# Patient Record
Sex: MALE | Race: WHITE | Employment: OTHER | URBAN - METROPOLITAN AREA
[De-identification: names, ages, dates, MRNs, and addresses within clinical notes are randomized per-mention and may not be internally consistent; named-entity substitution may affect disease eponyms.]

---

## 2017-03-03 ENCOUNTER — OFFICE VISIT (OUTPATIENT)
Dept: FAMILY MEDICINE CLINIC | Age: 52
End: 2017-03-03

## 2017-03-03 VITALS
OXYGEN SATURATION: 98 % | HEART RATE: 95 BPM | DIASTOLIC BLOOD PRESSURE: 99 MMHG | WEIGHT: 242 LBS | BODY MASS INDEX: 29.85 KG/M2 | SYSTOLIC BLOOD PRESSURE: 153 MMHG

## 2017-03-03 DIAGNOSIS — G89.29 CHRONIC BILATERAL LOW BACK PAIN WITHOUT SCIATICA: Primary | ICD-10-CM

## 2017-03-03 DIAGNOSIS — M54.50 CHRONIC BILATERAL LOW BACK PAIN WITHOUT SCIATICA: Primary | ICD-10-CM

## 2017-03-03 PROCEDURE — 99213 OFFICE O/P EST LOW 20 MIN: CPT | Performed by: FAMILY MEDICINE

## 2017-03-03 RX ORDER — BACLOFEN 10 MG/1
10 TABLET ORAL 3 TIMES DAILY
Qty: 90 TABLET | Refills: 0 | Status: SHIPPED | OUTPATIENT
Start: 2017-03-03 | End: 2017-03-28 | Stop reason: SDUPTHER

## 2017-03-06 RX ORDER — LISINOPRIL 40 MG/1
TABLET ORAL
Qty: 30 TABLET | Refills: 5 | Status: SHIPPED | OUTPATIENT
Start: 2017-03-06 | End: 2017-09-01 | Stop reason: SDUPTHER

## 2017-03-15 ENCOUNTER — TELEPHONE (OUTPATIENT)
Dept: FAMILY MEDICINE CLINIC | Age: 52
End: 2017-03-15

## 2017-03-28 DIAGNOSIS — G89.29 CHRONIC BILATERAL LOW BACK PAIN WITHOUT SCIATICA: ICD-10-CM

## 2017-03-28 DIAGNOSIS — M54.50 CHRONIC BILATERAL LOW BACK PAIN WITHOUT SCIATICA: ICD-10-CM

## 2017-03-28 RX ORDER — BACLOFEN 10 MG/1
TABLET ORAL
Qty: 90 TABLET | Refills: 3 | Status: SHIPPED | OUTPATIENT
Start: 2017-03-28 | End: 2017-05-05 | Stop reason: CLARIF

## 2017-04-04 RX ORDER — DULOXETIN HYDROCHLORIDE 30 MG/1
CAPSULE, DELAYED RELEASE ORAL
Qty: 90 CAPSULE | Refills: 1 | Status: SHIPPED | OUTPATIENT
Start: 2017-04-04 | End: 2017-10-01 | Stop reason: SDUPTHER

## 2017-04-24 ENCOUNTER — HOSPITAL ENCOUNTER (OUTPATIENT)
Dept: OTHER | Age: 52
Discharge: HOME | End: 2017-04-24
Attending: NEUROLOGICAL SURGERY | Admitting: NEUROLOGICAL SURGERY

## 2017-04-24 LAB
ALBUMIN SERPL-MCNC: 4.4 G/DL (ref 3.4–5)
ANION GAP SERPL CALCULATED.3IONS-SCNC: 14 MMOL/L (ref 3–16)
BUN BLDV-MCNC: 14 MG/DL (ref 7–20)
CALCIUM SERPL-MCNC: 9.7 MG/DL (ref 8.3–10.6)
CHLORIDE BLD-SCNC: 101 MMOL/L (ref 99–110)
CO2: 26 MMOL/L (ref 21–32)
CREAT SERPL-MCNC: 0.7 MG/DL (ref 0.9–1.3)
GFR AFRICAN AMERICAN: >60
GFR NON-AFRICAN AMERICAN: >60
GLUCOSE BLD-MCNC: 103 MG/DL (ref 70–99)
HCT VFR BLD CALC: 41.2 % (ref 40.5–52.5)
HEMOGLOBIN: 14 G/DL (ref 13.5–17.5)
MCH RBC QN AUTO: 29.5 PG (ref 26–34)
MCHC RBC AUTO-ENTMCNC: 33.9 G/DL (ref 31–36)
MCV RBC AUTO: 86.8 FL (ref 80–100)
PDW BLD-RTO: 14.1 % (ref 12.4–15.4)
PHOSPHORUS: 4.7 MG/DL (ref 2.5–4.9)
PLATELET # BLD: 229 K/UL (ref 135–450)
PMV BLD AUTO: 7.8 FL (ref 5–10.5)
POTASSIUM SERPL-SCNC: 4.3 MMOL/L (ref 3.5–5.1)
RBC # BLD: 4.75 M/UL (ref 4.2–5.9)
SODIUM BLD-SCNC: 141 MMOL/L (ref 136–145)
WBC # BLD: 9.6 K/UL (ref 4–11)

## 2017-04-24 RX ORDER — TIZANIDINE 4 MG/1
TABLET ORAL
Qty: 90 TABLET | Refills: 3 | Status: SHIPPED | OUTPATIENT
Start: 2017-04-24 | End: 2017-05-17 | Stop reason: SDUPTHER

## 2017-05-05 ENCOUNTER — OFFICE VISIT (OUTPATIENT)
Dept: FAMILY MEDICINE CLINIC | Age: 52
End: 2017-05-05

## 2017-05-05 VITALS
HEIGHT: 75 IN | TEMPERATURE: 98.5 F | HEART RATE: 85 BPM | DIASTOLIC BLOOD PRESSURE: 72 MMHG | SYSTOLIC BLOOD PRESSURE: 110 MMHG | BODY MASS INDEX: 28.91 KG/M2 | WEIGHT: 232.5 LBS | OXYGEN SATURATION: 97 %

## 2017-05-05 DIAGNOSIS — G89.29 CHRONIC BILATERAL LOW BACK PAIN WITHOUT SCIATICA: ICD-10-CM

## 2017-05-05 DIAGNOSIS — M54.50 CHRONIC BILATERAL LOW BACK PAIN WITHOUT SCIATICA: ICD-10-CM

## 2017-05-05 DIAGNOSIS — J45.20 MILD INTERMITTENT ASTHMA WITHOUT COMPLICATION: ICD-10-CM

## 2017-05-05 DIAGNOSIS — Z01.818 PREOP EXAMINATION: Primary | ICD-10-CM

## 2017-05-05 DIAGNOSIS — I10 ESSENTIAL HYPERTENSION: ICD-10-CM

## 2017-05-05 PROCEDURE — 99243 OFF/OP CNSLTJ NEW/EST LOW 30: CPT | Performed by: FAMILY MEDICINE

## 2017-05-05 ASSESSMENT — ENCOUNTER SYMPTOMS
BLOOD IN STOOL: 0
SHORTNESS OF BREATH: 0
ABDOMINAL PAIN: 0
EYE PAIN: 0
WHEEZING: 0

## 2017-05-17 ENCOUNTER — OFFICE VISIT (OUTPATIENT)
Dept: FAMILY MEDICINE CLINIC | Age: 52
End: 2017-05-17

## 2017-05-17 VITALS
HEIGHT: 76 IN | HEART RATE: 76 BPM | DIASTOLIC BLOOD PRESSURE: 74 MMHG | WEIGHT: 228.13 LBS | SYSTOLIC BLOOD PRESSURE: 137 MMHG | OXYGEN SATURATION: 98 % | BODY MASS INDEX: 27.78 KG/M2

## 2017-05-17 DIAGNOSIS — E78.2 MIXED HYPERLIPIDEMIA: ICD-10-CM

## 2017-05-17 DIAGNOSIS — Z12.5 SCREENING PSA (PROSTATE SPECIFIC ANTIGEN): ICD-10-CM

## 2017-05-17 DIAGNOSIS — M54.50 CHRONIC BILATERAL LOW BACK PAIN WITHOUT SCIATICA: ICD-10-CM

## 2017-05-17 DIAGNOSIS — G89.29 CHRONIC BILATERAL LOW BACK PAIN WITHOUT SCIATICA: ICD-10-CM

## 2017-05-17 DIAGNOSIS — I10 ESSENTIAL HYPERTENSION: Primary | ICD-10-CM

## 2017-05-17 DIAGNOSIS — Z12.11 COLON CANCER SCREENING: ICD-10-CM

## 2017-05-17 DIAGNOSIS — Z23 NEED FOR TDAP VACCINATION: ICD-10-CM

## 2017-05-17 LAB
CHOLESTEROL, TOTAL: 205 MG/DL (ref 0–199)
HDLC SERPL-MCNC: 26 MG/DL (ref 40–60)
LDL CHOLESTEROL CALCULATED: 130 MG/DL
PROSTATE SPECIFIC ANTIGEN: 0.75 NG/ML (ref 0–4)
TRIGL SERPL-MCNC: 243 MG/DL (ref 0–150)
VLDLC SERPL CALC-MCNC: 49 MG/DL

## 2017-05-17 PROCEDURE — 90471 IMMUNIZATION ADMIN: CPT | Performed by: FAMILY MEDICINE

## 2017-05-17 PROCEDURE — 90715 TDAP VACCINE 7 YRS/> IM: CPT | Performed by: FAMILY MEDICINE

## 2017-05-17 PROCEDURE — 99214 OFFICE O/P EST MOD 30 MIN: CPT | Performed by: FAMILY MEDICINE

## 2017-05-17 PROCEDURE — 36415 COLL VENOUS BLD VENIPUNCTURE: CPT | Performed by: FAMILY MEDICINE

## 2017-05-17 RX ORDER — OXYCODONE HYDROCHLORIDE 10 MG/1
TABLET ORAL
Refills: 0 | COMMUNITY
Start: 2017-05-06

## 2017-05-17 RX ORDER — TIZANIDINE 4 MG/1
4 TABLET ORAL EVERY 6 HOURS PRN
Qty: 120 TABLET | Refills: 3 | Status: SHIPPED | OUTPATIENT
Start: 2017-05-17 | End: 2017-09-05 | Stop reason: SDUPTHER

## 2017-05-17 ASSESSMENT — PATIENT HEALTH QUESTIONNAIRE - PHQ9
SUM OF ALL RESPONSES TO PHQ QUESTIONS 1-9: 16
5. POOR APPETITE OR OVEREATING: 2
3. TROUBLE FALLING OR STAYING ASLEEP: 2
9. THOUGHTS THAT YOU WOULD BE BETTER OFF DEAD, OR OF HURTING YOURSELF: 0
10. IF YOU CHECKED OFF ANY PROBLEMS, HOW DIFFICULT HAVE THESE PROBLEMS MADE IT FOR YOU TO DO YOUR WORK, TAKE CARE OF THINGS AT HOME, OR GET ALONG WITH OTHER PEOPLE: 2
2. FEELING DOWN, DEPRESSED OR HOPELESS: 2
7. TROUBLE CONCENTRATING ON THINGS, SUCH AS READING THE NEWSPAPER OR WATCHING TELEVISION: 2
6. FEELING BAD ABOUT YOURSELF - OR THAT YOU ARE A FAILURE OR HAVE LET YOURSELF OR YOUR FAMILY DOWN: 1
4. FEELING TIRED OR HAVING LITTLE ENERGY: 3
8. MOVING OR SPEAKING SO SLOWLY THAT OTHER PEOPLE COULD HAVE NOTICED. OR THE OPPOSITE, BEING SO FIGETY OR RESTLESS THAT YOU HAVE BEEN MOVING AROUND A LOT MORE THAN USUAL: 2
1. LITTLE INTEREST OR PLEASURE IN DOING THINGS: 2
SUM OF ALL RESPONSES TO PHQ9 QUESTIONS 1 & 2: 4

## 2017-06-12 RX ORDER — DULOXETIN HYDROCHLORIDE 60 MG/1
CAPSULE, DELAYED RELEASE ORAL
Qty: 90 CAPSULE | Refills: 1 | Status: SHIPPED | OUTPATIENT
Start: 2017-06-12 | End: 2017-11-25 | Stop reason: SDUPTHER

## 2017-06-28 ENCOUNTER — TELEPHONE (OUTPATIENT)
Dept: FAMILY MEDICINE CLINIC | Age: 52
End: 2017-06-28

## 2017-06-28 RX ORDER — LUBIPROSTONE 24 UG/1
24 CAPSULE, GELATIN COATED ORAL 2 TIMES DAILY WITH MEALS
Qty: 60 CAPSULE | Refills: 3 | Status: SHIPPED | OUTPATIENT
Start: 2017-06-28 | End: 2018-03-16 | Stop reason: ALTCHOICE

## 2017-07-07 ENCOUNTER — OFFICE VISIT (OUTPATIENT)
Dept: FAMILY MEDICINE CLINIC | Age: 52
End: 2017-07-07

## 2017-07-07 VITALS
TEMPERATURE: 98.3 F | WEIGHT: 228 LBS | OXYGEN SATURATION: 97 % | BODY MASS INDEX: 28.12 KG/M2 | DIASTOLIC BLOOD PRESSURE: 72 MMHG | HEART RATE: 87 BPM | SYSTOLIC BLOOD PRESSURE: 118 MMHG

## 2017-07-07 DIAGNOSIS — T40.2X5A CONSTIPATION DUE TO OPIOID THERAPY: Primary | ICD-10-CM

## 2017-07-07 DIAGNOSIS — K59.03 CONSTIPATION DUE TO OPIOID THERAPY: Primary | ICD-10-CM

## 2017-07-07 PROCEDURE — 99213 OFFICE O/P EST LOW 20 MIN: CPT | Performed by: FAMILY MEDICINE

## 2017-07-07 RX ORDER — LACTULOSE 10 G/15ML
SOLUTION ORAL
Qty: 3 BOTTLE | Refills: 5 | Status: SHIPPED | OUTPATIENT
Start: 2017-07-07 | End: 2019-01-17

## 2017-07-10 ENCOUNTER — TELEPHONE (OUTPATIENT)
Dept: FAMILY MEDICINE CLINIC | Age: 52
End: 2017-07-10

## 2017-07-10 ENCOUNTER — HOSPITAL ENCOUNTER (OUTPATIENT)
Dept: OTHER | Age: 52
Discharge: HOME | End: 2017-07-10
Attending: FAMILY MEDICINE | Admitting: FAMILY MEDICINE

## 2017-07-10 DIAGNOSIS — K59.03 CONSTIPATION DUE TO OPIOID THERAPY: ICD-10-CM

## 2017-07-10 DIAGNOSIS — K59.03 CONSTIPATION DUE TO OPIOID THERAPY: Primary | ICD-10-CM

## 2017-07-10 DIAGNOSIS — T40.2X5A CONSTIPATION DUE TO OPIOID THERAPY: Primary | ICD-10-CM

## 2017-07-10 DIAGNOSIS — T40.2X5A CONSTIPATION DUE TO OPIOID THERAPY: ICD-10-CM

## 2017-08-18 ENCOUNTER — TELEPHONE (OUTPATIENT)
Dept: FAMILY MEDICINE CLINIC | Age: 52
End: 2017-08-18

## 2017-08-22 ENCOUNTER — TELEPHONE (OUTPATIENT)
Dept: FAMILY MEDICINE CLINIC | Age: 52
End: 2017-08-22

## 2017-09-01 RX ORDER — LISINOPRIL 40 MG/1
TABLET ORAL
Qty: 30 TABLET | Refills: 5 | Status: SHIPPED | OUTPATIENT
Start: 2017-09-01 | End: 2018-02-27 | Stop reason: SDUPTHER

## 2017-09-05 DIAGNOSIS — M54.50 CHRONIC BILATERAL LOW BACK PAIN WITHOUT SCIATICA: ICD-10-CM

## 2017-09-05 DIAGNOSIS — G89.29 CHRONIC BILATERAL LOW BACK PAIN WITHOUT SCIATICA: ICD-10-CM

## 2017-09-05 RX ORDER — TIZANIDINE 4 MG/1
TABLET ORAL
Qty: 120 TABLET | Refills: 3 | Status: SHIPPED | OUTPATIENT
Start: 2017-09-05 | End: 2017-12-11 | Stop reason: SDUPTHER

## 2017-10-02 RX ORDER — DULOXETIN HYDROCHLORIDE 30 MG/1
CAPSULE, DELAYED RELEASE ORAL
Qty: 90 CAPSULE | Refills: 1 | Status: SHIPPED | OUTPATIENT
Start: 2017-10-02 | End: 2017-11-27 | Stop reason: SDUPTHER

## 2017-10-05 ENCOUNTER — TELEPHONE (OUTPATIENT)
Dept: FAMILY MEDICINE CLINIC | Age: 52
End: 2017-10-05

## 2017-10-11 ENCOUNTER — OFFICE VISIT (OUTPATIENT)
Dept: FAMILY MEDICINE CLINIC | Age: 52
End: 2017-10-11

## 2017-10-11 VITALS
BODY MASS INDEX: 28.31 KG/M2 | WEIGHT: 229.5 LBS | SYSTOLIC BLOOD PRESSURE: 145 MMHG | DIASTOLIC BLOOD PRESSURE: 85 MMHG | OXYGEN SATURATION: 95 % | HEART RATE: 79 BPM

## 2017-10-11 DIAGNOSIS — R79.89 LOW TESTOSTERONE: Primary | ICD-10-CM

## 2017-10-11 PROCEDURE — 99213 OFFICE O/P EST LOW 20 MIN: CPT | Performed by: FAMILY MEDICINE

## 2017-10-11 NOTE — PATIENT INSTRUCTIONS
Please read the healthy family handout that you were given and share it with your family. Please compare this printed medication list with your medications at home to be sure they are the same. If you have any medications that are different please contact us immediately at [phone]. Also review your allergies that we have listed, these may also include medications that you have not been able to tolerate, make sure everything listed is correct. If you have any allergies that are different please contact us immediately at [phone].

## 2017-10-11 NOTE — PROGRESS NOTES
Subjective:   He presents for follow-up of his low testosterone. He was diagnosed with low testosterone back in 2011 and he took Androderm for short period of time but stopped it for unclear reasons in 2014 his testosterone was low normal range. He has had low motivation ED and fatigue and his chronic pain doctor checked his blood work and he had significantly lower her total testosterone at 40 now. His PSA was normal at 0.7. He has been feeling depressed also which may be related to low testosterone as well        He is allergic to vioxx and phenergan [promethazine hcl]. Objective:   BP (!) 145/85 (Site: Right Arm, Position: Sitting, Cuff Size: Large Adult)   Pulse 79   Wt 229 lb 8 oz (104.1 kg)   SpO2 95%   BMI 28.31 kg/m²   No results found for this visit on 10/11/17. Exam:  Constitutional:  Well developed, well nourished, no acute distress, non-toxic appearance    HENT:  Atraumatic,oropharynx moist,  Neck-  no tenderness, supple   Respiratory:  No respiratory distress, normal breath sounds, no rales, no wheezing   Cardiovascular:  Normal rate, normal rhythm, no murmurs, no gallops, no rubs   GI:  Soft, nondistended, nontender, no organomegaly, no mass, no rebound, no guarding   Musculoskeletal:  No edema  Integument:  Well hydrated, no rash   Neurologic:  Alert & oriented x 3, no focal deficits noted   Psychiatric:  Speech and behavior appropriate, mildly depressed affect and mood   Assessment and Plan:  1. Low testosterone  Testosterone 4 MG/24HR PT24     Start on testosterone replacement therapy. We talked about testosterone pellets placed under the skin. Recheck in 1 month if still depressed will adjust depression pills. Explained to patient risk of taking testosterone replacement therapy. Recheck levels and monitor PSAs. Call or return to office prn if these symptoms worsen or fail to improve as anticipated. Avoid tobacco products exposure.  The Healthy Family Handout was given to the patient today.  Saravanan Keyes M.D.

## 2017-10-16 DIAGNOSIS — R79.89 LOW TESTOSTERONE: Primary | ICD-10-CM

## 2017-10-16 NOTE — TELEPHONE ENCOUNTER
Patient scheduled for Thursday and Friday morning - please place future orders and he will be in to have them drawn.

## 2017-10-17 ENCOUNTER — NURSE ONLY (OUTPATIENT)
Dept: FAMILY MEDICINE CLINIC | Age: 52
End: 2017-10-17

## 2017-10-17 DIAGNOSIS — R79.89 LOW TESTOSTERONE: ICD-10-CM

## 2017-10-17 PROCEDURE — 36415 COLL VENOUS BLD VENIPUNCTURE: CPT | Performed by: FAMILY MEDICINE

## 2017-10-17 NOTE — PROGRESS NOTES
Blood drawn per physician order. 21 gauge needle used. Location rt ac space w/o incident. Pressure applied until bleeding stopped. Bandaid applied. Patient instructed to call or return if problems with bleeding. 1 tubes drawn.

## 2017-10-18 ENCOUNTER — NURSE ONLY (OUTPATIENT)
Dept: FAMILY MEDICINE CLINIC | Age: 52
End: 2017-10-18

## 2017-10-18 DIAGNOSIS — R79.89 LOW TESTOSTERONE: Primary | ICD-10-CM

## 2017-10-18 PROCEDURE — 36415 COLL VENOUS BLD VENIPUNCTURE: CPT | Performed by: FAMILY MEDICINE

## 2017-10-19 LAB
SEX HORMONE BINDING GLOBULIN: 28 NMOL/L (ref 11–80)
TESTOSTERONE FREE-NONMALE: 3.9 PG/ML (ref 47–244)
TESTOSTERONE TOTAL: 20 NG/DL (ref 220–1000)

## 2017-10-20 LAB
SEX HORMONE BINDING GLOBULIN: 27 NMOL/L (ref 11–80)
TESTOSTERONE FREE-NONMALE: 4.4 PG/ML (ref 47–244)
TESTOSTERONE TOTAL: 22 NG/DL (ref 220–1000)

## 2017-10-26 ENCOUNTER — TELEPHONE (OUTPATIENT)
Dept: FAMILY MEDICINE CLINIC | Age: 52
End: 2017-10-26

## 2017-10-26 NOTE — TELEPHONE ENCOUNTER
I let the patient know that an appeal was started for the Androderm Patch and it will take up to 67 hourse for a decision. They do not contact us, they will send a letter to the patient.      I will follow up on this by Monday afternoon    [phone]  Ref# [id number]

## 2017-11-17 ENCOUNTER — OFFICE VISIT (OUTPATIENT)
Dept: FAMILY MEDICINE CLINIC | Age: 52
End: 2017-11-17

## 2017-11-17 VITALS
HEIGHT: 76 IN | DIASTOLIC BLOOD PRESSURE: 73 MMHG | HEART RATE: 79 BPM | SYSTOLIC BLOOD PRESSURE: 124 MMHG | TEMPERATURE: 99.6 F | BODY MASS INDEX: 28.18 KG/M2 | WEIGHT: 231.38 LBS

## 2017-11-17 DIAGNOSIS — Z23 NEED FOR INFLUENZA VACCINATION: ICD-10-CM

## 2017-11-17 DIAGNOSIS — Z23 NEED FOR PNEUMOCOCCAL VACCINE: ICD-10-CM

## 2017-11-17 DIAGNOSIS — E78.2 MIXED HYPERLIPIDEMIA: ICD-10-CM

## 2017-11-17 DIAGNOSIS — I10 ESSENTIAL HYPERTENSION: Primary | ICD-10-CM

## 2017-11-17 DIAGNOSIS — R79.89 LOW TESTOSTERONE: ICD-10-CM

## 2017-11-17 PROCEDURE — 90471 IMMUNIZATION ADMIN: CPT | Performed by: FAMILY MEDICINE

## 2017-11-17 PROCEDURE — 90670 PCV13 VACCINE IM: CPT | Performed by: FAMILY MEDICINE

## 2017-11-17 PROCEDURE — 90688 IIV4 VACCINE SPLT 0.5 ML IM: CPT | Performed by: FAMILY MEDICINE

## 2017-11-17 PROCEDURE — 90472 IMMUNIZATION ADMIN EACH ADD: CPT | Performed by: FAMILY MEDICINE

## 2017-11-17 PROCEDURE — 99214 OFFICE O/P EST MOD 30 MIN: CPT | Performed by: FAMILY MEDICINE

## 2017-11-17 NOTE — PROGRESS NOTES
Subjective:   He presents for follow up of his high blood pressure low testosterone hyperlipidemia and he wants to get a flu shot and recommended that he get a pneumonia shot. He finally did get testosterone started 10 days ago he really hasn't started feeling better just yet. His blood pressures have been running good we checked his annual cholesterol back in the spring which I reviewed today. He has a hard time walking and asked that we give him disability placard because of his back issues        He is allergic to vioxx and phenergan [promethazine hcl]. He   reports that he has been smoking Cigars. He has a 5.00 pack-year smoking history. He has never used smokeless tobacco. Review of systems negative for chest pain swelling shortness breath wheezing abdominal pain rectal bleeding  Objective:   /73 (Site: Left Arm, Position: Sitting, Cuff Size: Large Adult)   Pulse 79   Temp 99.6 °F (37.6 °C) (Oral)   Ht 6' 3.5\" (1.918 m)   Wt 231 lb 6 oz (105 kg)   BMI 28.54 kg/m²   BP Readings from Last 3 Encounters:   11/17/17 124/73   10/11/17 (!) 145/85   07/07/17 118/72     Physical Exam   Constitutional: He is oriented to person, place, and time. He appears well-developed and well-nourished. Non-toxic appearance. HENT:   Head: Normocephalic. Eyes: Conjunctivae are normal. Right eye exhibits no discharge. Left eye exhibits no discharge. No scleral icterus. Neck: Neck supple. Carotid bruit is not present. No thyroid mass and no thyromegaly present. Cardiovascular: Normal rate, regular rhythm and normal heart sounds. No murmur heard. Pulmonary/Chest: Breath sounds normal. No respiratory distress. He has no wheezes. He has no rales. Abdominal: Soft. He exhibits no distension and no mass. There is no hepatosplenomegaly. There is no tenderness. There is no rebound and no guarding. Musculoskeletal: He exhibits no edema. Lymphadenopathy:     He has no cervical adenopathy.         Right: No

## 2017-11-28 RX ORDER — DULOXETIN HYDROCHLORIDE 30 MG/1
CAPSULE, DELAYED RELEASE ORAL
Qty: 90 CAPSULE | Refills: 1 | Status: SHIPPED | OUTPATIENT
Start: 2017-11-28 | End: 2018-05-30 | Stop reason: SDUPTHER

## 2017-11-28 RX ORDER — DULOXETIN HYDROCHLORIDE 60 MG/1
CAPSULE, DELAYED RELEASE ORAL
Qty: 90 CAPSULE | Refills: 0 | Status: SHIPPED | OUTPATIENT
Start: 2017-11-28 | End: 2018-04-04 | Stop reason: SDUPTHER

## 2017-12-11 DIAGNOSIS — M54.50 CHRONIC BILATERAL LOW BACK PAIN WITHOUT SCIATICA: ICD-10-CM

## 2017-12-11 DIAGNOSIS — G89.29 CHRONIC BILATERAL LOW BACK PAIN WITHOUT SCIATICA: ICD-10-CM

## 2017-12-12 RX ORDER — TIZANIDINE 4 MG/1
TABLET ORAL
Qty: 120 TABLET | Refills: 3 | Status: SHIPPED | OUTPATIENT
Start: 2017-12-12 | End: 2018-03-26 | Stop reason: SDUPTHER

## 2018-02-27 RX ORDER — LISINOPRIL 40 MG/1
TABLET ORAL
Qty: 30 TABLET | Refills: 5 | Status: SHIPPED | OUTPATIENT
Start: 2018-02-27 | End: 2018-08-14 | Stop reason: SDUPTHER

## 2018-03-16 ENCOUNTER — OFFICE VISIT (OUTPATIENT)
Dept: FAMILY MEDICINE CLINIC | Age: 53
End: 2018-03-16

## 2018-03-16 VITALS
WEIGHT: 232.25 LBS | SYSTOLIC BLOOD PRESSURE: 136 MMHG | DIASTOLIC BLOOD PRESSURE: 76 MMHG | BODY MASS INDEX: 28.65 KG/M2 | HEART RATE: 83 BPM | TEMPERATURE: 99.1 F | OXYGEN SATURATION: 99 %

## 2018-03-16 DIAGNOSIS — K59.03 DRUG-INDUCED CONSTIPATION: ICD-10-CM

## 2018-03-16 DIAGNOSIS — N32.0 BLADDER OUTLET OBSTRUCTION: ICD-10-CM

## 2018-03-16 DIAGNOSIS — R79.89 LOW TESTOSTERONE: ICD-10-CM

## 2018-03-16 DIAGNOSIS — N52.9 ERECTILE DYSFUNCTION, UNSPECIFIED ERECTILE DYSFUNCTION TYPE: ICD-10-CM

## 2018-03-16 DIAGNOSIS — Z12.5 SCREENING PSA (PROSTATE SPECIFIC ANTIGEN): ICD-10-CM

## 2018-03-16 DIAGNOSIS — G47.01 INSOMNIA DUE TO MEDICAL CONDITION: Primary | ICD-10-CM

## 2018-03-16 PROCEDURE — 99214 OFFICE O/P EST MOD 30 MIN: CPT | Performed by: FAMILY MEDICINE

## 2018-03-16 RX ORDER — TRAZODONE HYDROCHLORIDE 50 MG/1
50 TABLET ORAL NIGHTLY
Qty: 30 TABLET | Refills: 3 | Status: SHIPPED | OUTPATIENT
Start: 2018-03-16 | End: 2018-07-06 | Stop reason: SDUPTHER

## 2018-03-26 ENCOUNTER — NURSE ONLY (OUTPATIENT)
Dept: FAMILY MEDICINE CLINIC | Age: 53
End: 2018-03-26

## 2018-03-26 DIAGNOSIS — M54.50 CHRONIC BILATERAL LOW BACK PAIN WITHOUT SCIATICA: ICD-10-CM

## 2018-03-26 DIAGNOSIS — G89.29 CHRONIC BILATERAL LOW BACK PAIN WITHOUT SCIATICA: ICD-10-CM

## 2018-03-26 DIAGNOSIS — R79.89 LOW TESTOSTERONE: ICD-10-CM

## 2018-03-26 LAB — PROSTATE SPECIFIC ANTIGEN: 1.12 NG/ML (ref 0–4)

## 2018-03-26 PROCEDURE — 36415 COLL VENOUS BLD VENIPUNCTURE: CPT | Performed by: FAMILY MEDICINE

## 2018-03-27 RX ORDER — TIZANIDINE 4 MG/1
TABLET ORAL
Qty: 120 TABLET | Refills: 3 | Status: SHIPPED | OUTPATIENT
Start: 2018-03-27 | End: 2018-07-06 | Stop reason: SDUPTHER

## 2018-03-28 ENCOUNTER — HOSPITAL ENCOUNTER (OUTPATIENT)
Dept: OTHER | Age: 53
Discharge: HOME | End: 2018-03-28
Attending: FAMILY MEDICINE | Admitting: FAMILY MEDICINE

## 2018-03-28 LAB — PROSTATE SPECIFIC ANTIGEN: 1.17 NG/ML (ref 0–4)

## 2018-03-29 LAB
SEX HORMONE BINDING GLOBULIN: 33 NMOL/L (ref 11–80)
TESTOSTERONE FREE-NONMALE: 3.4 PG/ML (ref 47–244)
TESTOSTERONE TOTAL: 19 NG/DL (ref 220–1000)

## 2018-04-02 DIAGNOSIS — R79.89 LOW TESTOSTERONE: ICD-10-CM

## 2018-04-03 ENCOUNTER — TELEPHONE (OUTPATIENT)
Dept: FAMILY MEDICINE CLINIC | Age: 53
End: 2018-04-03

## 2018-04-04 LAB
SEX HORMONE BINDING GLOBULIN: 29 NMOL/L (ref 11–80)
TESTOSTERONE FREE-NONMALE: 1.5 PG/ML (ref 47–244)
TESTOSTERONE TOTAL: 8 NG/DL (ref 220–1000)

## 2018-04-05 RX ORDER — DULOXETIN HYDROCHLORIDE 60 MG/1
CAPSULE, DELAYED RELEASE ORAL
Qty: 90 CAPSULE | Refills: 1 | Status: SHIPPED | OUTPATIENT
Start: 2018-04-05 | End: 2018-07-26 | Stop reason: ALTCHOICE

## 2018-05-19 ENCOUNTER — OFFICE VISIT (OUTPATIENT)
Dept: FAMILY MEDICINE CLINIC | Age: 53
End: 2018-05-19

## 2018-05-19 VITALS
HEART RATE: 68 BPM | SYSTOLIC BLOOD PRESSURE: 107 MMHG | WEIGHT: 224.5 LBS | OXYGEN SATURATION: 98 % | BODY MASS INDEX: 27.69 KG/M2 | TEMPERATURE: 98.8 F | DIASTOLIC BLOOD PRESSURE: 68 MMHG

## 2018-05-19 DIAGNOSIS — F32.2 SEVERE SINGLE CURRENT EPISODE OF MAJOR DEPRESSIVE DISORDER, WITHOUT PSYCHOTIC FEATURES (HCC): ICD-10-CM

## 2018-05-19 DIAGNOSIS — R79.89 LOW TESTOSTERONE: Primary | ICD-10-CM

## 2018-05-19 PROCEDURE — 99213 OFFICE O/P EST LOW 20 MIN: CPT | Performed by: FAMILY MEDICINE

## 2018-05-19 RX ORDER — TESTOSTERONE CYPIONATE 200 MG/ML
200 INJECTION INTRAMUSCULAR
Qty: 2 ML | Refills: 2 | Status: SHIPPED | OUTPATIENT
Start: 2018-05-19 | End: 2018-08-16 | Stop reason: SDUPTHER

## 2018-05-22 ENCOUNTER — TELEPHONE (OUTPATIENT)
Dept: ORTHOPEDIC SURGERY | Age: 53
End: 2018-05-22

## 2018-05-22 ENCOUNTER — NURSE ONLY (OUTPATIENT)
Dept: FAMILY MEDICINE CLINIC | Age: 53
End: 2018-05-22

## 2018-05-22 DIAGNOSIS — Z79.899 MEDICATION MANAGEMENT: Primary | ICD-10-CM

## 2018-05-22 PROCEDURE — 99999 PR OFFICE/OUTPT VISIT,PROCEDURE ONLY: CPT | Performed by: FAMILY MEDICINE

## 2018-05-30 RX ORDER — DULOXETIN HYDROCHLORIDE 30 MG/1
CAPSULE, DELAYED RELEASE ORAL
Qty: 90 CAPSULE | Refills: 1 | Status: SHIPPED | OUTPATIENT
Start: 2018-05-30 | End: 2018-07-26 | Stop reason: ALTCHOICE

## 2018-05-31 DIAGNOSIS — Z79.899 MEDICATION MANAGEMENT: Primary | ICD-10-CM

## 2018-06-04 ENCOUNTER — TELEPHONE (OUTPATIENT)
Dept: FAMILY MEDICINE CLINIC | Age: 53
End: 2018-06-04

## 2018-06-04 RX ORDER — SERTRALINE HYDROCHLORIDE 100 MG/1
100 TABLET, FILM COATED ORAL DAILY
Qty: 30 TABLET | Refills: 3 | Status: SHIPPED | OUTPATIENT
Start: 2018-06-04 | End: 2018-08-16 | Stop reason: ALTCHOICE

## 2018-06-14 ENCOUNTER — OFFICE VISIT (OUTPATIENT)
Dept: FAMILY MEDICINE CLINIC | Age: 53
End: 2018-06-14

## 2018-06-14 VITALS
SYSTOLIC BLOOD PRESSURE: 108 MMHG | DIASTOLIC BLOOD PRESSURE: 68 MMHG | OXYGEN SATURATION: 98 % | TEMPERATURE: 98.6 F | BODY MASS INDEX: 28.28 KG/M2 | HEART RATE: 86 BPM | WEIGHT: 229.25 LBS

## 2018-06-14 DIAGNOSIS — K08.89 PAIN, DENTAL: ICD-10-CM

## 2018-06-14 DIAGNOSIS — R59.0 ANTERIOR CERVICAL LYMPHADENOPATHY: Primary | ICD-10-CM

## 2018-06-14 PROCEDURE — 99213 OFFICE O/P EST LOW 20 MIN: CPT | Performed by: NURSE PRACTITIONER

## 2018-06-14 RX ORDER — AMOXICILLIN AND CLAVULANATE POTASSIUM 875; 125 MG/1; MG/1
1 TABLET, FILM COATED ORAL 2 TIMES DAILY
Qty: 20 TABLET | Refills: 0 | Status: SHIPPED | OUTPATIENT
Start: 2018-06-14 | End: 2018-06-24

## 2018-06-14 ASSESSMENT — ENCOUNTER SYMPTOMS
SORE THROAT: 0
NAUSEA: 0
TROUBLE SWALLOWING: 0
RHINORRHEA: 0
VOICE CHANGE: 0
FACIAL SWELLING: 0
COUGH: 0
SHORTNESS OF BREATH: 0
WHEEZING: 0

## 2018-06-27 ENCOUNTER — TELEPHONE (OUTPATIENT)
Dept: FAMILY MEDICINE CLINIC | Age: 53
End: 2018-06-27

## 2018-07-03 ENCOUNTER — HOSPITAL ENCOUNTER (OUTPATIENT)
Dept: OTHER | Age: 53
Discharge: HOME | End: 2018-07-03
Attending: ANESTHESIOLOGY | Admitting: ANESTHESIOLOGY

## 2018-07-03 DIAGNOSIS — M51.24 THORACIC DISC HERNIATION: ICD-10-CM

## 2018-07-03 DIAGNOSIS — G89.29 CHRONIC LOW BACK PAIN: ICD-10-CM

## 2018-07-03 DIAGNOSIS — M54.50 CHRONIC LOW BACK PAIN: ICD-10-CM

## 2018-07-06 DIAGNOSIS — G89.29 CHRONIC BILATERAL LOW BACK PAIN WITHOUT SCIATICA: ICD-10-CM

## 2018-07-06 DIAGNOSIS — M54.50 CHRONIC BILATERAL LOW BACK PAIN WITHOUT SCIATICA: ICD-10-CM

## 2018-07-06 DIAGNOSIS — G47.01 INSOMNIA DUE TO MEDICAL CONDITION: ICD-10-CM

## 2018-07-06 RX ORDER — TIZANIDINE 4 MG/1
TABLET ORAL
Qty: 120 TABLET | Refills: 1 | Status: SHIPPED | OUTPATIENT
Start: 2018-07-06 | End: 2018-08-16 | Stop reason: ALTCHOICE

## 2018-07-06 RX ORDER — TRAZODONE HYDROCHLORIDE 50 MG/1
50 TABLET ORAL NIGHTLY
Qty: 30 TABLET | Refills: 1 | Status: SHIPPED | OUTPATIENT
Start: 2018-07-06 | End: 2018-07-26 | Stop reason: ALTCHOICE

## 2018-07-26 ENCOUNTER — OFFICE VISIT (OUTPATIENT)
Dept: FAMILY MEDICINE CLINIC | Age: 53
End: 2018-07-26

## 2018-07-26 VITALS
WEIGHT: 229 LBS | DIASTOLIC BLOOD PRESSURE: 70 MMHG | HEART RATE: 79 BPM | BODY MASS INDEX: 28.25 KG/M2 | OXYGEN SATURATION: 98 % | SYSTOLIC BLOOD PRESSURE: 124 MMHG

## 2018-07-26 DIAGNOSIS — G89.29 CHRONIC BILATERAL LOW BACK PAIN WITHOUT SCIATICA: Primary | ICD-10-CM

## 2018-07-26 DIAGNOSIS — F32.2 SEVERE SINGLE CURRENT EPISODE OF MAJOR DEPRESSIVE DISORDER, WITHOUT PSYCHOTIC FEATURES (HCC): ICD-10-CM

## 2018-07-26 DIAGNOSIS — F41.9 ANXIETY: ICD-10-CM

## 2018-07-26 DIAGNOSIS — M54.50 CHRONIC BILATERAL LOW BACK PAIN WITHOUT SCIATICA: Primary | ICD-10-CM

## 2018-07-26 PROCEDURE — 99213 OFFICE O/P EST LOW 20 MIN: CPT | Performed by: NURSE PRACTITIONER

## 2018-07-26 RX ORDER — ALPRAZOLAM 0.5 MG/1
TABLET ORAL
Qty: 4 TABLET | Refills: 0 | Status: SHIPPED | OUTPATIENT
Start: 2018-07-26 | End: 2018-09-05 | Stop reason: SDUPTHER

## 2018-07-26 RX ORDER — CITALOPRAM 10 MG/1
10 TABLET ORAL DAILY
Qty: 30 TABLET | Refills: 3 | Status: SHIPPED | OUTPATIENT
Start: 2018-07-26 | End: 2018-08-16 | Stop reason: SDUPTHER

## 2018-07-26 ASSESSMENT — ENCOUNTER SYMPTOMS
ALLERGIC/IMMUNOLOGIC NEGATIVE: 1
EYES NEGATIVE: 1
RESPIRATORY NEGATIVE: 1
BACK PAIN: 1

## 2018-07-26 NOTE — PROGRESS NOTES
Packs/day: 0.25     Years: 20.00     Types: Cigars    Smokeless tobacco: Never Used    Alcohol use Yes      Comment: occassional       Objective:   /70   Pulse 79   Wt 229 lb (103.9 kg)   SpO2 98%   BMI 28.25 kg/m²     Physical Exam   Constitutional: He is oriented to person, place, and time. Vital signs are normal. He appears well-developed and well-nourished. He is cooperative. He does not have a sickly appearance. No distress. HENT:   Head: Normocephalic and atraumatic. Eyes: Conjunctivae and EOM are normal.   Neck: Neck supple. Cardiovascular: Normal rate, regular rhythm, S1 normal, S2 normal, normal heart sounds, intact distal pulses and normal pulses. Exam reveals no gallop. No murmur heard. Pulmonary/Chest: Effort normal and breath sounds normal. No accessory muscle usage. No respiratory distress. Abdominal: Soft. Bowel sounds are normal.   Neurological: He is alert and oriented to person, place, and time. Skin: Skin is warm and dry. No rash noted. Psychiatric: His speech is normal and behavior is normal. Thought content is not paranoid and not delusional. He exhibits a depressed mood. He expresses no homicidal and no suicidal ideation. He expresses no suicidal plans and no homicidal plans. Assessment/Plan:   1. Chronic bilateral low back pain without sciatica  Patient presents today requesting medication to help him endure long car ride. Patient has severe back pain related to multi-level degenerative disc disease and prior surgeries. Patient has most recently taken xanax to help with claustrophobia and did quite well. I advised patient that concern with him taking a benzodiazepine with his current pain medication including hydromorphone and OxyIR is the concern for over sedation and even respiratory depression. I advised patient to go to local health department Narcan can in the event he would become oversedated.   Advised patient not to take more than one Xanax a day and that he may take half hour prior to car ride. Controlled Substances Monitoring:     RX Monitoring 7/26/2018   Attestation The Prescription Monitoring Report for this patient was reviewed today. Documentation No signs of potential drug abuse or diversion identified.     - ALPRAZolam (XANAX) 0.5 MG tablet; 1 tablet 1/2 hour prior to long car ride. .  Dispense: 4 tablet; Refill: 0    2. Anxiety  See note above. - ALPRAZolam (XANAX) 0.5 MG tablet; 1 tablet 1/2 hour prior to long car ride. .  Dispense: 4 tablet; Refill: 0    3. Severe single current episode of major depressive disorder, without psychotic features [facility])  Patient also with complaints of increased depression. Patient reports depressed mood for several years with no improvement with sertraline. Patient denies any homicidal or suicidal ideations. Reviewed recent genetic testing and recommend patient taper down on sertraline, taking 100 mg daily ×3 days then 50 mg by mouth daily ×2 days then starting Celexa 10 mg by mouth daily. Advised on possible side effects and for patient to call with any problems or concerns. Patient verbalized understanding and agreeable to plan. - citalopram (CELEXA) 10 MG tablet; Take 1 tablet by mouth daily  Dispense: 30 tablet;  Refill: 3

## 2018-07-26 NOTE — PATIENT INSTRUCTIONS
Please read the healthy family handout that you were given and share it with your family. Please compare this printed medication list with your medications at home to be sure they are the same. If you have any medications that are different please contact us immediately at [phone]. Also review your allergies that we have listed, these may also include medications that you have not been able to tolerate, make sure everything listed is correct. If you have any allergies that are different please contact us immediately at [phone]. Patient Education      reduce sertraline 100 mg daily X 3 days then 50 mg daily X 2 days then start the celexa.  a Narcan kit at [facility] department to have on hand while you are taking xanax in addition to current pain medication. Depression and Chronic Disease: Care Instructions  Your Care Instructions    A chronic disease is one that you have for a long time. Some chronic diseases can be controlled, but they usually cannot be cured. Depression is common in people with chronic diseases, but it often goes unnoticed. Many people have concerns about seeking treatment for a mental health problem. You may think it's a sign of weakness, or you don't want people to know about it. It's important to overcome these reasons for not seeking treatment. Treating depression or anxiety is good for your health. Follow-up care is a key part of your treatment and safety. Be sure to make and go to all appointments, and call your doctor if you are having problems. It's also a good idea to know your test results and keep a list of the medicines you take. How can you care for yourself at home? Watch for symptoms of depression  The symptoms of depression are often subtle at first. You may think they are caused by your disease rather than depression. Or you may think it is normal to be depressed when you have a chronic disease.   If you are depressed you may:  · Feel sad or hopeless. · Feel guilty or worthless. · Not enjoy the things you used to enjoy. · Feel hopeless, as though life is not worth living. · Have trouble thinking or remembering. · Have low energy, and you may not eat or sleep well. · Pull away from others. · Think often about death or killing yourself. (Keep the numbers for these national suicide hotlines: 6-908-832-TALK [1-155.463.8989] and 2-983-CILMYWP [1-100.568.8912]. )  Get treatment  By treating your depression, you can feel more hopeful and have more energy. If you feel better, you may take better care of yourself, so your health may improve. · Talk to your doctor if you have any changes in mood during treatment for your disease. · Ask your doctor for help. Counseling, antidepressant medicine, or a combination of the two can help most people with depression. Often a combination works best. Counseling can also help you cope with having a chronic disease. When should you call for help? Call 911 anytime you think you may need emergency care. For example, call if:    · You feel like hurting yourself or someone else.     · Someone you know has depression and is about to attempt or is attempting suicide.   [facility] your doctor now or seek immediate medical care if:    · You hear voices.     · Someone you know has depression and:  ¨ Starts to give away his or her possessions. ¨ Uses illegal drugs or drinks alcohol heavily. ¨ Talks or writes about death, including writing suicide notes or talking about guns, knives, or pills. ¨ Starts to spend a lot of time alone. ¨ Acts very aggressively or suddenly appears calm.    Watch closely for changes in your health, and be sure to contact your doctor if:    · You do not get better as expected. Where can you learn more? Go to [email or web address] org and sign in to your [employer] account.  Enter S141 in the ZAPR box to learn more about \"Depression and Chronic Disease: Care bleeding or blood clotting disorder;  · liver or kidney disease;  · narrow-angle glaucoma;  · seizures or epilepsy;  · heart disease, heart failure, a heart rhythm disorder, slow heartbeats, or recent history of heart attack;  · personal or family history of Long QT syndrome;  · an electrolyte imbalance (such as low levels of potassium or magnesium in your blood);  · bipolar disorder (manic depression); or  · a history of drug abuse or suicidal thoughts. Some young people have thoughts about suicide when first taking an antidepressant. Your doctor should check your progress at regular visits. Your family or other caregivers should also be alert to changes in your mood or symptoms. Taking an SSRI antidepressant during pregnancy may cause serious lung problems or other complications in the baby. However, you may have a relapse of depression if you stop taking your antidepressant. Tell your doctor right away if you become pregnant. Do not start or stop taking this medicine during pregnancy without your doctor's advice. Citalopram can pass into breast milk and may harm a nursing baby. You should not breast-feed while you are using citalopram.  Do not give this medicine to anyone under 25years old without medical advice. Citalopram is not approved for use in children. How should I take citalopram?  Follow all directions on your prescription label. Your doctor may occasionally change your dose. Do not use this medicine in larger or smaller amounts or for longer than recommended. Measure liquid medicine with the dosing syringe provided, or with a special dose-measuring spoon or medicine cup. If you do not have a dose-measuring device, ask your pharmacist for one. It may take up to 4 weeks before your symptoms improve. Keep using the medication as directed and tell your doctor if your symptoms do not improve. Do not stop using citalopram suddenly, or you could have unpleasant withdrawal symptoms.  Ask your doctor how serotonin in the body --agitation, hallucinations, fever, fast heart rate, overactive reflexes, nausea, vomiting, diarrhea, loss of coordination, fainting; or  · low levels of sodium in the body --headache, confusion, slurred speech, severe weakness, vomiting, feeling unsteady. Common side effects may include:  · problems with memory or concentration;  · headache, drowsiness;  · dry mouth, increased sweating;  · numbness or tingling;  · increased appetite, nausea, diarrhea, gas;  · fast heartbeats, feeling shaky;  · sleep problems (insomnia), feeling tired;  · cold symptoms such as stuffy nose, sneezing, sore throat;  · changes in weight; or  · difficulty having an orgasm. This is not a complete list of side effects and others may occur. Call your doctor for medical advice about side effects. You may report side effects to FDA at 3-215-FDA-5964. What other drugs will affect citalopram?  Taking citalopram with other drugs that make you sleepy or slow your breathing can cause dangerous side effects or death. Ask your doctor before taking a sleeping pill, narcotic pain medicine, prescription cough medicine, a muscle relaxer, or medicine for anxiety, depression, or seizures. Many drugs can interact with citalopram. Not all possible interactions are listed here. Tell your doctor about all your current medicines and any you start or stop using, especially:  · cimetidine;  · lithium;  · Debbi's wort;  · tryptophan (sometimes called L-tryptophan);  · a blood thinner (warfarin, Coumadin, Jantoven);  · any other antidepressant;  · heart medication;  · medicine to treat a psychiatric disorder; or  · \"triptan\" migraine headache medicine. This list is not complete and many other drugs can interact with citalopram. This includes prescription and over-the-counter medicines, vitamins, and herbal products. Give a list of all your medicines to any healthcare provider who treats you. Where can I get more information?   Your rock. [address] any warranty or liability for your use of this information. Patient Education          alprazolam  Pronunciation:  nagi martinez mukherjee  Brand:  Niravam, Xanax, Xanax XR  What is the most important information I should know about alprazolam?  You should not use this medicine if you have narrow-angle glaucoma, if you also take itraconazole or ketoconazole, or if you are allergic to alprazolam or similar medicines (Valium, Ativan, Tranxene, and others). Do not use alprazolam if you are pregnant. This medicine can cause birth defects or life-threatening withdrawal symptoms in a . Alprazolam may be habit-forming. Misuse of habit-forming medicine can cause addiction, overdose, or death. What is alprazolam?  Alprazolam is a benzodiazepine (ginette-juan-dye-AZE-eh-peen). Alprazolam affects chemicals in the brain that may be unbalanced in people with anxiety. Alprazolam is used to treat anxiety disorders, panic disorders, and anxiety caused by depression. Alprazolam may also be used for purposes not listed in this medication guide. What should I discuss with my healthcare provider before taking alprazolam?  It is dangerous to purchase alprazolam on the Internet or from vendors outside the [place]. Medications distributed from [employer] may contain dangerous ingredients, or may not be distributed by a licensed pharmacy. The sale and distribution of alprazolam outside the [place] does not comply with the regulations of the Food and Drug Administration (FDA) for the safe use of this medication. You should not take alprazolam if you have:  · narrow-angle glaucoma;  · if you are also taking itraconazole or ketoconazole; or  · if you are allergic to alprazolam or to other benzodiazepines, such as chlordiazepoxide (Librium), clorazepate (Tranxene), diazepam (Valium), lorazepam (Ativan), or oxazepam (Serax).   To make sure alprazolam is safe for you, tell your doctor or hurting yourself;  · racing thoughts, increased energy, unusual risk-taking behavior;  · confusion, agitation, hostility, hallucinations;  · uncontrolled muscle movements, tremor, seizure (convulsions); or  · pounding heartbeats or fluttering in your chest.  Common side effects may include:  · drowsiness, feeling tired;  · slurred speech, lack of balance or coordination;  · memory problems; or  · feeling anxious early in the morning. This is not a complete list of side effects and others may occur. Call your doctor for medical advice about side effects. You may report side effects to FDA at 4-296-WVB-1134. What other drugs will affect alprazolam?  Taking alprazolam with other drugs that make you sleepy or slow your breathing can cause dangerous side effects or death. Ask your doctor before taking a sleeping pill, narcotic pain medicine, prescription cough medicine, a muscle relaxer, or medicine for anxiety, depression, or seizures. Tell your doctor about all your current medicines and any you start or stop using, especially:  · cimetidine;  · digoxin;  · fluvoxamine;  · nefazodone;  · ritonavir or other medicines to treat HIV or AIDS; or  · antifungal medicine --fluconazole, voriconazole. This list is not complete. Other drugs may interact with alprazolam, including prescription and over-the-counter medicines, vitamins, and herbal products. Not all possible interactions are listed in this medication guide. Where can I get more information? Your pharmacist can provide more information about alprazolam.    Remember, keep this and all other medicines out of the reach of children, never share your medicines with others, and use this medication only for the indication prescribed. Every effort has been made to ensure that the information provided by [address] is accurate, up-to-date, and complete, but no guarantee is made to that effect.  Drug information contained herein may be time sensitive. [employer] information has been compiled for use by healthcare practitioners and consumers in the [place] and therefore [employer] does not warrant that uses outside of the [place] are appropriate, unless specifically indicated otherwise. [facility] drug information does not endorse drugs, diagnose patients or recommend therapy. [facility] drug information is an informational resource designed to assist licensed healthcare practitioners in caring for their patients and/or to serve consumers viewing this service as a supplement to, and not a substitute for, the expertise, skill, knowledge and judgment of healthcare practitioners. The absence of a warning for a given drug or drug combination in no way should be construed to indicate that the drug or drug combination is safe, effective or appropriate for any given patient. [facility] does not assume any responsibility for any aspect of healthcare administered with the aid of information [facility] provides. The information contained herein is not intended to cover all possible uses, directions, precautions, warnings, drug interactions, allergic reactions, or adverse effects. If you have questions about the drugs you are taking, check with your doctor, nurse or pharmacist.  Copyright 3009-4829 [address]: 10.05. Revision date: 9/28/2016. Care instructions adapted under license by [facility] ([facility]). If you have questions about a medical condition or this instruction, always ask your healthcare professional. [place] [address] any warranty or liability for your use of this information. Patient Education          naloxone (injection)  Pronunciation:  nah LOX one  Brand:  Evzio  What is the most important information I should know about naloxone? In an emergency situation it may not be possible to tell your caregivers about your health conditions.  Make sure any doctor caring for you afterward knows you have received this medicine. What is naloxone? Naloxone blocks or reverses the effects of opioid medication, including extreme drowsiness, slowed breathing, or loss of consciousness. An opioid is sometimes called a narcotic. Naloxone is used to treat a narcotic overdose in an emergency situation. This medicine should not be used in place of emergency medical care for an overdose. Naloxone is also used to help diagnose whether a person has used an overdose of an opioid. Naloxone may also be used for purposes not listed in this medication guide. What should I discuss with my health care provider before receiving naloxone? You should not be treated with naloxone if you are allergic to it. If possible before you receive a naloxone injection, tell your doctor if you have heart disease. It is not known whether this medicine will harm an unborn baby. Tell your doctor if you are pregnant. It is not known whether naloxone passes into breast milk or if it could harm a nursing baby. Tell your doctor if you are breast-feeding a baby. In an emergency situation it may not be possible to tell your caregivers if you are pregnant or breast-feeding. Make sure any doctor caring for your pregnancy or your baby knows you have received this medicine. How is naloxone given? Naloxone is injected into a muscle, under the skin, or into a vein through an IV. The injection may be given by a healthcare provider, emergency medical provider, or a family member or caregiver who is trained to properly give a naloxone injection. If you are a caregiver or family member giving a naloxone injection, read all instructions when you first get this medicine. If provided, use the \"\" device to practice giving an injection so you will know how to do it in an emergency. Ask your doctor or pharmacist if you have any questions. Be sure you know how to recognize the signs of an opioid overdose in the person you are caring for.  Overdose symptoms may include: · slowed breathing, or no breathing;  · very small or pinpoint pupils in the eyes;  · slow heartbeats; or  · extreme drowsiness, especially if you are unable to wake the person from sleep. Even if you are not sure an opioid overdose has occurred, if the person is not breathing or is unresponsive, give the naloxone injection right away and then seek emergency medical care. Do not assume that an overdose episode has ended if symptoms improve. You must get emergency help after giving a naloxone injection. Naloxone injected into a muscle is given in the outer thigh. In an emergency, you may give an injection through the person's clothing. After injecting naloxone, stay with the person and watch for continued signs of overdose. You may need to give another injection every 2 to 3 minutes until emergency help arrives. Follow all medication instructions carefully. Each [employer] auto-injector is for one use only. Throw away after one use, even if there is still some medicine left in it after injecting a dose. Store at room temperature away from moisture and heat. Keep the auto-injector in its outer case until you are ready to use it. Do not use the medicine if it has changed colors or has particles in it. Call your pharmacist for new medication. What happens if I miss a dose? Because you will receive naloxone in an emergency situation, you are not likely to miss a dose. What happens if I overdose? Seek emergency medical attention or call the Poison Help line at 1-433.598.1038. What should I avoid while using naloxone? Avoid leaving a person alone after giving him or her a naloxone injection. An overdose can impair a person's thinking or reactions. What are the possible side effects of naloxone? Get emergency medical help if you have signs of an allergic reaction: hives; difficult breathing; swelling of your face, lips, tongue, or throat.   Because naloxone reverses opioid effects, this medicine may cause sudden

## 2018-08-14 RX ORDER — LISINOPRIL 40 MG/1
TABLET ORAL
Qty: 30 TABLET | Refills: 5 | Status: SHIPPED | OUTPATIENT
Start: 2018-08-14 | End: 2019-01-04 | Stop reason: SDUPTHER

## 2018-08-15 ENCOUNTER — TELEPHONE (OUTPATIENT)
Dept: PRIMARY CARE CLINIC | Age: 53
End: 2018-08-15

## 2018-08-16 ENCOUNTER — OFFICE VISIT (OUTPATIENT)
Dept: FAMILY MEDICINE CLINIC | Age: 53
End: 2018-08-16

## 2018-08-16 VITALS
BODY MASS INDEX: 28.64 KG/M2 | WEIGHT: 232.2 LBS | DIASTOLIC BLOOD PRESSURE: 75 MMHG | HEART RATE: 86 BPM | TEMPERATURE: 99.3 F | OXYGEN SATURATION: 97 % | SYSTOLIC BLOOD PRESSURE: 133 MMHG

## 2018-08-16 DIAGNOSIS — F32.2 SEVERE SINGLE CURRENT EPISODE OF MAJOR DEPRESSIVE DISORDER, WITHOUT PSYCHOTIC FEATURES (HCC): ICD-10-CM

## 2018-08-16 DIAGNOSIS — M54.50 CHRONIC BILATERAL LOW BACK PAIN WITHOUT SCIATICA: ICD-10-CM

## 2018-08-16 DIAGNOSIS — E78.2 MIXED HYPERLIPIDEMIA: ICD-10-CM

## 2018-08-16 DIAGNOSIS — G89.29 CHRONIC BILATERAL LOW BACK PAIN WITHOUT SCIATICA: ICD-10-CM

## 2018-08-16 DIAGNOSIS — I10 ESSENTIAL HYPERTENSION: Primary | ICD-10-CM

## 2018-08-16 DIAGNOSIS — R79.89 LOW TESTOSTERONE: ICD-10-CM

## 2018-08-16 PROCEDURE — 99214 OFFICE O/P EST MOD 30 MIN: CPT | Performed by: FAMILY MEDICINE

## 2018-08-16 RX ORDER — CITALOPRAM 20 MG/1
20 TABLET ORAL DAILY
Qty: 30 TABLET | Refills: 3 | Status: SHIPPED | OUTPATIENT
Start: 2018-08-16 | End: 2018-12-07 | Stop reason: SDUPTHER

## 2018-08-16 RX ORDER — TESTOSTERONE CYPIONATE 200 MG/ML
200 INJECTION INTRAMUSCULAR
Qty: 2 ML | Refills: 2 | Status: SHIPPED | OUTPATIENT
Start: 2018-08-16 | End: 2018-11-26 | Stop reason: SDUPTHER

## 2018-08-16 RX ORDER — TIZANIDINE HYDROCHLORIDE 6 MG/1
6 CAPSULE, GELATIN COATED ORAL 4 TIMES DAILY PRN
Qty: 120 CAPSULE | Refills: 2 | Status: SHIPPED | OUTPATIENT
Start: 2018-08-16 | End: 2018-10-22 | Stop reason: SDUPTHER

## 2018-08-16 NOTE — PROGRESS NOTES
Subjective:   He presents for follow up of his high blood pressure chronic back issues depression low T and hyperlipidemia he was started on 10 mg of Celexa by NP but he states it has not really helped much. Pressures have been under good control lately he still continues to have chronic back pain he wants to increase dose of Zanaflex of possible it helps but not always all the way he states. He still going to chronic pain doctor and has pain pump. He needs to get testosterone levels rechecked but he will come back and do it in the morning and he needs a fasting lipid to follow-up on his high cholesterol      He is allergic to vioxx and phenergan [promethazine hcl]. He   reports that he has been smoking Cigars. He has a 5.00 pack-year smoking history. He has never used smokeless tobacco. Review of systems negative for chest pain swelling shortness of breath wheezing abdominal pain rectal bleeding  Objective:   /75   Pulse 86   Temp 99.3 °F (37.4 °C) (Oral)   Wt 232 lb 3.2 oz (105.3 kg)   SpO2 97%   BMI 28.64 kg/m²   BP Readings from Last 3 Encounters:   08/16/18 133/75   07/26/18 124/70   06/14/18 108/68     Physical Exam   Constitutional: He is oriented to person, place, and time. He appears well-developed and well-nourished. Non-toxic appearance. HENT:   Head: Normocephalic. Eyes: Conjunctivae are normal. Right eye exhibits no discharge. Left eye exhibits no discharge. No scleral icterus. Neck: Neck supple. Carotid bruit is not present. No thyroid mass and no thyromegaly present. Cardiovascular: Normal rate, regular rhythm and normal heart sounds. No murmur heard. Pulmonary/Chest: Breath sounds normal. No respiratory distress. He has no wheezes. He has no rales. Abdominal: Soft. He exhibits no distension and no mass. There is no hepatosplenomegaly. There is no tenderness. There is no rebound and no guarding. Musculoskeletal: He exhibits no edema.    Lymphadenopathy:     He has no cervical adenopathy. Right: No supraclavicular adenopathy present. Neurological: He is alert and oriented to person, place, and time. Skin: Skin is warm. No cyanosis. Psychiatric: He has a normal mood and affect. His behavior is normal. Judgment and thought content normal.   Vitals reviewed. pain pump palpable in abdomen  Assessment and Plan:   Diagnosis Orders   1. Essential hypertension     2. Chronic bilateral low back pain without sciatica  tiZANidine (ZANAFLEX) 6 MG capsule   3. Severe single current episode of major depressive disorder, without psychotic features ([address]  citalopram (CELEXA) 20 MG tablet   4. Low testosterone  testosterone cypionate (DEPOTESTOTERONE CYPIONATE) 200 MG/ML injection   5. Mixed hyperlipidemia     Increased dose of Celexa and Zanaflex. Keep follow-up with pain doctor  The problems listed in the assessment are stable unless otherwise indicated. He  was instructed to continue their current medications and treatment for the above  problems unless otherwise indicated above. Age-specific preventative medicine recommendations were reviewed with patient today and the Healthy Family Handout was given to patient. Avoid tobacco products exposure. Follow up in 6mo. Call or return to office for any problems that develop before the next scheduled follow-up appointment. Jayce Pena M.D. Parts of this note were completed using voice recognition transcription. Every effort was made to ensure accuracy; however, inadvertent transcription errors may be present.

## 2018-08-30 ENCOUNTER — NURSE ONLY (OUTPATIENT)
Dept: FAMILY MEDICINE CLINIC | Age: 53
End: 2018-08-30

## 2018-08-30 DIAGNOSIS — E78.2 MIXED HYPERLIPIDEMIA: ICD-10-CM

## 2018-08-30 DIAGNOSIS — R79.89 LOW TESTOSTERONE: Primary | ICD-10-CM

## 2018-08-30 DIAGNOSIS — N52.9 ERECTILE DYSFUNCTION, UNSPECIFIED ERECTILE DYSFUNCTION TYPE: ICD-10-CM

## 2018-08-30 DIAGNOSIS — I10 ESSENTIAL HYPERTENSION: ICD-10-CM

## 2018-08-30 LAB
ANION GAP SERPL CALCULATED.3IONS-SCNC: 13 MMOL/L (ref 3–16)
BUN BLDV-MCNC: 12 MG/DL (ref 7–20)
CALCIUM SERPL-MCNC: 9 MG/DL (ref 8.3–10.6)
CHLORIDE BLD-SCNC: 101 MMOL/L (ref 99–110)
CHOLESTEROL, TOTAL: 187 MG/DL (ref 0–199)
CO2: 27 MMOL/L (ref 21–32)
CREAT SERPL-MCNC: 0.9 MG/DL (ref 0.9–1.3)
GFR AFRICAN AMERICAN: >60
GFR NON-AFRICAN AMERICAN: >60
GLUCOSE BLD-MCNC: 84 MG/DL (ref 70–99)
HDLC SERPL-MCNC: 33 MG/DL (ref 40–60)
LDL CHOLESTEROL CALCULATED: 116 MG/DL
POTASSIUM SERPL-SCNC: 4.1 MMOL/L (ref 3.5–5.1)
PROSTATE SPECIFIC ANTIGEN: 2.36 NG/ML (ref 0–4)
SODIUM BLD-SCNC: 141 MMOL/L (ref 136–145)
TRIGL SERPL-MCNC: 190 MG/DL (ref 0–150)
VLDLC SERPL CALC-MCNC: 38 MG/DL

## 2018-08-30 PROCEDURE — 36415 COLL VENOUS BLD VENIPUNCTURE: CPT | Performed by: FAMILY MEDICINE

## 2018-09-01 LAB
SEX HORMONE BINDING GLOBULIN: 19 NMOL/L (ref 11–80)
TESTOSTERONE FREE-NONMALE: 177.6 PG/ML (ref 47–244)
TESTOSTERONE TOTAL: 625 NG/DL (ref 220–1000)

## 2018-09-04 ENCOUNTER — TELEPHONE (OUTPATIENT)
Dept: FAMILY MEDICINE CLINIC | Age: 53
End: 2018-09-04

## 2018-09-04 DIAGNOSIS — M54.50 CHRONIC BILATERAL LOW BACK PAIN WITHOUT SCIATICA: ICD-10-CM

## 2018-09-04 DIAGNOSIS — F41.9 ANXIETY: ICD-10-CM

## 2018-09-04 DIAGNOSIS — G89.29 CHRONIC BILATERAL LOW BACK PAIN WITHOUT SCIATICA: ICD-10-CM

## 2018-09-05 RX ORDER — ALPRAZOLAM 0.5 MG/1
TABLET ORAL
Qty: 4 TABLET | Refills: 0 | Status: SHIPPED | OUTPATIENT
Start: 2018-09-05 | End: 2019-05-30 | Stop reason: SDUPTHER

## 2018-09-09 DIAGNOSIS — G47.01 INSOMNIA DUE TO MEDICAL CONDITION: ICD-10-CM

## 2018-09-10 RX ORDER — TRAZODONE HYDROCHLORIDE 50 MG/1
50 TABLET ORAL NIGHTLY
Qty: 30 TABLET | Refills: 1 | OUTPATIENT
Start: 2018-09-10

## 2018-10-15 ENCOUNTER — HOSPITAL ENCOUNTER (OUTPATIENT)
Dept: GENERAL RADIOLOGY | Age: 53
Discharge: HOME | End: 2018-10-15
Payer: COMMERCIAL

## 2018-10-15 ENCOUNTER — HOSPITAL ENCOUNTER (OUTPATIENT)
Age: 53
Discharge: HOME | End: 2018-10-15
Payer: COMMERCIAL

## 2018-10-15 DIAGNOSIS — M96.1 FAILED BACK SYNDROME: ICD-10-CM

## 2018-10-15 PROCEDURE — 72110 X-RAY EXAM L-2 SPINE 4/>VWS: CPT

## 2018-10-22 DIAGNOSIS — G89.29 CHRONIC BILATERAL LOW BACK PAIN WITHOUT SCIATICA: ICD-10-CM

## 2018-10-22 DIAGNOSIS — M54.50 CHRONIC BILATERAL LOW BACK PAIN WITHOUT SCIATICA: ICD-10-CM

## 2018-10-22 RX ORDER — TIZANIDINE HYDROCHLORIDE 6 MG/1
6 CAPSULE, GELATIN COATED ORAL 4 TIMES DAILY PRN
Qty: 120 CAPSULE | Refills: 1 | Status: SHIPPED | OUTPATIENT
Start: 2018-10-22 | End: 2018-12-06 | Stop reason: SDUPTHER

## 2018-11-19 ENCOUNTER — OFFICE VISIT (OUTPATIENT)
Dept: FAMILY MEDICINE CLINIC | Age: 53
End: 2018-11-19
Payer: COMMERCIAL

## 2018-11-19 VITALS
DIASTOLIC BLOOD PRESSURE: 82 MMHG | OXYGEN SATURATION: 98 % | HEART RATE: 83 BPM | BODY MASS INDEX: 27.57 KG/M2 | SYSTOLIC BLOOD PRESSURE: 132 MMHG | WEIGHT: 223.5 LBS | TEMPERATURE: 100.3 F

## 2018-11-19 DIAGNOSIS — M25.521 RIGHT ELBOW PAIN: Primary | ICD-10-CM

## 2018-11-19 PROCEDURE — 99214 OFFICE O/P EST MOD 30 MIN: CPT | Performed by: NURSE PRACTITIONER

## 2018-11-19 RX ORDER — PREDNISONE 20 MG/1
40 TABLET ORAL DAILY
Qty: 14 TABLET | Refills: 0 | Status: SHIPPED | OUTPATIENT
Start: 2018-11-19 | End: 2018-11-26

## 2018-11-19 NOTE — PROGRESS NOTES
Patient: Nidhi Lutz is a 52 y.o. male who presents today with the following Chief Complaint(s):  Chief Complaint   Patient presents with    Elbow Pain     Right       HPI  Patient presents to the office with complaints of right elbow pain for the past 3 months. He states that he went on a fishing trip and pain started while casting. Pain is progressively getting worse and now he has some tingling sensation in his fingers. Complains of constant lateral elbow ache with sharp radiating pain down to his hands. Pain is worse with movement and lying down. He states pain keeps him up at night. He has been using steroidal creams, NSAIDs and Ace wrap without relief of symptoms. Denies prior episodes. Denies new weakness, loss of range of motion, swelling, redness, fever or warmth. Current Outpatient Prescriptions   Medication Sig Dispense Refill    tiZANidine (ZANAFLEX) 6 MG capsule TAKE 1 CAPSULE BY MOUTH 4 TIMES DAILY AS NEEDED FOR MUSCLE SPASMS (DOSE CHANGE) 120 capsule 1    citalopram (CELEXA) 20 MG tablet Take 1 tablet by mouth daily (dose increase) 30 tablet 3    lisinopril (PRINIVIL;ZESTRIL) 40 MG tablet TAKE 1 TABLET BY MOUTH EVERY DAY 30 tablet 5    Methylnaltrexone Bromide (RELISTOR) 150 MG TABS Take 150 mg by mouth daily 90 tablet 1    lactulose (CHRONULAC) 10 GM/15ML solution Take 1-2 tablspoons once or twice a day by mouth for constipation 3 Bottle 5    oxyCODONE HCl (OXY-IR) 10 MG immediate release tablet TAKE 1 TABLET BY MOUTH EVERY 6 HOURS  0    sildenafil (VIAGRA) 100 MG tablet Take 1 tablet by mouth as needed for Erectile Dysfunction Take 1 hour before sex (max one a day) 6 tablet 5    testosterone cypionate (DEPOTESTOTERONE CYPIONATE) 200 MG/ML injection Inject 1 mL into the muscle every 14 days for 92 days. (please supply syringes and needles also). 2 mL 2     No current facility-administered medications for this visit.         Patient's past medical history, surgical history, family

## 2018-11-26 DIAGNOSIS — R79.89 LOW TESTOSTERONE: ICD-10-CM

## 2018-11-28 RX ORDER — TESTOSTERONE CYPIONATE 200 MG/ML
INJECTION INTRAMUSCULAR
Qty: 2 ML | Refills: 2 | Status: SHIPPED | OUTPATIENT
Start: 2018-11-28 | End: 2019-02-05 | Stop reason: ALTCHOICE

## 2018-12-03 ENCOUNTER — HOSPITAL ENCOUNTER (OUTPATIENT)
Dept: NEUROLOGY | Age: 53
Discharge: HOME | End: 2018-12-03
Payer: COMMERCIAL

## 2018-12-03 DIAGNOSIS — M25.521 RIGHT ELBOW PAIN: ICD-10-CM

## 2018-12-03 DIAGNOSIS — R94.131 ABNORMAL EMG: Primary | ICD-10-CM

## 2018-12-03 PROCEDURE — 95886 MUSC TEST DONE W/N TEST COMP: CPT

## 2018-12-03 PROCEDURE — 95908 NRV CNDJ TST 3-4 STUDIES: CPT

## 2018-12-06 DIAGNOSIS — G89.29 CHRONIC BILATERAL LOW BACK PAIN WITHOUT SCIATICA: ICD-10-CM

## 2018-12-06 DIAGNOSIS — M54.50 CHRONIC BILATERAL LOW BACK PAIN WITHOUT SCIATICA: ICD-10-CM

## 2018-12-07 DIAGNOSIS — F32.2 SEVERE SINGLE CURRENT EPISODE OF MAJOR DEPRESSIVE DISORDER, WITHOUT PSYCHOTIC FEATURES (HCC): ICD-10-CM

## 2018-12-07 RX ORDER — TIZANIDINE HYDROCHLORIDE 6 MG/1
6 CAPSULE, GELATIN COATED ORAL 4 TIMES DAILY PRN
Qty: 120 CAPSULE | Refills: 1 | Status: SHIPPED | OUTPATIENT
Start: 2018-12-07 | End: 2019-02-02 | Stop reason: SDUPTHER

## 2018-12-07 RX ORDER — CITALOPRAM 20 MG/1
20 TABLET ORAL DAILY
Qty: 30 TABLET | Refills: 3 | Status: SHIPPED | OUTPATIENT
Start: 2018-12-07 | End: 2019-01-17 | Stop reason: SDUPTHER

## 2018-12-13 ENCOUNTER — OFFICE VISIT (OUTPATIENT)
Dept: ORTHOPEDIC SURGERY | Age: 53
End: 2018-12-13
Payer: COMMERCIAL

## 2018-12-13 VITALS — RESPIRATION RATE: 16 BRPM | BODY MASS INDEX: 27.22 KG/M2 | HEIGHT: 76 IN | WEIGHT: 223.55 LBS

## 2018-12-13 DIAGNOSIS — M72.0 DUPUYTREN'S CONTRACTURE: ICD-10-CM

## 2018-12-13 DIAGNOSIS — M77.11 RIGHT LATERAL EPICONDYLITIS: ICD-10-CM

## 2018-12-13 DIAGNOSIS — G56.01 RIGHT CARPAL TUNNEL SYNDROME: ICD-10-CM

## 2018-12-13 PROCEDURE — MISCD86 TENNIS ELBOW STRAP-BREG: Performed by: ORTHOPAEDIC SURGERY

## 2018-12-13 PROCEDURE — L3908 WHO COCK-UP NONMOLDE PRE OTS: HCPCS | Performed by: ORTHOPAEDIC SURGERY

## 2018-12-13 PROCEDURE — 99243 OFF/OP CNSLTJ NEW/EST LOW 30: CPT | Performed by: ORTHOPAEDIC SURGERY

## 2018-12-13 NOTE — PROGRESS NOTES
HISTORY OF PRESENT ILLNESS: The patient is a 64-year-old left-hand-dominant gentleman who presents today for a new hand surgery specialty consultation at the request of Dr. Lakesha Louis regarding his right wrist and elbow. Around September of this year he started to have some pain in the right lateral elbow but also along the hand and wrist.  He started to notice discomfort with numbness and tingling in the fingers at nighttime. A recent electrodiagnostic study did confirm the presence of moderate right carpal tunnel syndrome. There is no sign on the testing of any and organ muscle changes within the opponens pollicis. He did go ahead and obtain himself a thumb spica brace that seemed to aggravate the symptoms worse. He does report considerable long-standing back issues and uses a cane in his left hand. PAST MEDICAL HISTORY: Patient's medications, allergies, past medical, surgical, social and family histories were reviewed and updated as appropriate. ROS: Pertinent items are noted in HPI. Review of systems reviewed from patient history form dated on 12/13/18 and available in the patient's chart under the media tab. Denies fever, chills, confusion, bowel/bladder active change. PHYSICAL EXAMINATION: Examination reveals a pleasant individual in no acute distress. There appears to be satisfactory pain-free range of motion, strength, and stability of the cervical spine, shoulders, and elbows. Skin is intact without lymphadenopathy, discoloration, or abnormal temperature. He does have some mild Dupuytren's nodularity mostly along the long and ring finger rays of the left hand without fixed flexion contracture. There is intact, symmetric circulation in both upper extremities. Wrist, hand, and digital range of motion is satisfactory bilaterally. Provocative testing for right carpal tunnel syndrome suggests reproduction of symptoms with direct compression, Phalen's, and Tinel's.  There is no sign of

## 2019-01-04 RX ORDER — LISINOPRIL 40 MG/1
TABLET ORAL
Qty: 90 TABLET | Refills: 1 | Status: SHIPPED | OUTPATIENT
Start: 2019-01-04 | End: 2019-07-03 | Stop reason: SDUPTHER

## 2019-01-10 ENCOUNTER — OFFICE VISIT (OUTPATIENT)
Dept: ORTHOPEDIC SURGERY | Age: 54
End: 2019-01-10
Payer: COMMERCIAL

## 2019-01-10 VITALS — HEIGHT: 76 IN | RESPIRATION RATE: 15 BRPM | WEIGHT: 223.55 LBS | BODY MASS INDEX: 27.22 KG/M2

## 2019-01-10 DIAGNOSIS — G56.01 RIGHT CARPAL TUNNEL SYNDROME: Primary | ICD-10-CM

## 2019-01-10 DIAGNOSIS — M77.11 RIGHT LATERAL EPICONDYLITIS: ICD-10-CM

## 2019-01-10 PROCEDURE — 99214 OFFICE O/P EST MOD 30 MIN: CPT | Performed by: ORTHOPAEDIC SURGERY

## 2019-01-17 ENCOUNTER — OFFICE VISIT (OUTPATIENT)
Dept: FAMILY MEDICINE CLINIC | Age: 54
End: 2019-01-17
Payer: COMMERCIAL

## 2019-01-17 VITALS
HEART RATE: 79 BPM | SYSTOLIC BLOOD PRESSURE: 120 MMHG | DIASTOLIC BLOOD PRESSURE: 72 MMHG | TEMPERATURE: 98 F | OXYGEN SATURATION: 95 % | BODY MASS INDEX: 28.24 KG/M2 | WEIGHT: 229 LBS

## 2019-01-17 DIAGNOSIS — J45.20 MILD INTERMITTENT ASTHMA WITHOUT COMPLICATION: ICD-10-CM

## 2019-01-17 DIAGNOSIS — I10 ESSENTIAL HYPERTENSION: ICD-10-CM

## 2019-01-17 DIAGNOSIS — F32.2 SEVERE SINGLE CURRENT EPISODE OF MAJOR DEPRESSIVE DISORDER, WITHOUT PSYCHOTIC FEATURES (HCC): ICD-10-CM

## 2019-01-17 DIAGNOSIS — G56.01 CARPAL TUNNEL SYNDROME OF RIGHT WRIST: Primary | ICD-10-CM

## 2019-01-17 PROCEDURE — 93000 ELECTROCARDIOGRAM COMPLETE: CPT | Performed by: FAMILY MEDICINE

## 2019-01-17 PROCEDURE — 99243 OFF/OP CNSLTJ NEW/EST LOW 30: CPT | Performed by: FAMILY MEDICINE

## 2019-01-17 RX ORDER — CITALOPRAM 40 MG/1
40 TABLET ORAL DAILY
Qty: 30 TABLET | Refills: 5 | Status: SHIPPED | OUTPATIENT
Start: 2019-01-17 | End: 2019-05-09 | Stop reason: SDUPTHER

## 2019-02-02 DIAGNOSIS — M54.50 CHRONIC BILATERAL LOW BACK PAIN WITHOUT SCIATICA: ICD-10-CM

## 2019-02-02 DIAGNOSIS — G89.29 CHRONIC BILATERAL LOW BACK PAIN WITHOUT SCIATICA: ICD-10-CM

## 2019-02-04 ENCOUNTER — TELEPHONE (OUTPATIENT)
Dept: ORTHOPEDIC SURGERY | Age: 54
End: 2019-02-04

## 2019-02-04 RX ORDER — TIZANIDINE HYDROCHLORIDE 6 MG/1
6 CAPSULE, GELATIN COATED ORAL 4 TIMES DAILY PRN
Qty: 120 CAPSULE | Refills: 1 | Status: SHIPPED | OUTPATIENT
Start: 2019-02-04 | End: 2019-04-06 | Stop reason: SDUPTHER

## 2019-02-09 ENCOUNTER — ANESTHESIA EVENT (OUTPATIENT)
Dept: OPERATING ROOM | Age: 54
End: 2019-02-09
Payer: COMMERCIAL

## 2019-02-11 ENCOUNTER — ANESTHESIA (OUTPATIENT)
Dept: OPERATING ROOM | Age: 54
End: 2019-02-11
Payer: COMMERCIAL

## 2019-02-11 ENCOUNTER — TELEPHONE (OUTPATIENT)
Dept: FAMILY MEDICINE CLINIC | Age: 54
End: 2019-02-11

## 2019-02-11 ENCOUNTER — HOSPITAL ENCOUNTER (OUTPATIENT)
Age: 54
Setting detail: OUTPATIENT SURGERY
Discharge: HOME | End: 2019-02-11
Attending: ORTHOPAEDIC SURGERY | Admitting: ORTHOPAEDIC SURGERY
Payer: COMMERCIAL

## 2019-02-11 VITALS — DIASTOLIC BLOOD PRESSURE: 65 MMHG | SYSTOLIC BLOOD PRESSURE: 110 MMHG | OXYGEN SATURATION: 98 %

## 2019-02-11 VITALS
TEMPERATURE: 97.5 F | DIASTOLIC BLOOD PRESSURE: 82 MMHG | SYSTOLIC BLOOD PRESSURE: 148 MMHG | BODY MASS INDEX: 26.79 KG/M2 | HEIGHT: 76 IN | HEART RATE: 68 BPM | WEIGHT: 220 LBS | RESPIRATION RATE: 18 BRPM | OXYGEN SATURATION: 95 %

## 2019-02-11 DIAGNOSIS — G47.30 SLEEP APNEA, UNSPECIFIED TYPE: Primary | ICD-10-CM

## 2019-02-11 PROCEDURE — 7100000010 HC PHASE II RECOVERY - FIRST 15 MIN: Performed by: ORTHOPAEDIC SURGERY

## 2019-02-11 PROCEDURE — 2580000003 HC RX 258: Performed by: ANESTHESIOLOGY

## 2019-02-11 PROCEDURE — 3600000004 HC SURGERY LEVEL 4 BASE: Performed by: ORTHOPAEDIC SURGERY

## 2019-02-11 PROCEDURE — 3700000000 HC ANESTHESIA ATTENDED CARE: Performed by: ORTHOPAEDIC SURGERY

## 2019-02-11 PROCEDURE — 7100000011 HC PHASE II RECOVERY - ADDTL 15 MIN: Performed by: ORTHOPAEDIC SURGERY

## 2019-02-11 PROCEDURE — 3600000014 HC SURGERY LEVEL 4 ADDTL 15MIN: Performed by: ORTHOPAEDIC SURGERY

## 2019-02-11 PROCEDURE — 2500000003 HC RX 250 WO HCPCS: Performed by: NURSE ANESTHETIST, CERTIFIED REGISTERED

## 2019-02-11 PROCEDURE — 3700000001 HC ADD 15 MINUTES (ANESTHESIA): Performed by: ORTHOPAEDIC SURGERY

## 2019-02-11 PROCEDURE — 2500000003 HC RX 250 WO HCPCS: Performed by: ORTHOPAEDIC SURGERY

## 2019-02-11 PROCEDURE — 2580000003 HC RX 258: Performed by: ORTHOPAEDIC SURGERY

## 2019-02-11 PROCEDURE — 6360000002 HC RX W HCPCS: Performed by: NURSE ANESTHETIST, CERTIFIED REGISTERED

## 2019-02-11 PROCEDURE — 2709999900 HC NON-CHARGEABLE SUPPLY: Performed by: ORTHOPAEDIC SURGERY

## 2019-02-11 RX ORDER — MORPHINE SULFATE 2 MG/ML
1 INJECTION, SOLUTION INTRAMUSCULAR; INTRAVENOUS EVERY 5 MIN PRN
Status: DISCONTINUED | OUTPATIENT
Start: 2019-02-11 | End: 2019-02-11 | Stop reason: HOSPADM

## 2019-02-11 RX ORDER — LIDOCAINE HYDROCHLORIDE 10 MG/ML
0.3 INJECTION, SOLUTION EPIDURAL; INFILTRATION; INTRACAUDAL; PERINEURAL
Status: DISCONTINUED | OUTPATIENT
Start: 2019-02-11 | End: 2019-02-11 | Stop reason: HOSPADM

## 2019-02-11 RX ORDER — HYDRALAZINE HYDROCHLORIDE 20 MG/ML
5 INJECTION INTRAMUSCULAR; INTRAVENOUS EVERY 10 MIN PRN
Status: DISCONTINUED | OUTPATIENT
Start: 2019-02-11 | End: 2019-02-11 | Stop reason: HOSPADM

## 2019-02-11 RX ORDER — MEPERIDINE HYDROCHLORIDE 50 MG/ML
12.5 INJECTION INTRAMUSCULAR; INTRAVENOUS; SUBCUTANEOUS EVERY 5 MIN PRN
Status: DISCONTINUED | OUTPATIENT
Start: 2019-02-11 | End: 2019-02-11 | Stop reason: HOSPADM

## 2019-02-11 RX ORDER — PROMETHAZINE HYDROCHLORIDE 25 MG/ML
6.25 INJECTION, SOLUTION INTRAMUSCULAR; INTRAVENOUS
Status: DISCONTINUED | OUTPATIENT
Start: 2019-02-11 | End: 2019-02-11 | Stop reason: HOSPADM

## 2019-02-11 RX ORDER — SODIUM CHLORIDE 0.9 % (FLUSH) 0.9 %
10 SYRINGE (ML) INJECTION EVERY 12 HOURS SCHEDULED
Status: DISCONTINUED | OUTPATIENT
Start: 2019-02-11 | End: 2019-02-11 | Stop reason: HOSPADM

## 2019-02-11 RX ORDER — OXYCODONE HYDROCHLORIDE AND ACETAMINOPHEN 5; 325 MG/1; MG/1
1 TABLET ORAL PRN
Status: DISCONTINUED | OUTPATIENT
Start: 2019-02-11 | End: 2019-02-11 | Stop reason: HOSPADM

## 2019-02-11 RX ORDER — IBUPROFEN 800 MG/1
800 TABLET ORAL EVERY 8 HOURS PRN
Qty: 60 TABLET | Refills: 1 | Status: SHIPPED | OUTPATIENT
Start: 2019-02-11 | End: 2020-04-23 | Stop reason: ALTCHOICE

## 2019-02-11 RX ORDER — SODIUM CHLORIDE, SODIUM LACTATE, POTASSIUM CHLORIDE, CALCIUM CHLORIDE 600; 310; 30; 20 MG/100ML; MG/100ML; MG/100ML; MG/100ML
INJECTION, SOLUTION INTRAVENOUS CONTINUOUS
Status: DISCONTINUED | OUTPATIENT
Start: 2019-02-11 | End: 2019-02-11 | Stop reason: HOSPADM

## 2019-02-11 RX ORDER — MAGNESIUM HYDROXIDE 1200 MG/15ML
LIQUID ORAL CONTINUOUS PRN
Status: COMPLETED | OUTPATIENT
Start: 2019-02-11 | End: 2019-02-11

## 2019-02-11 RX ORDER — PROPOFOL 10 MG/ML
INJECTION, EMULSION INTRAVENOUS PRN
Status: DISCONTINUED | OUTPATIENT
Start: 2019-02-11 | End: 2019-02-11 | Stop reason: SDUPTHER

## 2019-02-11 RX ORDER — MORPHINE SULFATE 2 MG/ML
2 INJECTION, SOLUTION INTRAMUSCULAR; INTRAVENOUS EVERY 5 MIN PRN
Status: DISCONTINUED | OUTPATIENT
Start: 2019-02-11 | End: 2019-02-11 | Stop reason: HOSPADM

## 2019-02-11 RX ORDER — OXYCODONE HYDROCHLORIDE AND ACETAMINOPHEN 5; 325 MG/1; MG/1
2 TABLET ORAL PRN
Status: DISCONTINUED | OUTPATIENT
Start: 2019-02-11 | End: 2019-02-11 | Stop reason: HOSPADM

## 2019-02-11 RX ORDER — DIPHENHYDRAMINE HYDROCHLORIDE 50 MG/ML
12.5 INJECTION INTRAMUSCULAR; INTRAVENOUS
Status: DISCONTINUED | OUTPATIENT
Start: 2019-02-11 | End: 2019-02-11 | Stop reason: HOSPADM

## 2019-02-11 RX ORDER — ONDANSETRON 2 MG/ML
4 INJECTION INTRAMUSCULAR; INTRAVENOUS
Status: DISCONTINUED | OUTPATIENT
Start: 2019-02-11 | End: 2019-02-11 | Stop reason: HOSPADM

## 2019-02-11 RX ORDER — LIDOCAINE HYDROCHLORIDE 20 MG/ML
INJECTION, SOLUTION INFILTRATION; PERINEURAL PRN
Status: DISCONTINUED | OUTPATIENT
Start: 2019-02-11 | End: 2019-02-11 | Stop reason: SDUPTHER

## 2019-02-11 RX ORDER — SODIUM CHLORIDE 0.9 % (FLUSH) 0.9 %
10 SYRINGE (ML) INJECTION PRN
Status: DISCONTINUED | OUTPATIENT
Start: 2019-02-11 | End: 2019-02-11 | Stop reason: HOSPADM

## 2019-02-11 RX ORDER — LABETALOL HYDROCHLORIDE 5 MG/ML
5 INJECTION, SOLUTION INTRAVENOUS EVERY 10 MIN PRN
Status: DISCONTINUED | OUTPATIENT
Start: 2019-02-11 | End: 2019-02-11 | Stop reason: HOSPADM

## 2019-02-11 RX ADMIN — SODIUM CHLORIDE, POTASSIUM CHLORIDE, SODIUM LACTATE AND CALCIUM CHLORIDE: 600; 310; 30; 20 INJECTION, SOLUTION INTRAVENOUS at 08:19

## 2019-02-11 RX ADMIN — PROPOFOL 50 MG: 10 INJECTION, EMULSION INTRAVENOUS at 08:26

## 2019-02-11 RX ADMIN — PROPOFOL 100 MG: 10 INJECTION, EMULSION INTRAVENOUS at 08:23

## 2019-02-11 RX ADMIN — SODIUM CHLORIDE, POTASSIUM CHLORIDE, SODIUM LACTATE AND CALCIUM CHLORIDE: 600; 310; 30; 20 INJECTION, SOLUTION INTRAVENOUS at 07:19

## 2019-02-11 RX ADMIN — LIDOCAINE HYDROCHLORIDE 50 MG: 20 INJECTION, SOLUTION INFILTRATION; PERINEURAL at 08:22

## 2019-02-11 ASSESSMENT — PULMONARY FUNCTION TESTS
PIF_VALUE: 1
PIF_VALUE: 0
PIF_VALUE: 1
PIF_VALUE: 0
PIF_VALUE: 1

## 2019-02-11 ASSESSMENT — PAIN SCALES - GENERAL
PAINLEVEL_OUTOF10: 0

## 2019-02-22 ENCOUNTER — OFFICE VISIT (OUTPATIENT)
Dept: PULMONOLOGY | Age: 54
End: 2019-02-22
Payer: COMMERCIAL

## 2019-02-22 ENCOUNTER — OFFICE VISIT (OUTPATIENT)
Dept: ORTHOPEDIC SURGERY | Age: 54
End: 2019-02-22

## 2019-02-22 VITALS
RESPIRATION RATE: 16 BRPM | DIASTOLIC BLOOD PRESSURE: 88 MMHG | SYSTOLIC BLOOD PRESSURE: 138 MMHG | TEMPERATURE: 97.8 F | WEIGHT: 232 LBS | HEART RATE: 69 BPM | HEIGHT: 75 IN | OXYGEN SATURATION: 96 % | BODY MASS INDEX: 28.85 KG/M2

## 2019-02-22 VITALS — BODY MASS INDEX: 26.79 KG/M2 | WEIGHT: 220.02 LBS | HEIGHT: 76 IN

## 2019-02-22 DIAGNOSIS — M54.9 CHRONIC BACK PAIN, UNSPECIFIED BACK LOCATION, UNSPECIFIED BACK PAIN LATERALITY: ICD-10-CM

## 2019-02-22 DIAGNOSIS — I10 ESSENTIAL HYPERTENSION: ICD-10-CM

## 2019-02-22 DIAGNOSIS — R06.83 SNORING: Primary | ICD-10-CM

## 2019-02-22 DIAGNOSIS — G56.01 RIGHT CARPAL TUNNEL SYNDROME: Primary | ICD-10-CM

## 2019-02-22 DIAGNOSIS — G47.30 OBSERVED SLEEP APNEA: ICD-10-CM

## 2019-02-22 DIAGNOSIS — G47.10 HYPERSOMNIA: ICD-10-CM

## 2019-02-22 DIAGNOSIS — G89.29 CHRONIC BACK PAIN, UNSPECIFIED BACK LOCATION, UNSPECIFIED BACK PAIN LATERALITY: ICD-10-CM

## 2019-02-22 DIAGNOSIS — M26.19 RETROGNATHIA: ICD-10-CM

## 2019-02-22 PROCEDURE — 99243 OFF/OP CNSLTJ NEW/EST LOW 30: CPT | Performed by: NURSE PRACTITIONER

## 2019-02-22 PROCEDURE — 99024 POSTOP FOLLOW-UP VISIT: CPT | Performed by: ORTHOPAEDIC SURGERY

## 2019-02-22 ASSESSMENT — SLEEP AND FATIGUE QUESTIONNAIRES
HOW LIKELY ARE YOU TO NOD OFF OR FALL ASLEEP WHILE SITTING INACTIVE IN A PUBLIC PLACE: 0
HOW LIKELY ARE YOU TO NOD OFF OR FALL ASLEEP WHILE SITTING AND TALKING TO SOMEONE: 0
ESS TOTAL SCORE: 10
HOW LIKELY ARE YOU TO NOD OFF OR FALL ASLEEP WHILE WATCHING TV: 2
HOW LIKELY ARE YOU TO NOD OFF OR FALL ASLEEP WHILE LYING DOWN TO REST IN THE AFTERNOON WHEN CIRCUMSTANCES PERMIT: 3
HOW LIKELY ARE YOU TO NOD OFF OR FALL ASLEEP WHILE SITTING AND READING: 2
HOW LIKELY ARE YOU TO NOD OFF OR FALL ASLEEP WHILE SITTING QUIETLY AFTER LUNCH WITHOUT ALCOHOL: 3
HOW LIKELY ARE YOU TO NOD OFF OR FALL ASLEEP WHEN YOU ARE A PASSENGER IN A CAR FOR AN HOUR WITHOUT A BREAK: 0
NECK CIRCUMFERENCE (INCHES): 18
HOW LIKELY ARE YOU TO NOD OFF OR FALL ASLEEP IN A CAR, WHILE STOPPED FOR A FEW MINUTES IN TRAFFIC: 0

## 2019-02-28 ENCOUNTER — OFFICE VISIT (OUTPATIENT)
Dept: FAMILY MEDICINE CLINIC | Age: 54
End: 2019-02-28
Payer: COMMERCIAL

## 2019-02-28 VITALS
BODY MASS INDEX: 29.37 KG/M2 | HEART RATE: 79 BPM | WEIGHT: 235 LBS | DIASTOLIC BLOOD PRESSURE: 80 MMHG | TEMPERATURE: 98.7 F | OXYGEN SATURATION: 79 % | SYSTOLIC BLOOD PRESSURE: 144 MMHG

## 2019-02-28 DIAGNOSIS — G47.33 OSA (OBSTRUCTIVE SLEEP APNEA): ICD-10-CM

## 2019-02-28 DIAGNOSIS — M54.50 CHRONIC BILATERAL LOW BACK PAIN WITHOUT SCIATICA: ICD-10-CM

## 2019-02-28 DIAGNOSIS — J01.90 ACUTE BACTERIAL SINUSITIS: ICD-10-CM

## 2019-02-28 DIAGNOSIS — E78.2 MIXED HYPERLIPIDEMIA: ICD-10-CM

## 2019-02-28 DIAGNOSIS — B96.89 ACUTE BACTERIAL SINUSITIS: ICD-10-CM

## 2019-02-28 DIAGNOSIS — G89.29 CHRONIC BILATERAL LOW BACK PAIN WITHOUT SCIATICA: ICD-10-CM

## 2019-02-28 DIAGNOSIS — R79.89 LOW TESTOSTERONE: ICD-10-CM

## 2019-02-28 DIAGNOSIS — I10 ESSENTIAL HYPERTENSION: ICD-10-CM

## 2019-02-28 DIAGNOSIS — F32.2 SEVERE SINGLE CURRENT EPISODE OF MAJOR DEPRESSIVE DISORDER, WITHOUT PSYCHOTIC FEATURES (HCC): Primary | ICD-10-CM

## 2019-02-28 PROCEDURE — 99214 OFFICE O/P EST MOD 30 MIN: CPT | Performed by: FAMILY MEDICINE

## 2019-02-28 RX ORDER — TESTOSTERONE CYPIONATE 200 MG/ML
INJECTION INTRAMUSCULAR
Qty: 2 ML | Refills: 2 | Status: SHIPPED | OUTPATIENT
Start: 2019-02-28 | End: 2019-04-08 | Stop reason: SDUPTHER

## 2019-02-28 RX ORDER — AMOXICILLIN AND CLAVULANATE POTASSIUM 875; 125 MG/1; MG/1
1 TABLET, FILM COATED ORAL EVERY 12 HOURS
Qty: 20 TABLET | Refills: 0 | Status: SHIPPED | OUTPATIENT
Start: 2019-02-28 | End: 2019-03-10

## 2019-03-09 LAB
CHOLESTEROL, TOTAL: 195 MG/DL
CHOLESTEROL/HDL RATIO: 7
HDLC SERPL-MCNC: 28 MG/DL (ref 35–70)
LDL CHOLESTEROL CALCULATED: 134 MG/DL (ref 0–160)
TRIGL SERPL-MCNC: 167 MG/DL
VLDLC SERPL CALC-MCNC: 33 MG/DL

## 2019-03-28 ENCOUNTER — HOSPITAL ENCOUNTER (OUTPATIENT)
Dept: SLEEP CENTER | Age: 54
Discharge: HOME | End: 2019-03-30
Payer: COMMERCIAL

## 2019-03-28 DIAGNOSIS — G47.30 OBSERVED SLEEP APNEA: ICD-10-CM

## 2019-03-28 DIAGNOSIS — I10 ESSENTIAL HYPERTENSION: ICD-10-CM

## 2019-03-28 DIAGNOSIS — G89.29 CHRONIC BACK PAIN, UNSPECIFIED BACK LOCATION, UNSPECIFIED BACK PAIN LATERALITY: ICD-10-CM

## 2019-03-28 DIAGNOSIS — R06.83 SNORING: ICD-10-CM

## 2019-03-28 DIAGNOSIS — M54.9 CHRONIC BACK PAIN, UNSPECIFIED BACK LOCATION, UNSPECIFIED BACK PAIN LATERALITY: ICD-10-CM

## 2019-03-28 DIAGNOSIS — M26.19 RETROGNATHIA: ICD-10-CM

## 2019-03-28 DIAGNOSIS — G47.10 HYPERSOMNIA: ICD-10-CM

## 2019-03-28 PROCEDURE — 95810 POLYSOM 6/> YRS 4/> PARAM: CPT

## 2019-03-30 DIAGNOSIS — F32.2 SEVERE SINGLE CURRENT EPISODE OF MAJOR DEPRESSIVE DISORDER, WITHOUT PSYCHOTIC FEATURES (HCC): ICD-10-CM

## 2019-04-01 RX ORDER — CITALOPRAM 20 MG/1
20 TABLET ORAL DAILY
Qty: 30 TABLET | Refills: 3 | OUTPATIENT
Start: 2019-04-01

## 2019-04-06 DIAGNOSIS — M54.50 CHRONIC BILATERAL LOW BACK PAIN WITHOUT SCIATICA: ICD-10-CM

## 2019-04-06 DIAGNOSIS — G89.29 CHRONIC BILATERAL LOW BACK PAIN WITHOUT SCIATICA: ICD-10-CM

## 2019-04-08 DIAGNOSIS — R79.89 LOW TESTOSTERONE: ICD-10-CM

## 2019-04-08 RX ORDER — TIZANIDINE HYDROCHLORIDE 6 MG/1
6 CAPSULE, GELATIN COATED ORAL 4 TIMES DAILY PRN
Qty: 120 CAPSULE | Refills: 1 | Status: SHIPPED | OUTPATIENT
Start: 2019-04-08 | End: 2019-06-03 | Stop reason: SDUPTHER

## 2019-04-08 RX ORDER — TESTOSTERONE CYPIONATE 200 MG/ML
INJECTION INTRAMUSCULAR
Qty: 2 ML | Refills: 2 | Status: SHIPPED | OUTPATIENT
Start: 2019-04-08 | End: 2019-06-12 | Stop reason: SDUPTHER

## 2019-04-08 NOTE — TELEPHONE ENCOUNTER
Date of last refill of this med was 3/28, # of pills given 2 and # of refills given 2. Their next appointment is 7/26, the last date patient was seen was 2/28. Does patient have medication agreement on file? Yes  Has drug screen been done in last 12 months if needed? no  Has OARRS report been ran in last 90 days?  Yes 4/8/2019 Please review oarrs report

## 2019-04-08 NOTE — TELEPHONE ENCOUNTER
Micky Sanchez (OARRS and GAVIN reports) for the last 12 months was requested and reviewed today. The results of the report was consistent with the current treatment plan.

## 2019-04-09 ENCOUNTER — TELEPHONE (OUTPATIENT)
Dept: PULMONOLOGY | Age: 54
End: 2019-04-09

## 2019-04-09 DIAGNOSIS — G47.33 OSA (OBSTRUCTIVE SLEEP APNEA): Primary | ICD-10-CM

## 2019-04-24 ENCOUNTER — HOSPITAL ENCOUNTER (OUTPATIENT)
Dept: SLEEP CENTER | Age: 54
Discharge: HOME | End: 2019-04-26
Payer: COMMERCIAL

## 2019-04-24 DIAGNOSIS — G47.33 OSA (OBSTRUCTIVE SLEEP APNEA): ICD-10-CM

## 2019-04-24 PROCEDURE — 95811 POLYSOM 6/>YRS CPAP 4/> PARM: CPT

## 2019-05-07 ENCOUNTER — OFFICE VISIT (OUTPATIENT)
Dept: PULMONOLOGY | Age: 54
End: 2019-05-07
Payer: COMMERCIAL

## 2019-05-07 ENCOUNTER — TELEPHONE (OUTPATIENT)
Dept: PULMONOLOGY | Age: 54
End: 2019-05-07

## 2019-05-07 VITALS
OXYGEN SATURATION: 98 % | RESPIRATION RATE: 16 BRPM | SYSTOLIC BLOOD PRESSURE: 130 MMHG | DIASTOLIC BLOOD PRESSURE: 79 MMHG | TEMPERATURE: 98.6 F | BODY MASS INDEX: 27.76 KG/M2 | HEART RATE: 82 BPM | HEIGHT: 76 IN | WEIGHT: 228 LBS

## 2019-05-07 DIAGNOSIS — Z71.2 ENCOUNTER TO DISCUSS TEST RESULTS: ICD-10-CM

## 2019-05-07 DIAGNOSIS — R06.83 SNORING: ICD-10-CM

## 2019-05-07 DIAGNOSIS — Z72.821 POOR SLEEP HYGIENE: ICD-10-CM

## 2019-05-07 DIAGNOSIS — F51.04 PSYCHOPHYSIOLOGICAL INSOMNIA: ICD-10-CM

## 2019-05-07 DIAGNOSIS — G47.10 HYPERSOMNIA: ICD-10-CM

## 2019-05-07 DIAGNOSIS — G47.33 SEVERE OBSTRUCTIVE SLEEP APNEA: Primary | ICD-10-CM

## 2019-05-07 DIAGNOSIS — Z71.89 CPAP USE COUNSELING: ICD-10-CM

## 2019-05-07 PROCEDURE — 99213 OFFICE O/P EST LOW 20 MIN: CPT | Performed by: NURSE PRACTITIONER

## 2019-05-07 ASSESSMENT — SLEEP AND FATIGUE QUESTIONNAIRES
NECK CIRCUMFERENCE (INCHES): 18
HOW LIKELY ARE YOU TO NOD OFF OR FALL ASLEEP WHILE LYING DOWN TO REST IN THE AFTERNOON WHEN CIRCUMSTANCES PERMIT: 3
HOW LIKELY ARE YOU TO NOD OFF OR FALL ASLEEP IN A CAR, WHILE STOPPED FOR A FEW MINUTES IN TRAFFIC: 1
HOW LIKELY ARE YOU TO NOD OFF OR FALL ASLEEP WHILE SITTING AND READING: 3
HOW LIKELY ARE YOU TO NOD OFF OR FALL ASLEEP WHEN YOU ARE A PASSENGER IN A CAR FOR AN HOUR WITHOUT A BREAK: 0
HOW LIKELY ARE YOU TO NOD OFF OR FALL ASLEEP WHILE WATCHING TV: 3
HOW LIKELY ARE YOU TO NOD OFF OR FALL ASLEEP WHILE SITTING AND TALKING TO SOMEONE: 1
HOW LIKELY ARE YOU TO NOD OFF OR FALL ASLEEP WHILE SITTING INACTIVE IN A PUBLIC PLACE: 2
HOW LIKELY ARE YOU TO NOD OFF OR FALL ASLEEP WHILE SITTING QUIETLY AFTER LUNCH WITHOUT ALCOHOL: 3
ESS TOTAL SCORE: 16

## 2019-05-07 NOTE — PROGRESS NOTES
Orders verbalized by Carolin Winslow CNP;  Call in one month to check if pt wants to proceed w/ CPAP

## 2019-05-07 NOTE — PATIENT INSTRUCTIONS
Get into your favorite sleeping position: If you can't fall asleep within 15-30 minutes, get up and do something boring until you feel sleepy. Sit quietly in the dark or read the warranty on your refrigerator. Don't expose yourself to bright light while you are up, it gives cues to your brain that it is time to wake up. 11- Only use your bed for sleeping: Dont use the bed as an office, workroom or recreation room. Let your body \"know\" that the bed is associated with sleeping  12- Use comfortable bedding. Uncomfortable bedding can prevent good sleep. Evaluate whether or not this is a source of your problem, and make appropriate changes. 13- Make sure your bed and bedroom are quiet and comfortable: A hot room can be uncomfortable. A cooler room, along with enough blankets to stay warm is recommended. Get a blackout shade or wear a slumber mask and wear earplugs or get a \"white noise\" machine for light and noise distractions. 14- Use sunlight to set your biological clock: When you get up in the morning, go outside and turn your face to the sun for 15 minutes. 13- Dont take your worries to bed: Leave worries about job, school, daily life, etc., behind when you go to bed. Some people find it useful to assign a \"worry period\" during the evening or afternoon for these issues.     CPAP Equipment Cleaning and Disinfecting Schedule  Equipment Cleaning Frequency Instructions  Disinfecting Frequency   Non-Disposable Filters  Weekly Mild soapy water, Rinse, Air Dry Not Required   Disposable Filters Change as needed  2-4 weeks Do Not Wash Not Required   Hose/tubing Daily Mild soapy water, Rinse, Air Dry Once a week   Mask / Nasal Pillows Daily Mild soapy water, Rinse, Air Dry Once a week   Headgear Weekly Hand wash, Mild soapy water, Rinse, Dry  Not Required   Humidifier Daily Empty water daily  Mild soapy water, Rinse well, Air Dry  Once a week   CPAP Unit As Needed Dust with damp cloth,  No detergents or sprays Not Required         Disinfect (per schedule) with 1 part white vinegar and 3 parts water- soak mask and water chamber for 30 minutes every 1-2 weeks, more often if sick. Allow water/vinegar mixture to run through tubing. Allow all equipment to air dry. Drying Hints:   Always hang tubing away from direct sunlight, as this will cause the tubing to become yellow, brittle and crack over a period of time. DO NOT attach the wet tubing to your CPAP unit to blow-dry it. The moisture from the tubing can drain back into your machine. Moisture in your unit can cause sudden pressure increases or short circuits  DO's and DON'Ts:  - Don't use alcohol-based products to clean your mask, because it can cause the materials to become hard and brittle. - Don't put headgear in the washer or dryer  - Don't use any caustic or household cleaning solutions such as bleach on your CPAP   equipment.  - Do follow the recommended cleaning schedule. - Do change your disposable filter frequently. Adapted From: [employer]/cleaning. shtm.   These are general suggestions for all models please follow specific s recommendations and specific instructions

## 2019-05-09 ENCOUNTER — TELEPHONE (OUTPATIENT)
Dept: FAMILY MEDICINE CLINIC | Age: 54
End: 2019-05-09

## 2019-05-09 DIAGNOSIS — F32.2 SEVERE SINGLE CURRENT EPISODE OF MAJOR DEPRESSIVE DISORDER, WITHOUT PSYCHOTIC FEATURES (HCC): ICD-10-CM

## 2019-05-09 RX ORDER — CITALOPRAM 40 MG/1
40 TABLET ORAL DAILY
Qty: 30 TABLET | Refills: 5 | Status: SHIPPED | OUTPATIENT
Start: 2019-05-09 | End: 2019-11-01 | Stop reason: SDUPTHER

## 2019-05-28 DIAGNOSIS — G89.29 CHRONIC BILATERAL LOW BACK PAIN WITHOUT SCIATICA: ICD-10-CM

## 2019-05-28 DIAGNOSIS — M54.50 CHRONIC BILATERAL LOW BACK PAIN WITHOUT SCIATICA: ICD-10-CM

## 2019-05-28 DIAGNOSIS — F41.9 ANXIETY: ICD-10-CM

## 2019-05-28 NOTE — TELEPHONE ENCOUNTER
Patient is going on a road trip and he usually gets xanax. He is leaving [place]. On a trip and requests meds for going down and back.

## 2019-05-30 RX ORDER — ALPRAZOLAM 0.5 MG/1
TABLET ORAL
Qty: 4 TABLET | Refills: 0 | OUTPATIENT
Start: 2019-05-30 | End: 2019-06-30

## 2019-06-03 DIAGNOSIS — M54.50 CHRONIC BILATERAL LOW BACK PAIN WITHOUT SCIATICA: ICD-10-CM

## 2019-06-03 DIAGNOSIS — G89.29 CHRONIC BILATERAL LOW BACK PAIN WITHOUT SCIATICA: ICD-10-CM

## 2019-06-04 RX ORDER — TIZANIDINE HYDROCHLORIDE 6 MG/1
6 CAPSULE, GELATIN COATED ORAL 4 TIMES DAILY PRN
Qty: 120 CAPSULE | Refills: 1 | Status: SHIPPED | OUTPATIENT
Start: 2019-06-04 | End: 2019-08-03 | Stop reason: SDUPTHER

## 2019-06-12 DIAGNOSIS — R79.89 LOW TESTOSTERONE: ICD-10-CM

## 2019-06-12 RX ORDER — TESTOSTERONE CYPIONATE 200 MG/ML
INJECTION INTRAMUSCULAR
Qty: 2 ML | Refills: 2 | OUTPATIENT
Start: 2019-06-12 | End: 2019-07-17 | Stop reason: SDUPTHER

## 2019-06-12 NOTE — TELEPHONE ENCOUNTER
Date of last refill of this med was 4/8, # of pills given 2mL and # of refills given 2. Their next appointment is 8/1, the last date patient was seen was 2/28. Does patient have medication agreement on file? Yes  Has drug screen been done in last 12 months if needed? no  Has OARRS report been ran in last 90 days?  Yes 6/12/2019 Please review oarrs report

## 2019-06-14 ENCOUNTER — TELEPHONE (OUTPATIENT)
Dept: FAMILY MEDICINE CLINIC | Age: 54
End: 2019-06-14

## 2019-06-14 NOTE — TELEPHONE ENCOUNTER
Submitted PA for Testosterone Cypionate 200MG/ML solution, Key: [id number]. Medication has been APPROVED from 05/15/2019 through 06/13/2020. Please advise patient. Thank you.

## 2019-07-03 RX ORDER — LISINOPRIL 40 MG/1
TABLET ORAL
Qty: 90 TABLET | Refills: 1 | Status: SHIPPED | OUTPATIENT
Start: 2019-07-03 | End: 2019-12-12 | Stop reason: SDUPTHER

## 2019-07-17 DIAGNOSIS — R79.89 LOW TESTOSTERONE: ICD-10-CM

## 2019-07-18 RX ORDER — TESTOSTERONE CYPIONATE 200 MG/ML
INJECTION INTRAMUSCULAR
Qty: 2 ML | Refills: 2 | Status: SHIPPED | OUTPATIENT
Start: 2019-07-18 | End: 2019-08-26 | Stop reason: SDUPTHER

## 2019-07-23 DIAGNOSIS — M54.50 CHRONIC BILATERAL LOW BACK PAIN WITHOUT SCIATICA: ICD-10-CM

## 2019-07-23 DIAGNOSIS — F41.9 ANXIETY: ICD-10-CM

## 2019-07-23 DIAGNOSIS — G89.29 CHRONIC BILATERAL LOW BACK PAIN WITHOUT SCIATICA: ICD-10-CM

## 2019-07-23 RX ORDER — ALPRAZOLAM 0.5 MG/1
TABLET ORAL
Qty: 4 TABLET | Refills: 0 | OUTPATIENT
Start: 2019-07-23 | End: 2022-02-23 | Stop reason: SDUPTHER

## 2019-08-01 ENCOUNTER — OFFICE VISIT (OUTPATIENT)
Dept: FAMILY MEDICINE CLINIC | Age: 54
End: 2019-08-01
Payer: COMMERCIAL

## 2019-08-01 VITALS
SYSTOLIC BLOOD PRESSURE: 110 MMHG | BODY MASS INDEX: 27.27 KG/M2 | HEART RATE: 87 BPM | OXYGEN SATURATION: 95 % | TEMPERATURE: 99.4 F | DIASTOLIC BLOOD PRESSURE: 64 MMHG | WEIGHT: 224 LBS

## 2019-08-01 DIAGNOSIS — I10 ESSENTIAL HYPERTENSION: Primary | ICD-10-CM

## 2019-08-01 DIAGNOSIS — M54.50 CHRONIC BILATERAL LOW BACK PAIN WITHOUT SCIATICA: ICD-10-CM

## 2019-08-01 DIAGNOSIS — G47.33 OSA (OBSTRUCTIVE SLEEP APNEA): ICD-10-CM

## 2019-08-01 DIAGNOSIS — F32.2 SEVERE SINGLE CURRENT EPISODE OF MAJOR DEPRESSIVE DISORDER, WITHOUT PSYCHOTIC FEATURES (HCC): ICD-10-CM

## 2019-08-01 DIAGNOSIS — G89.29 CHRONIC BILATERAL LOW BACK PAIN WITHOUT SCIATICA: ICD-10-CM

## 2019-08-01 DIAGNOSIS — R79.89 LOW TESTOSTERONE: ICD-10-CM

## 2019-08-01 DIAGNOSIS — Z12.5 SCREENING PSA (PROSTATE SPECIFIC ANTIGEN): ICD-10-CM

## 2019-08-01 DIAGNOSIS — Z23 NEED FOR PNEUMOCOCCAL VACCINE: ICD-10-CM

## 2019-08-01 DIAGNOSIS — E78.2 MIXED HYPERLIPIDEMIA: ICD-10-CM

## 2019-08-01 LAB
A/G RATIO: 1.7 (ref 1.1–2.2)
ALBUMIN SERPL-MCNC: 4.3 G/DL (ref 3.4–5)
ALP BLD-CCNC: 72 U/L (ref 40–129)
ALT SERPL-CCNC: 16 U/L (ref 10–40)
ANION GAP SERPL CALCULATED.3IONS-SCNC: 11 MMOL/L (ref 3–16)
AST SERPL-CCNC: 15 U/L (ref 15–37)
BILIRUB SERPL-MCNC: 0.4 MG/DL (ref 0–1)
BUN BLDV-MCNC: 9 MG/DL (ref 7–20)
CALCIUM SERPL-MCNC: 9.5 MG/DL (ref 8.3–10.6)
CHLORIDE BLD-SCNC: 102 MMOL/L (ref 99–110)
CHOLESTEROL, TOTAL: 172 MG/DL (ref 0–199)
CO2: 29 MMOL/L (ref 21–32)
CREAT SERPL-MCNC: 1 MG/DL (ref 0.9–1.3)
GFR AFRICAN AMERICAN: >60
GFR NON-AFRICAN AMERICAN: >60
GLOBULIN: 2.5 G/DL
GLUCOSE BLD-MCNC: 91 MG/DL (ref 70–99)
HDLC SERPL-MCNC: 26 MG/DL (ref 40–60)
LDL CHOLESTEROL CALCULATED: 99 MG/DL
POTASSIUM SERPL-SCNC: 4.4 MMOL/L (ref 3.5–5.1)
PROSTATE SPECIFIC ANTIGEN: 3.1 NG/ML (ref 0–4)
SODIUM BLD-SCNC: 142 MMOL/L (ref 136–145)
TOTAL PROTEIN: 6.8 G/DL (ref 6.4–8.2)
TRIGL SERPL-MCNC: 236 MG/DL (ref 0–150)
VLDLC SERPL CALC-MCNC: 47 MG/DL

## 2019-08-01 PROCEDURE — 99214 OFFICE O/P EST MOD 30 MIN: CPT | Performed by: FAMILY MEDICINE

## 2019-08-01 PROCEDURE — 90471 IMMUNIZATION ADMIN: CPT | Performed by: FAMILY MEDICINE

## 2019-08-01 PROCEDURE — 90732 PPSV23 VACC 2 YRS+ SUBQ/IM: CPT | Performed by: FAMILY MEDICINE

## 2019-08-01 RX ORDER — LANOLIN ALCOHOL/MO/W.PET/CERES
1000 CREAM (GRAM) TOPICAL DAILY
COMMUNITY
End: 2020-04-23

## 2019-08-03 DIAGNOSIS — M54.50 CHRONIC BILATERAL LOW BACK PAIN WITHOUT SCIATICA: ICD-10-CM

## 2019-08-03 DIAGNOSIS — G89.29 CHRONIC BILATERAL LOW BACK PAIN WITHOUT SCIATICA: ICD-10-CM

## 2019-08-05 RX ORDER — TIZANIDINE HYDROCHLORIDE 6 MG/1
6 CAPSULE, GELATIN COATED ORAL 4 TIMES DAILY PRN
Qty: 120 CAPSULE | Refills: 1 | Status: SHIPPED | OUTPATIENT
Start: 2019-08-05 | End: 2019-10-03 | Stop reason: SDUPTHER

## 2019-08-26 DIAGNOSIS — R79.89 LOW TESTOSTERONE: ICD-10-CM

## 2019-08-26 RX ORDER — TESTOSTERONE CYPIONATE 200 MG/ML
INJECTION INTRAMUSCULAR
Qty: 2 ML | Refills: 2 | Status: SHIPPED | OUTPATIENT
Start: 2019-08-26 | End: 2019-11-04 | Stop reason: SDUPTHER

## 2019-10-01 DIAGNOSIS — R79.89 LOW TESTOSTERONE: ICD-10-CM

## 2019-10-03 DIAGNOSIS — G89.29 CHRONIC BILATERAL LOW BACK PAIN WITHOUT SCIATICA: ICD-10-CM

## 2019-10-03 DIAGNOSIS — M54.50 CHRONIC BILATERAL LOW BACK PAIN WITHOUT SCIATICA: ICD-10-CM

## 2019-10-03 RX ORDER — TIZANIDINE HYDROCHLORIDE 6 MG/1
6 CAPSULE, GELATIN COATED ORAL 4 TIMES DAILY PRN
Qty: 120 CAPSULE | Refills: 1 | Status: SHIPPED | OUTPATIENT
Start: 2019-10-03 | End: 2019-10-31 | Stop reason: SDUPTHER

## 2019-10-31 DIAGNOSIS — M54.50 CHRONIC BILATERAL LOW BACK PAIN WITHOUT SCIATICA: ICD-10-CM

## 2019-10-31 DIAGNOSIS — G89.29 CHRONIC BILATERAL LOW BACK PAIN WITHOUT SCIATICA: ICD-10-CM

## 2019-11-01 DIAGNOSIS — R79.89 LOW TESTOSTERONE: ICD-10-CM

## 2019-11-01 DIAGNOSIS — F32.2 SEVERE SINGLE CURRENT EPISODE OF MAJOR DEPRESSIVE DISORDER, WITHOUT PSYCHOTIC FEATURES (HCC): ICD-10-CM

## 2019-11-01 RX ORDER — TIZANIDINE HYDROCHLORIDE 6 MG/1
6 CAPSULE, GELATIN COATED ORAL 4 TIMES DAILY PRN
Qty: 120 CAPSULE | Refills: 1 | Status: SHIPPED | OUTPATIENT
Start: 2019-11-01 | End: 2020-01-03

## 2019-11-05 RX ORDER — CITALOPRAM 40 MG/1
40 TABLET ORAL DAILY
Qty: 90 TABLET | Refills: 1 | Status: SHIPPED | OUTPATIENT
Start: 2019-11-05 | End: 2020-01-21 | Stop reason: ALTCHOICE

## 2019-11-05 RX ORDER — TESTOSTERONE CYPIONATE 200 MG/ML
INJECTION INTRAMUSCULAR
Qty: 2 ML | Refills: 2 | Status: SHIPPED | OUTPATIENT
Start: 2019-11-05 | End: 2020-03-02

## 2019-11-28 DIAGNOSIS — G89.29 CHRONIC BILATERAL LOW BACK PAIN WITHOUT SCIATICA: ICD-10-CM

## 2019-11-28 DIAGNOSIS — M54.50 CHRONIC BILATERAL LOW BACK PAIN WITHOUT SCIATICA: ICD-10-CM

## 2019-11-29 RX ORDER — TIZANIDINE HYDROCHLORIDE 6 MG/1
6 CAPSULE, GELATIN COATED ORAL 4 TIMES DAILY PRN
Qty: 120 CAPSULE | Refills: 1 | OUTPATIENT
Start: 2019-11-29

## 2019-12-12 RX ORDER — LISINOPRIL 40 MG/1
TABLET ORAL
Qty: 90 TABLET | Refills: 1 | Status: SHIPPED | OUTPATIENT
Start: 2019-12-12 | End: 2020-06-04

## 2020-01-03 RX ORDER — TIZANIDINE HYDROCHLORIDE 6 MG/1
6 CAPSULE, GELATIN COATED ORAL 4 TIMES DAILY PRN
Qty: 120 CAPSULE | Refills: 1 | Status: SHIPPED | OUTPATIENT
Start: 2020-01-03 | End: 2020-03-02

## 2020-01-21 ENCOUNTER — OFFICE VISIT (OUTPATIENT)
Dept: FAMILY MEDICINE CLINIC | Age: 55
End: 2020-01-21
Payer: COMMERCIAL

## 2020-01-21 VITALS
HEART RATE: 84 BPM | OXYGEN SATURATION: 99 % | DIASTOLIC BLOOD PRESSURE: 86 MMHG | TEMPERATURE: 98.2 F | BODY MASS INDEX: 28.34 KG/M2 | WEIGHT: 232.8 LBS | SYSTOLIC BLOOD PRESSURE: 135 MMHG

## 2020-01-21 PROCEDURE — 99213 OFFICE O/P EST LOW 20 MIN: CPT | Performed by: NURSE PRACTITIONER

## 2020-01-21 RX ORDER — AMOXICILLIN AND CLAVULANATE POTASSIUM 875; 125 MG/1; MG/1
1 TABLET, FILM COATED ORAL 2 TIMES DAILY
Qty: 20 TABLET | Refills: 0 | Status: SHIPPED | OUTPATIENT
Start: 2020-01-21 | End: 2020-01-31

## 2020-01-21 ASSESSMENT — ENCOUNTER SYMPTOMS
SINUS PRESSURE: 1
BACK PAIN: 1
SINUS PAIN: 1
COUGH: 1
GASTROINTESTINAL NEGATIVE: 1

## 2020-01-21 NOTE — PROGRESS NOTES
tablet by mouth every 8 hours as needed for Pain 60 tablet 1    sodium chloride 0.9 % SOLN 100 mL with morphine (PF) 10 MG/ML SOLN 100 mg Infuse intravenously continuous Along with dilaudid      oxyCODONE HCl (OXY-IR) 10 MG immediate release tablet TAKE 1 TABLET BY MOUTH EVERY 6 HOURS  0       Allergies   Allergen Reactions    Vioxx Other (See Comments)     Oral ulcers      Phenergan [Promethazine Hcl] Nausea And Vomiting     Was given after surgery and induced nausea and vomiting. Social History     Tobacco Use    Smoking status: Former Smoker     Packs/day: 0.25     Years: 20.00     Pack years: 5.00     Types: Cigars     Last attempt to quit: 3/4/2019     Years since quittin.8    Smokeless tobacco: Never Used    Tobacco comment: 17 - smokes 6 cigars weekly/ quit   Substance Use Topics    Alcohol use: Yes     Comment: occassional       Objective:   /86   Pulse 84   Temp 98.2 °F (36.8 °C) (Oral)   Wt 232 lb 12.8 oz (105.6 kg)   SpO2 99%   BMI 28.34 kg/m²     Physical Exam  Vitals signs and nursing note reviewed. Constitutional:       General: He is not in acute distress. Appearance: He is well-developed. HENT:      Head: Normocephalic and atraumatic. Right Ear: Tympanic membrane, ear canal and external ear normal.      Left Ear: Tympanic membrane, ear canal and external ear normal.      Nose:      Right Sinus: Maxillary sinus tenderness and frontal sinus tenderness present. Left Sinus: Maxillary sinus tenderness and frontal sinus tenderness present. Mouth/Throat:      Lips: Pink. Mouth: Mucous membranes are moist.      Pharynx: Oropharynx is clear. Posterior oropharyngeal erythema present. No oropharyngeal exudate. Eyes:      Conjunctiva/sclera: Conjunctivae normal.   Neck:      Musculoskeletal: Neck supple. Cardiovascular:      Rate and Rhythm: Normal rate and regular rhythm. Heart sounds: Normal heart sounds.    Pulmonary:      Effort: Pulmonary

## 2020-01-21 NOTE — PATIENT INSTRUCTIONS
disease; or  · mononucleosis. It is not known whether this medicine will harm an unborn baby. Tell your doctor if you are pregnant or plan to become pregnant. Amoxicillin and clavulanate potassium can make birth control pills less effective. Ask your doctor about using a non-hormonal birth control (condom, diaphragm with spermicide) to prevent pregnancy. Amoxicillin and clavulanate potassium can pass into breast milk and may affect the nursing baby. Tell your doctor if you are breast-feeding. Do not give this medicine to a child without medical advice. The liquid or chewable tablet may contain phenylalanine. Talk to your doctor before using these forms of this medicine if you have phenylketonuria (PKU). How should I take amoxicillin and clavulanate potassium? Follow all directions on your prescription label. Do not take this medicine in larger or smaller amounts or for longer than recommended. Take the medicine every 12 hours, at the start of a meal to reduce stomach upset. Do not crush or chew the extended-release tablet. Swallow the pill whole, or break the pill in half and take both halves one at a time. If you have trouble swallowing a whole or half pill, talk with your doctor about using another form of amoxicillin and clavulanate potassium. The chewable tablet must be chewed before you swallow it. Shake the liquid medicine well just before you measure a dose. Measure liquid medicine with the dosing syringe provided, or with a special dose-measuring spoon or medicine cup. If you do not have a dose-measuring device, ask your pharmacist for one. Use this medicine for the full prescribed length of time. Your symptoms may improve before the infection is completely cleared. Skipping doses may also increase your risk of further infection that is resistant to antibiotics. Amoxicillin and clavulanate potassium will not treat a viral infection such as the flu or a common cold.   This medicine can cause blistering and peeling. Common side effects may include:  · nausea, diarrhea; or  · vaginal itching or discharge; This is not a complete list of side effects and others may occur. Call your doctor for medical advice about side effects. You may report side effects to FDA at 0-715-NDL-5263. What other drugs will affect amoxicillin and clavulanate potassium? Tell your doctor about all your current medicines and any you start or stop using, especially:  · allopurinol;  · probenecid; or  · a blood thinner --warfarin, Coumadin, Jantoven. This list is not complete. Other drugs may interact with amoxicillin and clavulanate potassium, including prescription and over-the-counter medicines, vitamins, and herbal products. Not all possible interactions are listed in this medication guide. Where can I get more information? Your pharmacist can provide more information about amoxicillin and clavulanate potassium. Remember, keep this and all other medicines out of the reach of children, never share your medicines with others, and use this medication only for the indication prescribed. Every effort has been made to ensure that the information provided by [facility] [address] is accurate, up-to-date, and complete, but no guarantee is made to that effect. Drug information contained herein may be time sensitive. [facility] information has been compiled for use by healthcare practitioners and consumers in the [place] and therefore [employer] does not warrant that uses outside of the [place] are appropriate, unless specifically indicated otherwise. [facility]'s drug information does not endorse drugs, diagnose patients or recommend therapy.  [facility] drug information is an informational resource designed to assist licensed healthcare practitioners in caring for their patients and/or to serve consumers viewing this service as a supplement to, and not a substitute for, the expertise, skill, knowledge and judgment of healthcare practitioners. The absence of a warning for a given drug or drug combination in no way should be construed to indicate that the drug or drug combination is safe, effective or appropriate for any given patient. [facility] does not assume any responsibility for any aspect of healthcare administered with the aid of information [facility] provides. The information contained herein is not intended to cover all possible uses, directions, precautions, warnings, drug interactions, allergic reactions, or adverse effects. If you have questions about the drugs you are taking, check with your doctor, nurse or pharmacist.  Copyright 8946-3260 [address]. Version: 11.02. Revision date: 1/2/2018. Care instructions adapted under license by [facility] If you have questions about a medical condition or this instruction, always ask your healthcare professional. [place] [address] any warranty or liability for your use of this information. Patient Education        Saline Nasal Washes: Care Instructions  Your Care Instructions  Saline nasal washes help keep the nasal passages open by washing out thick or dried mucus. This simple remedy can help relieve symptoms of allergies, sinusitis, and colds. It also can make the nose feel more comfortable by keeping the mucous membranes moist. You may notice a little burning sensation in your nose the first few times you use the solution, but this usually gets better in a few days. Follow-up care is a key part of your treatment and safety. Be sure to make and go to all appointments, and call your doctor if you are having problems. It's also a good idea to know your test results and keep a list of the medicines you take. How can you care for yourself at home? · You can buy premixed saline solution in a squeeze bottle or other sinus rinse products at a drugstore. Read and follow the instructions on the label.   · You also can make your own saline solution by adding 1 teaspoon of salt and 1 teaspoon of baking soda to 2 cups of distilled water. · If you use a homemade solution, pour a small amount into a clean bowl. Using a rubber bulb syringe, squeeze the syringe and place the tip in the salt water. Pull a small amount of the salt water into the syringe by relaxing your hand. · Sit down with your head tilted slightly back. Do not lie down. Put the tip of the bulb syringe or the squeeze bottle a little way into one of your nostrils. Gently drip or squirt a few drops into the nostril. Repeat with the other nostril. Some sneezing and gagging are normal at first.  · Gently blow your nose. · Wipe the syringe or bottle tip clean after each use. · Repeat this 2 or 3 times a day. · Use nasal washes gently if you have nosebleeds often. When should you call for help? Watch closely for changes in your health, and be sure to contact your doctor if:    · You often get nosebleeds.     · You have problems doing the nasal washes. Where can you learn more? Go to https://[email or web address].iLogon. org and sign in to your [employer]Gene account. Enter [phone] in the [employer] box to learn more about \"Saline Nasal Washes: Care Instructions. \"     If you do not have an account, please click on the \"Sign Up Now\" link. Current as of: July 28, 2019  Content Version: 12.3  © 5493-6570 Healthwise, [employer]. Care instructions adapted under license by [facility] If you have questions about a medical condition or this instruction, always ask your healthcare professional. [place] [address] any warranty or liability for your use of this information.

## 2020-02-03 ENCOUNTER — OFFICE VISIT (OUTPATIENT)
Dept: FAMILY MEDICINE CLINIC | Age: 55
End: 2020-02-03
Payer: COMMERCIAL

## 2020-02-03 VITALS
TEMPERATURE: 99 F | SYSTOLIC BLOOD PRESSURE: 111 MMHG | WEIGHT: 232 LBS | HEART RATE: 71 BPM | HEIGHT: 75 IN | OXYGEN SATURATION: 96 % | BODY MASS INDEX: 28.85 KG/M2 | DIASTOLIC BLOOD PRESSURE: 71 MMHG

## 2020-02-03 PROBLEM — F32.2 SEVERE SINGLE CURRENT EPISODE OF MAJOR DEPRESSIVE DISORDER, WITHOUT PSYCHOTIC FEATURES (HCC): Status: RESOLVED | Noted: 2018-05-19 | Resolved: 2020-02-03

## 2020-02-03 LAB
ALT SERPL-CCNC: 21 U/L (ref 10–40)
ANION GAP SERPL CALCULATED.3IONS-SCNC: 12 MMOL/L (ref 3–16)
BUN BLDV-MCNC: 8 MG/DL (ref 7–20)
CALCIUM SERPL-MCNC: 9.3 MG/DL (ref 8.3–10.6)
CHLORIDE BLD-SCNC: 100 MMOL/L (ref 99–110)
CHOLESTEROL, TOTAL: 162 MG/DL (ref 0–199)
CO2: 28 MMOL/L (ref 21–32)
CREAT SERPL-MCNC: 0.9 MG/DL (ref 0.9–1.3)
GFR AFRICAN AMERICAN: >60
GFR NON-AFRICAN AMERICAN: >60
GLUCOSE BLD-MCNC: 91 MG/DL (ref 70–99)
HDLC SERPL-MCNC: 27 MG/DL (ref 40–60)
LDL CHOLESTEROL CALCULATED: 103 MG/DL
POTASSIUM SERPL-SCNC: 4.3 MMOL/L (ref 3.5–5.1)
SODIUM BLD-SCNC: 140 MMOL/L (ref 136–145)
TRIGL SERPL-MCNC: 159 MG/DL (ref 0–150)
VLDLC SERPL CALC-MCNC: 32 MG/DL

## 2020-02-03 PROCEDURE — 99214 OFFICE O/P EST MOD 30 MIN: CPT | Performed by: FAMILY MEDICINE

## 2020-02-03 RX ORDER — DOXYCYCLINE HYCLATE 100 MG/1
100 CAPSULE ORAL 2 TIMES DAILY
Qty: 20 CAPSULE | Refills: 0 | Status: SHIPPED | OUTPATIENT
Start: 2020-02-03 | End: 2020-02-13

## 2020-02-03 NOTE — PROGRESS NOTES
Subjective:   He presents for follow up of his high blood pressure, depression, low testosterone, high cholesterol and sleep apnea. He was recently treated for sinus infection. His blood pressures under good control. He is due to have cholesterol rechecked. He was on antidepressant medication for his depression he was always tired so he weaned off of it about a month ago and he states he feels fine off of it so he is can stay off the pressure medicine for now. He still has daily chronic back pain. He uses his CPAP machine nightly but he has had some issues recently with sinus infection. He was on Augmentin but he finished that he still has discolored nasal drainage and congestion. He is allergic to vioxx and phenergan [promethazine hcl]. He   reports that he quit smoking about 11 months ago. His smoking use included cigars. He has a 5.00 pack-year smoking history. He has never used smokeless tobacco. Review of systems negative for chest pain swelling shortness of breath wheezing abdominal pain rectal bleeding  Objective:   /71   Pulse 71   Temp 99 °F (37.2 °C) (Oral)   Ht 6' 3\" (1.905 m)   Wt 232 lb (105.2 kg)   SpO2 96%   BMI 29.00 kg/m²   BP Readings from Last 3 Encounters:   02/03/20 111/71   01/21/20 135/86   08/01/19 110/64     Physical Exam  Vitals signs reviewed. Constitutional:       Appearance: He is well-developed. He is not toxic-appearing. HENT:      Head: Normocephalic. Eyes:      General: No scleral icterus. Right eye: No discharge. Left eye: No discharge. Conjunctiva/sclera: Conjunctivae normal.   Neck:      Musculoskeletal: Neck supple. Thyroid: No thyroid mass or thyromegaly. Vascular: No carotid bruit. Cardiovascular:      Rate and Rhythm: Normal rate and regular rhythm. Heart sounds: Normal heart sounds. No murmur. Pulmonary:      Effort: No respiratory distress. Breath sounds: Normal breath sounds. No wheezing or rales. Abdominal:      General: There is no distension. Palpations: Abdomen is soft. There is no mass. Tenderness: There is no abdominal tenderness. There is no guarding or rebound. Lymphadenopathy:      Cervical: No cervical adenopathy. Upper Body:      Right upper body: No supraclavicular adenopathy. Skin:     General: Skin is warm. Neurological:      Mental Status: He is alert and oriented to person, place, and time. Psychiatric:         Behavior: Behavior normal.         Thought Content: Thought content normal.         Judgment: Judgment normal.       Assessment and Plan:   Diagnosis Orders   1. Essential hypertension  Basic Metabolic Panel   2. Severe single current episode of major depressive disorder, without psychotic features ([facility] [address]     3. Low testosterone     4. Mixed hyperlipidemia  Lipid Panel   5. BIJU (obstructive sleep apnea)     6. Medication monitoring encounter  ALT   7. Acute bacterial sinusitis  doxycycline hyclate (VIBRAMYCIN) 100 MG capsule   Depression resolved okay to stay off antidepressants for now. We will try another antibiotic for his sinus infection. The problems listed in the assessment are stable unless otherwise indicated. He  was instructed to continue their current medications and treatment for the aboveproblems unless otherwise indicated above. Age-specific preventative medicine recommendations were reviewed with patient today and the Healthy Family Handout was given to patient. Avoid tobacco products exposure. Follow up in 6mo. Call or return to office for any problems that develop before the next scheduled follow-up appointment. Carl Keen M.D. Parts of this note were completed using voice recognition transcription. Every effort was made to ensure accuracy; however, inadvertent transcription errors may be present.

## 2020-03-02 RX ORDER — TIZANIDINE HYDROCHLORIDE 6 MG/1
6 CAPSULE, GELATIN COATED ORAL 4 TIMES DAILY PRN
Qty: 120 CAPSULE | Refills: 1 | Status: SHIPPED | OUTPATIENT
Start: 2020-03-02 | End: 2020-05-01

## 2020-03-02 RX ORDER — TESTOSTERONE CYPIONATE 200 MG/ML
INJECTION INTRAMUSCULAR
Qty: 6 ML | Refills: 0 | Status: SHIPPED | OUTPATIENT
Start: 2020-03-02 | End: 2020-06-02

## 2020-03-07 LAB — HEMOGLOBIN: 18.1 G/DL (ref 13.5–17.5)

## 2020-03-26 ENCOUNTER — TELEPHONE (OUTPATIENT)
Dept: FAMILY MEDICINE CLINIC | Age: 55
End: 2020-03-26

## 2020-03-26 NOTE — TELEPHONE ENCOUNTER
Tried calling patient no answer, no voicemail, received labs, per  prediabetic range decrease sugar intake, labs scanned.

## 2020-04-13 ENCOUNTER — APPOINTMENT (OUTPATIENT)
Dept: CARDIAC CATH/INVASIVE PROCEDURES | Age: 55
DRG: 246 | End: 2020-04-13
Payer: COMMERCIAL

## 2020-04-13 ENCOUNTER — HOSPITAL ENCOUNTER (INPATIENT)
Age: 55
LOS: 2 days | Discharge: HOME | DRG: 246 | End: 2020-04-15
Attending: EMERGENCY MEDICINE | Admitting: INTERNAL MEDICINE
Payer: COMMERCIAL

## 2020-04-13 ENCOUNTER — APPOINTMENT (OUTPATIENT)
Dept: GENERAL RADIOLOGY | Age: 55
DRG: 246 | End: 2020-04-13
Payer: COMMERCIAL

## 2020-04-13 PROBLEM — I21.3 STEMI (ST ELEVATION MYOCARDIAL INFARCTION) (HCC): Status: ACTIVE | Noted: 2020-04-13

## 2020-04-13 PROBLEM — E66.9 OBESITY: Status: ACTIVE | Noted: 2020-04-13

## 2020-04-13 LAB
A/G RATIO: 1.5 (ref 1.1–2.2)
ALBUMIN SERPL-MCNC: 4.4 G/DL (ref 3.4–5)
ALP BLD-CCNC: 73 U/L (ref 40–129)
ALT SERPL-CCNC: 20 U/L (ref 10–40)
ANION GAP SERPL CALCULATED.3IONS-SCNC: 13 MMOL/L (ref 3–16)
AST SERPL-CCNC: 18 U/L (ref 15–37)
BANDED NEUTROPHILS RELATIVE PERCENT: 4 % (ref 0–7)
BASOPHILS ABSOLUTE: 0 K/UL (ref 0–0.2)
BASOPHILS RELATIVE PERCENT: 0 %
BILIRUB SERPL-MCNC: 0.5 MG/DL (ref 0–1)
BUN BLDV-MCNC: 11 MG/DL (ref 7–20)
CALCIUM SERPL-MCNC: 9.7 MG/DL (ref 8.3–10.6)
CHLORIDE BLD-SCNC: 101 MMOL/L (ref 99–110)
CO2: 25 MMOL/L (ref 21–32)
CREAT SERPL-MCNC: 1 MG/DL (ref 0.9–1.3)
EKG ATRIAL RATE: 62 BPM
EKG ATRIAL RATE: 66 BPM
EKG ATRIAL RATE: 67 BPM
EKG DIAGNOSIS: NORMAL
EKG P AXIS: 51 DEGREES
EKG P AXIS: 58 DEGREES
EKG P AXIS: 73 DEGREES
EKG P-R INTERVAL: 172 MS
EKG P-R INTERVAL: 184 MS
EKG P-R INTERVAL: 194 MS
EKG Q-T INTERVAL: 364 MS
EKG Q-T INTERVAL: 372 MS
EKG Q-T INTERVAL: 372 MS
EKG QRS DURATION: 84 MS
EKG QRS DURATION: 86 MS
EKG QRS DURATION: 92 MS
EKG QTC CALCULATION (BAZETT): 377 MS
EKG QTC CALCULATION (BAZETT): 384 MS
EKG QTC CALCULATION (BAZETT): 389 MS
EKG R AXIS: 1 DEGREES
EKG R AXIS: 244 DEGREES
EKG R AXIS: 261 DEGREES
EKG T AXIS: 63 DEGREES
EKG T AXIS: 65 DEGREES
EKG T AXIS: 74 DEGREES
EKG VENTRICULAR RATE: 62 BPM
EKG VENTRICULAR RATE: 66 BPM
EKG VENTRICULAR RATE: 67 BPM
EOSINOPHILS ABSOLUTE: 0.3 K/UL (ref 0–0.6)
EOSINOPHILS RELATIVE PERCENT: 2 %
GFR AFRICAN AMERICAN: >60
GFR NON-AFRICAN AMERICAN: >60
GLOBULIN: 3 G/DL
GLUCOSE BLD-MCNC: 155 MG/DL (ref 70–99)
HCT VFR BLD CALC: 54.6 % (ref 40.5–52.5)
HEMATOLOGY PATH CONSULT: YES
HEMOGLOBIN: 18.6 G/DL (ref 13.5–17.5)
LV EF: 35 %
LVEF MODALITY: NORMAL
LYMPHOCYTES ABSOLUTE: 6.4 K/UL (ref 1–5.1)
LYMPHOCYTES RELATIVE PERCENT: 43 %
MCH RBC QN AUTO: 30.6 PG (ref 26–34)
MCHC RBC AUTO-ENTMCNC: 34.1 G/DL (ref 31–36)
MCV RBC AUTO: 89.6 FL (ref 80–100)
MONOCYTES ABSOLUTE: 1.2 K/UL (ref 0–1.3)
MONOCYTES RELATIVE PERCENT: 8 %
NEUTROPHILS ABSOLUTE: 7 K/UL (ref 1.7–7.7)
NEUTROPHILS RELATIVE PERCENT: 43 %
PDW BLD-RTO: 14 % (ref 12.4–15.4)
PLATELET # BLD: 253 K/UL (ref 135–450)
PLATELET SLIDE REVIEW: ADEQUATE
PMV BLD AUTO: 8.4 FL (ref 5–10.5)
POC ACT LR: 165 SEC
POC ACT LR: 387 SEC
POTASSIUM REFLEX MAGNESIUM: 3.6 MMOL/L (ref 3.5–5.1)
PRO-BNP: 9 PG/ML (ref 0–124)
RBC # BLD: 6.09 M/UL (ref 4.2–5.9)
SLIDE REVIEW: ABNORMAL
SODIUM BLD-SCNC: 139 MMOL/L (ref 136–145)
TOTAL PROTEIN: 7.4 G/DL (ref 6.4–8.2)
TROPONIN: 4.95 NG/ML
TROPONIN: <0.01 NG/ML
WBC # BLD: 14.8 K/UL (ref 4–11)

## 2020-04-13 PROCEDURE — 4A023N7 MEASUREMENT OF CARDIAC SAMPLING AND PRESSURE, LEFT HEART, PERCUTANEOUS APPROACH: ICD-10-PCS | Performed by: INTERNAL MEDICINE

## 2020-04-13 PROCEDURE — 2000000000 HC ICU R&B

## 2020-04-13 PROCEDURE — 2500000003 HC RX 250 WO HCPCS

## 2020-04-13 PROCEDURE — 93010 ELECTROCARDIOGRAM REPORT: CPT | Performed by: INTERNAL MEDICINE

## 2020-04-13 PROCEDURE — 2580000003 HC RX 258: Performed by: INTERNAL MEDICINE

## 2020-04-13 PROCEDURE — 6360000002 HC RX W HCPCS: Performed by: INTERNAL MEDICINE

## 2020-04-13 PROCEDURE — 6360000002 HC RX W HCPCS

## 2020-04-13 PROCEDURE — C1769 GUIDE WIRE: HCPCS

## 2020-04-13 PROCEDURE — 80053 COMPREHEN METABOLIC PANEL: CPT

## 2020-04-13 PROCEDURE — 93306 TTE W/DOPPLER COMPLETE: CPT

## 2020-04-13 PROCEDURE — C1894 INTRO/SHEATH, NON-LASER: HCPCS

## 2020-04-13 PROCEDURE — 6370000000 HC RX 637 (ALT 250 FOR IP): Performed by: INTERNAL MEDICINE

## 2020-04-13 PROCEDURE — 93005 ELECTROCARDIOGRAM TRACING: CPT | Performed by: EMERGENCY MEDICINE

## 2020-04-13 PROCEDURE — 93005 ELECTROCARDIOGRAM TRACING: CPT | Performed by: INTERNAL MEDICINE

## 2020-04-13 PROCEDURE — 83880 ASSAY OF NATRIURETIC PEPTIDE: CPT

## 2020-04-13 PROCEDURE — 85025 COMPLETE CBC W/AUTO DIFF WBC: CPT

## 2020-04-13 PROCEDURE — 96374 THER/PROPH/DIAG INJ IV PUSH: CPT

## 2020-04-13 PROCEDURE — 93458 L HRT ARTERY/VENTRICLE ANGIO: CPT | Performed by: INTERNAL MEDICINE

## 2020-04-13 PROCEDURE — 92941 PRQ TRLML REVSC TOT OCCL AMI: CPT | Performed by: INTERNAL MEDICINE

## 2020-04-13 PROCEDURE — 6370000000 HC RX 637 (ALT 250 FOR IP)

## 2020-04-13 PROCEDURE — 36415 COLL VENOUS BLD VENIPUNCTURE: CPT

## 2020-04-13 PROCEDURE — 96375 TX/PRO/DX INJ NEW DRUG ADDON: CPT

## 2020-04-13 PROCEDURE — B2111ZZ FLUOROSCOPY OF MULTIPLE CORONARY ARTERIES USING LOW OSMOLAR CONTRAST: ICD-10-PCS | Performed by: INTERNAL MEDICINE

## 2020-04-13 PROCEDURE — 027034Z DILATION OF CORONARY ARTERY, ONE ARTERY WITH DRUG-ELUTING INTRALUMINAL DEVICE, PERCUTANEOUS APPROACH: ICD-10-PCS | Performed by: INTERNAL MEDICINE

## 2020-04-13 PROCEDURE — 84484 ASSAY OF TROPONIN QUANT: CPT

## 2020-04-13 PROCEDURE — 99285 EMERGENCY DEPT VISIT HI MDM: CPT

## 2020-04-13 PROCEDURE — C1725 CATH, TRANSLUMIN NON-LASER: HCPCS

## 2020-04-13 PROCEDURE — 85347 COAGULATION TIME ACTIVATED: CPT

## 2020-04-13 PROCEDURE — 93458 L HRT ARTERY/VENTRICLE ANGIO: CPT

## 2020-04-13 PROCEDURE — 99291 CRITICAL CARE FIRST HOUR: CPT | Performed by: INTERNAL MEDICINE

## 2020-04-13 PROCEDURE — 71045 X-RAY EXAM CHEST 1 VIEW: CPT

## 2020-04-13 PROCEDURE — C1874 STENT, COATED/COV W/DEL SYS: HCPCS

## 2020-04-13 PROCEDURE — 92941 PRQ TRLML REVSC TOT OCCL AMI: CPT

## 2020-04-13 PROCEDURE — C1887 CATHETER, GUIDING: HCPCS

## 2020-04-13 PROCEDURE — B2151ZZ FLUOROSCOPY OF LEFT HEART USING LOW OSMOLAR CONTRAST: ICD-10-PCS | Performed by: INTERNAL MEDICINE

## 2020-04-13 PROCEDURE — 6360000002 HC RX W HCPCS: Performed by: EMERGENCY MEDICINE

## 2020-04-13 RX ORDER — ASPIRIN 81 MG/1
81 TABLET, CHEWABLE ORAL DAILY
Status: DISCONTINUED | OUTPATIENT
Start: 2020-04-14 | End: 2020-04-15 | Stop reason: HOSPADM

## 2020-04-13 RX ORDER — MORPHINE SULFATE 4 MG/ML
4 INJECTION, SOLUTION INTRAMUSCULAR; INTRAVENOUS ONCE
Status: COMPLETED | OUTPATIENT
Start: 2020-04-13 | End: 2020-04-13

## 2020-04-13 RX ORDER — SODIUM CHLORIDE 0.9 % (FLUSH) 0.9 %
10 SYRINGE (ML) INJECTION PRN
Status: DISCONTINUED | OUTPATIENT
Start: 2020-04-13 | End: 2020-04-15 | Stop reason: HOSPADM

## 2020-04-13 RX ORDER — OXYCODONE HYDROCHLORIDE 5 MG/1
10 TABLET ORAL EVERY 6 HOURS PRN
Status: DISCONTINUED | OUTPATIENT
Start: 2020-04-13 | End: 2020-04-15 | Stop reason: HOSPADM

## 2020-04-13 RX ORDER — ATORVASTATIN CALCIUM 40 MG/1
40 TABLET, FILM COATED ORAL NIGHTLY
Status: DISCONTINUED | OUTPATIENT
Start: 2020-04-13 | End: 2020-04-15 | Stop reason: HOSPADM

## 2020-04-13 RX ORDER — EPTIFIBATIDE 0.75 MG/ML
2 INJECTION, SOLUTION INTRAVENOUS CONTINUOUS
Status: DISPENSED | OUTPATIENT
Start: 2020-04-13 | End: 2020-04-13

## 2020-04-13 RX ORDER — NITROGLYCERIN 0.4 MG/1
TABLET SUBLINGUAL
Status: COMPLETED
Start: 2020-04-13 | End: 2020-04-13

## 2020-04-13 RX ORDER — SODIUM CHLORIDE 0.9 % (FLUSH) 0.9 %
10 SYRINGE (ML) INJECTION EVERY 12 HOURS SCHEDULED
Status: DISCONTINUED | OUTPATIENT
Start: 2020-04-13 | End: 2020-04-15 | Stop reason: HOSPADM

## 2020-04-13 RX ORDER — LISINOPRIL 20 MG/1
40 TABLET ORAL DAILY
Status: DISCONTINUED | OUTPATIENT
Start: 2020-04-13 | End: 2020-04-15 | Stop reason: HOSPADM

## 2020-04-13 RX ORDER — NITROGLYCERIN 0.4 MG/1
0.4 TABLET SUBLINGUAL EVERY 5 MIN PRN
Status: DISCONTINUED | OUTPATIENT
Start: 2020-04-13 | End: 2020-04-15 | Stop reason: HOSPADM

## 2020-04-13 RX ORDER — CARVEDILOL 6.25 MG/1
3.12 TABLET ORAL 2 TIMES DAILY WITH MEALS
Status: DISCONTINUED | OUTPATIENT
Start: 2020-04-13 | End: 2020-04-15 | Stop reason: HOSPADM

## 2020-04-13 RX ORDER — OXYCODONE HYDROCHLORIDE 5 MG/1
5 TABLET ORAL EVERY 6 HOURS PRN
Status: DISCONTINUED | OUTPATIENT
Start: 2020-04-13 | End: 2020-04-13

## 2020-04-13 RX ORDER — HEPARIN SODIUM 1000 [USP'U]/ML
5000 INJECTION, SOLUTION INTRAVENOUS; SUBCUTANEOUS ONCE
Status: DISCONTINUED | OUTPATIENT
Start: 2020-04-13 | End: 2020-04-13

## 2020-04-13 RX ORDER — HEPARIN SODIUM 1000 [USP'U]/ML
4000 INJECTION, SOLUTION INTRAVENOUS; SUBCUTANEOUS ONCE
Status: COMPLETED | OUTPATIENT
Start: 2020-04-13 | End: 2020-04-13

## 2020-04-13 RX ADMIN — AMIODARONE HYDROCHLORIDE 0.5 MG/MIN: 50 INJECTION, SOLUTION INTRAVENOUS at 17:33

## 2020-04-13 RX ADMIN — TICAGRELOR 90 MG: 90 TABLET ORAL at 20:39

## 2020-04-13 RX ADMIN — EPTIFIBATIDE 2 MCG/KG/MIN: 0.75 INJECTION, SOLUTION INTRAVENOUS at 12:33

## 2020-04-13 RX ADMIN — CARVEDILOL 3.12 MG: 6.25 TABLET, FILM COATED ORAL at 12:34

## 2020-04-13 RX ADMIN — EPTIFIBATIDE 2 MCG/KG/MIN: 0.75 INJECTION, SOLUTION INTRAVENOUS at 18:45

## 2020-04-13 RX ADMIN — HEPARIN SODIUM 4000 UNITS: 1000 INJECTION, SOLUTION INTRAVENOUS; SUBCUTANEOUS at 09:57

## 2020-04-13 RX ADMIN — Medication 10 ML: at 20:39

## 2020-04-13 RX ADMIN — MORPHINE SULFATE 4 MG: 4 INJECTION, SOLUTION INTRAMUSCULAR; INTRAVENOUS at 09:54

## 2020-04-13 RX ADMIN — OXYCODONE 5 MG: 5 TABLET ORAL at 18:08

## 2020-04-13 RX ADMIN — MUPIROCIN: 20 OINTMENT TOPICAL at 20:39

## 2020-04-13 RX ADMIN — DEXTROSE MONOHYDRATE 1 MG/MIN: 50 INJECTION, SOLUTION INTRAVENOUS at 12:37

## 2020-04-13 RX ADMIN — NITROGLYCERIN 0.4 MG: 0.4 TABLET, ORALLY DISINTEGRATING SUBLINGUAL at 13:24

## 2020-04-13 RX ADMIN — NITROGLYCERIN 0.4 MG: 0.4 TABLET SUBLINGUAL at 13:24

## 2020-04-13 RX ADMIN — LISINOPRIL 40 MG: 20 TABLET ORAL at 12:35

## 2020-04-13 RX ADMIN — CARVEDILOL 3.12 MG: 6.25 TABLET, FILM COATED ORAL at 18:28

## 2020-04-13 RX ADMIN — ATORVASTATIN CALCIUM 40 MG: 40 TABLET, FILM COATED ORAL at 20:39

## 2020-04-13 ASSESSMENT — PAIN SCALES - GENERAL
PAINLEVEL_OUTOF10: 5
PAINLEVEL_OUTOF10: 0
PAINLEVEL_OUTOF10: 0
PAINLEVEL_OUTOF10: 8
PAINLEVEL_OUTOF10: 0
PAINLEVEL_OUTOF10: 8
PAINLEVEL_OUTOF10: 0
PAINLEVEL_OUTOF10: 4

## 2020-04-13 ASSESSMENT — ENCOUNTER SYMPTOMS
CHEST TIGHTNESS: 0
SORE THROAT: 0
ABDOMINAL PAIN: 0
STRIDOR: 0
EYE DISCHARGE: 0
CHOKING: 0
DIARRHEA: 0
EYE ITCHING: 0
EYE PAIN: 0
VOICE CHANGE: 0
SHORTNESS OF BREATH: 1
CONSTIPATION: 0

## 2020-04-13 NOTE — PROGRESS NOTES
Patient admitted from cath lab to bed 233. Admission assessment completed and documented on flowsheets. Four eyes skin assessment completed with 2 RNs. 2ml air removed from TR band on Right Radial. Chlorhexidine bath given. VSS, will continue to monitor.

## 2020-04-13 NOTE — ED NOTES
Bed: 03  Expected date: 4/13/20  Expected time: 9:45 AM  Means of arrival: [facility]  Comments:  [id number] , [facility]  04/13/20 [id number]

## 2020-04-13 NOTE — CONSULTS
Psychiatric/Behavioral: Negative for agitation, confusion and hallucinations. Objective:   PHYSICAL EXAM:  /64   Pulse 78   Temp 98 °F (36.7 °C) (Oral)   Resp 21   Wt 232 lb (105.2 kg)   SpO2 100%   BMI 29.00 kg/m²    Physical Exam  Constitutional:       General: He is not in acute distress. Appearance: He is well-developed. He is not diaphoretic. HENT:      Head: Normocephalic and atraumatic. Mouth/Throat:      Pharynx: No oropharyngeal exudate. Eyes:      Pupils: Pupils are equal, round, and reactive to light. Neck:      Musculoskeletal: Neck supple. Vascular: No JVD. Cardiovascular:      Heart sounds: Normal heart sounds. No murmur. No friction rub. No gallop. Pulmonary:      Effort: Pulmonary effort is normal.      Breath sounds: No wheezing or rales. Abdominal:      General: Bowel sounds are normal. There is no distension. Palpations: Abdomen is soft. Tenderness: There is no abdominal tenderness. Musculoskeletal: Normal range of motion. Lymphadenopathy:      Cervical: No cervical adenopathy. Skin:     General: Skin is warm and dry. Findings: No rash. Neurological:      Mental Status: He is alert. Cranial Nerves: No cranial nerve deficit. Comments: CN 2-12 grossly intact            Data Reviewed:   LABS:  CBC:   Recent Labs     04/13/20  0950   WBC 14.8*   HGB 18.6*   HCT 54.6*   MCV 89.6        BMP:   Recent Labs     04/13/20  0950      K 3.6      CO2 25   BUN 11   CREATININE 1.0     LIVER PROFILE:   Recent Labs     04/13/20  0950   AST 18   ALT 20   BILITOT 0.5   ALKPHOS 73     PT/INR: No results for input(s): PROTIME, INR in the last 72 hours. APTT:No results for input(s): APTT in the last 72 hours.   UA:No results for input(s): NITRITE, COLORU, PHUR, LABCAST, WBCUA, RBCUA, MUCUS, TRICHOMONAS, YEAST, BACTERIA, CLARITYU, SPECGRAV, LEUKOCYTESUR, UROBILINOGEN, BILIRUBINUR, BLOODU, GLUCOSEU, AMORPHOUS in the last 72 hours.    Invalid input(s): KETONESU  No results for input(s): PHART, [id number], PO2ART in the last 72 hours. CXR personally reviewed, right basilar airspace disease vs infiltrate           Assessment:     1. Acute STEMI   -s/p PCI to LAD   -CAD   -systolic CHF, ischemic cardiomyopathy  2. Right lower lobe infiltrate vs atelectasis    -suspected COVID per Dr. Anh Fairbanks  3. Acute resp failure, hypoxic  4. Tobacco dependence without formal diagnosis of COPD    Plan:      -Wean FIO2  -COVID isolation pending testing  -CXR in AM  -No need for bronchodilators  -Pulm expansion maneuvers  -STEMI management per cardiology  -Counseled on tobacco cessation  -ICU level of care    Due to life threatening acute STEMI, acute resp failure this patient is critically ill.  Total critical care time involved in his care was 31 mins     Francisco Gonzalez MD

## 2020-04-13 NOTE — H&P
Hospital Medicine History & Physical      PCP: Jorge Calle MD    Date of Admission: 4/13/2020    Date of Service: Pt seen/examined on 13 April and Admitted to Inpatient with expected LOS greater than two midnights due to medical therapy. Chief Complaint:  Chest Pain    History Of Present Illness:        62 y.o. male w/out hx of known CAD who presented to [facility] with a 1 day hx of acute onset persistent moderately severe non-exertional substernal Chest Pain w/ associated SOB but no N/V/Diaphoresis. Pain was unrelieved w/ ASA/SL NTC per EMS. The patient denies any fever/chills or other signs/sxs of systemic illness or identifiable aggravating/alleviating factors.       Past Medical History:          Diagnosis Date    Asthma     Back problem     Dr Oumou Alcocer, Dr Vale Holliday Cholelithiasis without obstruction 2010    asymptomatic    Depression     ED (erectile dysfunction)     Gallbladder & bile duct stone     Gallbladder full of stones    GERD (gastroesophageal reflux disease)     Hernia     Hyperlipidemia     Hypertension     Low testosterone     MI (myocardial infarction) ([facility] [address] 04/13/2020    mLAD 100% FELICITY Xience Martha 3.5 x 18    Seasonal allergies     Sleep apnea     SVT (supraventricular tachycardia) ([facility] [address]     GXT 1998    Tendonitis        Past Surgical History:          Procedure Laterality Date    BACK SURGERY  2001    L5 FUSION, Dr Hines Range Right 2/11/2019    RIGHT CARPAL TUNNEL RELEASE performed by Braulio Llamas MD at [address] [facility]  2012    Dr. Delana Mcburney  2010    [facility] [place] patient states   [address] [facility]  2012    Dr. Karyle Chatters      stimulator put in 3/10   [facility] LUMBAR SPINE SURGERY  2004    Dr Cristopher Saenz         Medications Prior to Admission:      Prior to Admission deformity. Pupils equal, round, and reactive to light. Extra ocular muscles intact. Conjunctivae/corneas clear. Neck: Supple, with full range of motion. No jugular venous distention. Trachea midline. Respiratory:  Normal respiratory effort. Clear to auscultation, bilaterally without Rales/Wheezes/Rhonchi. Cardiovascular:  Regular rate and rhythm with normal S1/S2 without murmurs, rubs or gallops. Abdomen: Soft, non-tender, non-distended with normal bowel sounds. Musculoskeletal:  No clubbing, cyanosis or edema bilaterally. Full range of motion without deformity. Skin: Skin color, texture, turgor normal.  No rashes or lesions. Neurologic:  Neurovascularly intact without any focal sensory/motor deficits. Cranial nerves: II-XII intact, grossly non-focal.  Psychiatric:  Alert and oriented, thought content appropriate, normal insight  Capillary Refill: Brisk,< 3 seconds   Peripheral Pulses: +2 palpable, equal bilaterally       CXR:  I have reviewed the CXR with the following interpretation: No acute ASD  EKG:  I have reviewed the EKG with the following interpretation: c/w ischemic changes. Labs:     Recent Labs     04/13/20  0950 04/14/20  0425   WBC 14.8* 15.7*   HGB 18.6* 17.5   HCT 54.6* 51.6    209     Recent Labs     04/13/20  0950 04/14/20  0425    136   K 3.6 4.6    98*   CO2 25 27   BUN 11 11   CREATININE 1.0 0.9   CALCIUM 9.7 9.5     Recent Labs     04/13/20  0950   AST 18   ALT 20   BILITOT 0.5   ALKPHOS 73     No results for input(s): INR in the last 72 hours.   Recent Labs     04/13/20  0950 04/13/20  1902 04/14/20  0055   TROPONINI <0.01 4.95* 4.67*       Urinalysis:    No results found for: NITRU, WBCUA, BACTERIA, RBCUA, BLOODU, SPECGRAV, GLUCOSEU      ASSESSMENT:    Active Hospital Problems    Diagnosis Date Noted    STEMI (ST elevation myocardial infarction) ([facility] [address] [I21.3] 04/13/2020    ST elevation myocardial infarction (STEMI) ([facility] [address] [I21.3]     Tobacco abuse [Z72.0]    

## 2020-04-13 NOTE — CONSULTS
tablet TAKE 1 TABLET BY MOUTH EVERY 6 HOURS 5/6/17  Yes Historical Provider, MD   testosterone cypionate (DEPOTESTOTERONE CYPIONATE) 200 MG/ML injection INJECT 1 ML INTO THE MUSCLE EVERY 14 DAYS 3/2/20 4/2/20  Juancho Stevens MD   vitamin B-12 (CYANOCOBALAMIN) 1000 MCG tablet Take 1,000 mcg by mouth daily    Historical Provider, MD   Ginseng (GIN-ZING PO) Take by mouth daily    Historical Provider, MD   ibuprofen (ADVIL;MOTRIN) 800 MG tablet Take 1 tablet by mouth every 8 hours as needed for Pain 2/11/19   Demetris Rockwell MD   sodium chloride 0.9 % SOLN 100 mL with morphine (PF) 10 MG/ML SOLN 100 mg Infuse intravenously continuous Along with dilaudid    Historical Provider, MD        CURRENT Medications:  No current facility-administered medications for this encounter. Allergies:  Vioxx and Phenergan [promethazine hcl]     Review of Systems:   A 14 point review of symptoms completed. Pertinent positives identified in the HPI, all other review of symptoms negative as below.       Objective   PHYSICAL EXAM:    Vitals:    04/13/20 0959   BP: (!) 140/51   Pulse: 58   Resp: 18   Temp:    SpO2: 100%    Weight: 232 lb (105.2 kg)         General Appearance:  Alert, uncomfortable, moderate distress, appears stated age   Head:  Normocephalic, without obvious abnormality, atraumatic   Eyes:  PERRL, conjunctiva/corneas clear   Nose: Nares normal, no drainage or sinus tenderness   Throat: Lips, mucosa, and tongue normal   Neck: Supple, symmetrical, trachea midline, no adenopathy, thyroid: not enlarged, symmetric, no tenderness/mass/nodules, no carotid bruit or JVD   Lungs:   Clear to auscultation bilaterally, respirations unlabored   Chest Wall:  No deformity or tenderness   Heart:  Irregular rate and rhythm, S1, S2 normal, no murmur, rub or gallop   Abdomen:   Soft, non-tender, bowel sounds active all four quadrants,  no masses, no organomegaly   Extremities: Extremities normal, atraumatic, no cyanosis or edema Pulses: 2+ and symmetric   Skin: Skin color, texture, turgor normal, no rashes or lesions   Pysch: Normal mood and affect   Neurologic: Normal gross motor and sensory exam.         Labs   CBC:   Lab Results   Component Value Date    WBC 9.6 2017    RBC 4.75 2017    HGB 18.1 2020    HCT 41.2 2017    MCV 86.8 2017    RDW 14.1 2017     2017     CMP:  Lab Results   Component Value Date     2020    K 4.3 2020     2020    CO2 28 2020    BUN 8 2020    CREATININE 0.9 2020    GFRAA >60 2020    GFRAA >60 2013    AGRATIO 1.7 2019    LABGLOM >60 2020    GLUCOSE 91 2020    PROT 6.8 2019    PROT 6.7 2012    CALCIUM 9.3 2020    BILITOT 0.4 2019    ALKPHOS 72 2019    AST 15 2019    ALT 21 2020     PT/INR:  No results found for: PTINR  HgBA1c:  Lab Results   Component Value Date    LABA1C 5.8 2012     No results found for: CKTOTAL, CKMB, CKMBINDEX, TROPONINI      Cardiac Data     Last EK2020 Heart block with IVCD, Anterior St elevation, RAD,     Echo:    Stress Test:    Cath:    Studies:   CXR:  No acute cardiopulmonary pathology.             Assessment and Plan     1. STEMI: acute anterior  2. HTN  3. Tobacco abuse  4. Severe back pain      PLAN  1. Emergent C  2. Tobacco cessation      Cardiology Consult ([id number]) Total critical care time was 42 minutes, excluding separately reportable procedures. Services, included in critical care time were chart data review, documentation time, obtaining info from patient, review of nursing notes, and vital sign assessment and management of the patient. Speaking with family, wife, Elnoria Fleischer, Jaja Rowley/Shannan    There was a high probability of clinically significant life-threatening deterioration in the patient's condition, which required my urgent intervention.              Patient Active Problem List Diagnosis    Asthma    Restless legs syndrome (RLS)    Low testosterone    ED (erectile dysfunction)    Chronic bilateral low back pain without sciatica    Mixed hyperlipidemia    Essential hypertension    Right carpal tunnel syndrome    Right lateral epicondylitis    Dupuytren's contracture    BIJU (obstructive sleep apnea)           Thank you for allowing us to participate in the care of Salty Sadler. Please call me with any questions [phone]. Lucero Arellano MD, [address] [facility] Cardiologist  [facility]  [phone] [facility]  [phone] [address] [place] [address]  4/13/2020 10:02 AM    I will address the patient's cardiac risk factors and adjusted pharmacologic treatment as needed. In addition, I have reinforced the need for patient directed risk factor modification. All questions and concerns were addressed to the patient/family. Alternatives to my treatment were discussed. The note was completed using EMR. Every effort was made to ensure accuracy; however, inadvertent computerized transcription errors may be present.

## 2020-04-13 NOTE — ED PROVIDER NOTES
Food insecurity     Worry: Not on file     Inability: Not on file    Transportation needs     Medical: Not on file     Non-medical: Not on file   Tobacco Use    Smoking status: Current Every Day Smoker     Packs/day: 0.25     Years: 20.00     Pack years: 5.00     Types: Cigars     Last attempt to quit: 3/4/2019     Years since quittin.1    Smokeless tobacco: Never Used    Tobacco comment: 17 - smokes 6 cigars weekly/ quit   Substance and Sexual Activity    Alcohol use: Yes     Comment: occassional    Drug use: No    Sexual activity: Yes     Partners: Female   Lifestyle    Physical activity     Days per week: Not on file     Minutes per session: Not on file    Stress: Not on file   Relationships    Social connections     Talks on phone: Not on file     Gets together: Not on file     Attends Druze service: Not on file     Active member of club or organization: Not on file     Attends meetings of clubs or organizations: Not on file     Relationship status: Not on file    Intimate partner violence     Fear of current or ex partner: Not on file     Emotionally abused: Not on file     Physically abused: Not on file     Forced sexual activity: Not on file   Other Topics Concern    Not on file   Social History Narrative    Not on file       Nursing notes reviewed. ED Triage Vitals   Enc Vitals Group      BP 20 0946 (!) 140/98      Pulse 20 0946 65      Resp 20 0946 16      Temp 20 0949 97.8 °F (36.6 °C)      Temp Source 20 0946 Oral      SpO2 20 0946 95 %      Weight 20 0946 232 lb (105.2 kg)      Height --       Head Circumference --       Peak Flow --       Pain Score --       Pain Loc --       Pain Edu? --       Excl. in GC? --        GENERAL:  Awake, alert. Well developed, well nourished with no apparent distress. HENT:  Normocephalic, Atraumatic, moist mucous membranes.   EYES:  Pupils equal round and reactive to light, Conjunctiva normal,

## 2020-04-14 ENCOUNTER — APPOINTMENT (OUTPATIENT)
Dept: GENERAL RADIOLOGY | Age: 55
DRG: 246 | End: 2020-04-14
Payer: COMMERCIAL

## 2020-04-14 LAB
ANION GAP SERPL CALCULATED.3IONS-SCNC: 11 MMOL/L (ref 3–16)
BUN BLDV-MCNC: 11 MG/DL (ref 7–20)
CALCIUM SERPL-MCNC: 9.5 MG/DL (ref 8.3–10.6)
CHLORIDE BLD-SCNC: 98 MMOL/L (ref 99–110)
CO2: 27 MMOL/L (ref 21–32)
CREAT SERPL-MCNC: 0.9 MG/DL (ref 0.9–1.3)
ESTIMATED AVERAGE GLUCOSE: 108.3 MG/DL
GFR AFRICAN AMERICAN: >60
GFR NON-AFRICAN AMERICAN: >60
GLUCOSE BLD-MCNC: 121 MG/DL (ref 70–99)
HBA1C MFR BLD: 5.4 %
HCT VFR BLD CALC: 51.6 % (ref 40.5–52.5)
HEMATOLOGY PATH CONSULT: NORMAL
HEMOGLOBIN: 17.5 G/DL (ref 13.5–17.5)
MAGNESIUM: 2.2 MG/DL (ref 1.8–2.4)
MCH RBC QN AUTO: 30.5 PG (ref 26–34)
MCHC RBC AUTO-ENTMCNC: 33.9 G/DL (ref 31–36)
MCV RBC AUTO: 89.9 FL (ref 80–100)
PDW BLD-RTO: 14 % (ref 12.4–15.4)
PLATELET # BLD: 209 K/UL (ref 135–450)
PMV BLD AUTO: 8.4 FL (ref 5–10.5)
POTASSIUM REFLEX MAGNESIUM: 4.6 MMOL/L (ref 3.5–5.1)
RBC # BLD: 5.75 M/UL (ref 4.2–5.9)
SODIUM BLD-SCNC: 136 MMOL/L (ref 136–145)
TROPONIN: 4.04 NG/ML
TROPONIN: 4.67 NG/ML
WBC # BLD: 15.7 K/UL (ref 4–11)

## 2020-04-14 PROCEDURE — 6360000002 HC RX W HCPCS: Performed by: INTERNAL MEDICINE

## 2020-04-14 PROCEDURE — 80048 BASIC METABOLIC PNL TOTAL CA: CPT

## 2020-04-14 PROCEDURE — 36415 COLL VENOUS BLD VENIPUNCTURE: CPT

## 2020-04-14 PROCEDURE — 2580000003 HC RX 258: Performed by: INTERNAL MEDICINE

## 2020-04-14 PROCEDURE — 83735 ASSAY OF MAGNESIUM: CPT

## 2020-04-14 PROCEDURE — 6370000000 HC RX 637 (ALT 250 FOR IP): Performed by: INTERNAL MEDICINE

## 2020-04-14 PROCEDURE — 99233 SBSQ HOSP IP/OBS HIGH 50: CPT | Performed by: INTERNAL MEDICINE

## 2020-04-14 PROCEDURE — 83036 HEMOGLOBIN GLYCOSYLATED A1C: CPT

## 2020-04-14 PROCEDURE — 84484 ASSAY OF TROPONIN QUANT: CPT

## 2020-04-14 PROCEDURE — 71045 X-RAY EXAM CHEST 1 VIEW: CPT

## 2020-04-14 PROCEDURE — 2060000000 HC ICU INTERMEDIATE R&B

## 2020-04-14 PROCEDURE — 85027 COMPLETE CBC AUTOMATED: CPT

## 2020-04-14 RX ORDER — AMIODARONE HYDROCHLORIDE 200 MG/1
200 TABLET ORAL 2 TIMES DAILY
Status: DISCONTINUED | OUTPATIENT
Start: 2020-04-14 | End: 2020-04-15 | Stop reason: HOSPADM

## 2020-04-14 RX ORDER — AMIODARONE HYDROCHLORIDE 200 MG/1
200 TABLET ORAL DAILY
Status: DISCONTINUED | OUTPATIENT
Start: 2020-04-21 | End: 2020-04-15 | Stop reason: HOSPADM

## 2020-04-14 RX ORDER — TIZANIDINE 4 MG/1
6 TABLET ORAL EVERY 6 HOURS PRN
Status: DISCONTINUED | OUTPATIENT
Start: 2020-04-14 | End: 2020-04-15 | Stop reason: HOSPADM

## 2020-04-14 RX ADMIN — Medication 10 ML: at 20:49

## 2020-04-14 RX ADMIN — ATORVASTATIN CALCIUM 40 MG: 40 TABLET, FILM COATED ORAL at 20:35

## 2020-04-14 RX ADMIN — OXYCODONE 10 MG: 5 TABLET ORAL at 20:35

## 2020-04-14 RX ADMIN — OXYCODONE 10 MG: 5 TABLET ORAL at 00:24

## 2020-04-14 RX ADMIN — AMIODARONE HYDROCHLORIDE 200 MG: 200 TABLET ORAL at 20:36

## 2020-04-14 RX ADMIN — OXYCODONE 10 MG: 5 TABLET ORAL at 14:01

## 2020-04-14 RX ADMIN — MUPIROCIN: 20 OINTMENT TOPICAL at 08:11

## 2020-04-14 RX ADMIN — TICAGRELOR 90 MG: 90 TABLET ORAL at 20:34

## 2020-04-14 RX ADMIN — TIZANIDINE 6 MG: 4 TABLET ORAL at 20:35

## 2020-04-14 RX ADMIN — AMIODARONE HYDROCHLORIDE 200 MG: 200 TABLET ORAL at 08:11

## 2020-04-14 RX ADMIN — LISINOPRIL 40 MG: 20 TABLET ORAL at 08:10

## 2020-04-14 RX ADMIN — TIZANIDINE 6 MG: 4 TABLET ORAL at 01:50

## 2020-04-14 RX ADMIN — TIZANIDINE 6 MG: 4 TABLET ORAL at 14:01

## 2020-04-14 RX ADMIN — OXYCODONE 10 MG: 5 TABLET ORAL at 08:11

## 2020-04-14 RX ADMIN — TIZANIDINE 6 MG: 4 TABLET ORAL at 08:11

## 2020-04-14 RX ADMIN — CARVEDILOL 3.12 MG: 6.25 TABLET, FILM COATED ORAL at 08:11

## 2020-04-14 RX ADMIN — CARVEDILOL 3.12 MG: 6.25 TABLET, FILM COATED ORAL at 18:33

## 2020-04-14 RX ADMIN — AMIODARONE HYDROCHLORIDE 0.5 MG/MIN: 50 INJECTION, SOLUTION INTRAVENOUS at 00:24

## 2020-04-14 RX ADMIN — Medication 10 ML: at 08:11

## 2020-04-14 RX ADMIN — ASPIRIN 81 MG 81 MG: 81 TABLET ORAL at 08:11

## 2020-04-14 RX ADMIN — TICAGRELOR 90 MG: 90 TABLET ORAL at 08:11

## 2020-04-14 ASSESSMENT — PAIN DESCRIPTION - PAIN TYPE
TYPE: CHRONIC PAIN

## 2020-04-14 ASSESSMENT — PAIN SCALES - GENERAL
PAINLEVEL_OUTOF10: 3
PAINLEVEL_OUTOF10: 0
PAINLEVEL_OUTOF10: 5
PAINLEVEL_OUTOF10: 7
PAINLEVEL_OUTOF10: 5
PAINLEVEL_OUTOF10: 0
PAINLEVEL_OUTOF10: 3
PAINLEVEL_OUTOF10: 1

## 2020-04-14 ASSESSMENT — PAIN DESCRIPTION - FREQUENCY
FREQUENCY: CONTINUOUS

## 2020-04-14 ASSESSMENT — PAIN DESCRIPTION - ORIENTATION
ORIENTATION: LOWER

## 2020-04-14 ASSESSMENT — PAIN - FUNCTIONAL ASSESSMENT
PAIN_FUNCTIONAL_ASSESSMENT: PREVENTS OR INTERFERES SOME ACTIVE ACTIVITIES AND ADLS

## 2020-04-14 ASSESSMENT — PAIN DESCRIPTION - LOCATION
LOCATION: BACK

## 2020-04-14 ASSESSMENT — PAIN DESCRIPTION - ONSET
ONSET: ON-GOING

## 2020-04-14 ASSESSMENT — PAIN DESCRIPTION - DESCRIPTORS
DESCRIPTORS: ACHING

## 2020-04-14 ASSESSMENT — PAIN DESCRIPTION - PROGRESSION
CLINICAL_PROGRESSION: GRADUALLY IMPROVING
CLINICAL_PROGRESSION: NOT CHANGED
CLINICAL_PROGRESSION: GRADUALLY WORSENING

## 2020-04-14 NOTE — PLAN OF CARE
Problem: Falls - Risk of:  Goal: Will remain free from falls  Description: Will remain free from falls  4/14/2020 1005 by Nely Molina RN  Outcome: Ongoing  Note: Pt remains free from fall and injury. Non-skid slippers on. Fall risk band on wrist, bed alarm in use. Instructed to call for assistance. Call light in reach, will monitor. 4/14/2020 0158 by Libby Knight RN  Outcome: Ongoing     Problem: HEMODYNAMIC STATUS  Goal: Patient has stable vital signs and fluid balance  4/14/2020 1005 by Nely Molina RN  Outcome: Ongoing  Note: Pt hemodynamically stable. No pressor support required.

## 2020-04-14 NOTE — PROGRESS NOTES
Pt had a 12 beat run of Vtach. Pt does not have any complaints and is hemodynamically stable. Pt is currently on an amiodarone drip. Hospitalist notified. Orders to monitor and to call back if there is any more ectopy.

## 2020-04-14 NOTE — PROGRESS NOTES
Pulmonary & Critical Care Inpatient Progress Note   Miles Torres MD     REASON FOR TODAY'S VISIT:  Critical care management    SUBJECTIVE:   Afebrile, on room air oxygen, denies shortness of breath    Scheduled Meds:   [START ON 4/21/2020] amiodarone  200 mg Oral Daily    amiodarone  200 mg Oral BID    lisinopril  40 mg Oral Daily    carvedilol  3.125 mg Oral BID WC    atorvastatin  40 mg Oral Nightly    aspirin  81 mg Oral Daily    ticagrelor  90 mg Oral BID    mupirocin   Nasal BID    sodium chloride flush  10 mL Intravenous 2 times per day       Continuous Infusions:      PRN Meds:  tiZANidine, perflutren lipid microspheres, sodium chloride flush, nitroGLYCERIN, oxyCODONE    ALLERGIES:  Patient is allergic to vioxx and phenergan [promethazine hcl]. Objective:   PHYSICAL EXAM:  BP (!) 98/51   Pulse 63   Temp 98.6 °F (37 °C) (Oral)   Resp 18   Wt 216 lb 11.4 oz (98.3 kg)   SpO2 100%   BMI 27.09 kg/m²    Physical Exam  Constitutional:       General: He is not in acute distress. Appearance: He is well-developed. He is not diaphoretic. HENT:      Head: Normocephalic and atraumatic. Mouth/Throat:      Pharynx: No oropharyngeal exudate. Eyes:      Pupils: Pupils are equal, round, and reactive to light. Neck:      Musculoskeletal: Neck supple. Vascular: No JVD. Cardiovascular:      Heart sounds: Normal heart sounds. No murmur. No friction rub. No gallop. Pulmonary:      Effort: Pulmonary effort is normal.      Breath sounds: No wheezing or rales. Abdominal:      General: Bowel sounds are normal. There is no distension. Palpations: Abdomen is soft. Tenderness: There is no abdominal tenderness. Musculoskeletal: Normal range of motion. Lymphadenopathy:      Cervical: No cervical adenopathy. Skin:     General: Skin is warm and dry. Findings: No rash. Neurological:      Mental Status: He is alert. Cranial Nerves: No cranial nerve deficit.       Comments: CN 2-12 grossly intact            Data Reviewed:   LABS:  CBC:  Recent Labs     04/13/20  0950 04/14/20  0425   WBC 14.8* 15.7*   HGB 18.6* 17.5   HCT 54.6* 51.6   MCV 89.6 89.9    209     BMP:  Recent Labs     04/13/20  0950 04/14/20  0425    136   K 3.6 4.6    98*   CO2 25 27   BUN 11 11   CREATININE 1.0 0.9     LIVER PROFILE:   Recent Labs     04/13/20  0950   AST 18   ALT 20   BILITOT 0.5   ALKPHOS 73     PT/INR:No results for input(s): PROTIME, INR in the last 72 hours. APTT: No results for input(s): APTT in the last 72 hours. UA:No results for input(s): NITRITE, COLORU, PHUR, LABCAST, WBCUA, RBCUA, MUCUS, TRICHOMONAS, YEAST, BACTERIA, CLARITYU, SPECGRAV, LEUKOCYTESUR, UROBILINOGEN, BILIRUBINUR, BLOODU, GLUCOSEU, AMORPHOUS in the last 72 hours. Invalid input(s): KETONESU  No results for input(s): PHART, TIE5OCX, PO2ART in the last 72 hours. CXR personally reviewed, improving basilar atelectasis           Assessment:     1. Acute STEMI              -s/p PCI to LAD              -CAD              -systolic CHF, ischemic cardiomyopathy  2. Right lower lobe infiltrate vs atelectasis               -suspected COVID per Dr. Jason Self  3. Acute resp failure, hypoxic  4.  Tobacco dependence without formal diagnosis of COPD    Plan:      -Weaned off oxygen, monitor  -Atelectasis improving on chest imaging, consider repeat outpatient CXR in 6 weeks to ensure complete resolution.   -Smoking cessation counseling  -Cardiology following for post STEMI management  -Please contact with questions    Marilyn Boyer MD

## 2020-04-14 NOTE — PROGRESS NOTES
Dr. Eugene Rivers present at bedside for rounds at this time. POC reviewed and updated. Pt OK to transfer to C4 .

## 2020-04-15 VITALS
OXYGEN SATURATION: 99 % | WEIGHT: 216.71 LBS | RESPIRATION RATE: 18 BRPM | TEMPERATURE: 98.8 F | BODY MASS INDEX: 27.09 KG/M2 | DIASTOLIC BLOOD PRESSURE: 67 MMHG | SYSTOLIC BLOOD PRESSURE: 103 MMHG | HEART RATE: 74 BPM

## 2020-04-15 LAB
ALBUMIN SERPL-MCNC: 4.1 G/DL (ref 3.4–5)
ANION GAP SERPL CALCULATED.3IONS-SCNC: 12 MMOL/L (ref 3–16)
BUN BLDV-MCNC: 13 MG/DL (ref 7–20)
CALCIUM SERPL-MCNC: 9.5 MG/DL (ref 8.3–10.6)
CHLORIDE BLD-SCNC: 100 MMOL/L (ref 99–110)
CO2: 25 MMOL/L (ref 21–32)
CREAT SERPL-MCNC: 0.8 MG/DL (ref 0.9–1.3)
GFR AFRICAN AMERICAN: >60
GFR NON-AFRICAN AMERICAN: >60
GLUCOSE BLD-MCNC: 105 MG/DL (ref 70–99)
HCT VFR BLD CALC: 51.2 % (ref 40.5–52.5)
HEMOGLOBIN: 17.3 G/DL (ref 13.5–17.5)
MCH RBC QN AUTO: 30.2 PG (ref 26–34)
MCHC RBC AUTO-ENTMCNC: 33.9 G/DL (ref 31–36)
MCV RBC AUTO: 89.2 FL (ref 80–100)
PDW BLD-RTO: 14.1 % (ref 12.4–15.4)
PHOSPHORUS: 3.5 MG/DL (ref 2.5–4.9)
PLATELET # BLD: 206 K/UL (ref 135–450)
PMV BLD AUTO: 8.2 FL (ref 5–10.5)
POTASSIUM SERPL-SCNC: 4.3 MMOL/L (ref 3.5–5.1)
RBC # BLD: 5.74 M/UL (ref 4.2–5.9)
SODIUM BLD-SCNC: 137 MMOL/L (ref 136–145)
WBC # BLD: 13.2 K/UL (ref 4–11)

## 2020-04-15 PROCEDURE — 6370000000 HC RX 637 (ALT 250 FOR IP): Performed by: INTERNAL MEDICINE

## 2020-04-15 PROCEDURE — 80069 RENAL FUNCTION PANEL: CPT

## 2020-04-15 PROCEDURE — 85027 COMPLETE CBC AUTOMATED: CPT

## 2020-04-15 PROCEDURE — 99233 SBSQ HOSP IP/OBS HIGH 50: CPT | Performed by: INTERNAL MEDICINE

## 2020-04-15 PROCEDURE — 2580000003 HC RX 258: Performed by: INTERNAL MEDICINE

## 2020-04-15 RX ORDER — ATORVASTATIN CALCIUM 40 MG/1
40 TABLET, FILM COATED ORAL NIGHTLY
Qty: 30 TABLET | Refills: 0 | Status: SHIPPED | OUTPATIENT
Start: 2020-04-15 | End: 2020-05-14 | Stop reason: SDUPTHER

## 2020-04-15 RX ORDER — AMIODARONE HYDROCHLORIDE 200 MG/1
200 TABLET ORAL DAILY
Qty: 30 TABLET | Refills: 0 | Status: SHIPPED | OUTPATIENT
Start: 2020-04-21 | End: 2020-06-22 | Stop reason: ALTCHOICE

## 2020-04-15 RX ORDER — ASPIRIN 81 MG/1
81 TABLET, CHEWABLE ORAL DAILY
Qty: 30 TABLET | Refills: 0 | Status: SHIPPED | OUTPATIENT
Start: 2020-04-16

## 2020-04-15 RX ORDER — AMIODARONE HYDROCHLORIDE 200 MG/1
200 TABLET ORAL 2 TIMES DAILY
Qty: 30 TABLET | Refills: 3 | Status: SHIPPED | OUTPATIENT
Start: 2020-04-15 | End: 2020-06-30 | Stop reason: CLARIF

## 2020-04-15 RX ORDER — CARVEDILOL 3.12 MG/1
3.12 TABLET ORAL 2 TIMES DAILY WITH MEALS
Qty: 60 TABLET | Refills: 0 | Status: SHIPPED | OUTPATIENT
Start: 2020-04-15 | End: 2020-04-21

## 2020-04-15 RX ADMIN — LISINOPRIL 40 MG: 20 TABLET ORAL at 08:47

## 2020-04-15 RX ADMIN — TICAGRELOR 90 MG: 90 TABLET ORAL at 08:47

## 2020-04-15 RX ADMIN — MUPIROCIN: 20 OINTMENT TOPICAL at 08:49

## 2020-04-15 RX ADMIN — OXYCODONE 10 MG: 5 TABLET ORAL at 08:47

## 2020-04-15 RX ADMIN — AMIODARONE HYDROCHLORIDE 200 MG: 200 TABLET ORAL at 08:47

## 2020-04-15 RX ADMIN — TIZANIDINE 6 MG: 4 TABLET ORAL at 02:30

## 2020-04-15 RX ADMIN — Medication 10 ML: at 08:50

## 2020-04-15 RX ADMIN — ASPIRIN 81 MG 81 MG: 81 TABLET ORAL at 08:47

## 2020-04-15 RX ADMIN — CARVEDILOL 3.12 MG: 6.25 TABLET, FILM COATED ORAL at 08:47

## 2020-04-15 RX ADMIN — OXYCODONE 10 MG: 5 TABLET ORAL at 02:30

## 2020-04-15 RX ADMIN — TIZANIDINE 6 MG: 4 TABLET ORAL at 11:20

## 2020-04-15 ASSESSMENT — PAIN SCALES - GENERAL
PAINLEVEL_OUTOF10: 7
PAINLEVEL_OUTOF10: 7
PAINLEVEL_OUTOF10: 0

## 2020-04-15 NOTE — PROGRESS NOTES
amio   200mg po BID x 1 wk then 200mg po qday  For 2-3 month  3. CHF teaching    OK fo d/c home      Echo in 2-3 months        Patient Active Problem List   Diagnosis    Asthma    Restless legs syndrome (RLS)    Low testosterone    ED (erectile dysfunction)    Chronic bilateral low back pain without sciatica    Mixed hyperlipidemia    Essential hypertension    Right carpal tunnel syndrome    Right lateral epicondylitis    Dupuytren's contracture    BIJU (obstructive sleep apnea)    ST [facility] infarction ([facility]) [facility] [address]    Tobacco abuse    Ischemic cardiomyopathy    STEMI (ST elevation myocardial infarction) ([facility] [address]    Obesity         Thank you for allowing me to participate in the care of your patient. Please call me with any questions [phone].       Socorro Moreno MD, [address] [facility] Cardiologist  [address]  [phone] [facility]  [phone] [address] [place] [address]  4/15/2020 7:38 AM

## 2020-04-15 NOTE — DISCHARGE INSTR - DIET

## 2020-04-15 NOTE — DISCHARGE SUMMARY
basilar atelectasis owing to improved inspiratory effort, however   there is persistent subtle bibasilar airspace disease. XR CHEST PORTABLE   Final Result   No acute cardiopulmonary pathology. Consults:     IP CONSULT TO CARDIAC REHAB  IP CONSULT TO SPIRITUAL SERVICES    Disposition: Home     Condition at Discharge: Stable    Discharge Instructions/Follow-up:  w/ PCP 1-2 weeks and subspecialists as arranged. Code Status:  Full Code    Activity: activity as tolerated    Diet: regular diet      Discharge Medications:     Discharge Medication List as of 4/15/2020 12:15 PM           Details   ticagrelor (BRILINTA) 90 MG TABS tablet Take 1 tablet by mouth 2 times daily, Disp-60 tablet, R-0Print      carvedilol (COREG) 3.125 MG tablet Take 1 tablet by mouth 2 times daily (with meals), Disp-60 tablet, R-0Print      atorvastatin (LIPITOR) 40 MG tablet Take 1 tablet by mouth nightly, Disp-30 tablet, R-0Print      !! amiodarone (CORDARONE) 200 MG tablet Take 1 tablet by mouth 2 times daily for 7 days, Disp-30 tablet, R-3Print      !! amiodarone (CORDARONE) 200 MG tablet Take 1 tablet by mouth daily After initial week at twice daily, Disp-30 tablet, R-0Print      aspirin 81 MG chewable tablet Take 1 tablet by mouth daily, Disp-30 tablet, R-0Print       !! - Potential duplicate medications found. Please discuss with provider.            Details   tiZANidine (ZANAFLEX) 6 MG capsule TAKE 1 CAPSULE BY MOUTH 4 TIMES DAILY AS NEEDED FOR MUSCLE SPASMS (DOSE CHANGE), Disp-120 capsule, R-1Normal      testosterone cypionate (DEPOTESTOTERONE CYPIONATE) 200 MG/ML injection INJECT 1 ML INTO THE MUSCLE EVERY 14 DAYS, Disp-6 mL, R-0Normal      lisinopril (PRINIVIL;ZESTRIL) 40 MG tablet TAKE 1 TABLET BY MOUTH EVERY DAY, Disp-90 tablet, R-1Normal      vitamin B-12 (CYANOCOBALAMIN) 1000 MCG tablet Take 1,000 mcg by mouth dailyHistorical Med      Ginseng (GIN-ZING PO) Take by mouth dailyHistorical Med      ibuprofen

## 2020-04-15 NOTE — CONSULTS
decreased or difficulty urinating. Instructed on and emphasized importance of scheduled hospital follow-up appointment with Dr. Diana Hernandez at [facility] on 04- at [address] verbalized understanding. No additional questions at this time. Stated he will alert nurse with any questions. PATIENT/CAREGIVER TEACHING:    Level of patient/caregiver understanding able to:   [ X ] Verbalize understanding [ ] Demonstrate understanding [ ] Teach back   [ X ] Needs reinforcement [ ] Other:     Education Time: 10 minutes    Recommendations:   1. Encourage follow-up appointment compliance. Next appointment: 04/21  2. Educate further on fluid restriction of <64oz during inpatient stay so they can understand how to measure intake at home. 3. Review sodium restrictions. Encouraged to not add table salt to their foods and avoid foods that are high in sodium. 4. Continue to educate on S/S. Stress the importance of calling the MD with the earliest signs of an acute exacerbation. 5. Emphasize daily weights - instructed to call the MD if the patient gains 3 lb in a day or 5 lb in a week. 6. Provided patient with CHF Resource Line for questions and concerns.      Richard Rico HF BSN-RN  Heart Failure Navigator  [address] [facility]\""  [phone]

## 2020-04-15 NOTE — PROGRESS NOTES
Pulses: +2 palpable, equal bilaterally       Labs:   Recent Labs     04/13/20  0950 04/14/20  0425 04/15/20  0446   WBC 14.8* 15.7* 13.2*   HGB 18.6* 17.5 17.3   HCT 54.6* 51.6 51.2    209 206     Recent Labs     04/13/20  0950 04/14/20  0425 04/15/20  0446    136 137   K 3.6 4.6 4.3    98* 100   CO2 25 27 25   BUN 11 11 13   CREATININE 1.0 0.9 0.8*   CALCIUM 9.7 9.5 9.5   PHOS  --   --  3.5     Recent Labs     04/13/20  0950   AST 18   ALT 20   BILITOT 0.5   ALKPHOS 73     No results for input(s): INR in the last 72 hours. Recent Labs     04/13/20  1902 04/14/20  0055 04/14/20  0425   TROPONINI 4.95* 4.67* 4.04*       Urinalysis:    No results found for: Velasquez Courser, WBCUA, BACTERIA, RBCUA, BLOODU, SPECGRAV, GLUCOSEU    Consults:    IP CONSULT TO CARDIAC REHAB  IP CONSULT TO SPIRITUAL SERVICES      Assessment/Plan:    Active Hospital Problems    Diagnosis    STEMI (ST elevation myocardial infarction) ([facility] [address] [I21.3]    ST elevation myocardial infarction (STEMI) ([facility] [address] [I21.3]    Tobacco abuse [Z72.0]    Ischemic cardiomyopathy [I25.5]    Essential hypertension [I10]    Mixed hyperlipidemia [E78.2]       STEMI - cardiology consulted and appreciated s/p C w/ FELICITY to mid-LAD 13 April w/out complications. Started on Amiodarone - continued.      HTN/CAD - w/ known CAD but no evidence of ongoing active signs/sxs of ischemia/failure. Currently controlled on home meds w/ vitals reviewed and documented as above.     HyperLipidemia - controlled on home Statin. Continue, w/ f/u and med adjustment w/ PCP     CHF - acute systolic failure likely ischemic w/ reduced EF 35% by echo this admission. Continue to optimize meds and follow outpt for improvement.       Tobacco Abuse - active and ongoing. Cessation counseled. Nicotine replacement PRN.       DVT Prophylaxis: post-cath per Cardiology  Diet: DIET CARDIAC;  Code Status: Full Code      PT/OT Eval Status: not yet ordered.      Dispo - Likely to home Wed 15 April pending Cardiology recs.      Byron Linton MD

## 2020-04-15 NOTE — DISCHARGE INSTR - COC
08/01/2019    Tdap (Boostrix, Adacel) 05/17/2017       Active Problems:  Patient Active Problem List   Diagnosis Code    Asthma J45.909    Restless legs syndrome (RLS) G25.81    Low testosterone R79.89    ED (erectile dysfunction) N52.9    Chronic bilateral low back pain without sciatica M54.5, G89.29    Mixed hyperlipidemia E78.2    Essential hypertension I10    Right carpal tunnel syndrome G56.01    Right lateral epicondylitis M77.11    Dupuytren's contracture M72.0    BIJU (obstructive sleep apnea) G47.33    ST elevation myocardial infarction (STEMI) ([facility]) I21.3    Tobacco abuse Z72.0    Ischemic cardiomyopathy I25.5    STEMI (ST elevation myocardial infarction) ([facility]) I21.3    Obesity E66.9       Isolation/Infection:   Isolation          No Isolation        Patient Infection Status     None to display          Nurse Assessment:  Last Vital Signs: /67   Pulse 74   Temp 98.8 °F (37.1 °C) (Oral)   Resp 18   Wt 216 lb 11.4 oz (98.3 kg)   SpO2 99%   BMI 27.09 kg/m²     Last documented pain score (0-10 scale): Pain Level: 7  Last Weight:   Wt Readings from Last 1 Encounters:   04/14/20 216 lb 11.4 oz (98.3 kg)     Mental Status:  oriented, alert, coherent, logical, thought processes intact and able to concentrate and follow conversation    IV Access:  - None    Nursing Mobility/ADLs:  Walking   Independent  Transfer  Independent  Bathing  Independent  Dressing  Independent  Toileting  Independent  Feeding  Independent  Med Admin  Independent  Med Delivery   whole    Wound Care Documentation and Therapy:  Incision 02/28/12 Abdomen Anterior (Active)   Number of days: 2968       Incision 02/10/16 Abdomen (Active)   Number of days: 1525        Elimination:  Continence:   · Bowel:  Yes  · Bladder: Yes  Urinary Catheter: None   Colostomy/Ileostomy/Ileal Conduit: No       Date of Last BM: 04/15/2020    Intake/Output Summary (Last 24 hours) at 4/15/2020 1211  Last data filed at 4/15/2020 0500  Gross per 24 hour   Intake 1330 ml   Output 845 ml   Net 485 ml     I/O last 3 completed shifts: In: 0476 [P.O.:1560; I.V.:20]  Out: 845 [Urine:845]    Safety Concerns:     None    Impairments/Disabilities:      None    Nutrition Therapy:  Current Nutrition Therapy:   - Oral Diet:  Low Sodium (2gm)    Routes of Feeding: Oral  Liquids: Thin Liquids  Daily Fluid Restriction: no  Last Modified Barium Swallow with Video (Video Swallowing Test): not done    Treatments at the Time of Hospital Discharge:   Respiratory Treatments:   Oxygen Therapy:  is not on home oxygen therapy. Ventilator:    - No ventilator support    Rehab Therapies: Physical Therapy and Occupational Therapy  Weight Bearing Status/Restrictions: No weight bearing restirctions  Other Medical Equipment (for information only, NOT a DME order):  {EQUIPMENT:967088215}  Other Treatments: ***    Patient's personal belongings (please select all that are sent with patient):  {P DME Belongings:229824011}    RN SIGNATURE:  {Esignature:277008076}    CASE MANAGEMENT/SOCIAL WORK SECTION    Inpatient Status Date: ***    Readmission Risk Assessment Score:  Readmission Risk              Risk of Unplanned Readmission:        7           Discharging to Facility/ Agency   · Name:   · Address:  · Phone:  · Fax:    Dialysis Facility (if applicable)   · Name:  · Address:  · Dialysis Schedule:  · Phone:  · Fax:    / signature: {Esignature:[id number]}    PHYSICIAN SECTION    Prognosis: {Prognosis:6325987586}    Condition at Discharge: [address] [place] [address] Patient Condition:[id number]}    Rehab Potential (if transferring to Rehab): {Prognosis:5123354503}    Recommended Labs or Other Treatments After Discharge: ***    Physician Certification: I certify the above information and transfer of Nini Vines  is necessary for the continuing treatment of the diagnosis listed and that he requires {Admit to Appropriate Level of Care:43182} for {GREATER/LESS:174317580} 30 days. Update Admission H&P: {CHP DME Changes in FDHBY:311809128}    PHYSICIAN SIGNATURE:  {Esignature:242187332}

## 2020-04-20 NOTE — PROGRESS NOTES
1516  [facility]   Cardiovascular Evaluation    PATIENT: Lavern Lesch  DATE: 2020  MRN: [id number]  CSN: [id number]  : 1965      Primary Care Doctor: Betsy Leos MD  Reason for evaluation:   Follow-Up from Hospital (feeling better since hosp. no cardiac complaints)      Subjective:   History of present illness on initial date of evaluation:   Lavern Lesch is a 62 y.o. patient who presents for hospital up. PMH: HLD, HTN, SVT, asthma, GERD, depression. Patient presented to the ED on 2020 with complaints of chest pain. EMS was called to is home and pre-hospital EKG showed STEMI. Patient had emergent LHC with a successful PCI to mid LAD, stable V tach during case that broke on it's own. Today he reports feeling much better. He states he does still have SOB, but mainly at night. He states it has gotten better. He denies any chest pain, palpitations, dizziness or syncope. He states he only checks his BP and HR at home a couples times a week.        Patient Active Problem List   Diagnosis    Asthma    Restless legs syndrome (RLS)    Low testosterone    ED (erectile dysfunction)    Chronic bilateral low back pain without sciatica    Mixed hyperlipidemia    Essential hypertension    Right carpal tunnel syndrome    Right lateral epicondylitis    Dupuytren's contracture    BIJU (obstructive sleep apnea)    ST elevation myocardial infarction (STEMI) ([address]    Tobacco abuse    Ischemic cardiomyopathy    STEMI (ST elevation myocardial infarction) ([address]    Obesity    Coronary artery disease involving native coronary artery of native heart without angina pectoris         Past Medical History:   has a past medical history of Asthma, Back problem, Cholelithiasis without obstruction, Depression, ED (erectile dysfunction), Gallbladder & bile duct stone, GERD (gastroesophageal reflux disease), Hernia, Hyperlipidemia, Hypertension, Low testosterone, MI (myocardial infarction) ([facility] Seasonal allergies, Sleep apnea, SVT (supraventricular tachycardia) ([facility] [address] [facility] and Tendonitis. Surgical History:   has a past surgical history that includes back surgery (2001); shoulder surgery; Sternum fracture surgery; hip surgery; hernia repair (2012); Cholecystectomy (2012); Colonoscopy (2010); other surgical history; Lumbar spine surgery (2004); and Carpal tunnel release (Right, 2/11/2019). Social History:   reports that he has been smoking cigars. He has a 5.00 pack-year smoking history. He has never used smokeless tobacco. He reports current alcohol use. He reports that he does not use drugs. Family History:  No evidence for sudden cardiac death or premature CAD    Home Medications:  Reviewed and are listed in nursing record.  and/or listed below  Current Outpatient Medications   Medication Sig Dispense Refill    ticagrelor (BRILINTA) 90 MG TABS tablet Take 1 tablet by mouth 2 times daily 60 tablet 0    carvedilol (COREG) 3.125 MG tablet Take 1 tablet by mouth 2 times daily (with meals) 60 tablet 0    atorvastatin (LIPITOR) 40 MG tablet Take 1 tablet by mouth nightly 30 tablet 0    amiodarone (CORDARONE) 200 MG tablet Take 1 tablet by mouth daily After initial week at twice daily 30 tablet 0    aspirin 81 MG chewable tablet Take 1 tablet by mouth daily 30 tablet 0    tiZANidine (ZANAFLEX) 6 MG capsule TAKE 1 CAPSULE BY MOUTH 4 TIMES DAILY AS NEEDED FOR MUSCLE SPASMS (DOSE CHANGE) 120 capsule 1    testosterone cypionate (DEPOTESTOTERONE CYPIONATE) 200 MG/ML injection INJECT 1 ML INTO THE MUSCLE EVERY 14 DAYS 6 mL 0    lisinopril (PRINIVIL;ZESTRIL) 40 MG tablet TAKE 1 TABLET BY MOUTH EVERY DAY 90 tablet 1    vitamin B-12 (CYANOCOBALAMIN) 1000 MCG tablet Take 1,000 mcg by mouth daily      Ginseng (GIN-ZING PO) Take by mouth daily      ibuprofen (ADVIL;MOTRIN) 800 MG tablet Take 1 tablet by mouth every 8 hours as needed for Pain 60 tablet 1    sodium chloride 0.9 % SOLN 100 with mid , apical anterior and apex Hypokinesis  No AS gradient  LESION1; PCI to mid LAD  STENT 3.5 x 18mm post dilated to      Assessment  1. Successful PCI to mid LAD using 1 drug stent                100%-->0%, ALEXSANDRA-3 flow  2. Stable V-tach during case that broke on its own, followed by Inferior ST elevations. ? Spasm as RCA was clear                - Amio x 18 hrs  3. ASA 81mg poqday for life, ticagrelor 90mg po BID for 1 yr w/o interruption  4. BB, statin as tolerated                - ACE/ARB once able  5. Heart block resolved prior to case  6. Echo, ECg, cardiac rehab         VASCULAR/OTHER IMAGING:      Assessment and Plan   Artie Lemon is a 62 y.o. male who presents today for the following problems:      1. CAD              - 4/13/2020 S/p STEMI and PCI to mid LAD              - trop peaked 4.95  2. Ischemic cardiomyopathy: LVEF 35%  3. NSVT/VT: still several events on tele     MD PLAN  1.  Pt doing well no Issues, no CP or palpitations  2. Cont Amio 200mg po qday for 2 months  3. Increase coreg to 6.25mg po BID  4. Cont ASA, ticagrelor, coreg, and lisinopril  5. Lipids in June 6. Virtual visit next visit       Patient Active Problem List   Diagnosis    Asthma    Restless legs syndrome (RLS)    Low testosterone    ED (erectile dysfunction)    Chronic bilateral low back pain without sciatica    Mixed hyperlipidemia    Essential hypertension    Right carpal tunnel syndrome    Right lateral epicondylitis    Dupuytren's contracture    BIJU (obstructive sleep apnea)    ST [facility] ([facility]I) [facility]    Tobacco abuse    Ischemic cardiomyopathy    STEMI (ST elevation myocardial infarction) ([facility]    Obesity    Coronary artery disease involving native coronary artery of native heart without angina pectoris       Patient Plan:  1. Increase your carvedilol (Coreg) to 6.25 mg   2. Check your blood pressure and heart rate at home.  If your BP (top number) is less than 90 and your

## 2020-04-21 ENCOUNTER — OFFICE VISIT (OUTPATIENT)
Dept: CARDIOLOGY CLINIC | Age: 55
End: 2020-04-21
Payer: COMMERCIAL

## 2020-04-21 VITALS
HEIGHT: 76 IN | OXYGEN SATURATION: 98 % | DIASTOLIC BLOOD PRESSURE: 72 MMHG | BODY MASS INDEX: 27.03 KG/M2 | WEIGHT: 222 LBS | SYSTOLIC BLOOD PRESSURE: 130 MMHG | HEART RATE: 63 BPM

## 2020-04-21 PROBLEM — I25.10 CORONARY ARTERY DISEASE INVOLVING NATIVE CORONARY ARTERY OF NATIVE HEART WITHOUT ANGINA PECTORIS: Status: ACTIVE | Noted: 2020-04-21

## 2020-04-21 PROCEDURE — 99214 OFFICE O/P EST MOD 30 MIN: CPT | Performed by: INTERNAL MEDICINE

## 2020-04-21 RX ORDER — CARVEDILOL 6.25 MG/1
6.25 TABLET ORAL 2 TIMES DAILY WITH MEALS
Qty: 60 TABLET | Refills: 3 | Status: SHIPPED | OUTPATIENT
Start: 2020-04-21 | End: 2020-08-14

## 2020-04-21 NOTE — PATIENT INSTRUCTIONS
Patient Plan:  1. Increase your carvedilol (Coreg) to 6.25 mg   2. Check your blood pressure and heart rate at home. If your BP (top number) is less than 90 and your heart rate is less than 50   3. May stop the amiodarone in 2 months (to be taken once daily)  4. Echocardiogram to view size and strength of the heart in June 2020  5.   We will call you with the results of the Echocardiogram

## 2020-04-23 ENCOUNTER — VIRTUAL VISIT (OUTPATIENT)
Dept: FAMILY MEDICINE CLINIC | Age: 55
End: 2020-04-23
Payer: COMMERCIAL

## 2020-04-23 PROCEDURE — 99441 PR PHYS/QHP TELEPHONE EVALUATION 5-10 MIN: CPT | Performed by: FAMILY MEDICINE

## 2020-04-23 RX ORDER — BUPROPION HYDROCHLORIDE 150 MG/1
150 TABLET, EXTENDED RELEASE ORAL 2 TIMES DAILY
Qty: 60 TABLET | Refills: 3 | Status: SHIPPED | OUTPATIENT
Start: 2020-04-23 | End: 2020-08-14

## 2020-04-23 NOTE — PROGRESS NOTES
2020  Zurdo Garcia is a 62 y.o. male evaluated via telephone on 2020. Consent:  He and/or health care decision maker is aware that that he may receive a bill for this telephone service, depending on his insurance coverage, and has provided verbal consent to proceed: Yes. We attempted to do virtual video visit initially but patient was unable because of technical issues at his home so this was converted to a telephone encounter visit. Documentation:  I communicated with the patient and/or health care decision maker about his recent hospitalization for heart attack and to follow-up on his hypertension and heart disease. Patient would also like to stop smoking. Details of this discussion including any medical advice provided: below      I affirm this is a Patient Initiated Episode with an Established Patient who has not had a related appointment within my department in the past 7 days or scheduled within the next 24 hours. Patient identification was verified at the start of the visit: Yes    Total Time: minutes: 5-10 minutes    Note: not billable if this call serves to triage the patient into an appointment for the relevant concern      Pramod Mckinnon       History:    Zurdo Garcia (:  1965) had this telephone encounter to follow-up on his chronic medical problems and recent hospital admission for acute ST elevation myocardial infarction. He underwent angioplasty at [facility] for mid LAD lesion. He was also diagnosed with ischemic cardiomyopathy. He had some episodes of ventricular tachycardia during hospitalization so was on amiodarone for 2 months. He will also need to be scheduled for echocardiogram and follow-up with cardiology. He is doing better now. Patient is back on Lipitor for his cholesterol after heart attack. He was on it in the past but stopped it on his own in 2016. His medication list, problem list, allergies and most recent labs were reviewed.   He

## 2020-05-01 RX ORDER — TIZANIDINE HYDROCHLORIDE 6 MG/1
6 CAPSULE, GELATIN COATED ORAL 4 TIMES DAILY PRN
Qty: 120 CAPSULE | Refills: 1 | Status: SHIPPED | OUTPATIENT
Start: 2020-05-01 | End: 2020-06-29

## 2020-05-04 RX ORDER — CLOPIDOGREL 300 MG/1
300 TABLET, FILM COATED ORAL ONCE
Qty: 1 TABLET | Refills: 0 | Status: SHIPPED | OUTPATIENT
Start: 2020-05-04 | End: 2020-06-30 | Stop reason: CLARIF

## 2020-05-04 RX ORDER — CLOPIDOGREL BISULFATE 75 MG/1
75 TABLET ORAL DAILY
Qty: 30 TABLET | Refills: 5 | Status: SHIPPED | OUTPATIENT
Start: 2020-05-04 | End: 2020-08-26

## 2020-05-13 ENCOUNTER — TELEPHONE (OUTPATIENT)
Dept: CARDIOLOGY CLINIC | Age: 55
End: 2020-05-13

## 2020-05-13 NOTE — TELEPHONE ENCOUNTER
Pt called and needed atorvastatin (LIPITOR) 40 MG tablet    Sent to [facility] [address]

## 2020-05-14 RX ORDER — ATORVASTATIN CALCIUM 40 MG/1
40 TABLET, FILM COATED ORAL NIGHTLY
Qty: 90 TABLET | Refills: 3 | Status: SHIPPED | OUTPATIENT
Start: 2020-05-14 | End: 2021-05-07

## 2020-06-02 RX ORDER — TESTOSTERONE CYPIONATE 200 MG/ML
INJECTION INTRAMUSCULAR
Qty: 6 ML | Refills: 0 | Status: SHIPPED | OUTPATIENT
Start: 2020-06-02 | End: 2020-09-14

## 2020-06-02 NOTE — TELEPHONE ENCOUNTER
Patient called and scheduled an appt for august    Date of last refill of this med was 3-2-20, # of pills given 6ml and # of refills given 0. Their next appointment is 8-21-20, the last date patient was seen was 4-23-20. Does patient have medication agreement on file? Yes  Has drug screen been done in last 12 months if needed?  no

## 2020-06-04 RX ORDER — LISINOPRIL 40 MG/1
TABLET ORAL
Qty: 90 TABLET | Refills: 1 | Status: SHIPPED | OUTPATIENT
Start: 2020-06-04 | End: 2020-06-22 | Stop reason: ALTCHOICE

## 2020-06-11 ENCOUNTER — HOSPITAL ENCOUNTER (OUTPATIENT)
Dept: NON INVASIVE DIAGNOSTICS | Age: 55
Discharge: HOME | End: 2020-06-11
Payer: COMMERCIAL

## 2020-06-11 LAB
LV EF: 40 %
LVEF MODALITY: NORMAL

## 2020-06-11 PROCEDURE — 93306 TTE W/DOPPLER COMPLETE: CPT

## 2020-06-12 ENCOUNTER — TELEPHONE (OUTPATIENT)
Dept: CARDIOLOGY CLINIC | Age: 55
End: 2020-06-12

## 2020-06-18 ENCOUNTER — TELEPHONE (OUTPATIENT)
Dept: PULMONOLOGY | Age: 55
End: 2020-06-18

## 2020-06-18 NOTE — PROGRESS NOTES
capsule 1    buPROPion (WELLBUTRIN SR) 150 MG extended release tablet Take 1 tablet by mouth 2 times daily At the start take one daily for 3 days then increase to BID (Patient not taking: Reported on 6/22/2020) 60 tablet 3    amiodarone (CORDARONE) 200 MG tablet Take 1 tablet by mouth 2 times daily for 7 days (Patient not taking: Reported on 4/21/2020) 30 tablet 3     No current facility-administered medications for this visit. Allergies:  Vioxx and Phenergan [promethazine hcl]     Review of Systems:   A 14 point review of symptoms completed. Pertinent positives identified in the HPI, all other review of symptoms negative as below.     Objective:   PHYSICAL EXAM:    Vitals:    06/22/20 1436   BP: 132/80   Pulse: 74   SpO2: 98%    Weight: 222 lb (100.7 kg)     Wt Readings from Last 3 Encounters:   06/22/20 222 lb (100.7 kg)   04/21/20 222 lb (100.7 kg)   04/14/20 216 lb 11.4 oz (98.3 kg)         General Appearance:  Alert, cooperative, no distress, appears stated age   Head:  Normocephalic, atraumatic   Eyes:  PERRL, conjunctiva/corneas clear   Nose: Nares normal, no drainage or sinus tenderness   Throat: Lips, mucosa, and tongue normal   Neck: Supple, symmetrical, trachea midline, NL thyroid no carotid bruit or JVD   Lungs:   CTAB, respirations unlabored   Chest Wall:  No tenderness or deformity   Heart:  Regular rhythm and normal rate; S1, S2 are normal;   no murmur noted; no rub or gallop   Abdomen:   Soft, non-tender, +BS x 4, no masses, no organomegaly   Extremities: Extremities normal, atraumatic, no cyanosis or edema   Pulses: 2+ and symmetric   Skin: Skin color, texture, turgor normal, no rashes or lesions   Pysch: Normal mood and affect   Neurologic: Normal gross motor and sensory exam.         LABS   CBC:      Lab Results   Component Value Date    WBC 13.2 04/15/2020    RBC 5.74 04/15/2020    HGB 17.3 04/15/2020    HCT 51.2 04/15/2020    MCV 89.2 04/15/2020    RDW 14.1 04/15/2020

## 2020-06-18 NOTE — TELEPHONE ENCOUNTER
Within this Telehealth Consent, the terms you and yours refer to the person using the Telehealth Service (Service), or in the case of a use of the Service by or on behalf of a minor, you and yours refer to and include (i) the parent or legal guardian who provides consent to the use of the Service by such minor or uses the Service on behalf of such minor, and (ii) the minor for whom consent is being provided or on whose behalf the Service is being utilized. When using Service, you will be consulting with your health care providers via the use of Telehealth.   Telehealth involves the delivery of healthcare services using electronic communications, information technology or other means between a healthcare provider and a patient who are not in the same physical location. Telehealth may be used for diagnosis, treatment, follow-up and/or patient education, and may include, but is not limited to, one or more of the following:    Electronic transmission of medical records, photo images, personal health information or other data between a patient and a healthcare provider    Interactions between a patient and healthcare provider via audio, video and/or data communications    Use of output data from medical devices, sound and video files    Anticipated Benefits   The use of Telehealth by your Provider(s) through the Service may have the following possible benefits:    Making it easier and more efficient for you to access medical care and treatment for the conditions treated by such Provider(s) utilizing the Service    Allowing you to obtain medical care and treatment by Provider(s) at times that are convenient for you    Enabling you to interact with Provider(s) without the necessity of an in-office appointment     Possible Risks   While the use of Telehealth can provide potential benefits for you, there are also potential risks associated with the use of Telehealth.  These risks include, but may not be the provision of medical care and treatment via Telehealth and the Service and you may not be able to receive diagnosis and/or treatment through the Service for every condition for which you seek diagnosis and/or treatment. 11. There are potential risks to the use of Telehealth, including but not limited to the risks described in this Telehealth Consent. 12. Your Provider(s) have discussed the use of Telehealth and the Service with you, including the benefits and risks of such and you have provided oral consent to your Provider(s) for the use of Telehealth and the Service. 15. You understand that it is your duty to provide your Provider(s) truthful, accurate and complete information, including all relevant information regarding care that you may have received or may be receiving from other healthcare providers outside of the Service. 14. You understand that each of your Provider(s) may determine in his or sole discretion that your condition is not suitable for diagnosis and/or treatment using the Service, and that you may need to seek medical care and treatment a specialist or other healthcare provider, outside of the Service. 15. You understand that you are fully responsible for payment for all services provided by Provider(s) or through use of the Service and that you may not be able to use third-party insurance. 16. You represent that (a) you have read this Telehealth Consent carefully, (b) you understand the risks and benefits of the Service and the use of Telehealth in the medical care and treatment provided to you by Provider(s) using the Service, and (c) you have the legal capacity and authority to provide this consent for yourself and/or the minor for which you are consenting under applicable federal and state laws, including laws relating to the age of [de-identified] and/or parental/guardian consent.    17. You give your informed consent to the use of Telehealth by Provider(s) using the Service under the terms described in the Terms of Service and this Telehealth Consent. The patient was read the following statement and has consented to the visit as of 6/18/20. The patient has been scheduled for their first telehealth visit on 6/23/20 with Jessica Mcdowell

## 2020-06-22 ENCOUNTER — OFFICE VISIT (OUTPATIENT)
Dept: CARDIOLOGY CLINIC | Age: 55
End: 2020-06-22
Payer: COMMERCIAL

## 2020-06-22 VITALS
BODY MASS INDEX: 27.03 KG/M2 | WEIGHT: 222 LBS | HEART RATE: 74 BPM | DIASTOLIC BLOOD PRESSURE: 80 MMHG | SYSTOLIC BLOOD PRESSURE: 132 MMHG | HEIGHT: 76 IN | OXYGEN SATURATION: 98 %

## 2020-06-22 PROCEDURE — 99214 OFFICE O/P EST MOD 30 MIN: CPT | Performed by: INTERNAL MEDICINE

## 2020-06-22 RX ORDER — SACUBITRIL AND VALSARTAN 24; 26 MG/1; MG/1
1 TABLET, FILM COATED ORAL 2 TIMES DAILY
Qty: 60 TABLET | Refills: 3 | Status: SHIPPED | OUTPATIENT
Start: 2020-06-22 | End: 2020-12-17

## 2020-06-22 NOTE — PATIENT INSTRUCTIONS
Patient Plan:  1. Stop taking the amiodarone (Cordarone)  - therapy completed   2. Stop taking the lisinopril. Wait 3 days before starting the new medication \"Entresto\"  3. Start taking Entresto 24/26 twice daily  4. Continue all other medications   5.  Echocardiogram to view size and strength of the heart in December 2020  6.   Follow up in 2 months  - may do a VV at that time

## 2020-06-22 NOTE — LETTER
[address] [facility]   Cardiovascular Evaluation    PATIENT: Artie Lemon  DATE: 2020  MRN: [id number]  CSN: [id number]  : 1965      Primary Care Doctor: Brisa Foy MD  Reason for evaluation:   Follow-up and Coronary Artery Disease      Subjective:   History of present illness on initial date of evaluation:   Artie Lemon is a 62 y.o. patient who presents for hospital up. PMH: HLD, HTN, SVT, asthma, GERD, depression. Patient presented to the ED on 2020 with complaints of chest pain. EMS was called to is home and pre-hospital EKG showed STEMI. Patient had emergent LHC with a successful PCI to mid LAD, stable V tach during case that broke on it's own. Last OV 20 he reported feeling much better. He stated he does still have SOB, but mainly at night. Today he reports feeling much better. He denies any SOB or chest pain. He is taking his medication as prescribed. He has cut back to almost no caffeine.      Patient Active Problem List   Diagnosis    Asthma    Restless legs syndrome (RLS)    Low testosterone    ED (erectile dysfunction)    Chronic bilateral low back pain without sciatica    Mixed hyperlipidemia    Essential hypertension    Right carpal tunnel syndrome    Right lateral epicondylitis    Dupuytren's contracture    BIJU (obstructive sleep apnea)    Tobacco abuse    Ischemic cardiomyopathy    STEMI ([facility] elevation myocardial infarction) [facility] [address]    Obesity    Coronary artery disease involving native coronary artery of native heart without angina pectoris         Past Medical History:   has a past medical history of Asthma, Back problem, Cholelithiasis without obstruction, Depression, ED (erectile dysfunction), Gallbladder & bile duct stone, GERD (gastroesophageal reflux disease), Hernia, Hyperlipidemia, Hypertension, Low testosterone, MI (myocardial infarction) ([address] Seasonal allergies, Sleep apnea, SVT (supraventricular tachycardia) ([facility] [address] and Tendonitis. Surgical History:   has a past surgical history that includes back surgery (2001); shoulder surgery; Sternum fracture surgery; hip surgery; hernia repair (2012); Cholecystectomy (2012); Colonoscopy (2010); other surgical history; Lumbar spine surgery (2004); and Carpal tunnel release (Right, 2/11/2019). Social History:   reports that he quit smoking about 2 months ago. His smoking use included cigars. He has a 5.00 pack-year smoking history. He has never used smokeless tobacco. He reports current alcohol use. He reports that he does not use drugs. Family History:  No evidence for sudden cardiac death or premature CAD    Home Medications:  Reviewed and are listed in nursing record.  and/or listed below  Current Outpatient Medications   Medication Sig Dispense Refill    lisinopril (PRINIVIL;ZESTRIL) 40 MG tablet TAKE 1 TABLET BY MOUTH EVERY DAY 90 tablet 1    testosterone cypionate (DEPOTESTOTERONE CYPIONATE) 200 MG/ML injection INJECT 1 ML INTO THE MUSCLE EVERY 14 DAYS 6 mL 0    atorvastatin (LIPITOR) 40 MG tablet Take 1 tablet by mouth nightly 90 tablet 3    clopidogrel (PLAVIX) 75 MG tablet Take 1 tablet by mouth daily 30 tablet 5    KRILL OIL PO Take by mouth With omega 3      Multiple Vitamin (MULTI-VITAMIN DAILY PO) Take by mouth      carvedilol (COREG) 6.25 MG tablet Take 1 tablet by mouth 2 times daily (with meals) 60 tablet 3    aspirin 81 MG chewable tablet Take 1 tablet by mouth daily 30 tablet 0    sodium chloride 0.9 % SOLN 100 mL with morphine (PF) 10 MG/ML SOLN 100 mg Infuse intravenously continuous Along with dilaudid      oxyCODONE HCl (OXY-IR) 10 MG immediate release tablet TAKE 1 TABLET BY MOUTH EVERY 6 HOURS  0    clopidogrel (PLAVIX) 300 MG TABS Take 1 tablet by mouth once for 1 dose 1 tablet 0    tiZANidine (ZANAFLEX) 6 MG capsule TAKE 1 CAPSULE BY MOUTH 4 TIMES DAILY AS NEEDED FOR MUSCLE SPASMS (DOSE CHANGE) (Patient not taking: Reported on 3 mmHg), consistent with mild pulmonary hypertension. [address] [place] [address] Street: 4/13/2020   Summary   The left ventricular systolic function is moderately reduced with an   ejection fraction of 35 %. There is akinesis of the mid and apical anterior   wall and apex. There is mild concentric left ventricular hypertrophy. Grade I diastolic dysfunction with normal left ventricular filling pressure. Systolic pulmonary artery pressure (SPAP) is normal estimated at 31 mmHg   (Right atrial pressure of 3 mmHg). STRESS TEST:    CARDIAC CATH: 4/13/2020  LEFT HEART CATH  LM: luminals  LAD: 100% mid just after diag  LCX: luminals, 20% mid  RCA: dominant, large aneurysmal, mild diffuse disease with focal stenosis     LVEDP: 1   LVEF:45% with mid , apical anterior and apex Hypokinesis  No AS gradient  LESION1; PCI to mid LAD  STENT 3.5 x 18mm post dilated to      Assessment  1. Successful PCI to mid LAD using 1 drug stent                100%-->0%, ALEXSANDRA-3 flow  2. Stable V-tach during case that broke on its own, followed by Inferior ST elevations. ? Spasm as RCA was clear                - Amio x 18 hrs  3. ASA 81mg poqday for life, ticagrelor 90mg po BID for 1 yr w/o interruption  4. BB, statin as tolerated                - ACE/ARB once able  5. Heart block resolved prior to case  6. Echo, ECg, cardiac rehab         VASCULAR/OTHER IMAGING:      Assessment and Plan   Bisi Gomes is a 62 y.o. male who presents today for the following problems:      1. CAD   - trop peaked 4.95              - 4/13/2020 S/p STEMI and PCI to mid LAD  2. Ischemic cardiomyopathy: LVEF 35%   - euvolemic and compensated  3. NSVT/VT:  4. Fatigue       MD PLAN  1.  Pt doing well no Issues, no CP or palpitations  2. Ok to d/c Amiodarone   - ? Adding to xanaflex issues as well as change in pt caffeine levels  3. Change Lisinopril to Entresto after 36hr washout, SE's d/w pt  4.  Cont Coreg, ASA and plavix   - breathing much better off Ticagrelor 5. Lipids and limited echo nov/dec  6. Virtual visit next visit (2 months) for med titration       Patient Active Problem List   Diagnosis    Asthma    Restless legs syndrome (RLS)    Low testosterone    ED (erectile dysfunction)    Chronic bilateral low back pain without sciatica    Mixed hyperlipidemia    Essential hypertension    Right carpal tunnel syndrome    Right lateral epicondylitis    Dupuytren's contracture    BIJU (obstructive sleep apnea)    Tobacco abuse    Ischemic cardiomyopathy    STEMI (ST elevation myocardial infarction) ([facility] [address]    Obesity    Coronary artery disease involving native coronary artery of native heart without angina pectoris       Patient Plan:  1. Stop taking the amiodarone (Cordarone)  - therapy completed   2. Stop taking the lisinopril. Wait 3 days before starting the new medication \"Entresto\"  3. Start taking Entresto 24/26 twice daily  4. Continue all other medications   5.  Echocardiogram to view size and strength of the heart in December 2020  6. Follow up in 2 months  - may do a VV at that time       It is a pleasure to assist in the care of Zurdo Garcia. Please call with any questions. This note was scribed in the presence of Jerod Mendez MD by Abhilash Joyner RN.      Brie Carlos MD, personally performed the services described in this documentation as scribed by the above signed scribe in my presence, and it is both accurate and complete to the best of our ability and knowledge. I agree with the details independently gathered by my clinical support staff, while the remaining scribed note accurately describes my personal service to the patient. The above RN is working as a scribe for and in the presence of myself . Working as a scribe, the RN may have prepopulated components of this note with my historical intellectual property under my direct supervision.   Any additions to this intellectual

## 2020-06-23 ENCOUNTER — TELEPHONE (OUTPATIENT)
Dept: PULMONOLOGY | Age: 55
End: 2020-06-23

## 2020-06-29 RX ORDER — TIZANIDINE HYDROCHLORIDE 6 MG/1
6 CAPSULE, GELATIN COATED ORAL 4 TIMES DAILY PRN
Qty: 120 CAPSULE | Refills: 1 | Status: SHIPPED | OUTPATIENT
Start: 2020-06-29 | End: 2020-06-30 | Stop reason: CLARIF

## 2020-06-30 ENCOUNTER — TELEPHONE (OUTPATIENT)
Dept: CARDIOLOGY CLINIC | Age: 55
End: 2020-06-30

## 2020-06-30 ENCOUNTER — TELEPHONE (OUTPATIENT)
Dept: PULMONOLOGY | Age: 55
End: 2020-06-30

## 2020-06-30 ENCOUNTER — VIRTUAL VISIT (OUTPATIENT)
Dept: PULMONOLOGY | Age: 55
End: 2020-06-30
Payer: COMMERCIAL

## 2020-06-30 PROCEDURE — 99442 PR PHYS/QHP TELEPHONE EVALUATION 11-20 MIN: CPT | Performed by: NURSE PRACTITIONER

## 2020-06-30 ASSESSMENT — SLEEP AND FATIGUE QUESTIONNAIRES
ESS TOTAL SCORE: 4
HOW LIKELY ARE YOU TO NOD OFF OR FALL ASLEEP WHEN YOU ARE A PASSENGER IN A CAR FOR AN HOUR WITHOUT A BREAK: 1
HOW LIKELY ARE YOU TO NOD OFF OR FALL ASLEEP WHILE SITTING AND READING: 0
HOW LIKELY ARE YOU TO NOD OFF OR FALL ASLEEP WHILE LYING DOWN TO REST IN THE AFTERNOON WHEN CIRCUMSTANCES PERMIT: 3
HOW LIKELY ARE YOU TO NOD OFF OR FALL ASLEEP IN A CAR, WHILE STOPPED FOR A FEW MINUTES IN TRAFFIC: 0
HOW LIKELY ARE YOU TO NOD OFF OR FALL ASLEEP WHILE WATCHING TV: 0
HOW LIKELY ARE YOU TO NOD OFF OR FALL ASLEEP WHILE SITTING QUIETLY AFTER LUNCH WITHOUT ALCOHOL: 0
HOW LIKELY ARE YOU TO NOD OFF OR FALL ASLEEP WHILE SITTING AND TALKING TO SOMEONE: 0
HOW LIKELY ARE YOU TO NOD OFF OR FALL ASLEEP WHILE SITTING INACTIVE IN A PUBLIC PLACE: 0

## 2020-06-30 NOTE — PATIENT INSTRUCTIONS
soak mask and water chamber for 30 minutes every 1-2 weeks, more often if sick. Allow water/vinegar mixture to run through tubing. Allow all equipment to air dry. Drying Hints:   Always hang tubing away from direct sunlight, as this will cause the tubing to become yellow, brittle and crack over a period of time. DO NOT attach the wet tubing to your CPAP unit to blow-dry it. The moisture from the tubing can drain back into your machine. Moisture in your unit can cause sudden pressure increases or short circuits  DO's and DON'Ts:  - Don't use alcohol-based products to clean your mask, because it can cause the materials to become hard and brittle. - Don't put headgear in the washer or dryer  - Don't use any caustic or household cleaning solutions such as bleach on your CPAP   equipment.  - Do follow the recommended cleaning schedule. - Do change your disposable filter frequently. Adapted From: [email or web address]. shtm.   These are general suggestions for all models please follow specific s recommendations and specific instructions

## 2020-06-30 NOTE — PROGRESS NOTES
MA Communication: The following orders are received by verbal communication from BEE Smith.     Orders include:  Orders (pt to let me know what DME to send orders to)       31-90 scheduled 8/31/20

## 2020-06-30 NOTE — PROGRESS NOTES
Patient ID: Lilly Cardona is a 62 y.o. male who is being seen today for   Chief Complaint   Patient presents with    Sleep Apnea     wanting to purchase a CPAP         HPI:     Lilly Cardona is a 62 y.o. male for telephone only virtual visit for BIJU follow up. He was diagnosed with severe BIJU in 2019, however never started CPAP due to cost.  States he is ready to purchase CPAP now. Some daytime sleepiness (does state has improved with medication changes). +snoring. No nodding off when driving. Some fatigue. Bedtime is 10 pm and rise time is 530 am. Sleep onset is 10 minutes. Wakes up 2-3 times at night total. 1 nocturia. It takes usually few minutes to fall back a sleep. Occassional naps during the day does when he has a chance. No headache in am. No weight gain. 2 caffienated beverages during the day. No alcohol. ESS is 4. Previous HPI 5/7/19  Lilly Cardona is a 62 y.o. male in office for BIJU follow up. PSG and CPAP titration were reviewed by me and noted below. PSG showed severe BIJU. Results were dicussed with patient and multiple good questions were answered. States he will eventually start CPAP however cannot afford it at this time, states has to many other medical bills. Some daytime sleepiness. Denies nodding off when driving. +fatigue. No other change to sleep history below. Previous   Lilly Cardona is a 62 y.o. male in office for sleep apnea evaluation. State he had carpel tunnel surgery last week and [facility] physician was concerned about apnea. Patient states he doesn't sleep well at all. Patient reports snoring at night for the past 15 years. Worse in supine position. Wakes self snoring. Has witnessed apnea. No restorative sleep. +dry mouth upon awakening. Fatigue and tiredness during the day. No history of sleep apnea. States sleep is very fragmented.   Goes to bed at 8 pm gets up at 11pm goes back to sleep at [place] states sleeps 2-3 hrs at a time gets up goes to chair for 45-60 asymptomatic    Depression     ED (erectile dysfunction)     Gallbladder & bile duct stone     Gallbladder full of stones    GERD (gastroesophageal reflux disease)     Hernia     Hyperlipidemia     Hypertension     Low testosterone     MI (myocardial infarction) ([facility] [address] 04/13/2020    mLAD 100% FELICITY Xience Martha 3.5 x 18    Seasonal allergies     Sleep apnea     SVT (supraventricular tachycardia) ([facility] [address]     GXT 1998    Tendonitis        Past Surgical History:        Procedure Laterality Date    BACK SURGERY  2001    L5 FUSION, Dr Nandini Hendrix Right 2/11/2019    RIGHT CARPAL TUNNEL RELEASE performed by Mora Yao MD at [address]  2012    Dr. Norma Tatum  2010    [facility] patient states   [address]  2012    Dr. Sneha Parekh      stimulator put in 3/10   [facility] LUMBAR SPINE SURGERY  2004    Dr Ninfa Lemon         Allergies:  is allergic to vioxx and phenergan [promethazine hcl]. Social History:    TOBACCO:   reports that he quit smoking about 2 months ago. His smoking use included cigars. He has a 5.00 pack-year smoking history. He has never used smokeless tobacco.  ETOH:   reports current alcohol use.     Family History:       Problem Relation Age of Onset    Heart Disease Father        Current Medications:    Current Outpatient Medications:     sacubitril-valsartan (ENTRESTO) 24-26 MG per tablet, Take 1 tablet by mouth 2 times daily, Disp: 60 tablet, Rfl: 3    testosterone cypionate (DEPOTESTOTERONE CYPIONATE) 200 MG/ML injection, INJECT 1 ML INTO THE MUSCLE EVERY 14 DAYS, Disp: 6 mL, Rfl: 0    atorvastatin (LIPITOR) 40 MG tablet, Take 1 tablet by mouth nightly, Disp: 90 tablet, Rfl: 3    clopidogrel (PLAVIX) 75 MG tablet, Take 1 tablet by mouth daily, Disp: 30 tablet, Rfl: 5    KRILL OIL PO, Take by mouth With omega restriction  · Avoid sedatives, alcohol and caffeinated drinks at bed time. · No driving motorized vehicles or operating heavy machinery while fatigue, drowsy or sleepy. · Weight loss is also recommended as a long-term intervention. · Complications of BIJU if not treated were discussed with patient patient to include systemic hypertension, pulmonary hypertension, cardiovascular morbidities, car accidents and all cause mortality. Patient education regarding sleep tips and CPAP cleaning recommendations     Consent for telehealth visit was obtained and is noted in chart    Kelli Arana is a 62 y.o. male evaluated via telephone on 6/30/2020.           Patient identification was verified at the start of the visit: Yes    Total Time:11 minutes    Note: not billable if this call serves to triage the patient into an appointment for the relevant concern      Marin Pierre

## 2020-07-07 NOTE — TELEPHONE ENCOUNTER
SW patient. He said he has been busy and unable to call yet. He will try to call within the next week. If I don't hear from him in 1 week, I will call him back.

## 2020-07-15 NOTE — TELEPHONE ENCOUNTER
Pt called back requesting [employer] dream station. Orders faxed to [address]. Pt has f/u 8/31/20.

## 2020-07-27 ENCOUNTER — TELEPHONE (OUTPATIENT)
Dept: CARDIOLOGY CLINIC | Age: 55
End: 2020-07-27

## 2020-07-27 NOTE — TELEPHONE ENCOUNTER
Pt called, took his last entresto last pm and his pharmacy says he cant get his new refill until wed. Its it ok, to not be on the medication for a few days? Pt stated he didn't know why he is short a few pills. Please call and advise.

## 2020-08-10 ENCOUNTER — TELEPHONE (OUTPATIENT)
Dept: CARDIOLOGY CLINIC | Age: 55
End: 2020-08-10

## 2020-08-10 NOTE — TELEPHONE ENCOUNTER
Ulysses Herbert from [facility] called to see if the appeal for the entresto has been received.  She stated if any assistance is required or it needs to be refaxed to please contact [phone], reference key: [facility]

## 2020-08-11 NOTE — TELEPHONE ENCOUNTER
Patient called stating his pharmacy stated his Jayla Mcfadden was going to cost $730, patient is unable to afford the medication. Patient is going out of town on Wednesday for 6 days and will run out of medication.  Patient asking for direction

## 2020-08-14 RX ORDER — BUPROPION HYDROCHLORIDE 150 MG/1
150 TABLET, EXTENDED RELEASE ORAL 2 TIMES DAILY
Qty: 180 TABLET | Refills: 1 | Status: SHIPPED | OUTPATIENT
Start: 2020-08-14 | End: 2020-10-20 | Stop reason: ALTCHOICE

## 2020-08-14 RX ORDER — CARVEDILOL 6.25 MG/1
TABLET ORAL
Qty: 180 TABLET | Refills: 3 | Status: SHIPPED | OUTPATIENT
Start: 2020-08-14 | End: 2020-08-24 | Stop reason: SDUPTHER

## 2020-08-18 NOTE — TELEPHONE ENCOUNTER
Tried calling home #. Just rings. Called cell # and left message that we have samples for patient to . Will continue to try to appeal the [facility].

## 2020-08-20 NOTE — PROGRESS NOTES
[address] [facility]  TELEPHONE VISIT  (During HTJNK-63 public health emergency)  Cardiovascular Office Note        PATIENT: Zehra Durand  DATE: 2020  MRN: [id number]  CSN: [id number]  : 1965      Primary Care Doctor: Mac Workman MD  Reason for evaluation:   No chief complaint on file. Subjective:   History of present illness on initial date of evaluation:   Zehra Durand is a 62 y.o. patient who presented for hospital up. PMH: HLD, HTN, SVT, asthma, GERD, depression. Patient presented to the ED on 2020 with complaints of chest pain. EMS was called to is home and pre-hospital EKG showed STEMI. Patient had emergent LHC with a successful PCI to mid LAD, stable V tach during case that broke on it's own. Last OV 20 he reported feeling much better. He stated he does still have SOB, but mainly at night. Last OV 20 he reported feeling much better. He denied any SOB or chest pain. He was taking his medication as prescribed. He cut back to almost no caffeine. Today he is participating in a telephone visit. He states he is trying a new C-pap and is having some anxiety due to that. He states he has been struggling to wear it. He states he has a follow up appointment with his pulmonary. He states he is checking his BP at home and systolic is running 088'J to maybe 140. He states his HR runs in the 80's.      Patient Active Problem List   Diagnosis    Asthma    Restless legs syndrome (RLS)    Low testosterone    ED (erectile dysfunction)    Chronic bilateral low back pain without sciatica    Mixed hyperlipidemia    Essential hypertension    Right carpal tunnel syndrome    Right lateral epicondylitis    Dupuytren's contracture    Severe obstructive sleep apnea    Tobacco abuse    Ischemic cardiomyopathy    STEMI ([facility] elevation myocardial infarction) ([facility] [address]    Obesity    Coronary artery disease involving native coronary artery of native heart without angina pectoris         Past Medical History:   has a past medical history of Asthma, Back problem, Cholelithiasis without obstruction, Depression, ED (erectile dysfunction), Gallbladder & bile duct stone, GERD (gastroesophageal reflux disease), Hernia, Hyperlipidemia, Hypertension, Low testosterone, MI (myocardial infarction) ([facility] [address] Seasonal allergies, Sleep apnea, SVT (supraventricular tachycardia) ([facility] [address] and Tendonitis. Surgical History:   has a past surgical history that includes back surgery (2001); shoulder surgery; Sternum fracture surgery; hip surgery; hernia repair (2012); Cholecystectomy (2012); Colonoscopy (2010); other surgical history; Lumbar spine surgery (2004); and Carpal tunnel release (Right, 2/11/2019). Social History:   reports that he quit smoking about 4 months ago. His smoking use included cigars. He has a 5.00 pack-year smoking history. He has never used smokeless tobacco. He reports current alcohol use. He reports that he does not use drugs. Family History:  No evidence for sudden cardiac death or premature CAD    Home Medications:  Reviewed and are listed in nursing record.  and/or listed below  Current Outpatient Medications   Medication Sig Dispense Refill    carvedilol (COREG) 6.25 MG tablet TAKE 1 TABLET BY MOUTH TWICE A DAY WITH MEALS 180 tablet 3    buPROPion (WELLBUTRIN SR) 150 MG extended release tablet Take 1 tablet by mouth 2 times daily 180 tablet 1    sacubitril-valsartan (ENTRESTO) 24-26 MG per tablet Take 1 tablet by mouth 2 times daily 60 tablet 3    testosterone cypionate (DEPOTESTOTERONE CYPIONATE) 200 MG/ML injection INJECT 1 ML INTO THE MUSCLE EVERY 14 DAYS 6 mL 0    atorvastatin (LIPITOR) 40 MG tablet Take 1 tablet by mouth nightly 90 tablet 3    clopidogrel (PLAVIX) 75 MG tablet Take 1 tablet by mouth daily 30 tablet 5    KRILL OIL PO Take by mouth With omega 3      Multiple Vitamin (MULTI-VITAMIN DAILY PO) Take by mouth      aspirin 81 MG chewable tablet Take 1 tablet by mouth daily 30 tablet 0    sodium chloride 0.9 % SOLN 100 mL with morphine (PF) 10 MG/ML SOLN 100 mg Infuse intravenously continuous Along with dilaudid      oxyCODONE HCl (OXY-IR) 10 MG immediate release tablet TAKE 1 TABLET BY MOUTH EVERY 6 HOURS  0     No current facility-administered medications for this visit. Allergies:  Vioxx and Phenergan [promethazine hcl]     Review of Systems:   A 14 point review of symptoms completed. Pertinent positives identified in the HPI, all other review of symptoms negative as below.     Objective:   PHYSICAL EXAM:      Vitals based on last recorded  BP      Temp      Pulse     Resp      SpO2         Wt Readings from Last 3 Encounters:   06/22/20 222 lb (100.7 kg)   04/21/20 222 lb (100.7 kg)   04/14/20 216 lb 11.4 oz (98.3 kg)         Telephone visit 17 min      LABS   CBC:      Lab Results   Component Value Date    WBC 13.2 04/15/2020    RBC 5.74 04/15/2020    HGB 17.3 04/15/2020    HCT 51.2 04/15/2020    MCV 89.2 04/15/2020    RDW 14.1 04/15/2020     04/15/2020     CMP:  Lab Results   Component Value Date     04/15/2020    K 4.3 04/15/2020    K 4.6 04/14/2020     04/15/2020    CO2 25 04/15/2020    BUN 13 04/15/2020    CREATININE 0.8 04/15/2020    GFRAA >60 04/15/2020    GFRAA >60 01/31/2013    AGRATIO 1.5 04/13/2020    LABGLOM >60 04/15/2020    GLUCOSE 105 04/15/2020    PROT 7.4 04/13/2020    PROT 6.7 02/28/2012    CALCIUM 9.5 04/15/2020    BILITOT 0.5 04/13/2020    ALKPHOS 73 04/13/2020    AST 18 04/13/2020    ALT 20 04/13/2020     DM:  Lab Results   Component Value Date    LABA1C 5.4 04/14/2020     Lipids:         Lab Results   Component Value Date    TRIG 159 (H) 02/03/2020    TRIG 236 (H) 08/01/2019    TRIG 167 03/09/2019            Lab Results   Component Value Date    HDL 27 (L) 02/03/2020    HDL 26 (L) 08/01/2019    HDL 28 (A) 03/09/2019            Lab Results   Component Value Date    LDLCALC 103 (H) Amio x 18 hrs  3. ASA 81mg poqday for life, ticagrelor 90mg po BID for 1 yr w/o interruption  4. BB, statin as tolerated                - ACE/ARB once able  5. Heart block resolved prior to case  6. Echo, ECg, cardiac rehab       VASCULAR/OTHER IMAGING:      Assessment and Plan   Eduin Roberts is a 62 y.o. male who presents today for the following problems:       1. CAD   - trop peaked 4.95              - 4/13/2020 S/p STEMI and PCI to mid LAD  2. Ischemic cardiomyopathy: LVEF 35%              - euvolemic and compensated  3. NSVT/VT: no issues  4. Fatigue     MD PLAN  1. feels much better on Entresto. Fatigue gone after changing from Lisinopril   - Sending Appeal as PA declined, Jayson Serrato working on  2. Appears euvolemic and compensated by hx  3. Increase coreg to 9.375mg po BID  4. Limited echo in Nov if able to stay on Entresto   - will  samples for now  5. Cont Coreg, ASA and plavix              - breathing much better off Ticagrelor  6. Lipids and limited echo nov/dec       Patient Active Problem List   Diagnosis    Asthma    Restless legs syndrome (RLS)    Low testosterone    ED (erectile dysfunction)    Chronic bilateral low back pain without sciatica    Mixed hyperlipidemia    Essential hypertension    Right carpal tunnel syndrome    Right lateral epicondylitis    Dupuytren's contracture    Severe obstructive sleep apnea    Tobacco abuse    Ischemic cardiomyopathy    STEMI (ST elevation myocardial infarction) ([facility] [address]    Obesity    Coronary artery disease involving native coronary artery of native heart without angina pectoris       Patient Plan:  1. Increase carvedilol to 1.5 tabs in the AM and 1.5 in the PM    2. Limited Echocardiogram to view size and strength of the heart in November   3. We will call you with the results   4. Follow up in 3 months with Nurse Practitioner       It is a pleasure to assist in the care of Eduin Roberts. Please call with any questions.     This note was scribed in the presence of Daisy Bhatti MD by Miah Pryor RN.      Dewayne Hutton MD, personally performed the services described in this documentation as scribed by the above signed scribe in my presence, and it is both accurate and complete to the best of our ability and knowledge. I agree with the details independently gathered by my clinical support staff, while the remaining scribed note accurately describes my personal service to the patient. The above RN is working as a scribe for and in the presence of myself . Working as a scribe, the RN may have prepopulated components of this note with my historical intellectual property under my direct supervision. Any additions to this intellectual property were performed at my direction. Furthermore, the content and accuracy of this note have been reviewed by me to the best of my ability. Dorothy Marroquin MD, [address] [facility] Cardiologist  [address]  [phone] [facility]  [phone] [address] [place] [address]    Electronically signed by Rehan Clemons RN on 8/20/20 at 10:24 AM EDT   An electronic signature was used to authenticate this note.

## 2020-08-21 ENCOUNTER — TELEPHONE (OUTPATIENT)
Dept: FAMILY MEDICINE CLINIC | Age: 55
End: 2020-08-21

## 2020-08-21 RX ORDER — LORAZEPAM 0.5 MG/1
0.5 TABLET ORAL EVERY 8 HOURS PRN
Qty: 42 TABLET | Refills: 0 | Status: SHIPPED | OUTPATIENT
Start: 2020-08-21 | End: 2020-09-04

## 2020-08-21 NOTE — TELEPHONE ENCOUNTER
Came in for visit today, lot of stress and anxiety, mom was put in hospice last night during rooming he got a call that his mom had passed, want to know if medication can be called in to help.

## 2020-08-24 ENCOUNTER — VIRTUAL VISIT (OUTPATIENT)
Dept: CARDIOLOGY CLINIC | Age: 55
End: 2020-08-24
Payer: COMMERCIAL

## 2020-08-24 ENCOUNTER — TELEPHONE (OUTPATIENT)
Dept: CARDIOLOGY CLINIC | Age: 55
End: 2020-08-24

## 2020-08-24 PROCEDURE — 99442 PR PHYS/QHP TELEPHONE EVALUATION 11-20 MIN: CPT | Performed by: INTERNAL MEDICINE

## 2020-08-24 RX ORDER — CARVEDILOL 6.25 MG/1
9.38 TABLET ORAL 2 TIMES DAILY WITH MEALS
Qty: 270 TABLET | Refills: 3 | Status: SHIPPED | OUTPATIENT
Start: 2020-08-24 | End: 2021-06-10

## 2020-08-24 NOTE — TELEPHONE ENCOUNTER
Alessia Amaro at [address] states that for physician reviewing approval for [facility] needs to know about symptoms the pt is experiencing before he will approve the med. This can be faxed to [phone].

## 2020-08-24 NOTE — LETTER
[address] [facility]  TELEPHONE VISIT  (During GYNLJ-06 public health emergency)  Cardiovascular Office Note        PATIENT: Radha Schneider  DATE: 2020  MRN: [id number]  CSN: [id number]  : 1965      Primary Care Doctor: Ken Hanna MD  Reason for evaluation:   No chief complaint on file. Subjective:   History of present illness on initial date of evaluation:   Radha Schneider is a 62 y.o. patient who presented for hospital up. PMH: HLD, HTN, SVT, asthma, GERD, depression. Patient presented to the ED on 2020 with complaints of chest pain. EMS was called to is home and pre-hospital EKG showed STEMI. Patient had emergent LHC with a successful PCI to mid LAD, stable V tach during case that broke on it's own. Last OV 20 he reported feeling much better. He stated he does still have SOB, but mainly at night. Last OV 20 he reported feeling much better. He denied any SOB or chest pain. He was taking his medication as prescribed. He cut back to almost no caffeine. Today he is participating in a telephone visit. He states he is trying a new C-pap and is having some anxiety due to that. He states he has been struggling to wear it. He states he has a follow up appointment with his pulmonary. He states he is checking his BP at home and systolic is running 713'G to maybe 140. He states his HR runs in the 80's.      Patient Active Problem List   Diagnosis    Asthma    Restless legs syndrome (RLS)    Low testosterone    ED (erectile dysfunction)    Chronic bilateral low back pain without sciatica    Mixed hyperlipidemia    Essential hypertension    Right carpal tunnel syndrome    Right lateral epicondylitis    Dupuytren's contracture    Severe obstructive sleep apnea    Tobacco abuse    Ischemic cardiomyopathy    STEMI (ST elevation myocardial infarction) ([facility] [address]    Obesity  Coronary artery disease involving native coronary artery of native heart without angina pectoris         Past Medical History:   has a past medical history of Asthma, Back problem, Cholelithiasis without obstruction, Depression, ED (erectile dysfunction), Gallbladder & bile duct stone, GERD (gastroesophageal reflux disease), Hernia, Hyperlipidemia, Hypertension, Low testosterone, MI (myocardial infarction) ([facility] [address] Seasonal allergies, Sleep apnea, SVT (supraventricular tachycardia) ([facility] [address] and Tendonitis. Surgical History:   has a past surgical history that includes back surgery (2001); shoulder surgery; Sternum fracture surgery; hip surgery; hernia repair (2012); Cholecystectomy (2012); Colonoscopy (2010); other surgical history; Lumbar spine surgery (2004); and Carpal tunnel release (Right, 2/11/2019). Social History:   reports that he quit smoking about 4 months ago. His smoking use included cigars. He has a 5.00 pack-year smoking history. He has never used smokeless tobacco. He reports current alcohol use. He reports that he does not use drugs. Family History:  No evidence for sudden cardiac death or premature CAD    Home Medications:  Reviewed and are listed in nursing record.  and/or listed below  Current Outpatient Medications   Medication Sig Dispense Refill    carvedilol (COREG) 6.25 MG tablet TAKE 1 TABLET BY MOUTH TWICE A DAY WITH MEALS 180 tablet 3    buPROPion (WELLBUTRIN SR) 150 MG extended release tablet Take 1 tablet by mouth 2 times daily 180 tablet 1    sacubitril-valsartan (ENTRESTO) 24-26 MG per tablet Take 1 tablet by mouth 2 times daily 60 tablet 3    testosterone cypionate (DEPOTESTOTERONE CYPIONATE) 200 MG/ML injection INJECT 1 ML INTO THE MUSCLE EVERY 14 DAYS 6 mL 0    atorvastatin (LIPITOR) 40 MG tablet Take 1 tablet by mouth nightly 90 tablet 3    clopidogrel (PLAVIX) 75 MG tablet Take 1 tablet by mouth daily 30 tablet 5    KRILL OIL PO Take by mouth With omega 3  Multiple Vitamin (MULTI-VITAMIN DAILY PO) Take by mouth      aspirin 81 MG chewable tablet Take 1 tablet by mouth daily 30 tablet 0    sodium chloride 0.9 % SOLN 100 mL with morphine (PF) 10 MG/ML SOLN 100 mg Infuse intravenously continuous Along with dilaudid      oxyCODONE HCl (OXY-IR) 10 MG immediate release tablet TAKE 1 TABLET BY MOUTH EVERY 6 HOURS  0     No current facility-administered medications for this visit. Allergies:  Vioxx and Phenergan [promethazine hcl]     Review of Systems:   A 14 point review of symptoms completed. Pertinent positives identified in the HPI, all other review of symptoms negative as below.     Objective:   PHYSICAL EXAM:      Vitals based on last recorded  BP      Temp      Pulse     Resp      SpO2         Wt Readings from Last 3 Encounters:   06/22/20 222 lb (100.7 kg)   04/21/20 222 lb (100.7 kg)   04/14/20 216 lb 11.4 oz (98.3 kg)         Telephone visit 17 min      LABS   CBC:      Lab Results   Component Value Date    WBC 13.2 04/15/2020    RBC 5.74 04/15/2020    HGB 17.3 04/15/2020    HCT 51.2 04/15/2020    MCV 89.2 04/15/2020    RDW 14.1 04/15/2020     04/15/2020     CMP:  Lab Results   Component Value Date     04/15/2020    K 4.3 04/15/2020    K 4.6 04/14/2020     04/15/2020    CO2 25 04/15/2020    BUN 13 04/15/2020    CREATININE 0.8 04/15/2020    GFRAA >60 04/15/2020    GFRAA >60 01/31/2013    AGRATIO 1.5 04/13/2020    LABGLOM >60 04/15/2020    GLUCOSE 105 04/15/2020    PROT 7.4 04/13/2020    PROT 6.7 02/28/2012    CALCIUM 9.5 04/15/2020    BILITOT 0.5 04/13/2020    ALKPHOS 73 04/13/2020    AST 18 04/13/2020    ALT 20 04/13/2020     DM:  Lab Results   Component Value Date    LABA1C 5.4 04/14/2020     Lipids:         Lab Results   Component Value Date    TRIG 159 (H) 02/03/2020    TRIG 236 (H) 08/01/2019    TRIG 167 03/09/2019            Lab Results   Component Value Date    HDL 27 (L) 02/03/2020    HDL 26 (L) 08/01/2019 HDL 28 (A) 03/09/2019            Lab Results   Component Value Date    LDLCALC 103 (H) 02/03/2020    [address] 08/01/2019    LDLCALC 134 03/09/2019            Lab Results   Component Value Date    LABVLDL 32 02/03/2020    LABVLDL 47 08/01/2019    LABVLDL 38 08/30/2018           CARDIAC DATA   EKG:    ECHO 6/11/2020   Summary   Left ventricular systolic function is mildly reduced with ejection fraction   estimated at 40 %. There is dyskinesis of the apical septal and apical inferior wall segments. There is akinesis of the apical anterior wall segments. All remaining wall segments appear normal in function. Left ventricle size is normal.   Normal left ventricular wall thickness. Normal left ventricular diastolic filling pressure. No evidence of mitral valve stenosis. Mild mitral regurgitation. Mild tricuspid regurgitation. Systolic pulmonary artery pressure (SPAP) estimated at 43 mmHg (RA pressure   3 mmHg), consistent with mild pulmonary hypertension. [address] [place] [address] Street: 4/13/2020   Summary   The left ventricular systolic function is moderately reduced with an   ejection fraction of 35 %. There is akinesis of the mid and apical anterior   wall and apex. There is mild concentric left ventricular hypertrophy. Grade I diastolic dysfunction with normal left ventricular filling pressure. Systolic pulmonary artery pressure (SPAP) is normal estimated at 31 mmHg   (Right atrial pressure of 3 mmHg). STRESS TEST:    CARDIAC CATH:  CARDIAC CATH: 4/13/2020  LEFT HEART CATH  LM: luminals  LAD: 100% mid just after diag  LCX: luminals, 20% mid  RCA: dominant, large aneurysmal, mild diffuse disease with focal stenosis     LVEDP: 1   LVEF:45% with mid , apical anterior and apex Hypokinesis  No AS gradient  LESION1; PCI to mid LAD  STENT 3.5 x 18mm post dilated to      Assessment  1.  Successful PCI to mid LAD using 1 drug stent                100%-->0%, ALEXSANDRA-3 flow 2. Stable V-tach during case that broke on its own, followed by Inferior ST elevations. ? Spasm as RCA was clear                - Amio x 18 hrs  3. ASA 81mg poqday for life, ticagrelor 90mg po BID for 1 yr w/o interruption  4. BB, statin as tolerated                - ACE/ARB once able  5. Heart block resolved prior to case  6. Echo, ECg, cardiac rehab       VASCULAR/OTHER IMAGING:      Assessment and Plan   Uma Dinero is a 62 y.o. male who presents today for the following problems:       1. CAD   - trop peaked 4.95              - 4/13/2020 S/p STEMI and PCI to mid LAD  2. Ischemic cardiomyopathy: LVEF 35%              - euvolemic and compensated  3. NSVT/VT: no issues  4. Fatigue     MD PLAN  1. feels much better on Entresto. Fatigue gone after changing from Lisinopril   - Sending Appeal as PA declined, Carmen Gibson working on  2. Appears euvolemic and compensated by hx  3. Increase coreg to 9.375mg po BID  4. Limited echo in Nov if able to stay on Entresto   - will  samples for now  5. Cont Coreg, ASA and plavix              - breathing much better off Ticagrelor  6. Lipids and limited echo nov/dec       Patient Active Problem List   Diagnosis    Asthma    Restless legs syndrome (RLS)    Low testosterone    ED (erectile dysfunction)    Chronic bilateral low back pain without sciatica    Mixed hyperlipidemia    Essential hypertension    Right carpal tunnel syndrome    Right lateral epicondylitis    Dupuytren's contracture    Severe obstructive sleep apnea    Tobacco abuse    Ischemic cardiomyopathy    STEMI (ST elevation myocardial infarction) ([facility] [address]    Obesity    Coronary artery disease involving native coronary artery of native heart without angina pectoris       Patient Plan:  1. Increase carvedilol to 1.5 tabs in the AM and 1.5 in the PM    2. Limited Echocardiogram to view size and strength of the heart in November   3.  We will call you with the results 4. Follow up in 3 months with Nurse Practitioner       It is a pleasure to assist in the care of Santo Echevarria. Please call with any questions. This note was scribed in the presence of Ki Aquino MD by Tiffanie Tsai RN.      Sommer Gordon MD, personally performed the services described in this documentation as scribed by the above signed scribe in my presence, and it is both accurate and complete to the best of our ability and knowledge. I agree with the details independently gathered by my clinical support staff, while the remaining scribed note accurately describes my personal service to the patient. The above RN is working as a scribe for and in the presence of myself . Working as a scribe, the RN may have prepopulated components of this note with my historical intellectual property under my direct supervision. Any additions to this intellectual property were performed at my direction. Furthermore, the content and accuracy of this note have been reviewed by me to the best of my ability. Gisela Palomo MD, [address] [employer] [facility] Cardiologist  [facility]  [phone] [facility]  [phone] [address] [place] [address]    Electronically signed by Fausto Elam RN on 8/20/20 at 10:24 AM EDT   An electronic signature was used to authenticate this note. [address] [place] [address] Cardiology - [address]  Phone: [phone]  Fax: [phone]    Bryce Oleary MD        August 25, 2020     Josef Miller, [address] [address]    Patient: Santo Echevarria  MR Number: [id number]  YOB: 1965  Date of Visit: 8/24/2020    Dear Dr. Josef Miller:    Thank you for the request for consultation for [facility]. Below are the relevant portions of my assessment and plan of care. If you have questions, please do not hesitate to call me. I look forward to following Ilenecinthya Erendira along with you.     Sincerely,        Bryce Oleary MD

## 2020-08-24 NOTE — PATIENT INSTRUCTIONS
Patient Plan:  1. Increase carvedilol to 1.5 tabs in the AM and 1.5 in the PM    2. Limited Echocardiogram to view size and strength of the heart in November   3. We will call you with the results   4. Follow up in 3 months with Nurse Practitioner     Your provider has ordered testing for further evaluation. An order/prescription has been included in your paper work.  To schedule outpatient testing, contact Central Scheduling by calling [address] [place] [address] ([phone]).

## 2020-08-26 RX ORDER — CLOPIDOGREL BISULFATE 75 MG/1
TABLET ORAL
Qty: 90 TABLET | Refills: 3 | Status: SHIPPED | OUTPATIENT
Start: 2020-08-26 | End: 2021-07-23 | Stop reason: ALTCHOICE

## 2020-08-31 ENCOUNTER — VIRTUAL VISIT (OUTPATIENT)
Dept: PULMONOLOGY | Age: 55
End: 2020-08-31
Payer: COMMERCIAL

## 2020-08-31 PROCEDURE — 99213 OFFICE O/P EST LOW 20 MIN: CPT | Performed by: NURSE PRACTITIONER

## 2020-08-31 ASSESSMENT — SLEEP AND FATIGUE QUESTIONNAIRES
HOW LIKELY ARE YOU TO NOD OFF OR FALL ASLEEP WHILE SITTING QUIETLY AFTER LUNCH WITHOUT ALCOHOL: 1
HOW LIKELY ARE YOU TO NOD OFF OR FALL ASLEEP WHILE SITTING AND READING: 1
HOW LIKELY ARE YOU TO NOD OFF OR FALL ASLEEP IN A CAR, WHILE STOPPED FOR A FEW MINUTES IN TRAFFIC: 0
HOW LIKELY ARE YOU TO NOD OFF OR FALL ASLEEP WHILE LYING DOWN TO REST IN THE AFTERNOON WHEN CIRCUMSTANCES PERMIT: 1
HOW LIKELY ARE YOU TO NOD OFF OR FALL ASLEEP WHEN YOU ARE A PASSENGER IN A CAR FOR AN HOUR WITHOUT A BREAK: 1
ESS TOTAL SCORE: 6
HOW LIKELY ARE YOU TO NOD OFF OR FALL ASLEEP WHILE SITTING AND TALKING TO SOMEONE: 0
HOW LIKELY ARE YOU TO NOD OFF OR FALL ASLEEP WHILE SITTING INACTIVE IN A PUBLIC PLACE: 1
HOW LIKELY ARE YOU TO NOD OFF OR FALL ASLEEP WHILE WATCHING TV: 1

## 2020-08-31 NOTE — PATIENT INSTRUCTIONS
Here are some tips to to getting better sleep  1- Avoid napping during the day: This will ensure you are tired at bedtime. If you have to take a nap, sleep less than one hour, before 3 pm.   2- Exercise regularly, but not right before bed: but the timing of the workout is important. Exercising in the morning or early afternoon will not interfere with sleep. Exercising within two hours before bedtime can decrease your ability to fall asleep. Regular exercise is recommended to help you deepen the sleep. 3- Avoid heavy, spicy, or sugary foods 4-6 hours before bedtime: These can affect your ability to stay asleep. 4- Have a light snack before bed: Having an empty stomach can interfere with your sleep. Dairy products and turkey contain tryptophan, which acts as a natural sleep inducer. 5- Stay away from caffeine, nicotine and alcohol at least 4-6 hours before bed: Caffeine and nicotine are stimulants that interfere with your ability to fall asleep. While alcohol has an immediate sleep-inducing effect, a few hours later, as alcohol levels in your blood start to fall, there is a stimulant effect and you will experience fragmented sleep. 6- Take a hot bath 90 minutes before bedtime:  A hot bath will raise your body temperature, but it is the drop in body temperature that may leave you feeling sleepy  7- Develop sleep rituals: it is important to give your body cues that it is time to slow down and sleep. Listen to relaxing music, read something soothing for 15 minutes, have a cup of caffeine free tea, or do relaxation exercises such as yoga or deep breathing help relieve anxiety and reduce muscle tension. 8- Fix a bedtime and an awakening time: Even on weekends! When your sleep cycle has a regular rhythm, you will feel better. 9- Sleep only when sleepy: This reduces the time you are awake in bed.    10- Get into your favorite sleeping position: If you can't fall asleep within 15-30 minutes, get up and do something boring until you feel sleepy. Sit quietly in the dark or read the warranty on your refrigerator. Don't expose yourself to bright light while you are up, it gives cues to your brain that it is time to wake up. 11- Only use your bed for sleeping: Dont use the bed as an office, workroom or recreation room. Let your body \"know\" that the bed is associated with sleeping  12- Use comfortable bedding. Uncomfortable bedding can prevent good sleep. Evaluate whether or not this is a source of your problem, and make appropriate changes. 13- Make sure your bed and bedroom are quiet and comfortable: A hot room can be uncomfortable. A cooler room, along with enough blankets to stay warm is recommended. Get a blackout shade or wear a slumber mask and wear earplugs or get a \"white noise\" machine for light and noise distractions. 14- Use sunlight to set your biological clock: When you get up in the morning, go outside and turn your face to the sun for 15 minutes. 13- Dont take your worries to bed: Leave worries about job, school, daily life, etc., behind when you go to bed. Some people find it useful to assign a \"worry period\" during the evening or afternoon for these issues.     CPAP Equipment Cleaning and Disinfecting Schedule  Equipment Cleaning Frequency Instructions  Disinfecting Frequency   Non-Disposable Filters  Weekly Mild soapy water, Rinse, Air Dry Not Required   Disposable Filters Change as needed  2-4 weeks Do Not Wash Not Required   Hose/tubing Daily Mild soapy water, Rinse, Air Dry Once a week   Mask / Nasal Pillows Daily Mild soapy water, Rinse, Air Dry Once a week   Headgear Weekly Hand wash, Mild soapy water, Rinse, Dry  Not Required   Humidifier Daily Empty water daily  Mild soapy water, Rinse well, Air Dry  Once a week   CPAP Unit As Needed Dust with damp cloth,  No detergents or sprays Not Required         Disinfect (per schedule) with 1 part white vinegar and 3 parts water- soak mask and water chamber for 30 minutes every 1-2 weeks, more often if sick. Allow water/vinegar mixture to run through tubing. Allow all equipment to air dry. Drying Hints:   Always hang tubing away from direct sunlight, as this will cause the tubing to become yellow, brittle and crack over a period of time. DO NOT attach the wet tubing to your CPAP unit to blow-dry it. The moisture from the tubing can drain back into your machine. Moisture in your unit can cause sudden pressure increases or short circuits  DO's and DON'Ts:  - Don't use alcohol-based products to clean your mask, because it can cause the materials to become hard and brittle. - Don't put headgear in the washer or dryer  - Don't use any caustic or household cleaning solutions such as bleach on your CPAP   equipment.  - Do follow the recommended cleaning schedule. - Do change your disposable filter frequently. Adapted From: [email or web address]. shtm.   These are general suggestions for all models please follow specific s recommendations and specific instructions
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
## 2020-09-14 ENCOUNTER — HOSPITAL ENCOUNTER (OUTPATIENT)
Dept: NON INVASIVE DIAGNOSTICS | Age: 55
Discharge: HOME | End: 2020-09-14
Payer: COMMERCIAL

## 2020-09-14 PROCEDURE — 93308 TTE F-UP OR LMTD: CPT

## 2020-09-14 RX ORDER — TESTOSTERONE CYPIONATE 200 MG/ML
INJECTION INTRAMUSCULAR
Qty: 6 ML | Refills: 2 | Status: SHIPPED | OUTPATIENT
Start: 2020-09-14 | End: 2021-05-18 | Stop reason: SDUPTHER

## 2020-09-14 NOTE — TELEPHONE ENCOUNTER
Date of last refill of this med was 06/02/2020, # of ml given 6 and # of refills given 0. Their next appointment is not schedued, the last date patient was seen was 04/23/2020. Does patient have medication agreement on file? Yes  Has drug screen been done in last 12 months if needed?  no

## 2020-09-15 ENCOUNTER — TELEPHONE (OUTPATIENT)
Dept: CARDIOLOGY CLINIC | Age: 55
End: 2020-09-15

## 2020-10-09 ENCOUNTER — VIRTUAL VISIT (OUTPATIENT)
Dept: PULMONOLOGY | Age: 55
End: 2020-10-09
Payer: COMMERCIAL

## 2020-10-09 PROCEDURE — 99213 OFFICE O/P EST LOW 20 MIN: CPT | Performed by: NURSE PRACTITIONER

## 2020-10-09 ASSESSMENT — SLEEP AND FATIGUE QUESTIONNAIRES
ESS TOTAL SCORE: 10
HOW LIKELY ARE YOU TO NOD OFF OR FALL ASLEEP WHILE LYING DOWN TO REST IN THE AFTERNOON WHEN CIRCUMSTANCES PERMIT: 3
HOW LIKELY ARE YOU TO NOD OFF OR FALL ASLEEP IN A CAR, WHILE STOPPED FOR A FEW MINUTES IN TRAFFIC: 0
HOW LIKELY ARE YOU TO NOD OFF OR FALL ASLEEP WHILE WATCHING TV: 3
HOW LIKELY ARE YOU TO NOD OFF OR FALL ASLEEP WHEN YOU ARE A PASSENGER IN A CAR FOR AN HOUR WITHOUT A BREAK: 1
HOW LIKELY ARE YOU TO NOD OFF OR FALL ASLEEP WHILE SITTING INACTIVE IN A PUBLIC PLACE: 0
HOW LIKELY ARE YOU TO NOD OFF OR FALL ASLEEP WHILE SITTING AND TALKING TO SOMEONE: 0
HOW LIKELY ARE YOU TO NOD OFF OR FALL ASLEEP WHILE SITTING QUIETLY AFTER LUNCH WITHOUT ALCOHOL: 0
HOW LIKELY ARE YOU TO NOD OFF OR FALL ASLEEP WHILE SITTING AND READING: 3
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
## 2020-10-20 ENCOUNTER — OFFICE VISIT (OUTPATIENT)
Dept: FAMILY MEDICINE CLINIC | Age: 55
End: 2020-10-20
Payer: COMMERCIAL

## 2020-10-20 ENCOUNTER — VIRTUAL VISIT (OUTPATIENT)
Dept: PULMONOLOGY | Age: 55
End: 2020-10-20
Payer: COMMERCIAL

## 2020-10-20 ENCOUNTER — TELEPHONE (OUTPATIENT)
Dept: PULMONOLOGY | Age: 55
End: 2020-10-20

## 2020-10-20 VITALS
DIASTOLIC BLOOD PRESSURE: 83 MMHG | WEIGHT: 223 LBS | BODY MASS INDEX: 27.14 KG/M2 | HEART RATE: 81 BPM | TEMPERATURE: 100.1 F | OXYGEN SATURATION: 96 % | SYSTOLIC BLOOD PRESSURE: 126 MMHG

## 2020-10-20 PROCEDURE — 90471 IMMUNIZATION ADMIN: CPT | Performed by: FAMILY MEDICINE

## 2020-10-20 PROCEDURE — 99213 OFFICE O/P EST LOW 20 MIN: CPT | Performed by: FAMILY MEDICINE

## 2020-10-20 PROCEDURE — 99442 PR PHYS/QHP TELEPHONE EVALUATION 11-20 MIN: CPT | Performed by: NURSE PRACTITIONER

## 2020-10-20 PROCEDURE — 90688 IIV4 VACCINE SPLT 0.5 ML IM: CPT | Performed by: FAMILY MEDICINE

## 2020-10-20 RX ORDER — ESCITALOPRAM OXALATE 10 MG/1
10 TABLET ORAL DAILY
Qty: 30 TABLET | Refills: 5 | Status: SHIPPED | OUTPATIENT
Start: 2020-10-20 | End: 2021-02-11

## 2020-10-20 ASSESSMENT — SLEEP AND FATIGUE QUESTIONNAIRES
HOW LIKELY ARE YOU TO NOD OFF OR FALL ASLEEP IN A CAR, WHILE STOPPED FOR A FEW MINUTES IN TRAFFIC: 0
ESS TOTAL SCORE: 4
HOW LIKELY ARE YOU TO NOD OFF OR FALL ASLEEP WHILE LYING DOWN TO REST IN THE AFTERNOON WHEN CIRCUMSTANCES PERMIT: 2
HOW LIKELY ARE YOU TO NOD OFF OR FALL ASLEEP WHILE SITTING QUIETLY AFTER LUNCH WITHOUT ALCOHOL: 0
HOW LIKELY ARE YOU TO NOD OFF OR FALL ASLEEP WHILE SITTING AND READING: 0
HOW LIKELY ARE YOU TO NOD OFF OR FALL ASLEEP WHEN YOU ARE A PASSENGER IN A CAR FOR AN HOUR WITHOUT A BREAK: 0
HOW LIKELY ARE YOU TO NOD OFF OR FALL ASLEEP WHILE SITTING AND TALKING TO SOMEONE: 0
HOW LIKELY ARE YOU TO NOD OFF OR FALL ASLEEP WHILE WATCHING TV: 2
HOW LIKELY ARE YOU TO NOD OFF OR FALL ASLEEP WHILE SITTING INACTIVE IN A PUBLIC PLACE: 0

## 2020-10-20 NOTE — PROGRESS NOTES
Subjective:   He presents for was with anxiety and occasional panic attack the last 6 months. His mom passed away recently and he was grieving from that he was given Ativan but he states it really did not help anxiety he takes a Benadryl at bedtime. He denies feeling depressed. He was on Celexa and Cymbalta in the past for depression and chronic pain and the last medicine he was on was Wellbutrin but it did not help anything. He states it is more anxiety than anything he worries about things and gets anxious        He is allergic to vioxx and phenergan [promethazine hcl]. Objective:   /83   Pulse 81   Temp 100.1 °F (37.8 °C) (Oral)   Wt 223 lb (101.2 kg)   SpO2 96%   BMI 27.14 kg/m²   No results found for this visit on 10/20/20. Exam: Constitutional:  Well developed, well nourished, no acute distress, non-toxic appearance    HENT:  Atraumatic,oropharynx moist,  Neck-  no tenderness, supple   Respiratory:  No respiratory distress, normal breath sounds, no rales, no wheezing   Cardiovascular:  Normal rate, normal rhythm, no murmurs, no gallops, no rubs   GI:  Soft, nondistended, nontender, no organomegaly, no mass, no rebound, no guarding   Musculoskeletal:  No edema  Integument:  Well hydrated, no rash   Neurologic:  Alert & oriented x 3, no focal deficits noted   Psychiatric:  Speech and behavior appropriate, anxious affect and mood  Assessment and Plan:   Diagnosis Orders   1. LILY (generalized anxiety disorder)  escitalopram (LEXAPRO) 10 MG tablet   2. Need for influenza vaccination  INFLUENZA, QUADV, 3 YRS AND OLDER, IM, MDV, 0.5ML (AFLURIA QUADV)     FU in 3 to 4 weeks. Call or return to office prn if these symptoms worsen or fail to improve as anticipated. Avoid tobacco products exposure. The Healthy Family Handout was given to the patient today.   Brent Downs M.D.

## 2020-10-20 NOTE — PROGRESS NOTES
Vaccine Information Sheet, \"Influenza - Inactivated\"  given to Gabino Montejo, or parent/legal guardian of  Gabino Montejo and verbalized understanding. Patient responses:    Have you ever had a reaction to a flu vaccine? No  Do you have any current illness? No  Have you ever had Guillian [place] Syndrome? No  Do you have a serious allergy to any of the follow: Neomycin, Polymyxin, Thimerosal, eggs or egg products? No    Flu vaccine given per order. Please see immunization tab. Risks and benefits explained. Current VIS given.

## 2020-10-20 NOTE — PATIENT INSTRUCTIONS
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
## 2020-10-20 NOTE — TELEPHONE ENCOUNTER
Patient called with message left for patient to call back to office. Sleep center will be calling the patient to schedule Bipap titration.  Pt needs to be scheduled for a 31-90(beginning on 31-90 period)

## 2020-10-22 NOTE — TELEPHONE ENCOUNTER
Noted. Patient did not complete recommended testing or keep follow up appointment as was recommended. Please schedule a follow up visit for this patient when he calls requesting such appointment.

## 2020-10-22 NOTE — TELEPHONE ENCOUNTER
Spoke to Deonte Perezing at the sleep center and she said that they have left the patient a message to schedule titration. Spoke to patient and he said that he wants to hold off on this. Pt said that his doctor is going to put him on some medicine that will take 3 weeks to see if it will work. Pt would not say what medication, stated that it was between him and his doctor.

## 2020-12-17 RX ORDER — SACUBITRIL AND VALSARTAN 24; 26 MG/1; MG/1
TABLET, FILM COATED ORAL
Qty: 60 TABLET | Refills: 3 | Status: SHIPPED | OUTPATIENT
Start: 2020-12-17 | End: 2021-04-23

## 2020-12-21 ENCOUNTER — TELEPHONE (OUTPATIENT)
Dept: FAMILY MEDICINE CLINIC | Age: 55
End: 2020-12-21

## 2020-12-21 NOTE — TELEPHONE ENCOUNTER
----- Message from Briaalyssa Madden sent at 12/21/2020 12:03 PM EST -----  Subject: Message to Provider    QUESTIONS  Information for Provider? Patient is having some tooth pain and their   dental insurance doesn't start until after 1/1/21 so they/re requesting an   antibiotic to help for now   ---------------------------------------------------------------------------  --------------  CALL BACK INFO  What is the best way for the office to contact you? OK to leave message on   voicemail  Preferred Call Back Phone Number? [phone]  ---------------------------------------------------------------------------  --------------  SCRIPT ANSWERS  Relationship to Patient?  Self

## 2020-12-22 RX ORDER — PENICILLIN V POTASSIUM 500 MG/1
500 TABLET ORAL 3 TIMES DAILY
Qty: 30 TABLET | Refills: 0 | Status: SHIPPED | OUTPATIENT
Start: 2020-12-22 | End: 2021-01-01

## 2021-01-12 ENCOUNTER — OFFICE VISIT (OUTPATIENT)
Dept: FAMILY MEDICINE CLINIC | Age: 56
End: 2021-01-12
Payer: COMMERCIAL

## 2021-01-12 VITALS
TEMPERATURE: 98.3 F | DIASTOLIC BLOOD PRESSURE: 84 MMHG | WEIGHT: 244.8 LBS | SYSTOLIC BLOOD PRESSURE: 123 MMHG | OXYGEN SATURATION: 96 % | HEART RATE: 79 BPM | BODY MASS INDEX: 29.8 KG/M2

## 2021-01-12 DIAGNOSIS — I88.9 LYMPHADENITIS: Primary | ICD-10-CM

## 2021-01-12 PROCEDURE — 99213 OFFICE O/P EST LOW 20 MIN: CPT | Performed by: NURSE PRACTITIONER

## 2021-01-12 RX ORDER — AMOXICILLIN AND CLAVULANATE POTASSIUM 875; 125 MG/1; MG/1
1 TABLET, FILM COATED ORAL 2 TIMES DAILY
Qty: 20 TABLET | Refills: 0 | Status: SHIPPED | OUTPATIENT
Start: 2021-01-12 | End: 2021-01-22

## 2021-01-12 ASSESSMENT — ENCOUNTER SYMPTOMS
GASTROINTESTINAL NEGATIVE: 1
EYES NEGATIVE: 1
RESPIRATORY NEGATIVE: 1

## 2021-01-12 NOTE — PROGRESS NOTES
Subjective:      Patient ID: Neila Holstein is a 60 y.o. male. HPI    Chief Complaint   Patient presents with    Other     swollen jaw     Patient presents today with c/o swollen lymph nodes under right side of chin. Patient reports he had an abscess tooth 2-3 weeks ago. Dentist extracted tooth and took 10 day course of PCN. Patient reports symptoms have improved however has not completely resolved. Patient denies dental pain, fever or chills. Review of Systems   Constitutional: Negative for appetite change, chills and fever. HENT: Negative. Eyes: Negative. Respiratory: Negative. Cardiovascular: Negative. Gastrointestinal: Negative. Genitourinary: Negative. Hematological: Positive for adenopathy. Psychiatric/Behavioral: Negative.         Patient Active Problem List   Diagnosis    Asthma    Restless legs syndrome (RLS)    Low testosterone    ED (erectile dysfunction)    Chronic bilateral low back pain without sciatica    Mixed hyperlipidemia    Essential hypertension    Right carpal tunnel syndrome    Right lateral epicondylitis    Dupuytren's contracture    Severe obstructive sleep apnea    Tobacco abuse    Ischemic cardiomyopathy    [facility]I ([facility] elevation myocardial infarction) [facility] [address]    Obesity    Coronary artery disease involving native coronary artery of native heart without angina pectoris       Outpatient Medications Marked as Taking for the 1/12/21 encounter (Office Visit) with ERICA Kumar CNP   Medication Sig Dispense Refill    ENTRESTO 24-26 MG per tablet TAKE 1 TABLET BY MOUTH TWICE A DAY 60 tablet 3    escitalopram (LEXAPRO) 10 MG tablet Take 1 tablet by mouth daily 30 tablet 5    clopidogrel (PLAVIX) 75 MG tablet TAKE 1 TABLET BY MOUTH EVERY DAY 90 tablet 3    carvedilol (COREG) 6.25 MG tablet Take 1.5 tablets by mouth 2 times daily (with meals) 270 tablet 3    atorvastatin (LIPITOR) 40 MG tablet Take 1 tablet by mouth nightly 90 tablet 3  KRILL OIL PO Take by mouth With omega 3      Multiple Vitamin (MULTI-VITAMIN DAILY PO) Take by mouth      aspirin 81 MG chewable tablet Take 1 tablet by mouth daily 30 tablet 0    sodium chloride 0.9 % SOLN 100 mL with morphine (PF) 10 MG/ML SOLN 100 mg Infuse intravenously continuous Along with dilaudid      oxyCODONE HCl (OXY-IR) 10 MG immediate release tablet TAKE 1 TABLET BY MOUTH EVERY 6 HOURS  0       Allergies   Allergen Reactions    Vioxx Other (See Comments)     Oral ulcers      Phenergan [Promethazine Hcl] Nausea And Vomiting     Was given after surgery and induced nausea and vomiting. Social History     Tobacco Use    Smoking status: Former Smoker     Packs/day: 0.25     Years: 20.00     Pack years: 5.00     Types: Cigars     Quit date: 2020     Years since quittin.7    Smokeless tobacco: Never Used   Substance Use Topics    Alcohol use: Yes     Comment: occassional       Objective:   /84   Pulse 79   Temp 98.3 °F (36.8 °C) (Oral)   Wt 244 lb 12.8 oz (111 kg)   SpO2 96%   BMI 29.80 kg/m²     Physical Exam  Vitals signs and nursing note reviewed. Constitutional:       General: He is not in acute distress. Appearance: Normal appearance. He is well-developed. He is not ill-appearing or toxic-appearing. HENT:      Head: Normocephalic and atraumatic. Eyes:      Extraocular Movements: Extraocular movements intact. Conjunctiva/sclera: Conjunctivae normal.   Neck:      Musculoskeletal: Neck supple. Cardiovascular:      Rate and Rhythm: Normal rate and regular rhythm. Heart sounds: Normal heart sounds. Pulmonary:      Effort: Pulmonary effort is normal.      Breath sounds: Normal breath sounds. Abdominal:      General: Bowel sounds are normal.      Palpations: Abdomen is soft. Lymphadenopathy:      Head:      Right side of head: Submandibular adenopathy present. Left side of head: No submandibular adenopathy.    Skin: General: Skin is warm and dry. Findings: No rash. Neurological:      Mental Status: He is alert and oriented to person, place, and time. Psychiatric:         Behavior: Behavior is cooperative. Assessment/Plan:   1. Lymphadenitis  Presents today with enlarged right submandibular node. Patient had abscessed tooth extracted approximately 2-3 weeks ago. Patient reports he did take a 10-day course of penicillin v.  Patient states symptoms have significantly improved however he continues to have swollen lymph nodes. On exam noted right enlarged submandibular node. Recommend treatment as below. Advised to follow-up if no better worsening of symptoms. Patient agreeable. - amoxicillin-clavulanate (AUGMENTIN) 875-125 MG per tablet; Take 1 tablet by mouth 2 times daily for 10 days  Dispense: 20 tablet;  Refill: 0

## 2021-01-12 NOTE — PATIENT INSTRUCTIONS
? Ask your doctor if you can take an over-the-counter pain medicine, such as acetaminophen (Tylenol), ibuprofen (Advil, Motrin), or naproxen (Aleve). Read and follow all instructions on the label. · If you have pain, try a warm compress. Soak a towel or washcloth in warm water. Wring it out, and place it on the affected skin. · Do not squeeze, drain, or puncture a painful lump. Doing this can irritate or inflame the lump, push any existing infection deeper into the skin, or cause severe bleeding. When should you call for help? Call your doctor now or seek immediate medical care if:    · Your lymph nodes get bigger.     · The area becomes red and feels more tender.     · You have a fever that does not go away. Watch closely for changes in your health, and be sure to contact your doctor if:    · You do not get better as expected. Where can you learn more? Go to https://[employer] ([employer]. org and sign in to your [employer] account. Enter S650 in the [employer]by [employer]) box to learn more about \"Lymphadenitis: Care Instructions. \"     If you do not have an account, please click on the \"Sign Up Now\" link. Current as of: February 11, 2020               Content Version: 12.6  © 7833-2810 [employer]. Care instructions adapted under license by [facility] ([facility]). If you have questions about a medical condition or this instruction, always ask your healthcare professional. [place] [address] any warranty or liability for your use of this information. Patient Education        amoxicillin and clavulanate potassium  Pronunciation:  am OK i DIRK in [address] ate altagracia TAS ee um  Brand:  Augmentin  What is the most important information I should know about amoxicillin and clavulanate potassium? Every effort has been made to ensure that the information provided by [address] is accurate, up-to-date, and complete, but no guarantee is made to that effect. Drug information contained herein may be time sensitive. [facility] information has been compiled for use by healthcare practitioners and consumers in the [place] and therefore [facility] does not warrant that uses outside of the [place] are appropriate, unless specifically indicated otherwise. [facility]'s drug information does not endorse drugs, diagnose patients or recommend therapy. [facility]'s drug information is an informational resource designed to assist licensed healthcare practitioners in caring for their patients and/or to serve consumers viewing this service as a supplement to, and not a substitute for, the expertise, skill, knowledge and judgment of healthcare practitioners. The absence of a warning for a given drug or drug combination in no way should be construed to indicate that the drug or drug combination is safe, effective or appropriate for any given patient. [facility] does not assume any responsibility for any aspect of healthcare administered with the aid of information [facility] provides. The information contained herein is not intended to cover all possible uses, directions, precautions, warnings, drug interactions, allergic reactions, or adverse effects. If you have questions about the drugs you are taking, check with your doctor, nurse or pharmacist.  Copyright 0082-1430 [address]: 12.01. Revision date: 2/24/2020. Care instructions adapted under license by [facility] ([facility]). If you have questions about a medical condition or this instruction, always ask your healthcare professional. [place] [address] any warranty or liability for your use of this information.

## 2021-02-11 DIAGNOSIS — F41.1 GAD (GENERALIZED ANXIETY DISORDER): ICD-10-CM

## 2021-02-11 RX ORDER — ESCITALOPRAM OXALATE 10 MG/1
TABLET ORAL
Qty: 90 TABLET | Refills: 1 | Status: SHIPPED | OUTPATIENT
Start: 2021-02-11 | End: 2021-12-06 | Stop reason: ALTCHOICE

## 2021-03-04 ENCOUNTER — OFFICE VISIT (OUTPATIENT)
Dept: FAMILY MEDICINE CLINIC | Age: 56
End: 2021-03-04
Payer: COMMERCIAL

## 2021-03-04 VITALS
BODY MASS INDEX: 28.97 KG/M2 | HEART RATE: 89 BPM | DIASTOLIC BLOOD PRESSURE: 88 MMHG | OXYGEN SATURATION: 96 % | TEMPERATURE: 98.7 F | WEIGHT: 238 LBS | SYSTOLIC BLOOD PRESSURE: 137 MMHG

## 2021-03-04 DIAGNOSIS — R06.02 SHORTNESS OF BREATH: ICD-10-CM

## 2021-03-04 DIAGNOSIS — R39.15 URINARY URGENCY: Primary | ICD-10-CM

## 2021-03-04 DIAGNOSIS — Z12.5 ENCOUNTER FOR SCREENING FOR MALIGNANT NEOPLASM OF PROSTATE: ICD-10-CM

## 2021-03-04 DIAGNOSIS — R31.9 HEMATURIA, UNSPECIFIED TYPE: ICD-10-CM

## 2021-03-04 LAB
BASOPHILS ABSOLUTE: 0 K/UL (ref 0–0.2)
BASOPHILS RELATIVE PERCENT: 0.5 %
BILIRUBIN, POC: ABNORMAL
BLOOD URINE, POC: ABNORMAL
CLARITY, POC: CLEAR
COLOR, POC: YELLOW
EOSINOPHILS ABSOLUTE: 0.1 K/UL (ref 0–0.6)
EOSINOPHILS RELATIVE PERCENT: 1.7 %
GLUCOSE URINE, POC: ABNORMAL
HCT VFR BLD CALC: 51.5 % (ref 40.5–52.5)
HEMOGLOBIN: 17.5 G/DL (ref 13.5–17.5)
KETONES, POC: ABNORMAL
LEUKOCYTE EST, POC: ABNORMAL
LYMPHOCYTES ABSOLUTE: 2 K/UL (ref 1–5.1)
LYMPHOCYTES RELATIVE PERCENT: 26.3 %
MCH RBC QN AUTO: 30.3 PG (ref 26–34)
MCHC RBC AUTO-ENTMCNC: 34 G/DL (ref 31–36)
MCV RBC AUTO: 89.1 FL (ref 80–100)
MONOCYTES ABSOLUTE: 0.7 K/UL (ref 0–1.3)
MONOCYTES RELATIVE PERCENT: 9.7 %
NEUTROPHILS ABSOLUTE: 4.7 K/UL (ref 1.7–7.7)
NEUTROPHILS RELATIVE PERCENT: 61.8 %
NITRITE, POC: ABNORMAL
PDW BLD-RTO: 13.5 % (ref 12.4–15.4)
PH, POC: 5.5
PLATELET # BLD: 178 K/UL (ref 135–450)
PMV BLD AUTO: 8.7 FL (ref 5–10.5)
PROTEIN, POC: 30
RBC # BLD: 5.77 M/UL (ref 4.2–5.9)
SPECIFIC GRAVITY, POC: 1.02
UROBILINOGEN, POC: 0.2
WBC # BLD: 7.6 K/UL (ref 4–11)

## 2021-03-04 PROCEDURE — G8482 FLU IMMUNIZE ORDER/ADMIN: HCPCS | Performed by: NURSE PRACTITIONER

## 2021-03-04 PROCEDURE — 81002 URINALYSIS NONAUTO W/O SCOPE: CPT | Performed by: NURSE PRACTITIONER

## 2021-03-04 PROCEDURE — 1036F TOBACCO NON-USER: CPT | Performed by: NURSE PRACTITIONER

## 2021-03-04 PROCEDURE — 3017F COLORECTAL CA SCREEN DOC REV: CPT | Performed by: NURSE PRACTITIONER

## 2021-03-04 PROCEDURE — G8427 DOCREV CUR MEDS BY ELIG CLIN: HCPCS | Performed by: NURSE PRACTITIONER

## 2021-03-04 PROCEDURE — G8419 CALC BMI OUT NRM PARAM NOF/U: HCPCS | Performed by: NURSE PRACTITIONER

## 2021-03-04 PROCEDURE — 36415 COLL VENOUS BLD VENIPUNCTURE: CPT | Performed by: NURSE PRACTITIONER

## 2021-03-04 PROCEDURE — 99213 OFFICE O/P EST LOW 20 MIN: CPT | Performed by: NURSE PRACTITIONER

## 2021-03-04 RX ORDER — NALOXEGOL OXALATE 12.5 MG/1
TABLET, FILM COATED ORAL
COMMUNITY
Start: 2021-02-17 | End: 2021-07-13

## 2021-03-04 ASSESSMENT — ENCOUNTER SYMPTOMS
ABDOMINAL PAIN: 0
VOMITING: 0
ABDOMINAL DISTENTION: 0
BACK PAIN: 1
SHORTNESS OF BREATH: 1
CHEST TIGHTNESS: 0
WHEEZING: 0
SORE THROAT: 0
COUGH: 0
NAUSEA: 0
DIARRHEA: 0
RHINORRHEA: 0

## 2021-03-04 NOTE — PATIENT INSTRUCTIONS
Please read the healthy family handout that you were given and share it with your family. Please compare this printed medication list with your medications at home to be sure they are the same. If you have any medications that are different please contact us immediately at [phone]. Also review your allergies that we have listed, these may also include medications that you have not been able to tolerate, make sure everything listed is correct. If you have any allergies that are different please contact us immediately at [phone]. Patient Education        Blood in the Urine: Care Instructions  Your Care Instructions     Blood in the urine, or hematuria, may make the urine look red, brown, or pink. There may be blood every time you urinate or just from time to time. You cannot always see blood in the urine, but it will show up in a urine test.  Blood in the urine may be serious. It should always be checked by a doctor. Your doctor may recommend more tests, including an X-ray, a CT scan, or a cystoscopy (which lets a doctor look inside the urethra and bladder). Blood in the urine can be a sign of another problem. Common causes are bladder infections and kidney stones. An injury to your groin or your genital area can also cause bleeding in the urinary tract. Very hard exercisesuch as running a marathoncan cause blood in the urine. Blood in the urine can also be a sign of kidney disease or cancer in the bladder or kidney. Many cases of blood in the urine are caused by a harmless condition that runs in families. This is called benign familial hematuria. It does not need any treatment. A prostate-specific antigen (PSA) test measures the amount of PSA in your blood. PSA is released by a man's prostate gland into his blood. A high PSA level may mean that you have an enlargement, infection, or cancer of the prostate. Why is this test done? You may have this test to:  · Check for prostate cancer. · Watch prostate cancer and see if treatment is working. How do you prepare for the test?  Do not ejaculate during the 2 days before your PSA blood test, either during sex or masturbation. Talk to your doctor about any concerns you have regarding the need for the test, its risks, how it will be done, or what the results will mean. How is the test done? A health professional uses a needle to take a blood sample, usually from the arm. What happens after the test?  · You will probably be able to go home right away. It depends on the reason for the test.  · You can go back to your usual activities right away. Follow-up care is a key part of your treatment and safety. Be sure to make and go to all appointments, and call your doctor if you are having problems. It's also a good idea to keep a list of the medicines you take. Ask your doctor when you can expect to have your test results. Where can you learn more? Go to https://[employer]. org and sign in to your [employer] account. Enter N955 in the [employer] box to learn more about \"Prostate-Specific Antigen (PSA) Test: About This Test.\"     If you do not have an account, please click on the \"Sign Up Now\" link. Current as of: April 29, 2020               Content Version: 12.6  © 3994-3697 [employer]. Care instructions adapted under license by [facility]. If you have questions about a medical condition or this instruction, always ask your healthcare professional. [address] any warranty or liability for your use of this information.

## 2021-03-04 NOTE — PROGRESS NOTES
CHIEF COMPLAINT  Chief Complaint   Patient presents with    Dysuria    Urinary Frequency        HPI   Kirk Kahn is a 60 y.o. male who presents to the office complaining of urinary urgency, frequency, dysuria over the past 3 months. Patient denies any agueda pain or discomfort. Patient has chronic low back pain and sees pain management for treatment. Patient reports chronic constipation but no new or worsening symptoms. Patient does take constipation medication and reports being off of it but recently restarting it. Patient reports that his urinary symptoms started prior to reinitiation of constipation medication. Patient reports history of kidney stones. No dizziness or lightheadedness. No fever or chills. Patient also complains of shortness of breath. Patient reports having heart attack in April and has quit smoking since then. Patient sees pulmonology for BIJU. Patient reports that he has noticed over the winter months having more shortness of breath and wearing out more easily. Patient denies any cough or congestion. Patient denies bringing this up to pulmonologist at previous visits. No other complaints, modifying factors or associated symptoms. Nursing notes reviewed.    Past Medical History:   Diagnosis Date    Asthma     Back problem     Dr Alexx Saleh, Dr Denise Briggs Cholelithiasis without obstruction 2010    asymptomatic    Depression     ED (erectile dysfunction)     Gallbladder & bile duct stone     Gallbladder full of stones    GERD (gastroesophageal reflux disease)     Hernia     Hyperlipidemia     Hypertension     Low testosterone     MI (myocardial infarction) ([address] 04/13/2020    mLAD 100% FELICITY Xience Martha 3.5 x 18    Seasonal allergies     Sleep apnea     SVT (supraventricular tachycardia) ([address]     GXT 1998    Tendonitis      Past Surgical History:   Procedure Laterality Date    BACK SURGERY  2001    L5 FUSION, Dr Molly Emanuel Right 2019    RIGHT CARPAL TUNNEL RELEASE performed by Lelia Marquez MD at [facility]      Dr. Latonia Leyva      [facility] patient states   Petra Santo      Dr. Christina Beal      stimulator put in 3/10   [facility] LUMBAR SPINE SURGERY      Dr Jo Ann Dasilva       Family History   Problem Relation Age of Onset    Heart Disease Father      Social History     Socioeconomic History    Marital status:      Spouse name: Not on file    Number of children: Not on file    Years of education: Not on file    Highest education level: Not on file   Occupational History    Not on file   Social Needs    Financial resource strain: Not on file    Food insecurity     Worry: Not on file     Inability: Not on file   [employer] needs     Medical: Not on file     Non-medical: Not on file   Tobacco Use    Smoking status: Former Smoker     Packs/day: 0.25     Years: 20.00     Pack years: 5.00     Types: Cigars     Quit date: 2020     Years since quittin.8    Smokeless tobacco: Never Used   Substance and Sexual Activity    Alcohol use: Yes     Comment: occassional    Drug use: No    Sexual activity: Yes     Partners: Female   Lifestyle    Physical activity     Days per week: Not on file     Minutes per session: Not on file    Stress: Not on file   Relationships    Social connections     Talks on phone: Not on file     Gets together: Not on file     Attends Yazidi service: Not on file     Active member of club or organization: Not on file     Attends meetings of clubs or organizations: Not on file     Relationship status: Not on file    Intimate partner violence     Fear of current or ex partner: Not on file     Emotionally abused: Not on file     Physically abused: Not on file     Forced sexual activity: Not on file   Other Topics Concern    Not on file   Social History Narrative    Not on file     Current Outpatient Medications   Medication Sig Dispense Refill    MOVANTIK 12.5 MG TABS tablet       escitalopram (LEXAPRO) 10 MG tablet TAKE 1 TABLET BY MOUTH EVERY DAY 90 tablet 1    ENTRESTO 24-26 MG per tablet TAKE 1 TABLET BY MOUTH TWICE A DAY 60 tablet 3    clopidogrel (PLAVIX) 75 MG tablet TAKE 1 TABLET BY MOUTH EVERY DAY 90 tablet 3    carvedilol (COREG) 6.25 MG tablet Take 1.5 tablets by mouth 2 times daily (with meals) 270 tablet 3    atorvastatin (LIPITOR) 40 MG tablet Take 1 tablet by mouth nightly 90 tablet 3    KRILL OIL PO Take by mouth With omega 3      Multiple Vitamin (MULTI-VITAMIN DAILY PO) Take by mouth      aspirin 81 MG chewable tablet Take 1 tablet by mouth daily 30 tablet 0    sodium chloride 0.9 % SOLN 100 mL with morphine (PF) 10 MG/ML SOLN 100 mg Infuse intravenously continuous Along with dilaudid      oxyCODONE HCl (OXY-IR) 10 MG immediate release tablet TAKE 1 TABLET BY MOUTH EVERY 6 HOURS  0    testosterone cypionate (DEPOTESTOTERONE CYPIONATE) 200 MG/ML injection INJECT 1 ML INTO THE MUSCLE EVERY 14 DAYS 6 mL 2     No current facility-administered medications for this visit. Allergies   Allergen Reactions    Vioxx Other (See Comments)     Oral ulcers      Phenergan [Promethazine Hcl] Nausea And Vomiting     Was given after surgery and induced nausea and vomiting. REVIEW OF SYSTEMS  Review of Systems   Constitutional: Negative for activity change, appetite change, chills and fever. HENT: Negative for congestion, rhinorrhea and sore throat. Eyes: Negative for visual disturbance. Respiratory: Positive for shortness of breath. Negative for cough, chest tightness and wheezing. Cardiovascular: Negative for chest pain and leg swelling. Gastrointestinal: Negative for abdominal distention, abdominal pain, diarrhea, nausea and vomiting.    Genitourinary: Positive for dysuria, frequency and urgency. Negative for hematuria. Musculoskeletal: Positive for back pain. Negative for gait problem and myalgias. Skin: Negative for rash. Neurological: Negative for dizziness, weakness, light-headedness, numbness and headaches. Psychiatric/Behavioral: Negative for sleep disturbance. PHYSICAL EXAM  /88   Pulse 89   Temp 98.7 °F (37.1 °C) (Oral)   Wt 238 lb (108 kg)   SpO2 96%   BMI 28.97 kg/m²   Physical Exam  Vitals signs reviewed. Constitutional:       General: He is not in acute distress. Appearance: Normal appearance. He is well-developed. He is not diaphoretic. HENT:      Head: Normocephalic and atraumatic. Right Ear: External ear normal.      Left Ear: External ear normal.      Nose: Nose normal.      Mouth/Throat:      Mouth: Mucous membranes are moist.      Pharynx: Oropharynx is clear. Eyes:      General:         Right eye: No discharge. Left eye: No discharge. Pupils: Pupils are equal, round, and reactive to light. Neck:      Musculoskeletal: Normal range of motion. Vascular: No JVD. Cardiovascular:      Rate and Rhythm: Normal rate and regular rhythm. Pulses: Normal pulses. Pulmonary:      Effort: Pulmonary effort is normal. No respiratory distress. Breath sounds: No stridor. No wheezing, rhonchi or rales. Chest:      Chest wall: No tenderness. Abdominal:      General: Bowel sounds are normal. There is no distension. Palpations: Abdomen is soft. Tenderness: There is no abdominal tenderness. There is no guarding. Musculoskeletal: Normal range of motion. General: No swelling. Skin:     General: Skin is warm and dry. Capillary Refill: Capillary refill takes less than 2 seconds. Neurological:      Mental Status: He is alert and oriented to person, place, and time. Psychiatric:         Mood and Affect: Mood normal.         Behavior: Behavior normal.          ASSESSMENT/PLAN:   1. to his history of ischemic cardiomyopathy. Patient verbalized and acknowledges with plan of care at this time. The note was completed using Dragon voice recognition transcription. Every effort was made to ensure accuracy; however, inadvertent  transcription errors may be present despite my best efforts to edit errors.     Juan Carlos Edwards, APRN - CNP

## 2021-03-05 LAB
A/G RATIO: 1.8 (ref 1.1–2.2)
ALBUMIN SERPL-MCNC: 4.6 G/DL (ref 3.4–5)
ALP BLD-CCNC: 64 U/L (ref 40–129)
ALT SERPL-CCNC: 18 U/L (ref 10–40)
ANION GAP SERPL CALCULATED.3IONS-SCNC: 13 MMOL/L (ref 3–16)
AST SERPL-CCNC: 17 U/L (ref 15–37)
BILIRUB SERPL-MCNC: 0.7 MG/DL (ref 0–1)
BUN BLDV-MCNC: 10 MG/DL (ref 7–20)
CALCIUM SERPL-MCNC: 9.5 MG/DL (ref 8.3–10.6)
CHLORIDE BLD-SCNC: 103 MMOL/L (ref 99–110)
CO2: 25 MMOL/L (ref 21–32)
CREAT SERPL-MCNC: 1 MG/DL (ref 0.9–1.3)
GFR AFRICAN AMERICAN: >60
GFR NON-AFRICAN AMERICAN: >60
GLOBULIN: 2.5 G/DL
GLUCOSE BLD-MCNC: 108 MG/DL (ref 70–99)
POTASSIUM SERPL-SCNC: 4.1 MMOL/L (ref 3.5–5.1)
PROSTATE SPECIFIC ANTIGEN: 3.81 NG/ML (ref 0–4)
SODIUM BLD-SCNC: 141 MMOL/L (ref 136–145)
TOTAL PROTEIN: 7.1 G/DL (ref 6.4–8.2)
URINE CULTURE, ROUTINE: NORMAL

## 2021-04-12 ENCOUNTER — TELEPHONE (OUTPATIENT)
Dept: CARDIOLOGY CLINIC | Age: 56
End: 2021-04-12

## 2021-04-12 NOTE — TELEPHONE ENCOUNTER
Pt sts having swelling in feet. Not red, not hot to the touch. No pain. They feel tight. Not on Water pill. Pt has appt on 4/29/2021.

## 2021-04-12 NOTE — TELEPHONE ENCOUNTER
Spoke with Sven Quan, he will keep 4/29 appt. I relayed JJP message. He will call the office if things get worse.

## 2021-04-23 RX ORDER — SACUBITRIL AND VALSARTAN 24; 26 MG/1; MG/1
TABLET, FILM COATED ORAL
Qty: 60 TABLET | Refills: 3 | Status: SHIPPED | OUTPATIENT
Start: 2021-04-23 | End: 2021-05-11 | Stop reason: DRUGHIGH

## 2021-04-29 ENCOUNTER — OFFICE VISIT (OUTPATIENT)
Dept: CARDIOLOGY CLINIC | Age: 56
End: 2021-04-29
Payer: COMMERCIAL

## 2021-04-29 VITALS
OXYGEN SATURATION: 98 % | WEIGHT: 248 LBS | DIASTOLIC BLOOD PRESSURE: 60 MMHG | HEIGHT: 76 IN | BODY MASS INDEX: 30.2 KG/M2 | HEART RATE: 71 BPM | SYSTOLIC BLOOD PRESSURE: 110 MMHG

## 2021-04-29 DIAGNOSIS — R06.02 SOB (SHORTNESS OF BREATH): ICD-10-CM

## 2021-04-29 DIAGNOSIS — I25.10 CORONARY ARTERY DISEASE INVOLVING NATIVE CORONARY ARTERY OF NATIVE HEART WITHOUT ANGINA PECTORIS: Primary | ICD-10-CM

## 2021-04-29 DIAGNOSIS — I25.5 ISCHEMIC CARDIOMYOPATHY: ICD-10-CM

## 2021-04-29 DIAGNOSIS — I50.23 ACUTE ON CHRONIC SYSTOLIC CONGESTIVE HEART FAILURE (HCC): ICD-10-CM

## 2021-04-29 DIAGNOSIS — R60.9 EDEMA, UNSPECIFIED TYPE: ICD-10-CM

## 2021-04-29 PROCEDURE — 99214 OFFICE O/P EST MOD 30 MIN: CPT | Performed by: INTERNAL MEDICINE

## 2021-04-29 PROCEDURE — G8427 DOCREV CUR MEDS BY ELIG CLIN: HCPCS | Performed by: INTERNAL MEDICINE

## 2021-04-29 PROCEDURE — 1036F TOBACCO NON-USER: CPT | Performed by: INTERNAL MEDICINE

## 2021-04-29 PROCEDURE — 3017F COLORECTAL CA SCREEN DOC REV: CPT | Performed by: INTERNAL MEDICINE

## 2021-04-29 PROCEDURE — G8417 CALC BMI ABV UP PARAM F/U: HCPCS | Performed by: INTERNAL MEDICINE

## 2021-04-29 RX ORDER — FUROSEMIDE 20 MG/1
20 TABLET ORAL DAILY
Qty: 30 TABLET | Refills: 5 | Status: SHIPPED | OUTPATIENT
Start: 2021-04-29 | End: 2021-05-11

## 2021-04-29 NOTE — PATIENT INSTRUCTIONS
Patient Plan:  1. Echocardiogram to view size and strength of the heart   2. We will call you with the results  3. Rx for Lasix (furosemide) 20 mg every morning until you loose water weight   4. Labs in 1 week BMP and BNP   5. Keep your legs elevated above your heart when you can   ~the more you walk the better   ~knee high compression socks during the day   6. Follow up in 2-3 weeks with [facility] [place]     Your provider has ordered testing for further evaluation. An order/prescription has been included in your paper work.  To schedule outpatient testing, contact Central Scheduling by calling [address] [place] [address] ([phone]).

## 2021-04-29 NOTE — LETTER
[address] [facility]  Cardiovascular Office Note        PATIENT: Brianne Hernandez  DATE: 2021  MRN: [id number]  CSN: [id number]  : 1965      Primary Care Doctor: Willa Olszewski, MD  Reason for evaluation:   Follow-up, Edema (bilateral feet and legs), Shortness of Breath (on exertion), and Fatigue      Subjective:   History of present illness on initial date of evaluation:   Brianne Hernandez is a 60 y.o. patient who presented for hospital up. PMH: HLD, HTN, SVT, asthma, GERD, depression. Patient presented to the ED on 2020 with complaints of chest pain. EMS was called to is home and pre-hospital EKG showed STEMI. Patient had emergent LHC with a successful PCI to mid LAD, stable V tach during case that broke on it's own. Last OV 20 he reported feeling much better. He stated he does still have SOB, but mainly at night. Last OV 20 he reported feeling much better. He denied any SOB or chest pain. He was taking his medication as prescribed. He cut back to almost no caffeine. Last OV 20 participated in a telephone visit. He stated he was trying a new C-pap and is having some anxiety due to that. He stated he has been struggling to wear it. He stated he has a follow up appointment with his pulmonary. He stated he is checking his BP at home and systolic is running 858'S to maybe 140. He stated his HR runs in the 80's. Today he reports he has noticed increased SOB and BLE edema. He is up about 20 lbs. He denies chest pain.       Patient Active Problem List   Diagnosis    Asthma    Restless legs syndrome (RLS)    Low testosterone    ED (erectile dysfunction)    Chronic bilateral low back pain without sciatica    Mixed hyperlipidemia    Essential hypertension    Right carpal tunnel syndrome    Right lateral epicondylitis    Dupuytren's contracture    Severe obstructive sleep apnea    Tobacco abuse    Ischemic cardiomyopathy    [facility]I [facility] elevation [facility] infarction) [facility] [address]    Obesity    Coronary artery disease involving native coronary artery of native heart without angina pectoris         Past Medical History:   has a past medical history of Asthma, Back problem, Cholelithiasis without obstruction, Depression, ED (erectile dysfunction), Gallbladder & bile duct stone, GERD (gastroesophageal reflux disease), Hernia, Hyperlipidemia, Hypertension, Low testosterone, MI (myocardial infarction) ([facility] [address] Seasonal allergies, Sleep apnea, SVT (supraventricular tachycardia) ([facility] [address] and Tendonitis. Surgical History:   has a past surgical history that includes back surgery (2001); shoulder surgery; Sternum fracture surgery; hip surgery; hernia repair (2012); Cholecystectomy (2012); Colonoscopy (2010); other surgical history; Lumbar spine surgery (2004); and Carpal tunnel release (Right, 2/11/2019). Social History:   reports that he quit smoking about 12 months ago. His smoking use included cigars. He has a 5.00 pack-year smoking history. He has never used smokeless tobacco. He reports current alcohol use. He reports that he does not use drugs. Family History:  No evidence for sudden cardiac death or premature CAD    Home Medications:  Reviewed and are listed in nursing record.  and/or listed below  Current Outpatient Medications   Medication Sig Dispense Refill    ENTRESTO 24-26 MG per tablet TAKE 1 TABLET BY MOUTH TWICE A DAY 60 tablet 3    MOVANTIK 12.5 MG TABS tablet       escitalopram (LEXAPRO) 10 MG tablet TAKE 1 TABLET BY MOUTH EVERY DAY 90 tablet 1    clopidogrel (PLAVIX) 75 MG tablet TAKE 1 TABLET BY MOUTH EVERY DAY 90 tablet 3    carvedilol (COREG) 6.25 MG tablet Take 1.5 tablets by mouth 2 times daily (with meals) 270 tablet 3    atorvastatin (LIPITOR) 40 MG tablet Take 1 tablet by mouth nightly 90 tablet 3    Multiple Vitamin (MULTI-VITAMIN DAILY PO) Take by mouth      aspirin 81 MG chewable tablet Take 1 tablet by mouth daily 30 tablet 0    sodium chloride 0.9 % SOLN 100 mL with morphine (PF) 10 MG/ML SOLN 100 mg Infuse intravenously continuous Along with dilaudid      oxyCODONE HCl (OXY-IR) 10 MG immediate release tablet TAKE 1 TABLET BY MOUTH EVERY 6 HOURS  0    testosterone cypionate (DEPOTESTOTERONE CYPIONATE) 200 MG/ML injection INJECT 1 ML INTO THE MUSCLE EVERY 14 DAYS 6 mL 2    KRILL OIL PO Take by mouth With omega 3       No current facility-administered medications for this visit. Allergies:  Vioxx and Phenergan [promethazine hcl]     Review of Systems:   A 14 point review of symptoms completed. Pertinent positives identified in the HPI, all other review of symptoms negative as below. Objective:   PHYSICAL EXAM:      Vitals based on last recorded  /60 (04/29/21 1528)    Temp      Pulse 71 (04/29/21 1528)   Resp      SpO2 98 % (04/29/21 1528)       Wt Readings from Last 3 Encounters:   04/29/21 248 lb (112.5 kg)   03/04/21 238 lb (108 kg)   01/12/21 244 lb 12.8 oz (111 kg)         Physical Exam  Vitals signs reviewed. Constitutional:       Appearance: Normal appearance. HENT:      Head: Normocephalic and atraumatic. Nose: Nose normal.      Mouth/Throat:      Mouth: Mucous membranes are moist.      Pharynx: Oropharynx is clear. Eyes:      Conjunctiva/sclera: Conjunctivae normal.      Pupils: Pupils are equal, round, and reactive to light. Neck:      Musculoskeletal: Normal range of motion and neck supple. Comments: No jvd    Cardiovascular:      Rate and Rhythm: Normal rate and regular rhythm. Pulses: Normal pulses. Heart sounds: Normal heart sounds. No murmur. No friction rub. No gallop. Pulmonary:      Effort: Pulmonary effort is normal.      Breath sounds: Normal breath sounds. Abdominal:      General: Bowel sounds are normal.      Palpations: Abdomen is soft. Musculoskeletal:      Right lower leg: Edema present. Left lower leg: Edema present.    Skin: General: Skin is warm and dry. Neurological:      General: No focal deficit present. Mental Status: He is alert. Psychiatric:         Mood and Affect: Mood normal.         Thought Content: Thought content normal.         Judgment: Judgment normal.             LABS   CBC:      Lab Results   Component Value Date    WBC 7.6 03/04/2021    RBC 5.77 03/04/2021    HGB 17.5 03/04/2021    HCT 51.5 03/04/2021    MCV 89.1 03/04/2021    RDW 13.5 03/04/2021     03/04/2021     CMP:  Lab Results   Component Value Date     03/04/2021    K 4.1 03/04/2021    K 4.6 04/14/2020     03/04/2021    CO2 25 03/04/2021    BUN 10 03/04/2021    CREATININE 1.0 03/04/2021    GFRAA >60 03/04/2021    GFRAA >60 01/31/2013    AGRATIO 1.8 03/04/2021    LABGLOM >60 03/04/2021    GLUCOSE 108 03/04/2021    PROT 7.1 03/04/2021    PROT 6.7 02/28/2012    CALCIUM 9.5 03/04/2021    BILITOT 0.7 03/04/2021    ALKPHOS 64 03/04/2021    AST 17 03/04/2021    ALT 18 03/04/2021     DM:  Lab Results   Component Value Date    LABA1C 5.4 04/14/2020     Lipids:         Lab Results   Component Value Date    TRIG 159 (H) 02/03/2020    TRIG 236 (H) 08/01/2019    TRIG 167 03/09/2019            Lab Results   Component Value Date    HDL 27 (L) 02/03/2020    HDL 26 (L) 08/01/2019    HDL 28 (A) 03/09/2019            Lab Results   Component Value Date    LDLCALC 103 (H) 02/03/2020    LDLCALC 99 08/01/2019    LDLCALC 134 03/09/2019            Lab Results   Component Value Date    LABVLDL 32 02/03/2020    LABVLDL 47 08/01/2019    LABVLDL 38 08/30/2018           CARDIAC DATA   EKG:    ECHO (limited) 9/14/20   Summary   Limited exam for LVEF. LV systolic function is mildly reduced with an ejection fraction of 45%. There is akinesis of the apical-septal and apical-inferior segments. ECHO 6/11/2020   Summary   Left ventricular systolic function is mildly reduced with ejection fraction   estimated at 40 %.    There is dyskinesis of the apical septal and apical inferior wall segments. There is akinesis of the apical anterior wall segments. All remaining wall segments appear normal in function. Left ventricle size is normal.   Normal left ventricular wall thickness. Normal left ventricular diastolic filling pressure. No evidence of mitral valve stenosis. Mild mitral regurgitation. Mild tricuspid regurgitation. Systolic pulmonary artery pressure (SPAP) estimated at 43 mmHg (RA pressure   3 mmHg), consistent with mild pulmonary hypertension. [address] [place] [address]: 4/13/2020   Summary   The left ventricular systolic function is moderately reduced with an   ejection fraction of 35 %. There is akinesis of the mid and apical anterior   wall and apex. There is mild concentric left ventricular hypertrophy. Grade I diastolic dysfunction with normal left ventricular filling pressure. Systolic pulmonary artery pressure (SPAP) is normal estimated at 31 mmHg   (Right atrial pressure of 3 mmHg). STRESS TEST:    CARDIAC CATH:  CARDIAC CATH: 4/13/2020  LEFT HEART CATH  LM: luminals  LAD: 100% mid just after diag  LCX: luminals, 20% mid  RCA: dominant, large aneurysmal, mild diffuse disease with focal stenosis     LVEDP: 1   LVEF:45% with mid , apical anterior and apex Hypokinesis  No AS gradient  LESION1; PCI to mid LAD  STENT 3.5 x 18mm post dilated to      Assessment  1. Successful PCI to mid LAD using 1 drug stent                100%-->0%, ALEXSANDRA-3 flow  2. Stable V-tach during case that broke on its own, followed by Inferior ST elevations. ? Spasm as RCA was clear                - Amio x 18 hrs  3. ASA 81mg poqday for life, ticagrelor 90mg po BID for 1 yr w/o interruption  4. BB, statin as tolerated                - ACE/ARB once able  5. Heart block resolved prior to case  6. Echo, ECg, cardiac rehab       VASCULAR/OTHER IMAGING:      Assessment and Plan   Juan Bullock is a 60 y.o. male who presents today for the following problems:       1.  CAD   - trop peaked 4.95              - 4/13/2020 S/p STEMI and PCI to mid LAD  2. Acute on chronic systolic CHF   Weight 762 lbs--> 248lbs  3. Ischemic cardiomyopathy: LVEF 35%              - euvolemic and compensated  4. NSVT/VT: no issues  5. Fatigue     MD PLAN  1. Patient appears to be fluid overloaded but not decompensated at this time. 20 pound weight gain since last visit. 2.  Initiate Lasix 20 mg p.o. daily for now given Lasix naïve   -Renal and BNP in 1 week continue to follow potassium. 3.  Educated patient how to take Lasix and signs and symptoms to observe  4. Follow-up with NP  2 weeks to evaluate progression  5. Continue aspirin, Lipitor, Coreg, Plavix, Entresto  6. Date echocardiogram for filling pressures interval LVEF       Patient Active Problem List   Diagnosis    Asthma    Restless legs syndrome (RLS)    Low testosterone    ED (erectile dysfunction)    Chronic bilateral low back pain without sciatica    Mixed hyperlipidemia    Essential hypertension    Right carpal tunnel syndrome    Right lateral epicondylitis    Dupuytren's contracture    Severe obstructive sleep apnea    Tobacco abuse    Ischemic cardiomyopathy    STEMI (ST elevation myocardial infarction) ([facility] [address]    Obesity    Coronary artery disease involving native coronary artery of native heart without angina pectoris       Patient Plan:  1. Echocardiogram to view size and strength of the heart   2. We will call you with the results  3. Rx for Lasix (furosemide) 20 mg every morning until you loose water weight   4. Labs in 1 week BMP and BNP   5. Keep your legs elevated above your heart when you can   ~the more you walk the better   ~knee high compression socks during the day   6. Follow up in 2-3 weeks with NP Ezequiel Quinn         It is a pleasure to assist in the care of Irish Enrique. Please call with any questions.     This note was scribed in the presence of Mendel Dubois MD by Mathew Mackenzie RN.    Main Edouard MD, personally performed the services described in this documentation as scribed by the above signed scribe in my presence, and it is both accurate and complete to the best of our ability and knowledge. I agree with the details independently gathered by my clinical support staff, while the remaining scribed note accurately describes my personal service to the patient. The above RN is working as a scribe for and in the presence of myself . Working as a scribe, the RN may have prepopulated components of this note with my historical intellectual property under my direct supervision. Any additions to this intellectual property were performed at my direction. Furthermore, the content and accuracy of this note have been reviewed by me to the best of my ability.      Bernice Penaloza MD, [address] [facility] Cardiologist  [facility]  [phone] [facility]  [phone] [address] [place] [address]

## 2021-04-29 NOTE — PROGRESS NOTES
[address] [facility]  Cardiovascular Office Note        PATIENT: Lizy Jacobs  DATE: 2021  MRN: [id number]  CSN: [id number]  : 1965      Primary Care Doctor: Bladimir Saleem MD  Reason for evaluation:   Follow-up, Edema (bilateral feet and legs), Shortness of Breath (on exertion), and Fatigue      Subjective:   History of present illness on initial date of evaluation:   Lizy Jacobs is a 60 y.o. patient who presented for hospital up. PMH: HLD, HTN, SVT, asthma, GERD, depression. Patient presented to the ED on 2020 with complaints of chest pain. EMS was called to is home and pre-hospital EKG showed STEMI. Patient had emergent LHC with a successful PCI to mid LAD, stable V tach during case that broke on it's own. Last OV 20 he reported feeling much better. He stated he does still have SOB, but mainly at night. Last OV 20 he reported feeling much better. He denied any SOB or chest pain. He was taking his medication as prescribed. He cut back to almost no caffeine. Last OV 20 participated in a telephone visit. He stated he was trying a new C-pap and is having some anxiety due to that. He stated he has been struggling to wear it. He stated he has a follow up appointment with his pulmonary. He stated he is checking his BP at home and systolic is running 407'X to maybe 140. He stated his HR runs in the 80's. Today he reports he has noticed increased SOB and BLE edema. He is up about 20 lbs. He denies chest pain.       Patient Active Problem List   Diagnosis    Asthma    Restless legs syndrome (RLS)    Low testosterone    ED (erectile dysfunction)    Chronic bilateral low back pain without sciatica    Mixed hyperlipidemia    Essential hypertension    Right carpal tunnel syndrome    Right lateral epicondylitis    Dupuytren's contracture    Severe obstructive sleep apnea    Tobacco abuse    Ischemic cardiomyopathy    STEMI (ST elevation myocardial infarction) ([facility] [address]    Obesity    Coronary artery disease involving native coronary artery of native heart without angina pectoris         Past Medical History:   has a past medical history of Asthma, Back problem, Cholelithiasis without obstruction, Depression, ED (erectile dysfunction), Gallbladder & bile duct stone, GERD (gastroesophageal reflux disease), Hernia, Hyperlipidemia, Hypertension, Low testosterone, MI (myocardial infarction) ([facility] [address] Seasonal allergies, Sleep apnea, SVT (supraventricular tachycardia) ([facility] [address] and Tendonitis. Surgical History:   has a past surgical history that includes back surgery (2001); shoulder surgery; Sternum fracture surgery; hip surgery; hernia repair (2012); Cholecystectomy (2012); Colonoscopy (2010); other surgical history; Lumbar spine surgery (2004); and Carpal tunnel release (Right, 2/11/2019). Social History:   reports that he quit smoking about 12 months ago. His smoking use included cigars. He has a 5.00 pack-year smoking history. He has never used smokeless tobacco. He reports current alcohol use. He reports that he does not use drugs. Family History:  No evidence for sudden cardiac death or premature CAD    Home Medications:  Reviewed and are listed in nursing record.  and/or listed below  Current Outpatient Medications   Medication Sig Dispense Refill    ENTRESTO 24-26 MG per tablet TAKE 1 TABLET BY MOUTH TWICE A DAY 60 tablet 3    MOVANTIK 12.5 MG TABS tablet       escitalopram (LEXAPRO) 10 MG tablet TAKE 1 TABLET BY MOUTH EVERY DAY 90 tablet 1    clopidogrel (PLAVIX) 75 MG tablet TAKE 1 TABLET BY MOUTH EVERY DAY 90 tablet 3    carvedilol (COREG) 6.25 MG tablet Take 1.5 tablets by mouth 2 times daily (with meals) 270 tablet 3    atorvastatin (LIPITOR) 40 MG tablet Take 1 tablet by mouth nightly 90 tablet 3    Multiple Vitamin (MULTI-VITAMIN DAILY PO) Take by mouth      aspirin 81 MG chewable tablet Take 1 tablet by mouth General: Skin is warm and dry. Neurological:      General: No focal deficit present. Mental Status: He is alert. Psychiatric:         Mood and Affect: Mood normal.         Thought Content: Thought content normal.         Judgment: Judgment normal.             LABS   CBC:      Lab Results   Component Value Date    WBC 7.6 03/04/2021    RBC 5.77 03/04/2021    HGB 17.5 03/04/2021    HCT 51.5 03/04/2021    MCV 89.1 03/04/2021    RDW 13.5 03/04/2021     03/04/2021     CMP:  Lab Results   Component Value Date     03/04/2021    K 4.1 03/04/2021    K 4.6 04/14/2020     03/04/2021    CO2 25 03/04/2021    BUN 10 03/04/2021    CREATININE 1.0 03/04/2021    GFRAA >60 03/04/2021    GFRAA >60 01/31/2013    AGRATIO 1.8 03/04/2021    LABGLOM >60 03/04/2021    GLUCOSE 108 03/04/2021    PROT 7.1 03/04/2021    PROT 6.7 02/28/2012    CALCIUM 9.5 03/04/2021    BILITOT 0.7 03/04/2021    ALKPHOS 64 03/04/2021    AST 17 03/04/2021    ALT 18 03/04/2021     DM:  Lab Results   Component Value Date    LABA1C 5.4 04/14/2020     Lipids:         Lab Results   Component Value Date    TRIG 159 (H) 02/03/2020    TRIG 236 (H) 08/01/2019    TRIG 167 03/09/2019            Lab Results   Component Value Date    HDL 27 (L) 02/03/2020    HDL 26 (L) 08/01/2019    HDL 28 (A) 03/09/2019            Lab Results   Component Value Date    LDLCALC 103 (H) 02/03/2020    LDLCALC 99 08/01/2019    LDLCALC 134 03/09/2019            Lab Results   Component Value Date    LABVLDL 32 02/03/2020    LABVLDL 47 08/01/2019    LABVLDL 38 08/30/2018           CARDIAC DATA   EKG:    ECHO (limited) 9/14/20   Summary   Limited exam for LVEF. LV systolic function is mildly reduced with an ejection fraction of 45%. There is akinesis of the apical-septal and apical-inferior segments. ECHO 6/11/2020   Summary   Left ventricular systolic function is mildly reduced with ejection fraction   estimated at 40 %.    There is dyskinesis of the apical septal and apical inferior wall segments. There is akinesis of the apical anterior wall segments. All remaining wall segments appear normal in function. Left ventricle size is normal.   Normal left ventricular wall thickness. Normal left ventricular diastolic filling pressure. No evidence of mitral valve stenosis. Mild mitral regurgitation. Mild tricuspid regurgitation. Systolic pulmonary artery pressure (SPAP) estimated at 43 mmHg (RA pressure   3 mmHg), consistent with mild pulmonary hypertension. [address] [place] [address] Street: 4/13/2020   Summary   The left ventricular systolic function is moderately reduced with an   ejection fraction of 35 %. There is akinesis of the mid and apical anterior   wall and apex. There is mild concentric left ventricular hypertrophy. Grade I diastolic dysfunction with normal left ventricular filling pressure. Systolic pulmonary artery pressure (SPAP) is normal estimated at 31 mmHg   (Right atrial pressure of 3 mmHg). STRESS TEST:    CARDIAC CATH:  CARDIAC CATH: 4/13/2020  LEFT HEART CATH  LM: luminals  LAD: 100% mid just after diag  LCX: luminals, 20% mid  RCA: dominant, large aneurysmal, mild diffuse disease with focal stenosis     LVEDP: 1   LVEF:45% with mid , apical anterior and apex Hypokinesis  No AS gradient  LESION1; PCI to mid LAD  STENT 3.5 x 18mm post dilated to      Assessment  1. Successful PCI to mid LAD using 1 drug stent                100%-->0%, ALEXSANDRA-3 flow  2. Stable V-tach during case that broke on its own, followed by Inferior ST elevations. ? Spasm as RCA was clear                - Amio x 18 hrs  3. ASA 81mg poqday for life, ticagrelor 90mg po BID for 1 yr w/o interruption  4. BB, statin as tolerated                - ACE/ARB once able  5. Heart block resolved prior to case  6. Echo, ECg, cardiac rehab       VASCULAR/OTHER IMAGING:      Assessment and Plan   Alexandra Mina is a 60 y.o. male who presents today for the following problems:       1.  CAD   - trop peaked 4.95              - 4/13/2020 S/p STEMI and PCI to mid LAD  2. Acute on chronic systolic CHF   Weight 327 lbs--> 248lbs  3. Ischemic cardiomyopathy: LVEF 35%              - euvolemic and compensated  4. NSVT/VT: no issues  5. Fatigue     MD PLAN  1. Patient appears to be fluid overloaded but not decompensated at this time. 20 pound weight gain since last visit. 2.  Initiate Lasix 20 mg p.o. daily for now given Lasix naïve   -Renal and BNP in 1 week continue to follow potassium. 3.  Educated patient how to take Lasix and signs and symptoms to observe  4. Follow-up with NP  2 weeks to evaluate progression  5. Continue aspirin, Lipitor, Coreg, Plavix, Entresto  6. Date echocardiogram for filling pressures interval LVEF       Patient Active Problem List   Diagnosis    Asthma    Restless legs syndrome (RLS)    Low testosterone    ED (erectile dysfunction)    Chronic bilateral low back pain without sciatica    Mixed hyperlipidemia    Essential hypertension    Right carpal tunnel syndrome    Right lateral epicondylitis    Dupuytren's contracture    Severe obstructive sleep apnea    Tobacco abuse    Ischemic cardiomyopathy    STEMI ([facility] elevation myocardial infarction) ([facility] [address]    Obesity    Coronary artery disease involving native coronary artery of native heart without angina pectoris       Patient Plan:  1. Echocardiogram to view size and strength of the heart   2. We will call you with the results  3. Rx for Lasix (furosemide) 20 mg every morning until you loose water weight   4. Labs in 1 week BMP and BNP   5. Keep your legs elevated above your heart when you can   ~the more you walk the better   ~knee high compression socks during the day   6. Follow up in 2-3 weeks with NP Adelia Sierra         It is a pleasure to assist in the care of Bevely Car. Please call with any questions.     This note was scribed in the presence of Jaelyn Hall MD by Vona Harada, RN.    Pablito Hicks MD, personally performed the services described in this documentation as scribed by the above signed scribe in my presence, and it is both accurate and complete to the best of our ability and knowledge. I agree with the details independently gathered by my clinical support staff, while the remaining scribed note accurately describes my personal service to the patient. The above RN is working as a scribe for and in the presence of myself . Working as a scribe, the RN may have prepopulated components of this note with my historical intellectual property under my direct supervision. Any additions to this intellectual property were performed at my direction. Furthermore, the content and accuracy of this note have been reviewed by me to the best of my ability.      Jeronimo Gonzalez MD, [address] [facility] Cardiologist  [facility]  [phone] [facility]  [phone] [address] [place] [address]

## 2021-05-03 ENCOUNTER — TELEPHONE (OUTPATIENT)
Dept: CARDIOLOGY CLINIC | Age: 56
End: 2021-05-03

## 2021-05-03 ENCOUNTER — HOSPITAL ENCOUNTER (OUTPATIENT)
Dept: NON INVASIVE DIAGNOSTICS | Age: 56
Discharge: HOME | End: 2021-05-03
Payer: MEDICARE

## 2021-05-03 DIAGNOSIS — I25.5 ISCHEMIC CARDIOMYOPATHY: ICD-10-CM

## 2021-05-03 DIAGNOSIS — R06.02 SOB (SHORTNESS OF BREATH): ICD-10-CM

## 2021-05-03 LAB
LV EF: 45 %
LVEF MODALITY: NORMAL

## 2021-05-03 PROCEDURE — 93306 TTE W/DOPPLER COMPLETE: CPT

## 2021-05-03 NOTE — TELEPHONE ENCOUNTER
----- Message from Chelsie Gallardo MD sent at 5/3/2021  9:57 AM EDT -----  Let patient know their echo test is stable, continue current meds, no new orders or changes at this time. Thanks.

## 2021-05-07 RX ORDER — ATORVASTATIN CALCIUM 40 MG/1
TABLET, FILM COATED ORAL
Qty: 90 TABLET | Refills: 3 | Status: SHIPPED | OUTPATIENT
Start: 2021-05-07 | End: 2022-04-26

## 2021-05-10 NOTE — PROGRESS NOTES
[facility]   Cardiac Follow-up    Primary Care Doctor:  Rosina Betancur MD    Chief Complaint   Patient presents with    Follow-up    Swelling     bilateral fet and legs    Shortness of Breath     laying down    Fatigue        History of Present Illness:   I had the pleasure of seeing Kimberlee Car in follow up for CHF. History of ischemic cardiomyopathy, CAD status post PCI. At his last visit, he had some worsening LE edema which was new for him. He was started on lasix 20mg daily and repeat echo showed similar EF. Furosemide was increased from 20 mg daily to 40 mg daily on 5/3/2021 as he wasn't having much urine output. Leg swelling has improved since last visit. He has a history of sleep apnea but not able to tolerate CPAP. Has to sleep upright a lot. He has been taking advil. Has a lot of back pain. Kimberlee Car describes symptoms including fatigue but denies chest pain, early saiety. Home weights: not checking     Past Medical History:   has a past medical history of Asthma, Back problem, Cholelithiasis without obstruction, Depression, ED (erectile dysfunction), Gallbladder & bile duct stone, GERD (gastroesophageal reflux disease), Hernia, Hyperlipidemia, Hypertension, Low testosterone, MI (myocardial infarction) ([address] Seasonal allergies, Sleep apnea, SVT (supraventricular tachycardia) ([address] and Tendonitis. Surgical History:   has a past surgical history that includes back surgery (2001); shoulder surgery; Sternum fracture surgery; hip surgery; hernia repair (2012); Cholecystectomy (2012); Colonoscopy (2010); other surgical history; Lumbar spine surgery (2004); and Carpal tunnel release (Right, 2/11/2019). Social History:   reports that he quit smoking about 12 months ago. His smoking use included cigars. He has a 5.00 pack-year smoking history. He has never used smokeless tobacco. He reports current alcohol use. He reports that he does not use drugs.    Family History: Results   Component Value Date    PROBNP 231 05/11/2021    PROBNP 9 04/13/2020     Lipids:   Lab Results   Component Value Date    CHOL 162 02/03/2020        Lab Results   Component Value Date    TRIG 159 (H) 02/03/2020        Lab Results   Component Value Date    HDL 27 (L) 02/03/2020        Lab Results   Component Value Date    LDLCALC 103 (H) 02/03/2020        Lab Results   Component Value Date    LABVLDL 32 02/03/2020    VLDL 33 03/09/2019        Lab Results   Component Value Date    CHOLHDLRATIO 7.0 03/09/2019       EF:   Lab Results   Component Value Date    LVEF 40 06/11/2020       Testing reviewed:     echo 5/3/21   Summary   LV systolic function is mildly reduced with an estimated EF of 45%. There is akinesis of the apical septum and distal apiex. Mild HK of the anteroseptal segment. There is mild concentric left ventricular hypertrophy. Grade I diastolic dysfunction with normal LV filling pressure. Inadequate tricuspid regurgitation jet to estimate systolic artery pressure   (SPAP).     ECHO (limited) 9/14/20   Summary   Limited exam for LVEF.   LV systolic function is mildly reduced with an ejection fraction of 45%.   There is akinesis of the apical-septal and apical-inferior segments.     ECHO 6/11/2020   Summary   Left ventricular systolic function is mildly reduced with ejection fraction   estimated at 40 %.   There is dyskinesis of the apical septal and apical inferior wall segments.   There is akinesis of the apical anterior wall segments.   All remaining wall segments appear normal in function.   Left ventricle size is normal.   Normal left ventricular wall thickness.   Normal left ventricular diastolic filling pressure.   No evidence of mitral valve stenosis.   Mild mitral regurgitation.   Mild tricuspid regurgitation.   Systolic pulmonary artery pressure (SPAP) estimated at 43 mmHg (RA pressure   3 mmHg), consistent with mild pulmonary hypertension.     ECHO/MUGA: 4/13/2020   Summary Entresto 49-51mg twice a day   5. Recommend 64 ounces of fluid a day  6. Repeat CXR given bibasilar crackles on exam today   7. Avoid over the counter cold medications that contain pseudo-ephredrines, phenylephrines, neosynephrines = decongestants  8. Okay to take antihistamines without the decongestant (Claritin, Allegra, Zyrtec, Benadryl without the \"D\")\  9. Okay to take guaifenesin (Mucinex or Robitussion) to help clear phlegm, okay to take dextromethorphan (Delsym) as cough suppressant. 10. Avoid NSAIDs, Naproxen, advil, motrin, ibuprofen   11. Recommend tylenol instead for breakthrough pain   12. Follow up 4 weeks   13. If labs are stable may consider adding Spironolactone (aldactone)    Core measures for HFrEF:  EF: 35%-->45%  ICD: NA  ACEi/ARB/ARNI: entresto  BB: coreg   Kevyn: consider  Hydralazine/nitrates: uptrating entresto       I appreciate the opportunity for caring for this patient.      Eber Alarcon CNP, 5/11/2021, 4:15 PM

## 2021-05-11 ENCOUNTER — OFFICE VISIT (OUTPATIENT)
Dept: CARDIOLOGY CLINIC | Age: 56
End: 2021-05-11
Payer: COMMERCIAL

## 2021-05-11 ENCOUNTER — HOSPITAL ENCOUNTER (OUTPATIENT)
Age: 56
Discharge: HOME | End: 2021-05-11
Payer: COMMERCIAL

## 2021-05-11 VITALS
HEIGHT: 76 IN | SYSTOLIC BLOOD PRESSURE: 110 MMHG | OXYGEN SATURATION: 96 % | DIASTOLIC BLOOD PRESSURE: 60 MMHG | WEIGHT: 241.5 LBS | BODY MASS INDEX: 29.41 KG/M2 | HEART RATE: 78 BPM

## 2021-05-11 DIAGNOSIS — R60.9 EDEMA, UNSPECIFIED TYPE: ICD-10-CM

## 2021-05-11 DIAGNOSIS — I50.22 CHRONIC SYSTOLIC CONGESTIVE HEART FAILURE (HCC): ICD-10-CM

## 2021-05-11 DIAGNOSIS — I25.5 ISCHEMIC CARDIOMYOPATHY: ICD-10-CM

## 2021-05-11 DIAGNOSIS — R06.02 SOB (SHORTNESS OF BREATH): ICD-10-CM

## 2021-05-11 DIAGNOSIS — R06.02 SHORTNESS OF BREATH: Primary | ICD-10-CM

## 2021-05-11 LAB
ANION GAP SERPL CALCULATED.3IONS-SCNC: 9 MMOL/L (ref 3–16)
BUN BLDV-MCNC: 14 MG/DL (ref 7–20)
CALCIUM SERPL-MCNC: 9.6 MG/DL (ref 8.3–10.6)
CHLORIDE BLD-SCNC: 103 MMOL/L (ref 99–110)
CO2: 27 MMOL/L (ref 21–32)
CREAT SERPL-MCNC: 0.9 MG/DL (ref 0.9–1.3)
GFR AFRICAN AMERICAN: >60
GFR NON-AFRICAN AMERICAN: >60
GLUCOSE BLD-MCNC: 107 MG/DL (ref 70–99)
POTASSIUM SERPL-SCNC: 4.2 MMOL/L (ref 3.5–5.1)
PRO-BNP: 231 PG/ML (ref 0–124)
SODIUM BLD-SCNC: 139 MMOL/L (ref 136–145)

## 2021-05-11 PROCEDURE — 36415 COLL VENOUS BLD VENIPUNCTURE: CPT

## 2021-05-11 PROCEDURE — G8417 CALC BMI ABV UP PARAM F/U: HCPCS | Performed by: NURSE PRACTITIONER

## 2021-05-11 PROCEDURE — 83880 ASSAY OF NATRIURETIC PEPTIDE: CPT

## 2021-05-11 PROCEDURE — 80048 BASIC METABOLIC PNL TOTAL CA: CPT

## 2021-05-11 PROCEDURE — 99214 OFFICE O/P EST MOD 30 MIN: CPT | Performed by: NURSE PRACTITIONER

## 2021-05-11 PROCEDURE — 3017F COLORECTAL CA SCREEN DOC REV: CPT | Performed by: NURSE PRACTITIONER

## 2021-05-11 PROCEDURE — 1036F TOBACCO NON-USER: CPT | Performed by: NURSE PRACTITIONER

## 2021-05-11 PROCEDURE — G8427 DOCREV CUR MEDS BY ELIG CLIN: HCPCS | Performed by: NURSE PRACTITIONER

## 2021-05-11 RX ORDER — FUROSEMIDE 20 MG/1
40 TABLET ORAL SEE ADMIN INSTRUCTIONS
Qty: 30 TABLET | Refills: 5
Start: 2021-05-11 | End: 2021-06-10

## 2021-05-11 RX ORDER — SACUBITRIL AND VALSARTAN 49; 51 MG/1; MG/1
1 TABLET, FILM COATED ORAL 2 TIMES DAILY
Qty: 60 TABLET | Refills: 3 | Status: SHIPPED | OUTPATIENT
Start: 2021-05-11 | End: 2022-02-22 | Stop reason: SDUPTHER

## 2021-05-11 NOTE — LETTER
[facility]   Cardiac Follow-up    Primary Care Doctor:  Hope Arce MD    Chief Complaint   Patient presents with    Follow-up    Swelling     bilateral fet and legs    Shortness of Breath     laying down    Fatigue        History of Present Illness:   I had the pleasure of seeing Mary Forrest in follow up for CHF. History of ischemic cardiomyopathy, CAD status post PCI. At his last visit, he had some worsening LE edema which was new for him. He was started on lasix 20mg daily and repeat echo showed similar EF. Furosemide was increased from 20 mg daily to 40 mg daily on 5/3/2021 as he wasn't having much urine output. Leg swelling has improved since last visit. He has a history of sleep apnea but not able to tolerate CPAP. Has to sleep upright a lot. He has been taking advil. Has a lot of back pain. Mary Forrest describes symptoms including fatigue but denies chest pain, early saiety. Home weights: not checking     Past Medical History:   has a past medical history of Asthma, Back problem, Cholelithiasis without obstruction, Depression, ED (erectile dysfunction), Gallbladder & bile duct stone, GERD (gastroesophageal reflux disease), Hernia, Hyperlipidemia, Hypertension, Low testosterone, MI (myocardial infarction) ([address] Seasonal allergies, Sleep apnea, SVT (supraventricular tachycardia) ([address] and Tendonitis. Surgical History:   has a past surgical history that includes back surgery (2001); shoulder surgery; Sternum fracture surgery; hip surgery; hernia repair (2012); Cholecystectomy (2012); Colonoscopy (2010); other surgical history; Lumbar spine surgery (2004); and Carpal tunnel release (Right, 2/11/2019). Social History:   reports that he quit smoking about 12 months ago. His smoking use included cigars. He has a 5.00 pack-year smoking history. He has never used smokeless tobacco. He reports current alcohol use. He reports that he does not use drugs.    Family History: without exudate, oral mucosa moist  Neck: JVP less than 8 cm H20  Respiratory:  · No use of accessory muscles  · Crackles in the bilateral bases, no wheezing, no rhonchi  Cardiovascular:  · The apical impulses not displaced  · no murmur/rub/gallop  · Regular rate and rhythm, S1,S2 normal  · Radial pulses 2+ and equal bilaterally  · Trace edema,  Has compression socks in place   · Pedal Pulses: 2+ and equal   Abdomen:  · No masses or tenderness  · Liver: No Abnormalities Noted  Musculoskeletal/Skin:  · Exhibits normal gait balance and coordination  · There is no clubbing, cyanosis of the extremities  · Skin is warm and dry  · Moves all extremities well  Neurological/Psychiatric:  · Alert and oriented in all spheres  · No abnormalities of mood, affect, memory, mentation, or behavior are noted    Lab Data reviewed and analyzed   CBC:   Lab Results   Component Value Date    WBC 7.6 03/04/2021    WBC 13.2 04/15/2020    WBC 15.7 04/14/2020    RBC 5.77 03/04/2021    RBC 5.74 04/15/2020    RBC 5.75 04/14/2020    HGB 17.5 03/04/2021    HGB 17.3 04/15/2020    HGB 17.5 04/14/2020    HCT 51.5 03/04/2021    HCT 51.2 04/15/2020    HCT 51.6 04/14/2020    MCV 89.1 03/04/2021    MCV 89.2 04/15/2020    MCV 89.9 04/14/2020    RDW 13.5 03/04/2021    RDW 14.1 04/15/2020    RDW 14.0 04/14/2020     03/04/2021     04/15/2020     04/14/2020     Iron: No results found for: IRON, TIBC, FERRITIN  BMP:   Lab Results   Component Value Date     05/11/2021     03/04/2021     04/15/2020    K 4.2 05/11/2021    K 4.1 03/04/2021    K 4.3 04/15/2020    K 4.6 04/14/2020    K 3.6 04/13/2020     05/11/2021     03/04/2021     04/15/2020    CO2 27 05/11/2021    CO2 25 03/04/2021    CO2 25 04/15/2020    PHOS 3.5 04/15/2020    PHOS 4.7 04/24/2017    BUN 14 05/11/2021    BUN 10 03/04/2021    BUN 13 04/15/2020    CREATININE 0.9 05/11/2021    CREATININE 1.0 03/04/2021    CREATININE 0.8 04/15/2020     BNP: Lab Results   Component Value Date    PROBNP 231 05/11/2021    PROBNP 9 04/13/2020     Lipids:   Lab Results   Component Value Date    CHOL 162 02/03/2020        Lab Results   Component Value Date    TRIG 159 (H) 02/03/2020        Lab Results   Component Value Date    HDL 27 (L) 02/03/2020        Lab Results   Component Value Date    LDLCALC 103 (H) 02/03/2020        Lab Results   Component Value Date    LABVLDL 32 02/03/2020    VLDL 33 03/09/2019        Lab Results   Component Value Date    CHOLHDLRATIO 7.0 03/09/2019       EF:   Lab Results   Component Value Date    LVEF 40 06/11/2020       Testing reviewed:     echo 5/3/21   Summary   LV systolic function is mildly reduced with an estimated EF of 45%. There is akinesis of the apical septum and distal apiex. Mild HK of the anteroseptal segment. There is mild concentric left ventricular hypertrophy. Grade I diastolic dysfunction with normal LV filling pressure. Inadequate tricuspid regurgitation jet to estimate systolic artery pressure   (SPAP).     ECHO (limited) 9/14/20   Summary   Limited exam for LVEF.   LV systolic function is mildly reduced with an ejection fraction of 45%.   There is akinesis of the apical-septal and apical-inferior segments.     ECHO 6/11/2020   Summary   Left ventricular systolic function is mildly reduced with ejection fraction   estimated at 40 %.   There is dyskinesis of the apical septal and apical inferior wall segments.   There is akinesis of the apical anterior wall segments.   All remaining wall segments appear normal in function.   Left ventricle size is normal.   Normal left ventricular wall thickness.   Normal left ventricular diastolic filling pressure.   No evidence of mitral valve stenosis.   Mild mitral regurgitation.   Mild tricuspid regurgitation.   Systolic pulmonary artery pressure (SPAP) estimated at 43 mmHg (RA pressure   3 mmHg), consistent with mild pulmonary hypertension.     ECHO/MUGA: 4/13/2020   Summary  The left ventricular systolic function is moderately reduced with an   ejection fraction of 35 %. There is akinesis of the mid and apical anterior   wall and apex.   There is mild concentric left ventricular hypertrophy.   Grade I diastolic dysfunction with normal left ventricular filling pressure.   Systolic pulmonary artery pressure (SPAP) is normal estimated at 31 mmHg   (Right atrial pressure of 3 mmHg). CARDIAC CATH: 4/13/2020  LEFT HEART CATH  LM: luminals  LAD: 100% mid just after diag  LCX: luminals, 20% mid  RCA: dominant, large aneurysmal, mild diffuse disease with focal stenosis     LVEDP: 1   LVEF:45% with mid , apical anterior and apex Hypokinesis  No AS gradient  LESION1; PCI to mid LAD  STENT 3.5 x 18mm post dilated to      Assessment  1. Successful PCI to mid LAD using 1 drug stent                100%-->0%, ALEXSANDRA-3 flow  2. Stable V-tach during case that broke on its own, followed by Inferior ST elevations. ? Spasm as RCA was clear                - Amio x 18 hrs  3. ASA 81mg poqday for life, ticagrelor 90mg po BID for 1 yr w/o interruption  4. BB, statin as tolerated                - ACE/ARB once able  5. Heart block resolved prior to case  6. Echo, ECg, cardiac rehab      NYHA:   II-III  ACC/ AHA Stage:    C    Assessment:  · Ischemic cardiomyopathy  · Chronic systolic heart failure  · CAD status post STEMI and PCI to mid LAD  · Hx of MI   · NSVT  · BIJU untreated     Visit Diagnosis:    1. Shortness of breath    2. Chronic systolic congestive heart failure ([address]    3. Ischemic cardiomyopathy        Plan:   Reviewed echo and labs with patient. 1. Continue daily weights and record; if you gain 3lbs in a day or 5lbs in a week, take 40mg of the lasix as needed, then recheck your weight day; if doesn't respond to lasix then will consider changing this to torsemide   2. Continue aspirin Plavix, Lipitor  3. Continue carvedilol 6.25mg (1 tab twice a day) as you are already taking  4.  Adjust the Entresto 49-51mg twice a day   5. Recommend 64 ounces of fluid a day  6. Repeat CXR given bibasilar crackles on exam today   7. Avoid over the counter cold medications that contain pseudo-ephredrines, phenylephrines, neosynephrines = decongestants  8. Okay to take antihistamines without the decongestant (Claritin, Allegra, Zyrtec, Benadryl without the \"D\")\  9. Okay to take guaifenesin (Mucinex or Robitussion) to help clear phlegm, okay to take dextromethorphan (Delsym) as cough suppressant. 10. Avoid NSAIDs, Naproxen, advil, motrin, ibuprofen   11. Recommend tylenol instead for breakthrough pain   12. Follow up 4 weeks   13. If labs are stable may consider adding Spironolactone (aldactone)    Core measures for HFrEF:  EF: 35%-->45%  ICD: NA  ACEi/ARB/ARNI: entresto  BB: coreg   Kevyn: consider  Hydralazine/nitrates: uptrating entresto       I appreciate the opportunity for caring for this patient.      Gerda Alcantara CNP, 5/11/2021, 4:15 PM

## 2021-05-11 NOTE — PATIENT INSTRUCTIONS
Plan:   1. Continue daily weights and record; if you gain 3lbs in a day or 5lbs in a week, take 40mg of the lasix as needed, then recheck your weight day; if doesn't respond to lasix then will consider changing this to torsemide   2. Continue aspirin Plavix, Lipitor  3. Continue carvedilol 6.25mg (1 tab twice a day) as you are already taking  4. Adjust the Entresto 49-51mg twice a day   5. Recommend 64 ounces of fluid a day  6. Repeat CXR   7. Avoid over the counter cold medications that contain pseudo-ephredrines, phenylephrines, neosynephrines = decongestants  8. Okay to take antihistamines without the decongestant (Claritin, Allegra, Zyrtec, Benadryl without the \"D\")\  9. Okay to take guaifenesin (Mucinex or Robitussion) to help clear phlegm, okay to take dextromethorphan (Delsym) as cough suppressant. 10. Avoid NSAIDs, Naproxen, advil, motrin, ibuprofen   11. Recommend tylenol instead for breakthrough pain   12.  Follow up 4 weeks

## 2021-05-17 ENCOUNTER — TELEPHONE (OUTPATIENT)
Dept: CARDIOLOGY CLINIC | Age: 56
End: 2021-05-17

## 2021-05-17 NOTE — TELEPHONE ENCOUNTER
----- Message from Meghan Foster MD sent at 5/14/2021  5:01 PM EDT -----  Let patient know that BNP is a little more elevated than before but is normal for his age. Renal panel is normal I would think no changes at this time.   This was ordered by wound practitioners on myself however

## 2021-05-17 NOTE — TELEPHONE ENCOUNTER
Per HIPAA left message on patients voicemail relaying message from Dr. Vale Santamaria. Advised patient to call with any questions.

## 2021-05-18 DIAGNOSIS — R79.89 LOW TESTOSTERONE: ICD-10-CM

## 2021-05-18 RX ORDER — TESTOSTERONE CYPIONATE 200 MG/ML
200 INJECTION INTRAMUSCULAR
Qty: 6 ML | Refills: 2 | Status: SHIPPED | OUTPATIENT
Start: 2021-05-18 | End: 2021-06-24 | Stop reason: SDUPTHER

## 2021-05-18 NOTE — TELEPHONE ENCOUNTER
Date of last refill of this med was 9/14, # of pills given 6ml and # of refills given 2. Their next appointment is 5/24, the last date patient was seen was 10/20. Does patient have medication agreement on file? Yes  Has drug screen been done in last 12 months if needed?  no

## 2021-05-19 ENCOUNTER — TELEPHONE (OUTPATIENT)
Dept: ADMINISTRATIVE | Age: 56
End: 2021-05-19

## 2021-05-19 NOTE — TELEPHONE ENCOUNTER
Submitted PA for Testosterone Cypionate 200MG/ML intramuscular solution Key: [id number] - PA Case ID: [id number] - Rx #: [id number]     Via CMM STATUS: PENDING

## 2021-05-19 NOTE — TELEPHONE ENCOUNTER
Received a DENIAL for Testosterone Cypionate 200MG/ML intramuscular solution. Letter attached. Please notify patient, thank you.

## 2021-05-21 NOTE — TELEPHONE ENCOUNTER
The insurance is saying they won't pay for it because he has polycythemia and its not safe to use Testosterone.   I did not see this condition on his problem list?

## 2021-05-24 ENCOUNTER — OFFICE VISIT (OUTPATIENT)
Dept: FAMILY MEDICINE CLINIC | Age: 56
End: 2021-05-24
Payer: COMMERCIAL

## 2021-05-24 VITALS
OXYGEN SATURATION: 96 % | TEMPERATURE: 98.7 F | BODY MASS INDEX: 28.36 KG/M2 | SYSTOLIC BLOOD PRESSURE: 128 MMHG | WEIGHT: 233 LBS | DIASTOLIC BLOOD PRESSURE: 78 MMHG | HEART RATE: 86 BPM

## 2021-05-24 DIAGNOSIS — R53.82 CHRONIC FATIGUE: Primary | ICD-10-CM

## 2021-05-24 DIAGNOSIS — R79.89 LOW TESTOSTERONE: ICD-10-CM

## 2021-05-24 DIAGNOSIS — I25.5 ISCHEMIC CARDIOMYOPATHY: ICD-10-CM

## 2021-05-24 PROCEDURE — 3017F COLORECTAL CA SCREEN DOC REV: CPT | Performed by: FAMILY MEDICINE

## 2021-05-24 PROCEDURE — 99214 OFFICE O/P EST MOD 30 MIN: CPT | Performed by: FAMILY MEDICINE

## 2021-05-24 PROCEDURE — G8417 CALC BMI ABV UP PARAM F/U: HCPCS | Performed by: FAMILY MEDICINE

## 2021-05-24 PROCEDURE — G8427 DOCREV CUR MEDS BY ELIG CLIN: HCPCS | Performed by: FAMILY MEDICINE

## 2021-05-24 PROCEDURE — 1036F TOBACCO NON-USER: CPT | Performed by: FAMILY MEDICINE

## 2021-05-24 ASSESSMENT — PATIENT HEALTH QUESTIONNAIRE - PHQ9
SUM OF ALL RESPONSES TO PHQ QUESTIONS 1-9: 2
SUM OF ALL RESPONSES TO PHQ QUESTIONS 1-9: 2

## 2021-05-24 NOTE — PROGRESS NOTES
He presents for follow up of issues after seeing cardiology and he continues to have significant fatigue that has gotten worse over the last 6 weeks. He does have history of sleep apnea but he has not treated it for over a year after losing weight he is not snoring much and he never tolerated the CPAP machine and it never helped him so he does not think if that he did have to stop his testosterone because the insurance stopped paying for it and that is made fatigue even worse but the fatigue started before he ran out of testosterone a couple weeks ago. He states he just has no energy does not feel depressed he still on Lexapro cardiology did echocardiogram and his BNP was up a little but although lab work okay. Insurance denied his testosterone because of past polycythemia noted on old labs  Tests and documents reviewed: Last labs     Objective:   /78   Pulse 86   Temp 98.7 °F (37.1 °C) (Oral)   Wt 233 lb (105.7 kg)   SpO2 96%   BMI 28.36 kg/m²   BP Readings from Last 3 Encounters:   05/24/21 128/78   05/11/21 110/60   04/29/21 110/60     Physical Exam  Vitals reviewed. Constitutional:       Appearance: He is well-developed. He is not toxic-appearing. HENT:      Head: Normocephalic. Neck:      Thyroid: No thyroid mass or thyromegaly. Cardiovascular:      Rate and Rhythm: Normal rate and regular rhythm. Heart sounds: Normal heart sounds. No murmur heard. Pulmonary:      Effort: No respiratory distress. Breath sounds: Normal breath sounds. No wheezing or rales. Abdominal:      General: There is no distension. Palpations: Abdomen is soft. There is no mass. Tenderness: There is no abdominal tenderness. There is no guarding or rebound. Musculoskeletal:      Cervical back: Neck supple. Lymphadenopathy:      Cervical: No cervical adenopathy. Upper Body:      Right upper body: No supraclavicular adenopathy. Skin:     General: Skin is warm.    Neurological: Mental Status: He is alert and oriented to person, place, and time. Psychiatric:         Behavior: Behavior normal.         Thought Content: Thought content normal.         Judgment: Judgment normal.       Assessment and Plan:   Diagnosis Orders   1. Chronic fatigue  Vitamin B12    Comprehensive Metabolic Panel    CBC Auto Differential    Vitamin D 25 Hydroxy    TSH with Reflex   2. Ischemic cardiomyopathy  BRAIN NATRIURETIC PEPTIDE (BNP)   3. Low testosterone     Discussed treating sleep apnea again. Recheck BNP and BMP at request of cardiology and add other labs to see if still polycythemia. Polycythemia resolved we will try and recertify testosterone. The problems listed in the assessment are stable unless otherwise indicated. He  was instructed to continue their current medications and treatment for the above problems unless otherwise indicated above. Age-specific preventative medicine recommendations were reviewed with patient today and the Healthy Family Handout was given to patient. Avoid tobacco products exposure. I have recommended that the patient follow CDC guidelines for prevention of COVID-19 infection. Follow up in 4 mo. Call or return to office for any problems that develop before the next scheduled follow-up appointment. Willa Olszewski M.D. Parts of this note were completed using voice recognition transcription. Every effort was made to ensure accuracy; however, inadvertent transcription errors may be present.

## 2021-05-24 NOTE — PATIENT INSTRUCTIONS
Please read the healthy family handout that you were given and share it with your family. Please compare this printed medication list with your medications at home to be sure they are the same. If you have any medications that are different please contact us immediately at [phone]. Also review your allergies that we have listed, these may also include medications that you have not been able to tolerate, make sure everything listed is correct. If you have any allergies that are different please contact us immediately at [phone].

## 2021-05-25 LAB
A/G RATIO: 1.9 (ref 1.1–2.2)
ALBUMIN SERPL-MCNC: 4.7 G/DL (ref 3.4–5)
ALP BLD-CCNC: 103 U/L (ref 40–129)
ALT SERPL-CCNC: 45 U/L (ref 10–40)
ANION GAP SERPL CALCULATED.3IONS-SCNC: 11 MMOL/L (ref 3–16)
AST SERPL-CCNC: 34 U/L (ref 15–37)
BASOPHILS ABSOLUTE: 0.1 K/UL (ref 0–0.2)
BASOPHILS RELATIVE PERCENT: 0.6 %
BILIRUB SERPL-MCNC: 0.5 MG/DL (ref 0–1)
BUN BLDV-MCNC: 16 MG/DL (ref 7–20)
CALCIUM SERPL-MCNC: 9.4 MG/DL (ref 8.3–10.6)
CHLORIDE BLD-SCNC: 104 MMOL/L (ref 99–110)
CO2: 28 MMOL/L (ref 21–32)
CREAT SERPL-MCNC: 1.1 MG/DL (ref 0.9–1.3)
EOSINOPHILS ABSOLUTE: 0.2 K/UL (ref 0–0.6)
EOSINOPHILS RELATIVE PERCENT: 2.1 %
GFR AFRICAN AMERICAN: >60
GFR NON-AFRICAN AMERICAN: >60
GLOBULIN: 2.5 G/DL
GLUCOSE BLD-MCNC: 87 MG/DL (ref 70–99)
HCT VFR BLD CALC: 48.5 % (ref 40.5–52.5)
HEMOGLOBIN: 16.6 G/DL (ref 13.5–17.5)
LYMPHOCYTES ABSOLUTE: 3.7 K/UL (ref 1–5.1)
LYMPHOCYTES RELATIVE PERCENT: 35.5 %
MCH RBC QN AUTO: 29.9 PG (ref 26–34)
MCHC RBC AUTO-ENTMCNC: 34.1 G/DL (ref 31–36)
MCV RBC AUTO: 87.5 FL (ref 80–100)
MONOCYTES ABSOLUTE: 0.9 K/UL (ref 0–1.3)
MONOCYTES RELATIVE PERCENT: 8.5 %
NEUTROPHILS ABSOLUTE: 5.5 K/UL (ref 1.7–7.7)
NEUTROPHILS RELATIVE PERCENT: 53.3 %
PDW BLD-RTO: 13.8 % (ref 12.4–15.4)
PLATELET # BLD: 191 K/UL (ref 135–450)
PLATELET SLIDE REVIEW: ADEQUATE
PMV BLD AUTO: 9.3 FL (ref 5–10.5)
POTASSIUM SERPL-SCNC: 4.8 MMOL/L (ref 3.5–5.1)
PRO-BNP: 171 PG/ML (ref 0–124)
RBC # BLD: 5.54 M/UL (ref 4.2–5.9)
SLIDE REVIEW: NORMAL
SODIUM BLD-SCNC: 143 MMOL/L (ref 136–145)
TOTAL PROTEIN: 7.2 G/DL (ref 6.4–8.2)
TSH REFLEX: 2.12 UIU/ML (ref 0.27–4.2)
VITAMIN B-12: 734 PG/ML (ref 211–911)
VITAMIN D 25-HYDROXY: 40.6 NG/ML
WBC # BLD: 10.4 K/UL (ref 4–11)

## 2021-06-03 ENCOUNTER — TELEPHONE (OUTPATIENT)
Dept: CARDIOLOGY CLINIC | Age: 56
End: 2021-06-03

## 2021-06-03 NOTE — TELEPHONE ENCOUNTER
Pt assistance needs clarification for rx of Entresto please call ph #:  [phone]. Please call this is the provider line. Please call pt regarding getting approved for pt to receive testosterone that was denied from insurance, so pcp did bld work again and sent it to his cardio doc.   Please call

## 2021-06-07 ENCOUNTER — HOSPITAL ENCOUNTER (OUTPATIENT)
Dept: GENERAL RADIOLOGY | Age: 56
Discharge: HOME | End: 2021-06-07
Payer: COMMERCIAL

## 2021-06-07 ENCOUNTER — HOSPITAL ENCOUNTER (OUTPATIENT)
Age: 56
Discharge: HOME | End: 2021-06-07
Payer: COMMERCIAL

## 2021-06-07 DIAGNOSIS — I50.22 CHRONIC SYSTOLIC CONGESTIVE HEART FAILURE (HCC): ICD-10-CM

## 2021-06-07 DIAGNOSIS — R06.02 SHORTNESS OF BREATH: ICD-10-CM

## 2021-06-07 DIAGNOSIS — I25.5 ISCHEMIC CARDIOMYOPATHY: ICD-10-CM

## 2021-06-07 LAB
ANION GAP SERPL CALCULATED.3IONS-SCNC: 9 MMOL/L (ref 3–16)
BUN BLDV-MCNC: 14 MG/DL (ref 7–20)
CALCIUM SERPL-MCNC: 9.7 MG/DL (ref 8.3–10.6)
CHLORIDE BLD-SCNC: 104 MMOL/L (ref 99–110)
CO2: 27 MMOL/L (ref 21–32)
CREAT SERPL-MCNC: 1 MG/DL (ref 0.9–1.3)
GFR AFRICAN AMERICAN: >60
GFR NON-AFRICAN AMERICAN: >60
GLUCOSE BLD-MCNC: 114 MG/DL (ref 70–99)
POTASSIUM SERPL-SCNC: 4.1 MMOL/L (ref 3.5–5.1)
PRO-BNP: 136 PG/ML (ref 0–124)
SODIUM BLD-SCNC: 140 MMOL/L (ref 136–145)

## 2021-06-07 PROCEDURE — 36415 COLL VENOUS BLD VENIPUNCTURE: CPT

## 2021-06-07 PROCEDURE — 83880 ASSAY OF NATRIURETIC PEPTIDE: CPT

## 2021-06-07 PROCEDURE — 80048 BASIC METABOLIC PNL TOTAL CA: CPT

## 2021-06-07 PROCEDURE — 71046 X-RAY EXAM CHEST 2 VIEWS: CPT

## 2021-06-07 NOTE — RESULT ENCOUNTER NOTE
Please call. Labs are improved. Heart number BNP is  improved. Kidney function is stable with current regimen. Chest xray is clear and doesn't show any fluid around the lungs. Continue current regimen and keep follow up.

## 2021-06-08 ENCOUNTER — OFFICE VISIT (OUTPATIENT)
Dept: FAMILY MEDICINE CLINIC | Age: 56
End: 2021-06-08
Payer: COMMERCIAL

## 2021-06-08 VITALS
DIASTOLIC BLOOD PRESSURE: 74 MMHG | SYSTOLIC BLOOD PRESSURE: 132 MMHG | TEMPERATURE: 99 F | BODY MASS INDEX: 28.48 KG/M2 | OXYGEN SATURATION: 97 % | WEIGHT: 234 LBS | HEART RATE: 79 BPM

## 2021-06-08 DIAGNOSIS — R31.9 HEMATURIA, UNSPECIFIED TYPE: Primary | ICD-10-CM

## 2021-06-08 LAB
BILIRUBIN, POC: ABNORMAL
BLOOD URINE, POC: ABNORMAL
CLARITY, POC: ABNORMAL
COLOR, POC: ABNORMAL
GLUCOSE URINE, POC: NEGATIVE
KETONES, POC: ABNORMAL
LEUKOCYTE EST, POC: ABNORMAL
NITRITE, POC: POSITIVE
PH, POC: 6
PROTEIN, POC: 100
SPECIFIC GRAVITY, POC: 1.02
UROBILINOGEN, POC: 1

## 2021-06-08 PROCEDURE — 81002 URINALYSIS NONAUTO W/O SCOPE: CPT | Performed by: NURSE PRACTITIONER

## 2021-06-08 PROCEDURE — G8427 DOCREV CUR MEDS BY ELIG CLIN: HCPCS | Performed by: NURSE PRACTITIONER

## 2021-06-08 PROCEDURE — 3017F COLORECTAL CA SCREEN DOC REV: CPT | Performed by: NURSE PRACTITIONER

## 2021-06-08 PROCEDURE — G8417 CALC BMI ABV UP PARAM F/U: HCPCS | Performed by: NURSE PRACTITIONER

## 2021-06-08 PROCEDURE — 99213 OFFICE O/P EST LOW 20 MIN: CPT | Performed by: NURSE PRACTITIONER

## 2021-06-08 PROCEDURE — 1036F TOBACCO NON-USER: CPT | Performed by: NURSE PRACTITIONER

## 2021-06-08 RX ORDER — CIPROFLOXACIN 500 MG/1
500 TABLET, FILM COATED ORAL 2 TIMES DAILY
Qty: 20 TABLET | Refills: 0 | Status: SHIPPED | OUTPATIENT
Start: 2021-06-08 | End: 2021-06-18

## 2021-06-08 RX ORDER — CIPROFLOXACIN 500 MG/1
500 TABLET, FILM COATED ORAL 2 TIMES DAILY
Qty: 10 TABLET | Refills: 0 | Status: CANCELLED | OUTPATIENT
Start: 2021-06-08 | End: 2021-06-13

## 2021-06-08 ASSESSMENT — ENCOUNTER SYMPTOMS
SORE THROAT: 0
ABDOMINAL PAIN: 0
WHEEZING: 0
NAUSEA: 0
SHORTNESS OF BREATH: 0
DIARRHEA: 0
RHINORRHEA: 0
COUGH: 0
VOMITING: 0

## 2021-06-08 NOTE — PATIENT INSTRUCTIONS
Please read the healthy family handout that you were given and share it with your family. Please compare this printed medication list with your medications at home to be sure they are the same. If you have any medications that are different please contact us immediately at [phone]. Also review your allergies that we have listed, these may also include medications that you have not been able to tolerate, make sure everything listed is correct. If you have any allergies that are different please contact us immediately at [phone].

## 2021-06-08 NOTE — PROGRESS NOTES
CHIEF COMPLAINT  Chief Complaint   Patient presents with    Other     blood in urine         HPI   Shila Carvajal is a 60 y.o. male who presents to the office complaining of in urine. Patient reports that symptoms started last Thursday. Patient reports that it was bright red and then went to dark red. Patient reports that over the weekend it cleared up completely. Patient reports upon awakening this morning he had noticed that the blood in his urine had returned. Patient reports that he has felt fatigued over the past several months. Patient denies any dizziness, lightheadedness, chest pain or worsening shortness of breath. Patient denies any fever, chills, nausea, vomiting or diarrhea. No abdominal pain or worsening low back pain. Patient reports some urinary urgency but denies any frequency or retention. Patient reports only change in medication was Entresto increased to twice a day versus daily 3 weeks ago. Patient continues to take Plavix on a daily basis. Patient reports that he is quit all anti-inflammatories as well. Patient has history of kidney stones but denies any similar symptoms. No other complaints, modifying factors or associated symptoms. Nursing notes reviewed.    Past Medical History:   Diagnosis Date    Asthma     Back problem     Dr Ayleen Schmitz, Dr Marce Nguyen Cholelithiasis without obstruction 2010    asymptomatic    Depression     ED (erectile dysfunction)     Gallbladder & bile duct stone     Gallbladder full of stones    GERD (gastroesophageal reflux disease)     Hernia     Hyperlipidemia     Hypertension     Low testosterone     MI (myocardial infarction) ([address] 04/13/2020    mLAD 100% FELICITY Xience Martha 3.5 x 18    Seasonal allergies     Sleep apnea     SVT (supraventricular tachycardia) ([address]     GXT 1998    Tendonitis      Past Surgical History:   Procedure Laterality Date    BACK SURGERY  2001    L5 FUSION, Dr Cruz Right 2019    RIGHT CARPAL TUNNEL RELEASE performed by Archie Shaikh [facility] at [facility]  2012    Dr. Darrell Pandey      [facility] patient states   Shilpi Stands      Dr. Edil Tang      stimulator put in 3/10   Shanna Draft LUMBAR SPINE SURGERY      Dr Sandee Penaloza       Family History   Problem Relation Age of Onset    Heart Disease Father      Social History     Socioeconomic History    Marital status:      Spouse name: Not on file    Number of children: Not on file    Years of education: Not on file    Highest education level: Not on file   Occupational History    Not on file   Tobacco Use    Smoking status: Former Smoker     Packs/day: 0.25     Years: 20.00     Pack years: 5.00     Types: Cigars     Quit date: 2020     Years since quittin.1    Smokeless tobacco: Never Used   Vaping Use    Vaping Use: Never used   Substance and Sexual Activity    Alcohol use: Yes     Comment: occassional    Drug use: No    Sexual activity: Yes     Partners: Female   Other Topics Concern    Not on file   Social History Narrative    Not on file     Social Determinants of Health     Financial Resource Strain:     Difficulty of Paying Living Expenses:    Food Insecurity:     Worried About Running Out of Food in the Last Year:     Ran Out of Food in the Last Year:    Transportation Needs:     Lack of Transportation (Medical):      Lack of Transportation (Non-Medical):    Physical Activity:     Days of Exercise per Week:     Minutes of Exercise per Session:    Stress:     Feeling of Stress :    Social Connections:     Frequency of Communication with Friends and Family:     Frequency of Social Gatherings with Friends and Family:     Attends Nondenominational Services:     Active Member of Clubs or Organizations:     Attends Club or Organization Meetings:     Marital Status:    Intimate Partner Violence:     Fear of Current or Ex-Partner:     Emotionally Abused:     Physically Abused:     Sexually Abused:      Current Outpatient Medications   Medication Sig Dispense Refill    ciprofloxacin (CIPRO) 500 MG tablet Take 1 tablet by mouth 2 times daily for 10 days 20 tablet 0    testosterone cypionate (DEPOTESTOTERONE CYPIONATE) 200 MG/ML injection Inject 1 mL into the muscle every 14 days for 30 days. 6 mL 2    furosemide (LASIX) 20 MG tablet Take 2 tablets by mouth See Admin Instructions Daily as needed for weight gain of 3lbs in a day or 5lbs in a week. 30 tablet 5    sacubitril-valsartan (ENTRESTO) 49-51 MG per tablet Take 1 tablet by mouth 2 times daily 60 tablet 3    atorvastatin (LIPITOR) 40 MG tablet TAKE 1 TABLET BY MOUTH EVERY DAY AT NIGHT 90 tablet 3    MOVANTIK 12.5 MG TABS tablet       escitalopram (LEXAPRO) 10 MG tablet TAKE 1 TABLET BY MOUTH EVERY DAY 90 tablet 1    clopidogrel (PLAVIX) 75 MG tablet TAKE 1 TABLET BY MOUTH EVERY DAY 90 tablet 3    carvedilol (COREG) 6.25 MG tablet Take 1.5 tablets by mouth 2 times daily (with meals) (Patient taking differently: Take 6.25 mg by mouth 2 times daily (with meals) ) 270 tablet 3    Multiple Vitamin (MULTI-VITAMIN DAILY PO) Take by mouth      aspirin 81 MG chewable tablet Take 1 tablet by mouth daily 30 tablet 0    sodium chloride 0.9 % SOLN 100 mL with morphine (PF) 10 MG/ML SOLN 100 mg Infuse intravenously continuous Along with dilaudid      oxyCODONE HCl (OXY-IR) 10 MG immediate release tablet TAKE 1 TABLET BY MOUTH EVERY 6 HOURS  0     No current facility-administered medications for this visit. Allergies   Allergen Reactions    Vioxx Other (See Comments)     Oral ulcers      Phenergan [Promethazine Hcl] Nausea And Vomiting     Was given after surgery and induced nausea and vomiting.        REVIEW OF SYSTEMS  Review of Systems   Constitutional: Positive for fatigue. Negative for activity change, appetite change, chills and fever. HENT: Negative for congestion, rhinorrhea and sore throat. Eyes: Negative for visual disturbance. Respiratory: Negative for cough, shortness of breath and wheezing. Cardiovascular: Negative for chest pain and leg swelling. Gastrointestinal: Negative for abdominal pain, diarrhea, nausea and vomiting. Genitourinary: Positive for hematuria and urgency. Negative for dysuria and frequency. Musculoskeletal: Negative for gait problem and myalgias. Skin: Negative for rash. Neurological: Negative for dizziness, weakness, light-headedness, numbness and headaches. Psychiatric/Behavioral: Negative for sleep disturbance. PHYSICAL EXAM  /74   Pulse 79   Temp 99 °F (37.2 °C) (Oral)   Wt 234 lb (106.1 kg)   SpO2 97%   BMI 28.48 kg/m²   Physical Exam  Vitals reviewed. Constitutional:       Appearance: He is well-developed. He is not toxic-appearing. HENT:      Head: Normocephalic. Right Ear: External ear normal.      Left Ear: External ear normal.      Nose: Nose normal.      Mouth/Throat:      Mouth: Mucous membranes are moist.      Pharynx: Oropharynx is clear. Eyes:      Pupils: Pupils are equal, round, and reactive to light. Neck:      Thyroid: No thyroid mass or thyromegaly. Cardiovascular:      Rate and Rhythm: Normal rate and regular rhythm. Heart sounds: Normal heart sounds. No murmur heard. Pulmonary:      Effort: No respiratory distress. Breath sounds: Normal breath sounds. No wheezing or rales. Abdominal:      General: There is no distension. Palpations: Abdomen is soft. There is no mass. Tenderness: There is no abdominal tenderness. There is no right CVA tenderness, left CVA tenderness, guarding or rebound. Musculoskeletal:         General: Normal range of motion. Cervical back: Neck supple. Right lower leg: No edema. Left lower leg: No edema. Lymphadenopathy:      Cervical: No cervical adenopathy. Upper Body:      Right upper body: No supraclavicular adenopathy. Skin:     General: Skin is warm and dry. Capillary Refill: Capillary refill takes less than 2 seconds. Findings: No rash. Neurological:      Mental Status: He is alert and oriented to person, place, and time. Psychiatric:         Mood and Affect: Mood normal.            ASSESSMENT/PLAN:   1. Hematuria, unspecified type  Patient presents the office today with concerns of hematuria. Patient reports that symptoms started last Thursday. Patient reports that initially urine was bright red and turned to dark red. Patient reports that over the weekend it completely cleared up. Patient reports that this morning when he woke up he had noticed his urine was dark again. Patient denies any dizziness, lightheadedness, fever or chills. Patient reports fatigue but no new or worsening symptoms. Patient reports that the only change in his medications was that his Bethene Oswald was increased to twice a day versus once a day approximately 3 weeks ago. Patient denies any fever, chills, nausea, vomiting or diarrhea. Patient has chronic back pain with pain pump in place but denies any new or worsening low back pain. No abdominal pain or discomfort. Patient denies some urinary urgency but no frequency, dysuria, or urinary retention. Patient denies any testicular pain or swelling. Patient is on Plavix but denies any excessive NSAID use. Patient had similar symptoms several months ago but reports that symptoms cleared up on their own. Patient reports history of kidney stones but reports today symptoms are different. Urinalysis obtained in the office did reveal moderate ketones, large bilirubin, large blood, positive for nitrites and leukocytes. Urine culture sent. BMP was performed yesterday which I was able to review with no acute kidney injury noted.   Therefore no repeat blood work performed today. We had discussed at previous visit urology follow-up however symptoms improved at that time. I do recommend that patient follows up with urologist this time in the next several days for further evaluation. Patient was placed on antibiotics, with close follow-up. Patient encouraged to drink plenty of water and avoid tea/pop. Referral placed. Patient verbalized and acknowledges with plan of care at this time. - POCT Urinalysis no Micro  - Culture, Urine  - AFL - Inga Mcgee MD, Urology, [facility]  - ciprofloxacin (CIPRO) 500 MG tablet; Take 1 tablet by mouth 2 times daily for 10 days  Dispense: 20 tablet; Refill: 0         The note was completed using Dragon voice recognition transcription. Every effort was made to ensure accuracy; however, inadvertent  transcription errors may be present despite my best efforts to edit errors.     ERICA Uribe - CNP

## 2021-06-09 ENCOUNTER — TELEPHONE (OUTPATIENT)
Dept: CARDIOLOGY CLINIC | Age: 56
End: 2021-06-09

## 2021-06-09 NOTE — TELEPHONE ENCOUNTER
----- Message from ERICA Grey CNP sent at 6/8/2021  5:22 PM EDT -----  Please call patient.   His lungs are clear no fluid around the lungs continue current regimen

## 2021-06-10 ENCOUNTER — OFFICE VISIT (OUTPATIENT)
Dept: CARDIOLOGY CLINIC | Age: 56
End: 2021-06-10
Payer: COMMERCIAL

## 2021-06-10 VITALS
SYSTOLIC BLOOD PRESSURE: 114 MMHG | DIASTOLIC BLOOD PRESSURE: 70 MMHG | OXYGEN SATURATION: 96 % | WEIGHT: 233 LBS | BODY MASS INDEX: 28.37 KG/M2 | HEIGHT: 76 IN | HEART RATE: 79 BPM

## 2021-06-10 DIAGNOSIS — I25.5 ISCHEMIC CARDIOMYOPATHY: ICD-10-CM

## 2021-06-10 DIAGNOSIS — I25.10 CORONARY ARTERY DISEASE INVOLVING NATIVE CORONARY ARTERY OF NATIVE HEART WITHOUT ANGINA PECTORIS: ICD-10-CM

## 2021-06-10 DIAGNOSIS — I10 ESSENTIAL HYPERTENSION: ICD-10-CM

## 2021-06-10 DIAGNOSIS — I50.22 CHRONIC SYSTOLIC CONGESTIVE HEART FAILURE (HCC): Primary | ICD-10-CM

## 2021-06-10 LAB — URINE CULTURE, ROUTINE: NORMAL

## 2021-06-10 PROCEDURE — 3017F COLORECTAL CA SCREEN DOC REV: CPT | Performed by: NURSE PRACTITIONER

## 2021-06-10 PROCEDURE — G8427 DOCREV CUR MEDS BY ELIG CLIN: HCPCS | Performed by: NURSE PRACTITIONER

## 2021-06-10 PROCEDURE — G8417 CALC BMI ABV UP PARAM F/U: HCPCS | Performed by: NURSE PRACTITIONER

## 2021-06-10 PROCEDURE — 99214 OFFICE O/P EST MOD 30 MIN: CPT | Performed by: NURSE PRACTITIONER

## 2021-06-10 PROCEDURE — 1036F TOBACCO NON-USER: CPT | Performed by: NURSE PRACTITIONER

## 2021-06-10 RX ORDER — CARVEDILOL 12.5 MG/1
12.5 TABLET ORAL 2 TIMES DAILY WITH MEALS
Qty: 60 TABLET | Refills: 2 | Status: SHIPPED | OUTPATIENT
Start: 2021-06-10 | End: 2021-09-02

## 2021-06-10 RX ORDER — FUROSEMIDE 20 MG/1
40 TABLET ORAL SEE ADMIN INSTRUCTIONS
Qty: 30 TABLET | Refills: 5
Start: 2021-06-10 | End: 2021-12-06 | Stop reason: ALTCHOICE

## 2021-06-10 NOTE — PROGRESS NOTES
[address]   Cardiac Follow-up    Primary Care Doctor:  Lino Ruiz MD    Chief Complaint   Patient presents with    Follow-up        History of Present Illness:   I had the pleasure of seeing Taty Alves in follow up for CHF. History of ischemic cardiomyopathy, CAD status post PCI. Since last visit, Entresto was adjusted to 49-51mg BID. CXR was clear. He had some intermittent hematuria recently, and he is seen by his primary care for this. His edema is improved and now resolved. Breathing is stable. He is no longer taking the Lasix. He was on testosterone and now off, very fatigued since not able to get from the pharmacy     Home weights: 233lbs  Taty Alves  denies chest pain, dyspnea, palpitations, edema, syncope. Past Medical History:   has a past medical history of Asthma, Back problem, Cholelithiasis without obstruction, Depression, ED (erectile dysfunction), Gallbladder & bile duct stone, GERD (gastroesophageal reflux disease), Hernia, Hyperlipidemia, Hypertension, Low testosterone, MI (myocardial infarction) ([address] Seasonal allergies, Sleep apnea, SVT (supraventricular tachycardia) ([address] and Tendonitis. Surgical History:   has a past surgical history that includes back surgery (2001); shoulder surgery; Sternum fracture surgery; hip surgery; hernia repair (2012); Cholecystectomy (2012); Colonoscopy (2010); other surgical history; Lumbar spine surgery (2004); and Carpal tunnel release (Right, 2/11/2019). Social History:   reports that he quit smoking about 13 months ago. His smoking use included cigars. He has a 5.00 pack-year smoking history. He has never used smokeless tobacco. He reports current alcohol use. He reports that he does not use drugs. Family History:   Family History   Problem Relation Age of Onset    Heart Disease Father        Home Medications:  Prior to Admission medications    Medication Sig Start Date End Date Taking?  Authorizing Provider ciprofloxacin (CIPRO) 500 MG tablet Take 1 tablet by mouth 2 times daily for 10 days 6/8/21 6/18/21 Yes ERICA Townsend CNP   sacubitril-valsartan (ENTRESTO) 49-51 MG per tablet Take 1 tablet by mouth 2 times daily 5/11/21  Yes ERICA Frias CNP   atorvastatin (LIPITOR) 40 MG tablet TAKE 1 TABLET BY MOUTH EVERY DAY AT NIGHT 5/7/21  Yes Fred Prince MD   MOVANTIK 12.5 MG TABS tablet  2/17/21  Yes Historical Provider, MD   escitalopram (LEXAPRO) 10 MG tablet TAKE 1 TABLET BY MOUTH EVERY DAY 2/11/21  Yes Davie Lang MD   clopidogrel (PLAVIX) 75 MG tablet TAKE 1 TABLET BY MOUTH EVERY DAY 8/26/20  Yes Sabrina Miller MD   carvedilol (COREG) 6.25 MG tablet Take 1.5 tablets by mouth 2 times daily (with meals)  Patient taking differently: Take 6.25 mg by mouth 2 times daily (with meals)  8/24/20  Yes Sabrina Miller MD   Multiple Vitamin (MULTI-VITAMIN DAILY PO) Take by mouth   Yes Historical Provider, MD   aspirin 81 MG chewable tablet Take 1 tablet by mouth daily 4/16/20  Yes Solange Rodriges MD   sodium chloride 0.9 % SOLN 100 mL with morphine (PF) 10 MG/ML SOLN 100 mg Infuse intravenously continuous Along with dilaudid   Yes Historical Provider, MD   oxyCODONE HCl (OXY-IR) 10 MG immediate release tablet TAKE 1 TABLET BY MOUTH EVERY 6 HOURS 5/6/17  Yes Historical Provider, MD   testosterone cypionate (DEPOTESTOTERONE CYPIONATE) 200 MG/ML injection Inject 1 mL into the muscle every 14 days for 30 days. Patient not taking: Reported on 6/10/2021 5/18/21 6/17/21  Davie Lang MD   furosemide (LASIX) 20 MG tablet Take 2 tablets by mouth See Admin Instructions Daily as needed for weight gain of 3lbs in a day or 5lbs in a week.   Patient not taking: Reported on 6/10/2021 5/11/21   ERICA Frias CNP        Allergies:  Vioxx and Phenergan [promethazine hcl]     Review of Systems:   Nausea/Vomiting/Diarrhea: No  Hematuria: yes      Physical Examination:    Vitals:    06/10/21 1354 BP: 114/70   Pulse: 79   SpO2: 96%   Weight: 233 lb (105.7 kg)   Height: 6' 4\" (1.93 m)        Constitutional and General Appearance: no apparent distress  HEENT: non-icteric sclera, mask in place  Neck: JVP less than 8 cm H20  Respiratory:  · No use of accessory muscles  · Clear in the bases, no wheezing, no rhonchi  Cardiovascular:   · The apical impulses not displaced  · no murmur/rub/gallop  · Regular rate and rhythm, S1,S2 normal  · Radial pulses 2+ and equal bilaterally  · No lower extremity edema,   · Pedal Pulses: 2+ and equal   Abdomen:  · No masses or tenderness  · Liver: No Abnormalities Noted  Musculoskeletal/Skin:  · Exhibits normal gait balance and coordination  · There is no clubbing, cyanosis of the extremities  · Skin is warm and dry  · Moves all extremities well  Neurological/Psychiatric:  · Alert and oriented in all spheres  · No abnormalities of mood, affect, memory, mentation, or behavior are noted    Lab Data reviewed and analyzed   CBC:   Lab Results   Component Value Date    WBC 10.4 05/24/2021    WBC 7.6 03/04/2021    WBC 13.2 04/15/2020    RBC 5.54 05/24/2021    RBC 5.77 03/04/2021    RBC 5.74 04/15/2020    HGB 16.6 05/24/2021    HGB 17.5 03/04/2021    HGB 17.3 04/15/2020    HCT 48.5 05/24/2021    HCT 51.5 03/04/2021    HCT 51.2 04/15/2020    MCV 87.5 05/24/2021    MCV 89.1 03/04/2021    MCV 89.2 04/15/2020    RDW 13.8 05/24/2021    RDW 13.5 03/04/2021    RDW 14.1 04/15/2020     05/24/2021     03/04/2021     04/15/2020     Iron: No results found for: IRON, TIBC, FERRITIN  BMP:   Lab Results   Component Value Date     06/07/2021     05/24/2021     05/11/2021    K 4.1 06/07/2021    K 4.8 05/24/2021    K 4.2 05/11/2021    K 4.6 04/14/2020    K 3.6 04/13/2020     06/07/2021     05/24/2021     05/11/2021    CO2 27 06/07/2021    CO2 28 05/24/2021    CO2 27 05/11/2021    PHOS 3.5 04/15/2020    PHOS 4.7 04/24/2017    BUN 14 06/07/2021 BUN 16 05/24/2021    BUN 14 05/11/2021    CREATININE 1.0 06/07/2021    CREATININE 1.1 05/24/2021    CREATININE 0.9 05/11/2021     BNP:   Lab Results   Component Value Date    PROBNP 136 06/07/2021    PROBNP 171 05/24/2021    PROBNP 231 05/11/2021     Lipids:   Lab Results   Component Value Date    CHOL 162 02/03/2020        Lab Results   Component Value Date    TRIG 159 (H) 02/03/2020        Lab Results   Component Value Date    HDL 27 (L) 02/03/2020        Lab Results   Component Value Date    LDLCALC 103 (H) 02/03/2020        Lab Results   Component Value Date    LABVLDL 32 02/03/2020    VLDL 33 03/09/2019        Lab Results   Component Value Date    CHOLHDLRATIO 7.0 03/09/2019       EF:   Lab Results   Component Value Date    LVEF 45 05/03/2021       Testing reviewed:  ECHO (limited) 9/14/20   Summary   Limited exam for LVEF.   LV systolic function is mildly reduced with an ejection fraction of 45%.   There is akinesis of the apical-septal and apical-inferior segments.     ECHO 6/11/2020   Summary   Left ventricular systolic function is mildly reduced with ejection fraction   estimated at 40 %.   There is dyskinesis of the apical septal and apical inferior wall segments.   There is akinesis of the apical anterior wall segments.   All remaining wall segments appear normal in function.   Left ventricle size is normal.   Normal left ventricular wall thickness.   Normal left ventricular diastolic filling pressure.   No evidence of mitral valve stenosis.   Mild mitral regurgitation.   Mild tricuspid regurgitation.   Systolic pulmonary artery pressure (SPAP) estimated at 43 mmHg (RA pressure   3 mmHg), consistent with mild pulmonary hypertension.     ECHO/MUGA: 4/13/2020   Summary   The left ventricular systolic function is moderately reduced with an   ejection fraction of 35 %.  There is akinesis of the mid and apical anterior   wall and apex.   There is mild concentric left ventricular hypertrophy.   Grade I diastolic dysfunction with normal left ventricular filling pressure.   Systolic pulmonary artery pressure (SPAP) is normal estimated at 31 mmHg   (Right atrial pressure of 3 mmHg). CARDIAC CATH: 4/13/2020  LEFT HEART CATH  LM: luminals  LAD: 100% mid just after diag  LCX: luminals, 20% mid  RCA: dominant, large aneurysmal, mild diffuse disease with focal stenosis     LVEDP: 1   LVEF:45% with mid , apical anterior and apex Hypokinesis  No AS gradient  LESION1; PCI to mid LAD  STENT 3.5 x 18mm post dilated to      Assessment  1. Successful PCI to mid LAD using 1 drug stent                100%-->0%, ALEXSANDRA-3 flow  2. Stable V-tach during case that broke on its own, followed by Inferior ST elevations. ? Spasm as RCA was clear                - Amio x 18 hrs  3. ASA 81mg poqday for life, ticagrelor 90mg po BID for 1 yr w/o interruption  4. BB, statin as tolerated                - ACE/ARB once able  5. Heart block resolved prior to case  6. Echo, ECg, cardiac rehab     echo 5/3/21   Summary   LV systolic function is mildly reduced with an estimated EF of 45%. There is akinesis of the apical septum and distal apiex. Mild HK of the anteroseptal segment. There is mild concentric left ventricular hypertrophy. Grade I diastolic dysfunction with normal LV filling pressure. Inadequate tricuspid regurgitation jet to estimate systolic artery pressure(SPAP). NYHA:   I-II  ACC/ AHA Stage:    C    Assessment:  · Ischemic cardiomyopathy. LVEF 45%  · Chronic combined heart failure  · CAD status post STEMI and PCI to mid LAD  · Hx of MI   · NSVT  · BIJU untreated     Visit Diagnosis:    1. Chronic systolic congestive heart failure ([address]    2. Ischemic cardiomyopathy    3. Coronary artery disease involving native coronary artery of native heart without angina pectoris    4. Essential hypertension      Plan:   1. Continue daily weights; if your weight goes up 3lbs in a day then okay to take a dose of the lasix   2.  Continue aspirin Plavix, Lipitor; will discuss with Dr. July Hernandez regarding stopping plavix. 3. Adjust carvedilol 6.25mg (2 tab twice a day) and will send in the 12.5mg take 1 tab twice a day  4. Entresto 49-51mg twice a day- no changes; recommend follow up with urology as planned    5. Recommend 64 ounces of fluid a day  6. Follow up 3 months with Dr. July Hernandez or sooner if needed     Core measures for HFrEF:  EF: 35%-->45%  ICD: NA  ACEi/ARB/ARNI: entresto  BB: coreg   Kevyn: consider  Hydralazine/nitrates: uptrating entresto     I appreciate the opportunity for caring for this patient.      ERICA Martins - CNP, CNP, 6/10/2021, 2:55 PM

## 2021-06-10 NOTE — LETTER
[facility]   Cardiac Follow-up    Primary Care Doctor:  Tenisha De La Cruz MD    Chief Complaint   Patient presents with    Follow-up        History of Present Illness:   I had the pleasure of seeing Nadir Bryant in follow up for CHF. History of ischemic cardiomyopathy, CAD status post PCI. Since last visit, Entresto was adjusted to 49-51mg BID. CXR was clear. He had some intermittent hematuria recently, and he is seen by his primary care for this. His edema is improved and now resolved. Breathing is stable. He is no longer taking the Lasix. He was on testosterone and now off, very fatigued since not able to get from the pharmacy     Home weights: 233lbs  Nadir Bryant  denies chest pain, dyspnea, palpitations, edema, syncope. Past Medical History:   has a past medical history of Asthma, Back problem, Cholelithiasis without obstruction, Depression, ED (erectile dysfunction), Gallbladder & bile duct stone, GERD (gastroesophageal reflux disease), Hernia, Hyperlipidemia, Hypertension, Low testosterone, MI (myocardial infarction) ([address] Seasonal allergies, Sleep apnea, SVT (supraventricular tachycardia) ([address] and Tendonitis. Surgical History:   has a past surgical history that includes back surgery (2001); shoulder surgery; Sternum fracture surgery; hip surgery; hernia repair (2012); Cholecystectomy (2012); Colonoscopy (2010); other surgical history; Lumbar spine surgery (2004); and Carpal tunnel release (Right, 2/11/2019). Social History:   reports that he quit smoking about 13 months ago. His smoking use included cigars. He has a 5.00 pack-year smoking history. He has never used smokeless tobacco. He reports current alcohol use. He reports that he does not use drugs. Family History:   Family History   Problem Relation Age of Onset    Heart Disease Father        Home Medications:  Prior to Admission medications    Medication Sig Start Date End Date Taking?  Authorizing Provider BP: 114/70   Pulse: 79   SpO2: 96%   Weight: 233 lb (105.7 kg)   Height: 6' 4\" (1.93 m)        Constitutional and General Appearance: no apparent distress  HEENT: non-icteric sclera, mask in place  Neck: JVP less than 8 cm H20  Respiratory:  · No use of accessory muscles  · Clear in the bases, no wheezing, no rhonchi  Cardiovascular:   · The apical impulses not displaced  · no murmur/rub/gallop  · Regular rate and rhythm, S1,S2 normal  · Radial pulses 2+ and equal bilaterally  · No lower extremity edema,   · Pedal Pulses: 2+ and equal   Abdomen:  · No masses or tenderness  · Liver: No Abnormalities Noted  Musculoskeletal/Skin:  · Exhibits normal gait balance and coordination  · There is no clubbing, cyanosis of the extremities  · Skin is warm and dry  · Moves all extremities well  Neurological/Psychiatric:  · Alert and oriented in all spheres  · No abnormalities of mood, affect, memory, mentation, or behavior are noted    Lab Data reviewed and analyzed   CBC:   Lab Results   Component Value Date    WBC 10.4 05/24/2021    WBC 7.6 03/04/2021    WBC 13.2 04/15/2020    RBC 5.54 05/24/2021    RBC 5.77 03/04/2021    RBC 5.74 04/15/2020    HGB 16.6 05/24/2021    HGB 17.5 03/04/2021    HGB 17.3 04/15/2020    HCT 48.5 05/24/2021    HCT 51.5 03/04/2021    HCT 51.2 04/15/2020    MCV 87.5 05/24/2021    MCV 89.1 03/04/2021    MCV 89.2 04/15/2020    RDW 13.8 05/24/2021    RDW 13.5 03/04/2021    RDW 14.1 04/15/2020     05/24/2021     03/04/2021     04/15/2020     Iron: No results found for: IRON, TIBC, FERRITIN  BMP:   Lab Results   Component Value Date     06/07/2021     05/24/2021     05/11/2021    K 4.1 06/07/2021    K 4.8 05/24/2021    K 4.2 05/11/2021    K 4.6 04/14/2020    K 3.6 04/13/2020     06/07/2021     05/24/2021     05/11/2021    CO2 27 06/07/2021    CO2 28 05/24/2021    CO2 27 05/11/2021    PHOS 3.5 04/15/2020    PHOS 4.7 04/24/2017    BUN 14 06/07/2021 BUN 16 05/24/2021    BUN 14 05/11/2021    CREATININE 1.0 06/07/2021    CREATININE 1.1 05/24/2021    CREATININE 0.9 05/11/2021     BNP:   Lab Results   Component Value Date    PROBNP 136 06/07/2021    PROBNP 171 05/24/2021    PROBNP 231 05/11/2021     Lipids:   Lab Results   Component Value Date    CHOL 162 02/03/2020        Lab Results   Component Value Date    TRIG 159 (H) 02/03/2020        Lab Results   Component Value Date    HDL 27 (L) 02/03/2020        Lab Results   Component Value Date    LDLCALC 103 (H) 02/03/2020        Lab Results   Component Value Date    LABVLDL 32 02/03/2020    VLDL 33 03/09/2019        Lab Results   Component Value Date    CHOLHDLRATIO 7.0 03/09/2019       EF:   Lab Results   Component Value Date    LVEF 45 05/03/2021       Testing reviewed:  ECHO (limited) 9/14/20   Summary   Limited exam for LVEF.   LV systolic function is mildly reduced with an ejection fraction of 45%.   There is akinesis of the apical-septal and apical-inferior segments.     ECHO 6/11/2020   Summary   Left ventricular systolic function is mildly reduced with ejection fraction   estimated at 40 %.   There is dyskinesis of the apical septal and apical inferior wall segments.   There is akinesis of the apical anterior wall segments.   All remaining wall segments appear normal in function.   Left ventricle size is normal.   Normal left ventricular wall thickness.   Normal left ventricular diastolic filling pressure.   No evidence of mitral valve stenosis.   Mild mitral regurgitation.   Mild tricuspid regurgitation.   Systolic pulmonary artery pressure (SPAP) estimated at 43 mmHg (RA pressure   3 mmHg), consistent with mild pulmonary hypertension.     ECHO/MUGA: 4/13/2020   Summary   The left ventricular systolic function is moderately reduced with an   ejection fraction of 35 %.  There is akinesis of the mid and apical anterior   wall and apex.   There is mild concentric left ventricular hypertrophy.   Grade I diastolic dysfunction with normal left ventricular filling pressure.   Systolic pulmonary artery pressure (SPAP) is normal estimated at 31 mmHg   (Right atrial pressure of 3 mmHg). CARDIAC CATH: 4/13/2020  LEFT HEART CATH  LM: luminals  LAD: 100% mid just after diag  LCX: luminals, 20% mid  RCA: dominant, large aneurysmal, mild diffuse disease with focal stenosis     LVEDP: 1   LVEF:45% with mid , apical anterior and apex Hypokinesis  No AS gradient  LESION1; PCI to mid LAD  STENT 3.5 x 18mm post dilated to      Assessment  1. Successful PCI to mid LAD using 1 drug stent                100%-->0%, ALEXSANDRA-3 flow  2. Stable V-tach during case that broke on its own, followed by Inferior ST elevations. ? Spasm as RCA was clear                - Amio x 18 hrs  3. ASA 81mg poqday for life, ticagrelor 90mg po BID for 1 yr w/o interruption  4. BB, statin as tolerated                - ACE/ARB once able  5. Heart block resolved prior to case  6. Echo, ECg, cardiac rehab     echo 5/3/21   Summary   LV systolic function is mildly reduced with an estimated EF of 45%. There is akinesis of the apical septum and distal apiex. Mild HK of the anteroseptal segment. There is mild concentric left ventricular hypertrophy. Grade I diastolic dysfunction with normal LV filling pressure. Inadequate tricuspid regurgitation jet to estimate systolic artery pressure(SPAP). NYHA:   I-II  ACC/ AHA Stage:    C    Assessment:  · Ischemic cardiomyopathy. LVEF 45%  · Chronic combined heart failure  · CAD status post STEMI and PCI to mid LAD  · Hx of MI   · NSVT  · BIJU untreated     Visit Diagnosis:    1. Chronic systolic congestive heart failure ([address]    2. Ischemic cardiomyopathy    3. Coronary artery disease involving native coronary artery of native heart without angina pectoris    4. Essential hypertension      Plan:   1. Continue daily weights; if your weight goes up 3lbs in a day then okay to take a dose of the lasix   2.  Continue aspirin Plavix, Lipitor; will discuss with Dr. Wanda Choe regarding stopping plavix. 3. Adjust carvedilol 6.25mg (2 tab twice a day) and will send in the 12.5mg take 1 tab twice a day  4. Entresto 49-51mg twice a day- no changes; recommend follow up with urology as planned    5. Recommend 64 ounces of fluid a day  6. Follow up 3 months with Dr. Wanda Choe or sooner if needed     Core measures for HFrEF:  EF: 35%-->45%  ICD: NA  ACEi/ARB/ARNI: entresto  BB: coreg   Kevyn: consider  Hydralazine/nitrates: uptrating entresto     I appreciate the opportunity for caring for this patient.      ERICA Bajwa - CNP, CNP, 6/10/2021, 2:55 PM

## 2021-06-10 NOTE — Clinical Note
Hi, seen today, having intermittent hematuria. Going to see urology. PCI was in 4/2020, okay to stop plavix ?  thanks

## 2021-06-10 NOTE — PATIENT INSTRUCTIONS
Plan:   1. Continue daily weights; if your weight goes up 3lbs in a day then okay to take a dose of the lasix   2. Continue aspirin Plavix, Lipitor; will discuss with Dr. Linda Foy regarding stopping plavix. 3. Adjust carvedilol 6.25mg (2 tab twice a day) and will send in the 12.5mg take 1 tab twice a day  4. Entresto 49-51mg twice a day- no changes; recommend follow up with urology as planned    5. Recommend 64 ounces of fluid a day  6.  Follow up 3 months with Dr. Linda Foy or sooner if needed

## 2021-06-14 ENCOUNTER — TELEPHONE (OUTPATIENT)
Dept: CARDIOLOGY | Age: 56
End: 2021-06-14

## 2021-06-14 ENCOUNTER — TELEPHONE (OUTPATIENT)
Dept: FAMILY MEDICINE CLINIC | Age: 56
End: 2021-06-14

## 2021-06-14 DIAGNOSIS — R79.89 LOW TESTOSTERONE: Primary | ICD-10-CM

## 2021-06-14 NOTE — TELEPHONE ENCOUNTER
----- Message from Rudy Ruiz MD sent at 6/11/2021  6:29 PM EDT -----  yeS ok to hold plavix      ----- Message -----  From: ERICA Palomares - CNP  Sent: 6/10/2021   2:57 PM EDT  To: Rudy Ruiz MD    Hi, seen today, having intermittent hematuria. Going to see urology. PCI was in 4/2020, okay to stop plavix ?  thanks

## 2021-06-17 ENCOUNTER — NURSE ONLY (OUTPATIENT)
Dept: FAMILY MEDICINE CLINIC | Age: 56
End: 2021-06-17
Payer: COMMERCIAL

## 2021-06-17 DIAGNOSIS — R79.89 LOW TESTOSTERONE: ICD-10-CM

## 2021-06-17 PROCEDURE — 36415 COLL VENOUS BLD VENIPUNCTURE: CPT | Performed by: FAMILY MEDICINE

## 2021-06-17 NOTE — PROGRESS NOTES
Blood drawn per physician order. 21 gauge needle used. Location left ac. Pressure applied until bleeding stopped. Bandaid applied. Patient instructed to call or return if problems with bleeding. 1 tubes drawn.

## 2021-06-22 LAB
SEX HORMONE BINDING GLOBULIN: 25 NMOL/L (ref 11–80)
TESTOSTERONE FREE-NONMALE: 8.4 PG/ML (ref 47–244)
TESTOSTERONE TOTAL: 40 NG/DL (ref 220–1000)

## 2021-06-23 DIAGNOSIS — R79.89 LOW TESTOSTERONE: ICD-10-CM

## 2021-06-23 NOTE — TELEPHONE ENCOUNTER
Dr Shelton Vinod, can you sign script for testosterone so we can tried to get it approved through insurance

## 2021-06-24 RX ORDER — TESTOSTERONE CYPIONATE 200 MG/ML
200 INJECTION INTRAMUSCULAR
Qty: 6 ML | Refills: 2 | Status: SHIPPED | OUTPATIENT
Start: 2021-06-24 | End: 2021-07-13

## 2021-06-25 ENCOUNTER — TELEPHONE (OUTPATIENT)
Dept: ADMINISTRATIVE | Age: 56
End: 2021-06-25

## 2021-06-25 NOTE — TELEPHONE ENCOUNTER
PA COVER MY MEDS  Medication:Testosterone Cypionate 200MG/ML intramuscular solution  Key[de-identified] [facility]  PA Case ID: [id number] - Rx #: [id number]  Status:PENDING

## 2021-06-30 ENCOUNTER — TELEPHONE (OUTPATIENT)
Dept: FAMILY MEDICINE CLINIC | Age: 56
End: 2021-06-30

## 2021-06-30 NOTE — TELEPHONE ENCOUNTER
Patient calling to ask about the current situation with his Testerone prescription. Patient was given a call on 6/14 but does not remember what the call was about of what the conclusion was. Please call patient back.

## 2021-07-01 LAB
BUN / CREAT RATIO: 22.2 RATIO (ref 9–28)
BUN BLDV-MCNC: 20 MG/DL (ref 6–20)
CREAT SERPL-MCNC: 0.9 MG/DL (ref 0.7–1.2)
PSA, TOTAL: 1.26 NG/ML (ref 0–4)

## 2021-07-07 NOTE — TELEPHONE ENCOUNTER
Insurance is now denying Testosterone because the patient has obstructive sleep apnea and that they do not see information that shows this drug is safe to serena when this illness is present.   You can write an appeal letter and fax to [phone] on the top of letter it must have the following:  Wilder Tomasz  [birth year]  Rerference # [id number]  Testoserone CYP 200mg/ml  Derrick Betts MD

## 2021-07-07 NOTE — TELEPHONE ENCOUNTER
Testosterone Cypionate 200MG/ML intramuscular solution  Denied on July 1  PA Case: [id number], Status: Denied. Notification: Completed.

## 2021-07-09 ENCOUNTER — HOSPITAL ENCOUNTER (OUTPATIENT)
Dept: CT IMAGING | Age: 56
Discharge: HOME | End: 2021-07-09
Payer: COMMERCIAL

## 2021-07-09 DIAGNOSIS — R31.0 GROSS HEMATURIA: ICD-10-CM

## 2021-07-09 PROCEDURE — 6360000004 HC RX CONTRAST MEDICATION: Performed by: UROLOGY

## 2021-07-09 PROCEDURE — 74178 CT ABD&PLV WO CNTR FLWD CNTR: CPT

## 2021-07-09 RX ADMIN — IOPAMIDOL 120 ML: 755 INJECTION, SOLUTION INTRAVENOUS at 10:42

## 2021-07-13 NOTE — PRE-PROCEDURE INSTRUCTIONS

## 2021-07-13 NOTE — PROGRESS NOTES
[facility] Preoperative Screening for Elective Surgery/Invasive Procedures While COVID-19 present in the community     Have you had any of the following symptoms? o Fever, chills  o Cough  o Shortness of breath  o Muscle aches/pain  o Diarrhea  o Abdominal pain, nausea, vomiting  o Loss or decrease in taste and / or smell   Risk of Exposure  o Have you recently been hospitalized for COVID-19 or flu-like illness, if so when?  o Recently diagnosed with COVID-19, if so when?  o Recently tested for COVID-19, if so when?  o Have you been in close contact with a person or family member who currently has or recently had COVID-19? If yes, when and in what context?  o Do you live with anybody who in the last 14 days has had fever, chills, shortness of breath, muscle aches, flu-like illness?  o Do you have any close contacts or family members who are currently in the hospital for COVID-19 or flu-like illness? If yes, assess recent close contact with this person. Indicate if the patient has a positive screen by answering yes to one or more of the above questions. Patients who test positive or screen positive prior to surgery or on the day of surgery should be evaluated in conjunction with the surgeon/proceduralist/anesthesiologist to determine the urgency of the procedure.

## 2021-07-14 ENCOUNTER — TELEPHONE (OUTPATIENT)
Dept: ADMINISTRATIVE | Age: 56
End: 2021-07-14

## 2021-07-16 ENCOUNTER — APPOINTMENT (OUTPATIENT)
Dept: GENERAL RADIOLOGY | Age: 56
End: 2021-07-16
Attending: UROLOGY
Payer: COMMERCIAL

## 2021-07-16 ENCOUNTER — HOSPITAL ENCOUNTER (OUTPATIENT)
Age: 56
Setting detail: OUTPATIENT SURGERY
Discharge: HOME | End: 2021-07-16
Attending: UROLOGY | Admitting: UROLOGY
Payer: COMMERCIAL

## 2021-07-16 ENCOUNTER — ANESTHESIA EVENT (OUTPATIENT)
Dept: OPERATING ROOM | Age: 56
End: 2021-07-16
Payer: COMMERCIAL

## 2021-07-16 ENCOUNTER — ANESTHESIA (OUTPATIENT)
Dept: OPERATING ROOM | Age: 56
End: 2021-07-16
Payer: COMMERCIAL

## 2021-07-16 VITALS
TEMPERATURE: 97 F | HEIGHT: 76 IN | DIASTOLIC BLOOD PRESSURE: 98 MMHG | HEART RATE: 66 BPM | OXYGEN SATURATION: 100 % | WEIGHT: 230 LBS | RESPIRATION RATE: 14 BRPM | BODY MASS INDEX: 28.01 KG/M2 | SYSTOLIC BLOOD PRESSURE: 172 MMHG

## 2021-07-16 VITALS — OXYGEN SATURATION: 97 % | SYSTOLIC BLOOD PRESSURE: 185 MMHG | DIASTOLIC BLOOD PRESSURE: 99 MMHG

## 2021-07-16 DIAGNOSIS — T83.84XA PAIN DUE TO URETERAL STENT, INITIAL ENCOUNTER (HCC): ICD-10-CM

## 2021-07-16 DIAGNOSIS — D41.12 NEOPLASM OF UNCERTAIN BEHAVIOR OF LEFT RENAL PELVIS: Primary | ICD-10-CM

## 2021-07-16 LAB
ANION GAP SERPL CALCULATED.3IONS-SCNC: 11 MMOL/L (ref 3–16)
BUN BLDV-MCNC: 15 MG/DL (ref 7–20)
CALCIUM SERPL-MCNC: 9.3 MG/DL (ref 8.3–10.6)
CHLORIDE BLD-SCNC: 100 MMOL/L (ref 99–110)
CO2: 26 MMOL/L (ref 21–32)
CREAT SERPL-MCNC: 0.9 MG/DL (ref 0.9–1.3)
EKG ATRIAL RATE: 69 BPM
EKG DIAGNOSIS: NORMAL
EKG P AXIS: 59 DEGREES
EKG P-R INTERVAL: 186 MS
EKG Q-T INTERVAL: 402 MS
EKG QRS DURATION: 98 MS
EKG QTC CALCULATION (BAZETT): 430 MS
EKG R AXIS: 270 DEGREES
EKG T AXIS: 45 DEGREES
EKG VENTRICULAR RATE: 69 BPM
GFR AFRICAN AMERICAN: >60
GFR NON-AFRICAN AMERICAN: >60
GLUCOSE BLD-MCNC: 100 MG/DL (ref 70–99)
POTASSIUM SERPL-SCNC: 3.9 MMOL/L (ref 3.5–5.1)
SODIUM BLD-SCNC: 137 MMOL/L (ref 136–145)

## 2021-07-16 PROCEDURE — 7100000001 HC PACU RECOVERY - ADDTL 15 MIN: Performed by: UROLOGY

## 2021-07-16 PROCEDURE — 88342 IMHCHEM/IMCYTCHM 1ST ANTB: CPT

## 2021-07-16 PROCEDURE — 7100000000 HC PACU RECOVERY - FIRST 15 MIN: Performed by: UROLOGY

## 2021-07-16 PROCEDURE — 3600000004 HC SURGERY LEVEL 4 BASE: Performed by: UROLOGY

## 2021-07-16 PROCEDURE — 3600000014 HC SURGERY LEVEL 4 ADDTL 15MIN: Performed by: UROLOGY

## 2021-07-16 PROCEDURE — 2720000010 HC SURG SUPPLY STERILE: Performed by: UROLOGY

## 2021-07-16 PROCEDURE — 2709999900 HC NON-CHARGEABLE SUPPLY: Performed by: UROLOGY

## 2021-07-16 PROCEDURE — C1894 INTRO/SHEATH, NON-LASER: HCPCS | Performed by: UROLOGY

## 2021-07-16 PROCEDURE — 6370000000 HC RX 637 (ALT 250 FOR IP): Performed by: ANESTHESIOLOGY

## 2021-07-16 PROCEDURE — 3700000000 HC ANESTHESIA ATTENDED CARE: Performed by: UROLOGY

## 2021-07-16 PROCEDURE — 7100000010 HC PHASE II RECOVERY - FIRST 15 MIN: Performed by: UROLOGY

## 2021-07-16 PROCEDURE — 3700000001 HC ADD 15 MINUTES (ANESTHESIA): Performed by: UROLOGY

## 2021-07-16 PROCEDURE — 6360000002 HC RX W HCPCS: Performed by: NURSE ANESTHETIST, CERTIFIED REGISTERED

## 2021-07-16 PROCEDURE — 80048 BASIC METABOLIC PNL TOTAL CA: CPT

## 2021-07-16 PROCEDURE — 2580000003 HC RX 258: Performed by: UROLOGY

## 2021-07-16 PROCEDURE — 36415 COLL VENOUS BLD VENIPUNCTURE: CPT

## 2021-07-16 PROCEDURE — 6360000004 HC RX CONTRAST MEDICATION: Performed by: UROLOGY

## 2021-07-16 PROCEDURE — 74420 UROGRAPHY RTRGR +-KUB: CPT

## 2021-07-16 PROCEDURE — 88305 TISSUE EXAM BY PATHOLOGIST: CPT

## 2021-07-16 PROCEDURE — 93010 ELECTROCARDIOGRAM REPORT: CPT | Performed by: INTERNAL MEDICINE

## 2021-07-16 PROCEDURE — C1758 CATHETER, URETERAL: HCPCS | Performed by: UROLOGY

## 2021-07-16 PROCEDURE — C2617 STENT, NON-COR, TEM W/O DEL: HCPCS | Performed by: UROLOGY

## 2021-07-16 PROCEDURE — 6360000002 HC RX W HCPCS: Performed by: UROLOGY

## 2021-07-16 PROCEDURE — 2500000003 HC RX 250 WO HCPCS: Performed by: NURSE ANESTHETIST, CERTIFIED REGISTERED

## 2021-07-16 PROCEDURE — 93005 ELECTROCARDIOGRAM TRACING: CPT | Performed by: ANESTHESIOLOGY

## 2021-07-16 PROCEDURE — C1769 GUIDE WIRE: HCPCS | Performed by: UROLOGY

## 2021-07-16 PROCEDURE — 88112 CYTOPATH CELL ENHANCE TECH: CPT

## 2021-07-16 PROCEDURE — 7100000011 HC PHASE II RECOVERY - ADDTL 15 MIN: Performed by: UROLOGY

## 2021-07-16 PROCEDURE — 2580000003 HC RX 258: Performed by: ANESTHESIOLOGY

## 2021-07-16 PROCEDURE — 88341 IMHCHEM/IMCYTCHM EA ADD ANTB: CPT

## 2021-07-16 DEVICE — URETERAL STENT
Type: IMPLANTABLE DEVICE | Status: FUNCTIONAL
Brand: CONTOUR™

## 2021-07-16 RX ORDER — MORPHINE SULFATE 10 MG/ML
1 INJECTION, SOLUTION INTRAMUSCULAR; INTRAVENOUS EVERY 5 MIN PRN
Status: DISCONTINUED | OUTPATIENT
Start: 2021-07-16 | End: 2021-07-16 | Stop reason: HOSPADM

## 2021-07-16 RX ORDER — SODIUM CHLORIDE 0.9 % (FLUSH) 0.9 %
5-40 SYRINGE (ML) INJECTION EVERY 12 HOURS SCHEDULED
Status: DISCONTINUED | OUTPATIENT
Start: 2021-07-16 | End: 2021-07-16 | Stop reason: HOSPADM

## 2021-07-16 RX ORDER — PROPOFOL 10 MG/ML
INJECTION, EMULSION INTRAVENOUS PRN
Status: DISCONTINUED | OUTPATIENT
Start: 2021-07-16 | End: 2021-07-16 | Stop reason: SDUPTHER

## 2021-07-16 RX ORDER — ONDANSETRON 2 MG/ML
INJECTION INTRAMUSCULAR; INTRAVENOUS PRN
Status: DISCONTINUED | OUTPATIENT
Start: 2021-07-16 | End: 2021-07-16 | Stop reason: SDUPTHER

## 2021-07-16 RX ORDER — DEXAMETHASONE SODIUM PHOSPHATE 4 MG/ML
INJECTION, SOLUTION INTRA-ARTICULAR; INTRALESIONAL; INTRAMUSCULAR; INTRAVENOUS; SOFT TISSUE PRN
Status: DISCONTINUED | OUTPATIENT
Start: 2021-07-16 | End: 2021-07-16 | Stop reason: SDUPTHER

## 2021-07-16 RX ORDER — SODIUM CHLORIDE, SODIUM LACTATE, POTASSIUM CHLORIDE, CALCIUM CHLORIDE 600; 310; 30; 20 MG/100ML; MG/100ML; MG/100ML; MG/100ML
INJECTION, SOLUTION INTRAVENOUS CONTINUOUS
Status: DISCONTINUED | OUTPATIENT
Start: 2021-07-16 | End: 2021-07-16 | Stop reason: HOSPADM

## 2021-07-16 RX ORDER — MORPHINE SULFATE 10 MG/ML
2 INJECTION, SOLUTION INTRAMUSCULAR; INTRAVENOUS EVERY 5 MIN PRN
Status: DISCONTINUED | OUTPATIENT
Start: 2021-07-16 | End: 2021-07-16 | Stop reason: HOSPADM

## 2021-07-16 RX ORDER — SODIUM CHLORIDE 9 MG/ML
25 INJECTION, SOLUTION INTRAVENOUS PRN
Status: DISCONTINUED | OUTPATIENT
Start: 2021-07-16 | End: 2021-07-16 | Stop reason: HOSPADM

## 2021-07-16 RX ORDER — LIDOCAINE HYDROCHLORIDE 20 MG/ML
INJECTION, SOLUTION INFILTRATION; PERINEURAL PRN
Status: DISCONTINUED | OUTPATIENT
Start: 2021-07-16 | End: 2021-07-16 | Stop reason: SDUPTHER

## 2021-07-16 RX ORDER — OXYCODONE HYDROCHLORIDE AND ACETAMINOPHEN 5; 325 MG/1; MG/1
1 TABLET ORAL EVERY 6 HOURS PRN
Qty: 12 TABLET | Refills: 0 | Status: SHIPPED | OUTPATIENT
Start: 2021-07-16 | End: 2021-07-19

## 2021-07-16 RX ORDER — MEPERIDINE HYDROCHLORIDE 25 MG/ML
12.5 INJECTION INTRAMUSCULAR; INTRAVENOUS; SUBCUTANEOUS EVERY 5 MIN PRN
Status: DISCONTINUED | OUTPATIENT
Start: 2021-07-16 | End: 2021-07-16 | Stop reason: HOSPADM

## 2021-07-16 RX ORDER — OXYCODONE HYDROCHLORIDE AND ACETAMINOPHEN 5; 325 MG/1; MG/1
1 TABLET ORAL PRN
Status: COMPLETED | OUTPATIENT
Start: 2021-07-16 | End: 2021-07-16

## 2021-07-16 RX ORDER — SODIUM CHLORIDE 0.9 % (FLUSH) 0.9 %
5-40 SYRINGE (ML) INJECTION PRN
Status: DISCONTINUED | OUTPATIENT
Start: 2021-07-16 | End: 2021-07-16 | Stop reason: HOSPADM

## 2021-07-16 RX ORDER — MAGNESIUM HYDROXIDE 1200 MG/15ML
LIQUID ORAL PRN
Status: DISCONTINUED | OUTPATIENT
Start: 2021-07-16 | End: 2021-07-16 | Stop reason: ALTCHOICE

## 2021-07-16 RX ORDER — LIDOCAINE HYDROCHLORIDE 10 MG/ML
0.3 INJECTION, SOLUTION EPIDURAL; INFILTRATION; INTRACAUDAL; PERINEURAL
Status: DISCONTINUED | OUTPATIENT
Start: 2021-07-16 | End: 2021-07-16 | Stop reason: HOSPADM

## 2021-07-16 RX ORDER — TAMSULOSIN HYDROCHLORIDE 0.4 MG/1
0.4 CAPSULE ORAL DAILY
Qty: 30 CAPSULE | Refills: 5 | Status: SHIPPED | OUTPATIENT
Start: 2021-07-16

## 2021-07-16 RX ORDER — OXYCODONE HYDROCHLORIDE AND ACETAMINOPHEN 5; 325 MG/1; MG/1
2 TABLET ORAL PRN
Status: COMPLETED | OUTPATIENT
Start: 2021-07-16 | End: 2021-07-16

## 2021-07-16 RX ORDER — ONDANSETRON 2 MG/ML
4 INJECTION INTRAMUSCULAR; INTRAVENOUS
Status: DISCONTINUED | OUTPATIENT
Start: 2021-07-16 | End: 2021-07-16 | Stop reason: HOSPADM

## 2021-07-16 RX ORDER — FENTANYL CITRATE 50 UG/ML
INJECTION, SOLUTION INTRAMUSCULAR; INTRAVENOUS PRN
Status: DISCONTINUED | OUTPATIENT
Start: 2021-07-16 | End: 2021-07-16 | Stop reason: SDUPTHER

## 2021-07-16 RX ADMIN — FENTANYL CITRATE 100 MCG: 50 INJECTION INTRAMUSCULAR; INTRAVENOUS at 12:58

## 2021-07-16 RX ADMIN — ONDANSETRON 4 MG: 2 INJECTION, SOLUTION INTRAMUSCULAR; INTRAVENOUS at 12:58

## 2021-07-16 RX ADMIN — SODIUM CHLORIDE, POTASSIUM CHLORIDE, SODIUM LACTATE AND CALCIUM CHLORIDE: 600; 310; 30; 20 INJECTION, SOLUTION INTRAVENOUS at 12:49

## 2021-07-16 RX ADMIN — FENTANYL CITRATE 50 MCG: 50 INJECTION INTRAMUSCULAR; INTRAVENOUS at 13:26

## 2021-07-16 RX ADMIN — FENTANYL CITRATE 50 MCG: 50 INJECTION INTRAMUSCULAR; INTRAVENOUS at 13:35

## 2021-07-16 RX ADMIN — OXYCODONE HYDROCHLORIDE AND ACETAMINOPHEN 2 TABLET: 5; 325 TABLET ORAL at 14:34

## 2021-07-16 RX ADMIN — PROPOFOL 200 MG: 10 INJECTION, EMULSION INTRAVENOUS at 13:02

## 2021-07-16 RX ADMIN — Medication 2000 MG: at 12:55

## 2021-07-16 RX ADMIN — PROPOFOL 100 MG: 10 INJECTION, EMULSION INTRAVENOUS at 13:26

## 2021-07-16 RX ADMIN — PROPOFOL 100 MG: 10 INJECTION, EMULSION INTRAVENOUS at 13:34

## 2021-07-16 RX ADMIN — LIDOCAINE HYDROCHLORIDE 100 MG: 20 INJECTION, SOLUTION INFILTRATION; PERINEURAL at 13:02

## 2021-07-16 RX ADMIN — DEXAMETHASONE SODIUM PHOSPHATE 4 MG: 4 INJECTION, SOLUTION INTRAMUSCULAR; INTRAVENOUS at 13:02

## 2021-07-16 ASSESSMENT — PULMONARY FUNCTION TESTS
PIF_VALUE: 17
PIF_VALUE: 3
PIF_VALUE: 17
PIF_VALUE: 19
PIF_VALUE: 7
PIF_VALUE: 15
PIF_VALUE: 0
PIF_VALUE: 3
PIF_VALUE: 9
PIF_VALUE: 9
PIF_VALUE: 15
PIF_VALUE: 3
PIF_VALUE: 19
PIF_VALUE: 8
PIF_VALUE: 0
PIF_VALUE: 0
PIF_VALUE: 14
PIF_VALUE: 6
PIF_VALUE: 14
PIF_VALUE: 19
PIF_VALUE: 4
PIF_VALUE: 2
PIF_VALUE: 19
PIF_VALUE: 25
PIF_VALUE: 4
PIF_VALUE: 17
PIF_VALUE: 19
PIF_VALUE: 3
PIF_VALUE: 2
PIF_VALUE: 14
PIF_VALUE: 21
PIF_VALUE: 3
PIF_VALUE: 3
PIF_VALUE: 19
PIF_VALUE: 10
PIF_VALUE: 19
PIF_VALUE: 19
PIF_VALUE: 9
PIF_VALUE: 24
PIF_VALUE: 19
PIF_VALUE: 8
PIF_VALUE: 3
PIF_VALUE: 19
PIF_VALUE: 9
PIF_VALUE: 3
PIF_VALUE: 8
PIF_VALUE: 19
PIF_VALUE: 2
PIF_VALUE: 14
PIF_VALUE: 4
PIF_VALUE: 2
PIF_VALUE: 14
PIF_VALUE: 19
PIF_VALUE: 3
PIF_VALUE: 14
PIF_VALUE: 15
PIF_VALUE: 14
PIF_VALUE: 14

## 2021-07-16 ASSESSMENT — LIFESTYLE VARIABLES: SMOKING_STATUS: 0

## 2021-07-16 ASSESSMENT — PAIN DESCRIPTION - LOCATION: LOCATION: BACK

## 2021-07-16 ASSESSMENT — PAIN DESCRIPTION - ORIENTATION: ORIENTATION: LOWER

## 2021-07-16 ASSESSMENT — PAIN - FUNCTIONAL ASSESSMENT: PAIN_FUNCTIONAL_ASSESSMENT: 0-10

## 2021-07-16 ASSESSMENT — PAIN SCALES - GENERAL: PAINLEVEL_OUTOF10: 7

## 2021-07-16 ASSESSMENT — PAIN DESCRIPTION - PAIN TYPE: TYPE: CHRONIC PAIN

## 2021-07-16 ASSESSMENT — ENCOUNTER SYMPTOMS: SHORTNESS OF BREATH: 1

## 2021-07-16 ASSESSMENT — PAIN DESCRIPTION - DESCRIPTORS: DESCRIPTORS: ACHING

## 2021-07-16 NOTE — ANESTHESIA PRE PROCEDURE
Intravenous 2 times per day Magaly Quintero MD        sodium chloride flush 0.9 % injection 5-40 mL  5-40 mL Intravenous PRN Magaly Quintero MD        0.9 % sodium chloride infusion  25 mL Intravenous PRN Magaly Quintero MD        lidocaine PF 1 % injection 0.3 mL  0.3 mL Intradermal Once PRN Magaly Quintero MD        ceFAZolin (ANCEF) 2000 mg in sterile water 20 mL IV syringe  2,000 mg Intravenous Once Delores Durant MD           Allergies: Allergies   Allergen Reactions    Vioxx Other (See Comments)     Oral ulcers      Phenergan [Promethazine Hcl] Nausea And Vomiting     Was given after surgery and induced nausea and vomiting.        Problem List:    Patient Active Problem List   Diagnosis Code    Asthma J45.909    Restless legs syndrome (RLS) G25.81    Low testosterone R79.89    ED (erectile dysfunction) N52.9    Chronic bilateral low back pain without sciatica M54.5, G89.29    Mixed hyperlipidemia E78.2    Essential hypertension I10    Right carpal tunnel syndrome G56.01    Right lateral epicondylitis M77.11    Dupuytren's contracture M72.0    Severe obstructive sleep apnea G47.33    Tobacco abuse Z72.0    Ischemic cardiomyopathy I25.5    STEMI (ST elevation myocardial infarction) (HCC) I21.3    Obesity E66.9    Coronary artery disease involving native coronary artery of native heart without angina pectoris I25.10    SOB (shortness of breath) R06.02    Edema R60.9       Past Medical History:        Diagnosis Date    Asthma     Back problem     Dr Gonzalez/Dr Nelida Valentine, Dr Singletary Seralejandra Cholelithiasis without obstruction 2010    asymptomatic    Depression     ED (erectile dysfunction)     Gallbladder & bile duct stone     Gallbladder full of stones    GERD (gastroesophageal reflux disease)     Hernia     Hyperlipidemia     Hypertension     Low testosterone     [facility] 04[facility]13/2020    mLAD 100% FELICITY Xience Martha 3.5 x 18    Seasonal allergies     Sleep apnea  SVT (supraventricular tachycardia) (HCC)     GXT     Tendonitis        Past Surgical History:        Procedure Laterality Date    BACK SURGERY  2001    L5 FUSION, Dr Galan Neigh Right 2019    RIGHT CARPAL TUNNEL RELEASE performed by Dwightstephan Velasquez MD at [address]      Dr. Dhara Larsen      [facility] patient states   [address]      Dr. Magda Denny      stimulator put in 3/10   24 [facility] LUMBAR SPINE SURGERY      Dr Joseph Mcmanus         Social History:    Social History     Tobacco Use    Smoking status: Former Smoker     Packs/day: 0.25     Years: 20.00     Pack years: 5.00     Types: Cigars     Quit date: 2020     Years since quittin.2    Smokeless tobacco: Never Used   Substance Use Topics    Alcohol use: Yes     Comment: daily                                 Counseling given: Not Answered      Vital Signs (Current):   Vitals:    21 1408   Weight: 230 lb (104.3 kg)   Height: 6' 4\" (1.93 m)                                              BP Readings from Last 3 Encounters:   06/10/21 114/70   21 132/74   21 128/78       NPO Status:  MN+, SEE MAR FOR AM MEDS                                                                               BMI:   Wt Readings from Last 3 Encounters:   21 230 lb (104.3 kg)   06/10/21 233 lb (105.7 kg)   21 234 lb (106.1 kg)     Body mass index is 28 kg/m².     CBC:   Lab Results   Component Value Date    WBC 10.4 2021    RBC 5.54 2021    HGB 16.6 2021    HCT 48.5 2021    MCV 87.5 2021    RDW 13.8 2021     2021       CMP:   Lab Results   Component Value Date     2021    K 4.1 2021    K 4.6 2020     2021    CO2 27 2021    BUN 20 2021    CREATININE 0.9 06/30/2021    GFRAA >60 06/07/2021    GFRAA >60 01/31/2013    AGRATIO 1.9 05/24/2021    LABGLOM >60 06/07/2021    GLUCOSE 114 06/07/2021    PROT 7.2 05/24/2021    PROT 6.7 02/28/2012    CALCIUM 9.7 06/07/2021    BILITOT 0.5 05/24/2021    ALKPHOS 103 05/24/2021    AST 34 05/24/2021    ALT 45 05/24/2021       POC Tests: No results for input(s): POCGLU, POCNA, POCK, POCCL, POCBUN, POCHEMO, POCHCT in the last 72 hours. Coags: No results found for: PROTIME, INR, APTT    HCG (If Applicable): No results found for: PREGTESTUR, PREGSERUM, HCG, HCGQUANT     ABGs: No results found for: PHART, PO2ART, EDD9EWS, BLM4FCB, BEART, L3CEXZPW     Type & Screen (If Applicable):  No results found for: LABABO, LABRH    Drug/Infectious Status (If Applicable):  No results found for: HIV, HEPCAB    COVID-19 Screening (If Applicable): No results found for: COVID19        Anesthesia Evaluation  Patient summary reviewed no history of anesthetic complications:   Airway: Mallampati: II  TM distance: >3 FB   Neck ROM: full  Mouth opening: > = 3 FB Dental:    (+) upper dentures      Pulmonary: breath sounds clear to auscultation  (+) shortness of breath (EXERT/PAIN):  sleep apnea: on CPAP and noncompliant,  asthma:     (-) COPD and not a current smoker          Patient did not smoke on day of surgery.                  Cardiovascular:    (+) hypertension:, past MI: > 6 months, CAD: obstructive, CABG/stent (STENT):, LU:, hyperlipidemia    (-) pacemaker, dysrhythmias,  angina and  CHF    ECG reviewed  Rhythm: regular  Rate: normal  Echocardiogram reviewed         Beta Blocker:  Not on Beta Blocker         Neuro/Psych:   (+) neuromuscular disease (LBP W/O RAD SXS/ PAIN PUMP IN/ON, RLS // CTS):, depression/anxiety  (HX DEP.)   (-) seizures, TIA and CVA           GI/Hepatic/Renal:   (+) GERD: well controlled, renal disease (HEMATURIA):,      (-) liver disease, bowel prep and no morbid obesity       Endo/Other:    (+) blood dyscrasia (BABY ASA):

## 2021-07-16 NOTE — ANESTHESIA POSTPROCEDURE EVALUATION
Department of Anesthesiology  Postprocedure Note    Patient: Annie Sanchez  MRN: [id number]  YOB: 1965  Date of evaluation: 7/16/2021  Time:  3:28 PM     Procedure Summary     Date: 07/16/21 Room / Location: [place] [address] / [facility]    Anesthesia Start: 8132 Anesthesia Stop: 0479    Procedure: CYSTOSCOPY, LEFT RENAL  BIOPSY, LEFT URETEROSCOPY WITH STENT PLACEMENT (Left Bladder) Diagnosis: (GROSS HEMATURIA)    Surgeons: Karina Bean MD Responsible Provider: Jose A Odonnell MD    Anesthesia Type: general ASA Status: 3          Anesthesia Type: general    Kaitlyn Phase I: Kaitlyn Score: 8    Kaitlyn Phase II:      Last vitals: Reviewed and per EMR flowsheets.      Vitals:    07/16/21 1412 07/16/21 1417 07/16/21 1422 07/16/21 1433   BP: (!) 174/106 (!) 177/104 (!) 161/100 (!) 167/91   Pulse: 75 71 67 69   Resp: 14 16 16 16   Temp:    97 °F (36.1 °C)   TempSrc:    Infrared   SpO2: 95% 96% 95% 96%   Weight:       Height:         Anesthesia Post Evaluation    Patient location during evaluation: bedside  Patient participation: complete - patient participated  Level of consciousness: awake and alert  Airway patency: patent  Nausea & Vomiting: no nausea  Complications: no  Cardiovascular status: hemodynamically stable  Respiratory status: acceptable  Hydration status: euvolemic

## 2021-07-16 NOTE — PROGRESS NOTES
PT STABLE FOR TRANSFER TO PHASE II; C/O CHRONIC BACK PAIN OF 7/10, WILL MEDICATE; FAMILY UPDATED DURING PACU STAY

## 2021-07-16 NOTE — H&P
Referring Provider:  No ref. provider found   Primary Care Provider:  Garfield Devi MD       Urology Attending H/P Note     Reason for H/P: surgery and as below    HPI:  Shayne Riley is a 60 y.o. male who presented with history of hematuria, flank pain    See office notes for further details. Past Medical History:   Diagnosis Date    Asthma     Back problem     Dr Dano Carrasquillo, Dr Ricardo Gabriel Cholelithiasis without obstruction 2010    asymptomatic    Depression     ED (erectile dysfunction)     Gallbladder & bile duct stone     Gallbladder full of stones    GERD (gastroesophageal reflux disease)     Hernia     Hyperlipidemia     Hypertension     Low testosterone     MI (myocardial infarction) ([facility] 04/13/2020    mLAD 100% FELICITY Xience Martha 3.5 x 18    Seasonal allergies     Sleep apnea     SVT (supraventricular tachycardia) ([facility]     GXT 1998    Tendonitis        Past Surgical History:   Procedure Laterality Date    BACK SURGERY  2001    L5 FUSION, Dr Kyle Blanco Right 2/11/2019    RIGHT CARPAL TUNNEL RELEASE performed by Day Chairez MD at [address] [facility]  2012    Dr. hSanna Tijerina  [address] [place] [address]  2012    Dr. Barbara Garza      stimulator put in 3/10   [facility] LUMBAR SPINE SURGERY  2004    Dr Juhi Moralez         Current epic outpatient medication list reviewed as well as inpatient meds:       No current facility-administered medications on file prior to encounter.      Current Outpatient Medications on File Prior to Encounter   Medication Sig Dispense Refill    carvedilol (COREG) 12.5 MG tablet Take 1 tablet by mouth 2 times daily (with meals) 60 tablet 2    furosemide (LASIX) 20 MG tablet Take 2 tablets by mouth See Admin Instructions Daily as needed for weight gain of 3lbs in a day or Intravenous Q5 Min PRN Holley Yanez MD        oxyCODONE-acetaminophen (PERCOCET) 5-325 MG per tablet 1 tablet  1 tablet Oral PRN Holley Yanez MD        Or    oxyCODONE-acetaminophen (PERCOCET) 5-325 MG per tablet 2 tablet  2 tablet Oral PRN Holley Yanez MD        ondansetron [facility] injection 4 mg  4 mg Intravenous Once PRN Holley Yanez MD           Allergies   Allergen Reactions    Vioxx Other (See Comments)     Oral ulcers      Phenergan [Promethazine Hcl] Nausea And Vomiting     Was given after surgery and induced nausea and vomiting. Family History   Problem Relation Age of Onset    Heart Disease Father        Social History     Tobacco Use    Smoking status: Former Smoker     Packs/day: 0.25     Years: 20.00     Pack years: 5.00     Types: Cigars     Quit date: 2020     Years since quittin.2    Smokeless tobacco: Never Used   Vaping Use    Vaping Use: Never used   Substance Use Topics    Alcohol use: Yes     Comment: daily     Drug use: No         Review of Systems: A 12 point ROS was performed and was unremarkable unless listed in the history of present illness. No intake/output data recorded.     Physical Exam:  Patient Vitals for the past 8 hrs:   BP Temp Pulse Resp SpO2 Height Weight   21 1234 (!) 144/83 97.3 °F (36.3 °C) 71 12 97 % 6' 4\" (1.93 m) 230 lb (104.3 kg)     Constitutional: pleasant patient in NAD, with a nl body habitus   Eyes: pink conjunctivae, no ptosis  ENT: lips without cyanosis, normal appearance of ears and nose externally  Neck: no masses or lesions, trachea midline  Respiratory: normal respiratory movements without distress  Cardiovascular: regular rate, lower extremities without edema   Abdomen: soft, NTND, no masses, no rebound or guarding  Skin: warm and dry, no rashes, lesions, or ulcers  Musculoskeletal: normal ROM, m.tone, no digital cyanosis, head normocephalic  Psych: normal mood and affect, alert and appropriately answers questions  : bladder not palpable, normal, no tucker catheter    Labs:    Lab Results   Component Value Date    CREATININE 0.9 07/16/2021    CREATININE 0.9 06/30/2021    CREATININE 1.0 06/07/2021     Lab Results   Component Value Date    COLORU red 06/08/2021    GLUCOSEU negative 06/08/2021    KETUA moderate 06/08/2021    BILIRUBINUR large 06/08/2021     Lab Results   Component Value Date    WBC 10.4 05/24/2021    HGB 16.6 05/24/2021    HCT 48.5 05/24/2021    MCV 87.5 05/24/2021     05/24/2021     Lab Results   Component Value Date    PSA 3.81 03/04/2021    PSA 3.10 08/01/2019    PSA 2.36 08/30/2018    PSA 1.17 03/28/2018    PSA 1.12 03/26/2018    PSA 0.75 05/17/2017    PSA 1.98 04/21/2016    PSA 1.72 03/04/2014         Impression:  Hematuria, gross, painful  Left flank pain, hydronephrosis     Plan:  R/b/a of surgery re-reviewed.   See office notes for further details    We will proceed with cysto, left uretero, poss biopsy    Felix Hurley MD  Office: [phone]

## 2021-07-16 NOTE — PROGRESS NOTES
NO CHANGE IN PHYSICAL ASSESSMENT; PT AND FAMILY VERBALIZE UNDERSTANDING OF INSTRUCTIONS; PT DISCHARGED VIA W/C BY NURSE WITH FAMILY IN STABLE CONDITION; STATES PAIN IS 5/10 AND TOLERABLE

## 2021-07-16 NOTE — BRIEF OP NOTE
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       Procedure(s):  CYSTOSCOPY, LEFT RENAL  BIOPSY, LEFT URETEROSCOPY WITH STENT PLACEMENT    SEE DETAILED DICTATION FOR PROCEDURE    Surgeon(s):  Gabbi Wright MD    Assistant:  * No surgical staff found *    Anesthesia: General    Estimated Blood Loss (mL): Minimal    Complications: None    Specimens:   ID Type Source Tests Collected by Time Destination   A :  Tissue Kidney CYTOLOGY, NON-GYN Gabbi Wright MD 7/16/2021 1322    B : left renal pelvis biopsy Tissue Tissue SURGICAL PATHOLOGY Gabbi Wright MD 7/16/2021 1346        Implants:  * No implants in log *      Drains: * No LDAs found *    Findings: dictated  Filling defect and large papillary neoplasm LEFT Kidney  Hist of tobacco abuse  Pain pump for back pain       Electronically signed by Gabbi Wright MD on 7/16/2021 at 1:58 PM

## 2021-07-18 NOTE — OP NOTE
[address]                 [address] [place] [address]                                OPERATIVE REPORT    PATIENT NAME: Yury Quigley                       :        1965  MED REC NO:   [id number]                          ROOM:  ACCOUNT NO:   [de-identified]                           ADMIT DATE: 2021  PROVIDER:     Delores Durant MD    DATE OF PROCEDURE:  2021    PREOPERATIVE DIAGNOSES:  Gross hematuria and neoplasm of uncertain  behavior, left kidney. POSTOPERATIVE DIAGNOSES:  Gross hematuria and neoplasm of uncertain  behavior, left kidney as well as renal pelvis neoplasm. OPERATION PERFORMED:  1. Cystoscopy with left retrograde pyelogram with interpretation,  fluoroscopy less than 1 hour. 2.  Left ureteroscopy with biopsy of renal pelvis tumor. 3.  Double-J stent placement, 7-Lithuanian x 26 cm. SURGEON:  Anita Rousseau MD    ANESTHESIOLOGIST:  Delmy Ruano MD    ANESTHESIA:  General.    INDICATIONS:  The patient is a 78-year-old who has had intermittent james hematuria, intermittently and painful at times. He had a CT with contrast showing a  filling defect in the left upper collecting system. He was counseled  about the options including possible biopsies and signed the consent  prior to surgery. PROCEDURE IN DETAIL:  The patient was brought to the operating theater. Anesthesia was induced. Antibiotics were administered. Genitalia were  prepped and draped in the usual sterile fashion. A rigid scope was  advanced transurethrally up in the bladder. Anterior urethra was  normal.  Prostatic fossa had some lateral effacement. Once in the  bladder, no tumors, stones, or foreign bodies were seen, we looked with  30- and 70-degree lens. We then found his left orifice and we put an  open-ended catheter and injected with 20 mL of contrast for  interpretation.   There was a filling defect in the renal pelvis with the  kidney. We did deploy a super stiff wire to get a pull back off the  cystoscope and then deployed a 11 x 13 degree ureteral access sheath to  the upper ureter followed by flexible ureteroscope to the kidney and we  got some blood products as well as a large papillary tumor. We used the  basket to grab about three or four pieces of the tissue. So, we went in  and out multiple times to grab the tissue from that renal tumor. We did  send them off for pathology. Of note, prior to that we did instill the  [facility] catheter to the collecting system and send off urine for  cytology. There were some small oozing from the kidney, so we thought it was safe  to deploy a stent. We then made sure wire was in the upper pole, backed  off the ureteral access sheath and deployed a 7-Upper sorbian x 26 cm Double-J  stent. Curl was seen in the kidney under direct vision and confirmed  the curl in the bladder after the string was removed. He was emerged  and brought to Recovery. COMPLICATIONS:  None apparent. BLOOD LOSS:  Minimal.    SPECIMENS:  Biopsy of the renal pelvis tumor as well as cytology. FOLLOWUP:  He will need to follow up later next week to discuss results  of pathology in a routine 10-minute visit. He may require  nephroureterectomy surgery. It may be of benefit to get his stent out  prior to that depending on one that could be scheduled.         Ynes Beasley MD    D: 07/18/2021 11:21:57       T: 07/18/2021 13:45:31     [id number]  Job#: [id number]     Doc#: [id number]    CC:

## 2021-07-21 ENCOUNTER — TELEPHONE (OUTPATIENT)
Dept: FAMILY MEDICINE CLINIC | Age: 56
End: 2021-07-21

## 2021-07-21 NOTE — TELEPHONE ENCOUNTER
----- Message from [facility] sent at 7/21/2021  2:18 PM EDT -----  Subject: Appointment Request    Reason for Call: Routine Pre-Op    QUESTIONS  Type of Appointment? Established Patient  Reason for appointment request? Available appointments did not meet   patient need  Additional Information for Provider? Pt has surgery next Thursday on 07/29   at [address] [place] [address] for kidney removal / cancerous tumor. Pt needs a pre op   appt scheduled as soon as possible. He screened green. Call pt back to   schedule.   ---------------------------------------------------------------------------  --------------  CALL BACK INFO  What is the best way for the office to contact you? OK to leave message on   voicemail  Preferred Call Back Phone Number? [phone]  ---------------------------------------------------------------------------  --------------  SCRIPT ANSWERS  Relationship to Patient? Self  Appointment reason? Symptomatic  Select script based on patient symptoms? Adult Pre-Op  Do you have questions for your provider that need to be answered prior to   scheduling your pre-op appointment? No  Have you been diagnosed with, awaiting test results for, or told that you   are suspected of having COVID-19 (Coronavirus)? (If patient has tested   negative or was tested as a requirement for work, school, or travel and   not based on symptoms, answer no)? No  Do you currently have flu-like symptoms including fever or chills, cough,   shortness of breath, difficulty breathing, or new loss of taste or smell? No  Have you had close contact with someone with COVID-19 in the last 14 days? No  (Service Expert  click yes below to proceed with [employer] As Usual   Scheduling)?  Yes

## 2021-07-22 NOTE — PROGRESS NOTES
Chalo Seats    Age 60 y.o.    male    1965    MRN [id number]    7/29/2021  Arrival Time_____________  OR Time____________265 Min     Procedure(s):  DAVINCI LEFT NEPHROURETERECTOMY                      General     Surgeon(s):  Kathe Llamas, MD      DAY ADMIT ___  SDS/OP ___  OUTPT IN BED ___         Phone [phone] (home)    PCP _____________________ Phone_________________ Epic ( ) Epic CE ( ) Appt ________    ADDITIONAL INFO __________________________________ Cardio/Consult _____________    NOTES _____________________________________________________________________    ____________________________________________________________________________    PAT APPT DATE:________ TIME: ________  FAXED QAD: _______  (__) H&P w/ hospitalist  ____________________________________________________________________________    COVID TEST: Date/Location______________        NURSING HISTORY COMPLETE: _______  (__) CBC       (__) W/ DIFF ___________  (__)  ECHO    __________  (__) Hgb A1C    ___________  (__) CHEST X RAY   __________  (__) LIPID PROFILE  ___________  (__) EKG   __________  (__) PT/PTT   ___________  (__) PFT's   __________  (__) BMP   ___________  (__) CAROTIDS  __________  (__) CMP   ___________  (__) VEIN MAPPING  __________  (__) U/A   ___________  (__) HISTORY & PHYSICAL __________  (__) URINE C & S  ___________  (__) CARDIAC CLEARANCE __________  (__) U/A W/ FLEX  ___________  (__) PULM.  CLEARANCE __________  (__) SERUM PREGNANCY ___________  (__) Check Epic DOS orders __________  (__) TYPE & SCREEN ________ repeat ( ) (__)  __________________ __________  (__) ALBUMIN   ___________  (__)  __________________ __________  (__) TRANSFERRIN  ___________  (__)  __________________ __________  (__) LIVER PROFILE  ___________  (__)  __________________ __________  (__) CARBOXY HGB  ___________  (__) URINE PREG DOS __________  (__) NICOTINE & MET.  ___________  (__) BLOOD SUGAR DOS __________  (__) PREALBUMIN  ___________    (__) MRSA NASAL SWAB ___________  (__) BLOOD THINNERS __________  (__) ACE/ ARBS: _____________________    (__) BETABLOCKERS ___________________

## 2021-07-23 ENCOUNTER — OFFICE VISIT (OUTPATIENT)
Dept: FAMILY MEDICINE CLINIC | Age: 56
End: 2021-07-23
Payer: COMMERCIAL

## 2021-07-23 ENCOUNTER — HOSPITAL ENCOUNTER (OUTPATIENT)
Age: 56
Discharge: HOME | End: 2021-07-23
Payer: COMMERCIAL

## 2021-07-23 ENCOUNTER — TELEPHONE (OUTPATIENT)
Dept: CARDIOLOGY CLINIC | Age: 56
End: 2021-07-23

## 2021-07-23 VITALS
HEIGHT: 75 IN | HEART RATE: 73 BPM | SYSTOLIC BLOOD PRESSURE: 134 MMHG | OXYGEN SATURATION: 96 % | WEIGHT: 234 LBS | DIASTOLIC BLOOD PRESSURE: 89 MMHG | TEMPERATURE: 98.6 F | BODY MASS INDEX: 29.09 KG/M2

## 2021-07-23 DIAGNOSIS — J45.20 MILD INTERMITTENT ASTHMA WITHOUT COMPLICATION: ICD-10-CM

## 2021-07-23 DIAGNOSIS — G47.33 SEVERE OBSTRUCTIVE SLEEP APNEA: ICD-10-CM

## 2021-07-23 DIAGNOSIS — I10 ESSENTIAL HYPERTENSION: ICD-10-CM

## 2021-07-23 DIAGNOSIS — I25.10 CORONARY ARTERY DISEASE INVOLVING NATIVE CORONARY ARTERY OF NATIVE HEART WITHOUT ANGINA PECTORIS: ICD-10-CM

## 2021-07-23 DIAGNOSIS — I25.5 ISCHEMIC CARDIOMYOPATHY: ICD-10-CM

## 2021-07-23 DIAGNOSIS — C64.2 UROTHELIAL CARCINOMA OF KIDNEY, LEFT (HCC): Primary | ICD-10-CM

## 2021-07-23 PROBLEM — E27.8 ADRENAL NODULE (HCC): Status: ACTIVE | Noted: 2021-07-23

## 2021-07-23 PROBLEM — M77.11 RIGHT LATERAL EPICONDYLITIS: Status: RESOLVED | Noted: 2018-12-13 | Resolved: 2021-07-23

## 2021-07-23 PROBLEM — G56.01 RIGHT CARPAL TUNNEL SYNDROME: Status: RESOLVED | Noted: 2018-12-13 | Resolved: 2021-07-23

## 2021-07-23 PROBLEM — E27.9 ADRENAL NODULE (HCC): Status: ACTIVE | Noted: 2021-07-23

## 2021-07-23 PROBLEM — R60.9 EDEMA: Status: RESOLVED | Noted: 2021-04-29 | Resolved: 2021-07-23

## 2021-07-23 LAB
ANION GAP SERPL CALCULATED.3IONS-SCNC: 15 MMOL/L (ref 3–16)
APTT: 31.2 SEC (ref 26.2–38.6)
BACTERIA: ABNORMAL /HPF
BILIRUBIN URINE: NEGATIVE
BLOOD, URINE: ABNORMAL
BUN BLDV-MCNC: 14 MG/DL (ref 7–20)
CALCIUM SERPL-MCNC: 9.3 MG/DL (ref 8.3–10.6)
CHLORIDE BLD-SCNC: 106 MMOL/L (ref 99–110)
CLARITY: CLEAR
CO2: 20 MMOL/L (ref 21–32)
COLOR: YELLOW
CREAT SERPL-MCNC: 1 MG/DL (ref 0.9–1.3)
EPITHELIAL CELLS, UA: ABNORMAL /HPF (ref 0–5)
GFR AFRICAN AMERICAN: >60
GFR NON-AFRICAN AMERICAN: >60
GLUCOSE BLD-MCNC: 98 MG/DL (ref 70–99)
GLUCOSE URINE: NEGATIVE MG/DL
HCT VFR BLD CALC: 39.9 % (ref 40.5–52.5)
HEMOGLOBIN: 13.8 G/DL (ref 13.5–17.5)
INR BLD: 0.96 (ref 0.88–1.12)
KETONES, URINE: NEGATIVE MG/DL
LEUKOCYTE ESTERASE, URINE: ABNORMAL
MCH RBC QN AUTO: 30.4 PG (ref 26–34)
MCHC RBC AUTO-ENTMCNC: 34.5 G/DL (ref 31–36)
MCV RBC AUTO: 87.9 FL (ref 80–100)
MICROSCOPIC EXAMINATION: YES
MUCUS: ABNORMAL /LPF
NITRITE, URINE: NEGATIVE
PDW BLD-RTO: 15.1 % (ref 12.4–15.4)
PH UA: 6 (ref 5–8)
PLATELET # BLD: 194 K/UL (ref 135–450)
PMV BLD AUTO: 7.6 FL (ref 5–10.5)
POTASSIUM SERPL-SCNC: 4.4 MMOL/L (ref 3.5–5.1)
PROTEIN UA: ABNORMAL MG/DL
PROTHROMBIN TIME: 10.8 SEC (ref 9.9–12.7)
RBC # BLD: 4.54 M/UL (ref 4.2–5.9)
RBC UA: ABNORMAL /HPF (ref 0–4)
SODIUM BLD-SCNC: 141 MMOL/L (ref 136–145)
SPECIFIC GRAVITY UA: 1.01 (ref 1–1.03)
URINE TYPE: ABNORMAL
UROBILINOGEN, URINE: 0.2 E.U./DL
WBC # BLD: 6.8 K/UL (ref 4–11)
WBC UA: ABNORMAL /HPF (ref 0–5)

## 2021-07-23 PROCEDURE — 87086 URINE CULTURE/COLONY COUNT: CPT

## 2021-07-23 PROCEDURE — 81001 URINALYSIS AUTO W/SCOPE: CPT

## 2021-07-23 PROCEDURE — 85027 COMPLETE CBC AUTOMATED: CPT

## 2021-07-23 PROCEDURE — 1036F TOBACCO NON-USER: CPT | Performed by: FAMILY MEDICINE

## 2021-07-23 PROCEDURE — G8417 CALC BMI ABV UP PARAM F/U: HCPCS | Performed by: FAMILY MEDICINE

## 2021-07-23 PROCEDURE — G8427 DOCREV CUR MEDS BY ELIG CLIN: HCPCS | Performed by: FAMILY MEDICINE

## 2021-07-23 PROCEDURE — 36415 COLL VENOUS BLD VENIPUNCTURE: CPT

## 2021-07-23 PROCEDURE — 99214 OFFICE O/P EST MOD 30 MIN: CPT | Performed by: FAMILY MEDICINE

## 2021-07-23 PROCEDURE — 80048 BASIC METABOLIC PNL TOTAL CA: CPT

## 2021-07-23 PROCEDURE — 85610 PROTHROMBIN TIME: CPT

## 2021-07-23 PROCEDURE — 3017F COLORECTAL CA SCREEN DOC REV: CPT | Performed by: FAMILY MEDICINE

## 2021-07-23 PROCEDURE — 85730 THROMBOPLASTIN TIME PARTIAL: CPT

## 2021-07-23 ASSESSMENT — ENCOUNTER SYMPTOMS
EYE PAIN: 0
WHEEZING: 0
ABDOMINAL PAIN: 0
BLOOD IN STOOL: 0
SHORTNESS OF BREATH: 0
BACK PAIN: 1

## 2021-07-23 NOTE — TELEPHONE ENCOUNTER
Pt is having a kidney removed next week, and is needing cardiac clearance. He was told that his surgeon faxed over clearance paperwork and hasn't heard anything. Please call and advise patient if we did.

## 2021-07-23 NOTE — PATIENT INSTRUCTIONS
Please read the healthy family handout that you were given and share it with your family. Please compare this printed medication list with your medications at home to be sure they are the same. If you have any medications that are different please contact us immediately at [phone]. Also review your allergies that we have listed, these may also include medications that you have not been able to tolerate, make sure everything listed is correct. If you have any allergies that are different please contact us immediately at [phone].

## 2021-07-23 NOTE — LETTER
[address] Cardiology - [address] [place] [address] [facility]  Phone: [phone]  Fax: [phone]    Dimas Dumont MD        2021        Dear [facility]:    Patient Jono Nguyen  1965 is okay to proceed with surgery at low cardiac risk. If he has had any interval worsening of shortness of breath, increased weight gain or chest pain we will need to know in advance and see him before surgery. His Plavix was already stopped. If you have any questions or concerns, please don't hesitate to call.     Sincerely,        Dimas Dumont MD

## 2021-07-23 NOTE — PROGRESS NOTES
Prior to Visit Medications    Medication Sig Taking? Authorizing Provider   tamsulosin (FLOMAX) 0.4 MG capsule Take 1 capsule by mouth daily Yes Karina Bean MD   carvedilol (COREG) 12.5 MG tablet Take 1 tablet by mouth 2 times daily (with meals) Yes ERICA Smith CNP   furosemide (LASIX) 20 MG tablet Take 2 tablets by mouth See Admin Instructions Daily as needed for weight gain of 3lbs in a day or 5lbs in a week.  Yes ERICA Smith CNP   sacubitril-valsartan (ENTRESTO) 49-51 MG per tablet Take 1 tablet by mouth 2 times daily Yes ERICA Smith CNP   atorvastatin (LIPITOR) 40 MG tablet TAKE 1 TABLET BY MOUTH EVERY DAY AT NIGHT Yes Сергей Longo MD   escitalopram (LEXAPRO) 10 MG tablet TAKE 1 TABLET BY MOUTH EVERY DAY Yes Geln Montalvo MD   Multiple Vitamin (MULTI-VITAMIN DAILY PO) Take by mouth Yes Historical Provider, MD   oxyCODONE HCl (OXY-IR) 10 MG immediate release tablet TAKE 1 TABLET BY MOUTH EVERY 6 HOURS Yes Historical Provider, MD   aspirin 81 MG chewable tablet Take 1 tablet by mouth daily  Patient not taking: Reported on 7/23/2021  Isadore Rinne, MD   sodium chloride 0.9 % SOLN 100 mL with morphine (PF) 10 MG/ML SOLN 100 mg Infuse intravenously continuous Along with dilaudid  Historical Provider, MD        Allergies   Allergen Reactions    Vioxx Other (See Comments)     Oral ulcers         Past Medical History:   Diagnosis Date    Arthritis     Asthma     Back problem     Dr Bola Gray, Dr Eulalio Paz Cholelithiasis without obstruction 2010    asymptomatic    Depression     ED (erectile dysfunction)     Gallbladder & bile duct stone     Gallbladder full of stones    GERD (gastroesophageal reflux disease)     Hernia     Hyperlipidemia     Hypertension     Low testosterone     MI (myocardial infarction) ([facility] 04/13/2020    mLAD 100% FELICITY Xience Martha 3.5 x 18    Seasonal allergies     Sleep apnea     SVT (supraventricular tachycardia) ([facility] [address]     GXT     Tendonitis     Urothelial carcinoma of kidney, left ([facility] [facility] 2021       Past Surgical History:   Procedure Laterality Date    BACK SURGERY      L5 FUSION, Dr Luigi Wan      stent   [address] Right 2019    RIGHT CARPAL TUNNEL RELEASE performed by Delphine Rahman MD at [address] [facility]      Dr. Emmanuel Blackburn COLONOSCOPY      [facility] patient states    CYSTOSCOPY Left 2021    LEFT RENAL  BIOPSY, LEFT URETEROSCOPY WITH STENT PLACEMENT    CYSTOSCOPY Left 2021    CYSTOSCOPY, LEFT RENAL  BIOPSY, LEFT URETEROSCOPY WITH STENT PLACEMENT performed by John Sapp MD at [address] [facility]      Dr. Yaritza Goss Right     stimulator put in 3/10    LUMBAR SPINE SURGERY      Dr Kady Andujar History     Socioeconomic History    Marital status:      Spouse name: Not on file    Number of children: Not on file    Years of education: Not on file    Highest education level: Not on file   Occupational History    Not on file   Tobacco Use    Smoking status: Former Smoker     Packs/day: 0.25     Years: 20.00     Pack years: 5.00     Types: Cigars     Quit date: 2020     Years since quittin.2    Smokeless tobacco: Never Used   Vaping Use    Vaping Use: Never used   Substance and Sexual Activity    Alcohol use:  Yes     Alcohol/week: 1.0 standard drinks     Types: 1 Shots of liquor per week     Comment: daily     Drug use: No    Sexual activity: Yes     Partners: Female          Family History   Problem Relation Age of Onset    Heart Disease Father        ADVANCE DIRECTIVE: N, <no information>    Vitals:    21 1039   BP: 134/89   Pulse: 73   Temp: 98.6 °F (37 °C)   TempSrc: Oral   SpO2: 96%   Weight: 234 lb (106.1 kg)   Height: 6' 2.5\" (1.892 m)     Estimated body mass index is 29.64 kg/m² as calculated from the following:    Height as of this encounter: 6' 2.5\" (1.892 m). Weight as of this encounter: 234 lb (106.1 kg). Physical Exam  Vitals reviewed. Constitutional:       Appearance: He is well-developed. He is not toxic-appearing. HENT:      Head: Normocephalic. Neck:      Thyroid: No thyroid mass or thyromegaly. Cardiovascular:      Rate and Rhythm: Normal rate and regular rhythm. Heart sounds: Normal heart sounds. No murmur heard. Pulmonary:      Effort: No respiratory distress. Breath sounds: Normal breath sounds. No wheezing or rales. Abdominal:      General: There is no distension. Palpations: Abdomen is soft. There is no mass. Tenderness: There is no abdominal tenderness. There is no guarding or rebound. Musculoskeletal:      Cervical back: Neck supple. Lymphadenopathy:      Cervical: No cervical adenopathy. Upper Body:      Right upper body: No supraclavicular adenopathy. Skin:     General: Skin is warm. Neurological:      Mental Status: He is alert and oriented to person, place, and time. Psychiatric:         Behavior: Behavior normal.         Thought Content: Thought content normal.         Judgment: Judgment normal.     Pain with range of motion of low back    The ASCVD Risk score (Kary Arredondo., et al., 2013) failed to calculate for the following reasons:     The patient has a prior MI or stroke diagnosis    Immunization History   Administered Date(s) Administered    COVID-19, Moderna, PF, 100mcg/0.5mL 03/13/2021, 04/10/2021    Hepatitis B 01/18/2007, 02/23/2007, 10/03/2007    Influenza Virus Vaccine 10/01/2010, 11/17/2017    Influenza Whole 12/12/2009    Influenza, Quadv, IM, (6 mo and older Fluzone, Flulaval, Fluarix and 3 yrs and older Afluria) 11/17/2017, 10/20/2020    Pneumococcal Conjugate 13-valent ([id number]) 11/17/2017    Pneumococcal Polysaccharide ([id number]) 08/01/2019    Tdap (Boostrix, Adacel) 05/17/2017          ASSESSMENT/PLAN:  1. Urothelial carcinoma of kidney, left ([address]    2. Essential hypertension    3. Severe obstructive sleep apnea    4. Ischemic cardiomyopathy    5. Coronary artery disease involving native coronary artery of native heart without angina pectoris    6. Mild intermittent asthma without complication      Patient had EKG done on July 16, 2021 read by Dr. Dhiraj Enrique. It showed normal sinus rhythm and old changes. Patient has preoperative lab and urine orders per surgeon which he will get done at hospital today. My opinion is that if preop labwork is in an acceptable range, the patient is currently medically stable. He was advised to avoid aspirin, NSAIDs, fish oil supplements, vitamin E supplements and blood thinners one week before planned procedure. This note will be sent to the surgeon. Jim Serrano M.D. [address] [place] [address]. [address], [address] [place] [address]             An electronic signature was used to authenticate this note.     --Jim Serrano MD on 7/23/2021 at 11:00 AM

## 2021-07-23 NOTE — PROGRESS NOTES
Obstructive Sleep Apnea (BIJU) Screening     Patient:  Kalpana Adam    YOB: 1965      Medical Record #:  [id number]                     Date:  7/23/2021     1. Are you a loud and/or regular snorer? [x]  Yes       [] No    2. Have you been observed to gasp or stop breathing during sleep? []  Yes       [x] No    3. Do you feel tired or groggy upon awakening or do you awaken with a headache?           []  Yes       [x] No    4. Are you often tired or fatigued during the wake time hours? []  Yes       [] No    5. Do you fall asleep sitting, reading, watching TV or driving? []  Yes       [x] No    6. Do you often have problems with memory or concentration? []  Yes       [x] No    **If patient's score is ? 3 they are considered high risk for BIJU. An Anesthesia provider will evaluate the patient and develop a plan of care the day of surgery. Note:  If the patient's BMI is more than 35 kg m¯² , has neck circumference > 40 cm, and/or high blood pressure the risk is greater (© American Sleep Apnea Association, 2006).     No CPAP

## 2021-07-24 LAB — URINE CULTURE, ROUTINE: NORMAL

## 2021-07-26 NOTE — TELEPHONE ENCOUNTER
Looks like patient recently saw Mateo Yi after following up with me in office, sounds like his shortness of breath and edema have all resolved therefore he is okay to proceed with surgery at low cardiac risk. If he has had any interval worsening of shortness of breath, increased weight gain or chest pain we will need to know in advance and see him before surgery.   His Plavix was already stopped

## 2021-07-28 ENCOUNTER — ANESTHESIA EVENT (OUTPATIENT)
Dept: OPERATING ROOM | Age: 56
DRG: 657 | End: 2021-07-28
Payer: COMMERCIAL

## 2021-07-28 RX ORDER — SODIUM CHLORIDE, SODIUM LACTATE, POTASSIUM CHLORIDE, CALCIUM CHLORIDE 600; 310; 30; 20 MG/100ML; MG/100ML; MG/100ML; MG/100ML
INJECTION, SOLUTION INTRAVENOUS CONTINUOUS
Status: CANCELLED | OUTPATIENT
Start: 2021-07-28

## 2021-07-28 RX ORDER — LIDOCAINE HYDROCHLORIDE 10 MG/ML
2 INJECTION, SOLUTION INFILTRATION; PERINEURAL
Status: CANCELLED | OUTPATIENT
Start: 2021-07-28 | End: 2021-07-28

## 2021-07-28 ASSESSMENT — ENCOUNTER SYMPTOMS: SHORTNESS OF BREATH: 1

## 2021-07-28 NOTE — ANESTHESIA PRE PROCEDURE
Department of Anesthesiology  Preprocedure Note       Name:  Lupe Nelson   Age:  60 y.o.  :  1965                                          MRN:  [id number]         Date:  2021      Surgeon: Shellie Porter):  Riccardo Kuhn MD    Procedure: Procedure(s):  DAVINCI LEFT NEPHROURETERECTOMY    Medications prior to admission:   Prior to Admission medications    Medication Sig Start Date End Date Taking? Authorizing Provider   tamsulosin (FLOMAX) 0.4 MG capsule Take 1 capsule by mouth daily 21  Yes Riccardo Kuhn MD   furosemide (LASIX) 20 MG tablet Take 2 tablets by mouth See Admin Instructions Daily as needed for weight gain of 3lbs in a day or 5lbs in a week. 6/10/21  Yes ERICA Bridges CNP   sacubitril-valsartan (ENTRESTO) 49-51 MG per tablet Take 1 tablet by mouth 2 times daily  Patient taking differently: Take 1 tablet by mouth 2 times daily LD 21  Yes ERICA Bridges CNP   atorvastatin (LIPITOR) 40 MG tablet TAKE 1 TABLET BY MOUTH EVERY DAY AT NIGHT 21  Yes Junior Francisco Javier MD   escitalopram (LEXAPRO) 10 MG tablet TAKE 1 TABLET BY MOUTH EVERY DAY 21  Yes Palak Antunez MD   Multiple Vitamin (MULTI-VITAMIN DAILY PO) Take by mouth   Yes Historical Provider, MD   aspirin 81 MG chewable tablet Take 1 tablet by mouth daily  Patient not taking: Reported on 2021  Yes Augusto Munguia MD   sodium chloride 0.9 % SOLN 100 mL with morphine (PF) 10 MG/ML SOLN 100 mg Infuse intravenously continuous Along with dilaudid   Yes Historical Provider, MD   oxyCODONE HCl (OXY-IR) 10 MG immediate release tablet TAKE 1 TABLET BY MOUTH EVERY 6 HOURS 17  Yes Historical Provider, MD   carvedilol (COREG) 12.5 MG tablet Take 1 tablet by mouth 2 times daily (with meals) 6/10/21   ERICA Bridges CNP       Current medications:    No current facility-administered medications for this encounter.      Current Outpatient Medications   Medication Sig Dispense Refill    tamsulosin (FLOMAX) 0.4 MG capsule Take 1 capsule by mouth daily 30 capsule 5    furosemide (LASIX) 20 MG tablet Take 2 tablets by mouth See Admin Instructions Daily as needed for weight gain of 3lbs in a day or 5lbs in a week. 30 tablet 5    sacubitril-valsartan (ENTRESTO) 49-51 MG per tablet Take 1 tablet by mouth 2 times daily (Patient taking differently: Take 1 tablet by mouth 2 times daily LD 7/27) 60 tablet 3    atorvastatin (LIPITOR) 40 MG tablet TAKE 1 TABLET BY MOUTH EVERY DAY AT NIGHT 90 tablet 3    escitalopram (LEXAPRO) 10 MG tablet TAKE 1 TABLET BY MOUTH EVERY DAY 90 tablet 1    Multiple Vitamin (MULTI-VITAMIN DAILY PO) Take by mouth      aspirin 81 MG chewable tablet Take 1 tablet by mouth daily (Patient not taking: Reported on 7/23/2021) 30 tablet 0    sodium chloride 0.9 % SOLN 100 mL with morphine (PF) 10 MG/ML SOLN 100 mg Infuse intravenously continuous Along with dilaudid      oxyCODONE HCl (OXY-IR) 10 MG immediate release tablet TAKE 1 TABLET BY MOUTH EVERY 6 HOURS  0    carvedilol (COREG) 12.5 MG tablet Take 1 tablet by mouth 2 times daily (with meals) 60 tablet 2       Allergies:     Allergies   Allergen Reactions    Vioxx Other (See Comments)     Oral ulcers         Problem List:    Patient Active Problem List   Diagnosis Code    Asthma J45.909    Restless legs syndrome (RLS) G25.81    Low testosterone R79.89    ED (erectile dysfunction) N52.9    Chronic bilateral low back pain without sciatica M54.5, G89.29    Mixed hyperlipidemia E78.2    Essential hypertension I10    Dupuytren's contracture M72.0    Severe obstructive sleep apnea G47.33    Tobacco abuse Z72.0    Ischemic cardiomyopathy I25.5    STEMI (ST elevation myocardial infarction) ([facility] [address] I21.3    Obesity E66.9    Coronary artery disease involving native coronary artery of native heart without angina pectoris I25.10    SOB (shortness of breath) R06.02    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    Adrenal nodule (HCC) E27.8    Urothelial carcinoma of kidney, left (HCC) C64.2       Past Medical History:        Diagnosis Date    Arthritis     Asthma     Back problem     Dr Luis Lincoln, Dr Abraham Easley Cholelithiasis without obstruction 2010    asymptomatic    Depression     ED (erectile dysfunction)     Gallbladder & bile duct stone     Gallbladder full of stones    GERD (gastroesophageal reflux disease)     Hernia     Hyperlipidemia     Hypertension     Low testosterone     MI (myocardial infarction) ([address] 2020    mLAD 100% FELICITY Xience Martha 3.5 x 18    Seasonal allergies     Sleep apnea     SVT (supraventricular tachycardia) ([address]     GXT     Tendonitis     Urothelial carcinoma of kidney, left ([address] 2021       Past Surgical History:        Procedure Laterality Date    BACK SURGERY      L5 FUSION, Dr Donnelly Notice      stent   [address] Right 2019    RIGHT CARPAL TUNNEL RELEASE performed by Vernon Guzman MD at [address]      Dr. Noemi Shabazz      [facility] [place] patient states    CYSTOSCOPY Left 2021    LEFT RENAL  BIOPSY, LEFT URETEROSCOPY WITH STENT PLACEMENT    CYSTOSCOPY Left 2021    CYSTOSCOPY, LEFT RENAL  BIOPSY, LEFT URETEROSCOPY WITH STENT PLACEMENT performed by Felix Hurley MD at [address]      Dr. Yumiko Toney Right     stimulator put in 3/10    LUMBAR SPINE SURGERY      Dr Lalitha Crook         Social History:    Social History     Tobacco Use    Smoking status: Former Smoker     Packs/day: 0.25     Years: 20.00     Pack years: 5.00     Types: Cigars     Quit date: 2020     Years since quittin.2    Smokeless tobacco: Never Used   Substance Use Topics    Alcohol use:  Yes     Alcohol/week: 1.0 standard drinks     Types: 1 Shots of liquor per week     Comment: daily                                 Counseling given: Not Answered      Vital Signs (Current):   Vitals:    07/23/21 0939   Weight: 230 lb (104.3 kg)   Height: 6' 4\" (1.93 m)                                              BP Readings from Last 3 Encounters:   07/23/21 134/89   07/16/21 (!) 172/98   07/16/21 (!) 185/99       NPO Status:                                                                                 BMI:   Wt Readings from Last 3 Encounters:   07/23/21 234 lb (106.1 kg)   07/16/21 230 lb (104.3 kg)   06/10/21 233 lb (105.7 kg)     Body mass index is 28 kg/m². CBC:   Lab Results   Component Value Date    WBC 6.8 07/23/2021    RBC 4.54 07/23/2021    HGB 13.8 07/23/2021    HCT 39.9 07/23/2021    MCV 87.9 07/23/2021    RDW 15.1 07/23/2021     07/23/2021       CMP:   Lab Results   Component Value Date     07/23/2021    K 4.4 07/23/2021    K 4.6 04/14/2020     07/23/2021    CO2 20 07/23/2021    BUN 14 07/23/2021    CREATININE 1.0 07/23/2021    GFRAA >60 07/23/2021    GFRAA >60 01/31/2013    AGRATIO 1.9 05/24/2021    LABGLOM >60 07/23/2021    GLUCOSE 98 07/23/2021    PROT 7.2 05/24/2021    PROT 6.7 02/28/2012    CALCIUM 9.3 07/23/2021    BILITOT 0.5 05/24/2021    ALKPHOS 103 05/24/2021    AST 34 05/24/2021    ALT 45 05/24/2021       POC Tests: No results for input(s): POCGLU, POCNA, POCK, POCCL, POCBUN, POCHEMO, POCHCT in the last 72 hours.     Coags:   Lab Results   Component Value Date    PROTIME 10.8 07/23/2021    INR 0.96 07/23/2021    APTT 31.2 07/23/2021       HCG (If Applicable): No results found for: PREGTESTUR, PREGSERUM, HCG, HCGQUANT     ABGs: No results found for: PHART, PO2ART, SZS4BWW, RQQ5LUZ, BEART, U8TGGHRV     Type & Screen (If Applicable):  No results found for: LABABO, LABRH    Drug/Infectious Status (If Applicable):  No results found for: HIV, HEPCAB    COVID-19 Screening (If Applicable): No results found for: COVID19        Anesthesia Evaluation  Patient summary reviewed and Nursing notes reviewed no history of anesthetic complications:   Airway: Mallampati: III     Neck ROM: full   Dental:          Pulmonary:Negative Pulmonary ROS and normal exam    (+) shortness of breath:  sleep apnea:  asthma:                            Cardiovascular:Negative CV ROS    (+) hypertension:, past MI:, CAD:, CABG/stent (stent 4/20):, dysrhythmias: SVT, hyperlipidemia                  Neuro/Psych:   Negative Neuro/Psych ROS  (+) psychiatric history:depression/anxiety             GI/Hepatic/Renal: Neg GI/Hepatic/Renal ROS  (+) GERD: well controlled,      (-) hiatal hernia       Endo/Other: Negative Endo/Other ROS   (+) : arthritis:., .                 Abdominal:             Vascular: Other Findings:           Anesthesia Plan      general     ASA 3     (I discussed with the patient the risks and benefits of PIV, general anesthesia, IV Narcotics, PACU. All questions were answered the patient agrees with the plan and wishes to proceed.  )  Induction: intravenous. Summary   LV systolic function is mildly reduced with an estimated EF of 45%. There is akinesis of the apical septum and distal apiex. Mild HK of the anteroseptal segment. There is mild concentric left ventricular hypertrophy. Grade I diastolic dysfunction with normal LV filling pressure. Inadequate tricuspid regurgitation jet to estimate systolic artery pressure   (SPAP). 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   BMI:  Body mass index is 28 kg/m².      Vitals:    07/23/21 0939 07/29/21 1145   BP:  134/80   Pulse:  74   Resp:  16   Temp:  97.4 °F (36.3 °C)   TempSrc:  Temporal   SpO2: 97%   Weight: 230 lb (104.3 kg)    Height: 6' 4\" (1.93 m)        Allergies   Allergen Reactions    Vioxx Other (See Comments)     Oral ulcers         Social History     Tobacco Use    Smoking status: Former Smoker     Packs/day: 0.25     Years: 20.00     Pack years: 5.00     Types: Cigars     Quit date: 2020     Years since quittin.2    Smokeless tobacco: Never Used   Substance Use Topics    Alcohol use:  Yes     Alcohol/week: 1.0 standard drinks     Types: 1 Shots of liquor per week     Comment: daily        LABS:    CBC  Lab Results   Component Value Date/Time    WBC 6.8 2021 01:02 PM    HGB 13.8 2021 01:02 PM    HCT 39.9 (L) 2021 01:02 PM     2021 01:02 PM     RENAL  Lab Results   Component Value Date/Time     2021 01:02 PM    K 4.4 2021 01:02 PM    K 4.6 2020 04:25 AM     2021 01:02 PM    CO2 20 (L) 2021 01:02 PM    BUN 14 2021 01:02 PM    CREATININE 1.0 2021 01:02 PM    GLUCOSE 98 2021 01:02 PM     COAGS  Lab Results   Component Value Date/Time    PROTIME 10.8 2021 01:02 PM    INR 0.96 2021 01:02 PM    APTT 31.2 2021 01:02 PM       Kary Torre MD   2021

## 2021-07-29 ENCOUNTER — ANESTHESIA (OUTPATIENT)
Dept: OPERATING ROOM | Age: 56
DRG: 657 | End: 2021-07-29
Payer: COMMERCIAL

## 2021-07-29 ENCOUNTER — HOSPITAL ENCOUNTER (INPATIENT)
Age: 56
LOS: 2 days | Discharge: HOME | DRG: 657 | End: 2021-07-31
Attending: UROLOGY | Admitting: UROLOGY
Payer: COMMERCIAL

## 2021-07-29 VITALS
TEMPERATURE: 99.3 F | SYSTOLIC BLOOD PRESSURE: 119 MMHG | OXYGEN SATURATION: 100 % | RESPIRATION RATE: 17 BRPM | DIASTOLIC BLOOD PRESSURE: 78 MMHG

## 2021-07-29 DIAGNOSIS — D41.12 NEOPLASM OF UNCERTAIN BEHAVIOR OF LEFT RENAL PELVIS: ICD-10-CM

## 2021-07-29 PROCEDURE — C1729 CATH, DRAINAGE: HCPCS | Performed by: UROLOGY

## 2021-07-29 PROCEDURE — 3600000019 HC SURGERY ROBOT ADDTL 15MIN: Performed by: UROLOGY

## 2021-07-29 PROCEDURE — 2500000003 HC RX 250 WO HCPCS: Performed by: NURSE ANESTHETIST, CERTIFIED REGISTERED

## 2021-07-29 PROCEDURE — 2580000003 HC RX 258: Performed by: NURSE ANESTHETIST, CERTIFIED REGISTERED

## 2021-07-29 PROCEDURE — 6370000000 HC RX 637 (ALT 250 FOR IP): Performed by: UROLOGY

## 2021-07-29 PROCEDURE — 6360000002 HC RX W HCPCS: Performed by: ANESTHESIOLOGY

## 2021-07-29 PROCEDURE — 0TP94DZ REMOVAL OF INTRALUMINAL DEVICE FROM URETER, PERCUTANEOUS ENDOSCOPIC APPROACH: ICD-10-PCS | Performed by: UROLOGY

## 2021-07-29 PROCEDURE — 0DN84ZZ RELEASE SMALL INTESTINE, PERCUTANEOUS ENDOSCOPIC APPROACH: ICD-10-PCS | Performed by: UROLOGY

## 2021-07-29 PROCEDURE — 2720000010 HC SURG SUPPLY STERILE: Performed by: UROLOGY

## 2021-07-29 PROCEDURE — 3600000009 HC SURGERY ROBOT BASE: Performed by: UROLOGY

## 2021-07-29 PROCEDURE — 2709999900 HC NON-CHARGEABLE SUPPLY: Performed by: UROLOGY

## 2021-07-29 PROCEDURE — 2580000003 HC RX 258: Performed by: UROLOGY

## 2021-07-29 PROCEDURE — 0TT10ZZ RESECTION OF LEFT KIDNEY, OPEN APPROACH: ICD-10-PCS | Performed by: UROLOGY

## 2021-07-29 PROCEDURE — 3700000000 HC ANESTHESIA ATTENDED CARE: Performed by: UROLOGY

## 2021-07-29 PROCEDURE — 2500000003 HC RX 250 WO HCPCS: Performed by: UROLOGY

## 2021-07-29 PROCEDURE — 0TB74ZZ EXCISION OF LEFT URETER, PERCUTANEOUS ENDOSCOPIC APPROACH: ICD-10-PCS | Performed by: UROLOGY

## 2021-07-29 PROCEDURE — 8E0W4CZ ROBOTIC ASSISTED PROCEDURE OF TRUNK REGION, PERCUTANEOUS ENDOSCOPIC APPROACH: ICD-10-PCS | Performed by: UROLOGY

## 2021-07-29 PROCEDURE — 6360000002 HC RX W HCPCS: Performed by: NURSE ANESTHETIST, CERTIFIED REGISTERED

## 2021-07-29 PROCEDURE — S2900 ROBOTIC SURGICAL SYSTEM: HCPCS | Performed by: UROLOGY

## 2021-07-29 PROCEDURE — 88307 TISSUE EXAM BY PATHOLOGIST: CPT

## 2021-07-29 PROCEDURE — 6360000002 HC RX W HCPCS: Performed by: UROLOGY

## 2021-07-29 PROCEDURE — 3700000001 HC ADD 15 MINUTES (ANESTHESIA): Performed by: UROLOGY

## 2021-07-29 PROCEDURE — 2780000010 HC IMPLANT OTHER: Performed by: UROLOGY

## 2021-07-29 PROCEDURE — 7100000000 HC PACU RECOVERY - FIRST 15 MIN: Performed by: UROLOGY

## 2021-07-29 PROCEDURE — 1200000000 HC SEMI PRIVATE

## 2021-07-29 PROCEDURE — 7100000001 HC PACU RECOVERY - ADDTL 15 MIN: Performed by: UROLOGY

## 2021-07-29 RX ORDER — OXYCODONE HYDROCHLORIDE 5 MG/1
10 TABLET ORAL EVERY 4 HOURS PRN
Status: DISCONTINUED | OUTPATIENT
Start: 2021-07-29 | End: 2021-07-31 | Stop reason: HOSPADM

## 2021-07-29 RX ORDER — LABETALOL HYDROCHLORIDE 5 MG/ML
INJECTION, SOLUTION INTRAVENOUS PRN
Status: DISCONTINUED | OUTPATIENT
Start: 2021-07-29 | End: 2021-07-29 | Stop reason: SDUPTHER

## 2021-07-29 RX ORDER — DEXAMETHASONE SODIUM PHOSPHATE 4 MG/ML
INJECTION, SOLUTION INTRA-ARTICULAR; INTRALESIONAL; INTRAMUSCULAR; INTRAVENOUS; SOFT TISSUE PRN
Status: DISCONTINUED | OUTPATIENT
Start: 2021-07-29 | End: 2021-07-29 | Stop reason: SDUPTHER

## 2021-07-29 RX ORDER — ESCITALOPRAM OXALATE 10 MG/1
1 TABLET ORAL DAILY
Status: CANCELLED | OUTPATIENT
Start: 2021-07-29

## 2021-07-29 RX ORDER — SODIUM CHLORIDE, SODIUM LACTATE, POTASSIUM CHLORIDE, CALCIUM CHLORIDE 600; 310; 30; 20 MG/100ML; MG/100ML; MG/100ML; MG/100ML
INJECTION, SOLUTION INTRAVENOUS CONTINUOUS PRN
Status: DISCONTINUED | OUTPATIENT
Start: 2021-07-29 | End: 2021-07-29 | Stop reason: SDUPTHER

## 2021-07-29 RX ORDER — ONDANSETRON 2 MG/ML
INJECTION INTRAMUSCULAR; INTRAVENOUS PRN
Status: DISCONTINUED | OUTPATIENT
Start: 2021-07-29 | End: 2021-07-29 | Stop reason: SDUPTHER

## 2021-07-29 RX ORDER — SODIUM CHLORIDE 9 MG/ML
INJECTION, SOLUTION INTRAVENOUS CONTINUOUS
Status: DISCONTINUED | OUTPATIENT
Start: 2021-07-29 | End: 2021-07-30

## 2021-07-29 RX ORDER — LABETALOL HYDROCHLORIDE 5 MG/ML
5 INJECTION, SOLUTION INTRAVENOUS EVERY 10 MIN PRN
Status: DISCONTINUED | OUTPATIENT
Start: 2021-07-29 | End: 2021-07-29 | Stop reason: HOSPADM

## 2021-07-29 RX ORDER — ONDANSETRON 2 MG/ML
4 INJECTION INTRAMUSCULAR; INTRAVENOUS PRN
Status: DISCONTINUED | OUTPATIENT
Start: 2021-07-29 | End: 2021-07-29 | Stop reason: HOSPADM

## 2021-07-29 RX ORDER — MORPHINE SULFATE 2 MG/ML
1 INJECTION, SOLUTION INTRAMUSCULAR; INTRAVENOUS EVERY 5 MIN PRN
Status: DISCONTINUED | OUTPATIENT
Start: 2021-07-29 | End: 2021-07-29 | Stop reason: HOSPADM

## 2021-07-29 RX ORDER — HYDROMORPHONE HCL 110MG/55ML
0.25 PATIENT CONTROLLED ANALGESIA SYRINGE INTRAVENOUS
Status: DISCONTINUED | OUTPATIENT
Start: 2021-07-29 | End: 2021-07-30

## 2021-07-29 RX ORDER — ROCURONIUM BROMIDE 10 MG/ML
INJECTION, SOLUTION INTRAVENOUS PRN
Status: DISCONTINUED | OUTPATIENT
Start: 2021-07-29 | End: 2021-07-29 | Stop reason: SDUPTHER

## 2021-07-29 RX ORDER — HYDROMORPHONE HCL 110MG/55ML
PATIENT CONTROLLED ANALGESIA SYRINGE INTRAVENOUS PRN
Status: DISCONTINUED | OUTPATIENT
Start: 2021-07-29 | End: 2021-07-29 | Stop reason: SDUPTHER

## 2021-07-29 RX ORDER — ONDANSETRON 2 MG/ML
4 INJECTION INTRAMUSCULAR; INTRAVENOUS EVERY 6 HOURS PRN
Status: DISCONTINUED | OUTPATIENT
Start: 2021-07-29 | End: 2021-07-31 | Stop reason: HOSPADM

## 2021-07-29 RX ORDER — OXYCODONE HYDROCHLORIDE AND ACETAMINOPHEN 5; 325 MG/1; MG/1
1 TABLET ORAL PRN
Status: DISCONTINUED | OUTPATIENT
Start: 2021-07-29 | End: 2021-07-29 | Stop reason: HOSPADM

## 2021-07-29 RX ORDER — SODIUM CHLORIDE 9 MG/ML
25 INJECTION, SOLUTION INTRAVENOUS PRN
Status: DISCONTINUED | OUTPATIENT
Start: 2021-07-29 | End: 2021-07-31 | Stop reason: HOSPADM

## 2021-07-29 RX ORDER — PROPOFOL 10 MG/ML
INJECTION, EMULSION INTRAVENOUS PRN
Status: DISCONTINUED | OUTPATIENT
Start: 2021-07-29 | End: 2021-07-29 | Stop reason: SDUPTHER

## 2021-07-29 RX ORDER — SODIUM CHLORIDE 0.9 % (FLUSH) 0.9 %
10 SYRINGE (ML) INJECTION PRN
Status: DISCONTINUED | OUTPATIENT
Start: 2021-07-29 | End: 2021-07-31 | Stop reason: HOSPADM

## 2021-07-29 RX ORDER — BUPIVACAINE HYDROCHLORIDE 5 MG/ML
INJECTION, SOLUTION EPIDURAL; INTRACAUDAL PRN
Status: DISCONTINUED | OUTPATIENT
Start: 2021-07-29 | End: 2021-07-29 | Stop reason: HOSPADM

## 2021-07-29 RX ORDER — TAMSULOSIN HYDROCHLORIDE 0.4 MG/1
0.4 CAPSULE ORAL DAILY
Status: CANCELLED | OUTPATIENT
Start: 2021-07-29

## 2021-07-29 RX ORDER — SENNA AND DOCUSATE SODIUM 50; 8.6 MG/1; MG/1
2 TABLET, FILM COATED ORAL DAILY
Qty: 14 TABLET | Refills: 0 | Status: SHIPPED | OUTPATIENT
Start: 2021-07-29 | End: 2021-12-06 | Stop reason: ALTCHOICE

## 2021-07-29 RX ORDER — DIPHENHYDRAMINE HYDROCHLORIDE 50 MG/ML
12.5 INJECTION INTRAMUSCULAR; INTRAVENOUS
Status: DISCONTINUED | OUTPATIENT
Start: 2021-07-29 | End: 2021-07-29 | Stop reason: HOSPADM

## 2021-07-29 RX ORDER — SODIUM CHLORIDE 9 MG/ML
INJECTION, SOLUTION INTRAVENOUS CONTINUOUS PRN
Status: COMPLETED | OUTPATIENT
Start: 2021-07-29 | End: 2021-07-29

## 2021-07-29 RX ORDER — FUROSEMIDE 40 MG/1
40 TABLET ORAL SEE ADMIN INSTRUCTIONS
Status: CANCELLED | OUTPATIENT
Start: 2021-07-30

## 2021-07-29 RX ORDER — MORPHINE SULFATE 2 MG/ML
2 INJECTION, SOLUTION INTRAMUSCULAR; INTRAVENOUS EVERY 5 MIN PRN
Status: DISCONTINUED | OUTPATIENT
Start: 2021-07-29 | End: 2021-07-29 | Stop reason: HOSPADM

## 2021-07-29 RX ORDER — ONDANSETRON 4 MG/1
4 TABLET, ORALLY DISINTEGRATING ORAL EVERY 8 HOURS PRN
Status: DISCONTINUED | OUTPATIENT
Start: 2021-07-29 | End: 2021-07-31 | Stop reason: HOSPADM

## 2021-07-29 RX ORDER — FENTANYL CITRATE 50 UG/ML
INJECTION, SOLUTION INTRAMUSCULAR; INTRAVENOUS PRN
Status: DISCONTINUED | OUTPATIENT
Start: 2021-07-29 | End: 2021-07-29 | Stop reason: SDUPTHER

## 2021-07-29 RX ORDER — CARVEDILOL 6.25 MG/1
12.5 TABLET ORAL 2 TIMES DAILY WITH MEALS
Status: CANCELLED | OUTPATIENT
Start: 2021-07-29

## 2021-07-29 RX ORDER — LIDOCAINE HYDROCHLORIDE 20 MG/ML
INJECTION, SOLUTION INFILTRATION; PERINEURAL PRN
Status: DISCONTINUED | OUTPATIENT
Start: 2021-07-29 | End: 2021-07-29 | Stop reason: SDUPTHER

## 2021-07-29 RX ORDER — OXYCODONE HYDROCHLORIDE 5 MG/1
5 TABLET ORAL EVERY 4 HOURS PRN
Status: DISCONTINUED | OUTPATIENT
Start: 2021-07-29 | End: 2021-07-31 | Stop reason: HOSPADM

## 2021-07-29 RX ORDER — HYDRALAZINE HYDROCHLORIDE 20 MG/ML
5 INJECTION INTRAMUSCULAR; INTRAVENOUS EVERY 10 MIN PRN
Status: DISCONTINUED | OUTPATIENT
Start: 2021-07-29 | End: 2021-07-29 | Stop reason: HOSPADM

## 2021-07-29 RX ORDER — ATORVASTATIN CALCIUM 40 MG/1
40 TABLET, FILM COATED ORAL DAILY
Status: CANCELLED | OUTPATIENT
Start: 2021-07-29

## 2021-07-29 RX ORDER — PHENYLEPHRINE HCL IN 0.9% NACL 1 MG/10 ML
SYRINGE (ML) INTRAVENOUS PRN
Status: DISCONTINUED | OUTPATIENT
Start: 2021-07-29 | End: 2021-07-29 | Stop reason: SDUPTHER

## 2021-07-29 RX ORDER — POLYETHYLENE GLYCOL 3350 17 G/17G
17 POWDER, FOR SOLUTION ORAL DAILY PRN
Status: DISCONTINUED | OUTPATIENT
Start: 2021-07-29 | End: 2021-07-31 | Stop reason: HOSPADM

## 2021-07-29 RX ORDER — PROMETHAZINE HYDROCHLORIDE 25 MG/ML
6.25 INJECTION, SOLUTION INTRAMUSCULAR; INTRAVENOUS
Status: DISCONTINUED | OUTPATIENT
Start: 2021-07-29 | End: 2021-07-29 | Stop reason: HOSPADM

## 2021-07-29 RX ORDER — OXYCODONE HYDROCHLORIDE AND ACETAMINOPHEN 5; 325 MG/1; MG/1
2 TABLET ORAL PRN
Status: DISCONTINUED | OUTPATIENT
Start: 2021-07-29 | End: 2021-07-29 | Stop reason: HOSPADM

## 2021-07-29 RX ORDER — SENNA AND DOCUSATE SODIUM 50; 8.6 MG/1; MG/1
2 TABLET, FILM COATED ORAL 2 TIMES DAILY
Status: DISCONTINUED | OUTPATIENT
Start: 2021-07-29 | End: 2021-07-31 | Stop reason: HOSPADM

## 2021-07-29 RX ORDER — DIPHENHYDRAMINE HYDROCHLORIDE 50 MG/ML
25 INJECTION INTRAMUSCULAR; INTRAVENOUS EVERY 6 HOURS PRN
Status: CANCELLED | OUTPATIENT
Start: 2021-07-29

## 2021-07-29 RX ORDER — MEPERIDINE HYDROCHLORIDE 50 MG/ML
12.5 INJECTION INTRAMUSCULAR; INTRAVENOUS; SUBCUTANEOUS EVERY 5 MIN PRN
Status: DISCONTINUED | OUTPATIENT
Start: 2021-07-29 | End: 2021-07-29 | Stop reason: HOSPADM

## 2021-07-29 RX ORDER — HYDROMORPHONE HCL 110MG/55ML
0.5 PATIENT CONTROLLED ANALGESIA SYRINGE INTRAVENOUS
Status: DISCONTINUED | OUTPATIENT
Start: 2021-07-29 | End: 2021-07-30

## 2021-07-29 RX ORDER — SODIUM CHLORIDE 0.9 % (FLUSH) 0.9 %
5-40 SYRINGE (ML) INJECTION EVERY 12 HOURS SCHEDULED
Status: DISCONTINUED | OUTPATIENT
Start: 2021-07-29 | End: 2021-07-31 | Stop reason: HOSPADM

## 2021-07-29 RX ADMIN — HYDROMORPHONE HYDROCHLORIDE 0.5 MG: 1 INJECTION, SOLUTION INTRAMUSCULAR; INTRAVENOUS; SUBCUTANEOUS at 20:09

## 2021-07-29 RX ADMIN — HYDROMORPHONE HYDROCHLORIDE 1 MG: 2 INJECTION INTRAMUSCULAR; INTRAVENOUS; SUBCUTANEOUS at 13:35

## 2021-07-29 RX ADMIN — ROCURONIUM BROMIDE 20 MG: 10 SOLUTION INTRAVENOUS at 18:13

## 2021-07-29 RX ADMIN — Medication 200 MCG: at 17:30

## 2021-07-29 RX ADMIN — SODIUM CHLORIDE, SODIUM LACTATE, POTASSIUM CHLORIDE, AND CALCIUM CHLORIDE: .6; .31; .03; .02 INJECTION, SOLUTION INTRAVENOUS at 18:56

## 2021-07-29 RX ADMIN — HYDROMORPHONE HYDROCHLORIDE 0.5 MG: 1 INJECTION, SOLUTION INTRAMUSCULAR; INTRAVENOUS; SUBCUTANEOUS at 20:30

## 2021-07-29 RX ADMIN — HYDROMORPHONE HYDROCHLORIDE 0.5 MG: 1 INJECTION, SOLUTION INTRAMUSCULAR; INTRAVENOUS; SUBCUTANEOUS at 20:03

## 2021-07-29 RX ADMIN — CEFAZOLIN 2 G: 10 INJECTION, POWDER, FOR SOLUTION INTRAVENOUS at 12:21

## 2021-07-29 RX ADMIN — ROCURONIUM BROMIDE 20 MG: 10 SOLUTION INTRAVENOUS at 17:26

## 2021-07-29 RX ADMIN — Medication 80 MCG: at 13:00

## 2021-07-29 RX ADMIN — FENTANYL CITRATE 50 MCG: 50 INJECTION INTRAMUSCULAR; INTRAVENOUS at 13:21

## 2021-07-29 RX ADMIN — SODIUM CHLORIDE, SODIUM LACTATE, POTASSIUM CHLORIDE, CALCIUM CHLORIDE: 600; 310; 30; 20 INJECTION, SOLUTION INTRAVENOUS at 18:56

## 2021-07-29 RX ADMIN — FENTANYL CITRATE 100 MCG: 50 INJECTION INTRAMUSCULAR; INTRAVENOUS at 12:28

## 2021-07-29 RX ADMIN — ROCURONIUM BROMIDE 20 MG: 10 SOLUTION INTRAVENOUS at 16:48

## 2021-07-29 RX ADMIN — HYDROMORPHONE HYDROCHLORIDE 0.5 MG: 1 INJECTION, SOLUTION INTRAMUSCULAR; INTRAVENOUS; SUBCUTANEOUS at 21:35

## 2021-07-29 RX ADMIN — PROPOFOL 100 MG: 10 INJECTION, EMULSION INTRAVENOUS at 12:29

## 2021-07-29 RX ADMIN — SUGAMMADEX 200 MG: 100 INJECTION, SOLUTION INTRAVENOUS at 19:47

## 2021-07-29 RX ADMIN — Medication 80 MCG: at 12:37

## 2021-07-29 RX ADMIN — ENOXAPARIN SODIUM 40 MG: 40 INJECTION SUBCUTANEOUS at 22:28

## 2021-07-29 RX ADMIN — FENTANYL CITRATE 50 MCG: 50 INJECTION INTRAMUSCULAR; INTRAVENOUS at 13:17

## 2021-07-29 RX ADMIN — LABETALOL HYDROCHLORIDE 5 MG: 5 INJECTION, SOLUTION INTRAVENOUS at 14:28

## 2021-07-29 RX ADMIN — SODIUM CHLORIDE: 9 INJECTION, SOLUTION INTRAVENOUS at 22:26

## 2021-07-29 RX ADMIN — ROCURONIUM BROMIDE 30 MG: 10 SOLUTION INTRAVENOUS at 14:04

## 2021-07-29 RX ADMIN — ROCURONIUM BROMIDE 10 MG: 10 SOLUTION INTRAVENOUS at 18:43

## 2021-07-29 RX ADMIN — LABETALOL HYDROCHLORIDE 5 MG: 5 INJECTION, SOLUTION INTRAVENOUS at 15:35

## 2021-07-29 RX ADMIN — SODIUM CHLORIDE, SODIUM LACTATE, POTASSIUM CHLORIDE, AND CALCIUM CHLORIDE: .6; .31; .03; .02 INJECTION, SOLUTION INTRAVENOUS at 12:02

## 2021-07-29 RX ADMIN — SODIUM CHLORIDE, PRESERVATIVE FREE 10 ML: 5 INJECTION INTRAVENOUS at 22:28

## 2021-07-29 RX ADMIN — HYDROMORPHONE HYDROCHLORIDE 0.5 MG: 2 INJECTION INTRAMUSCULAR; INTRAVENOUS; SUBCUTANEOUS at 16:21

## 2021-07-29 RX ADMIN — HYDROMORPHONE HYDROCHLORIDE 0.5 MG: 1 INJECTION, SOLUTION INTRAMUSCULAR; INTRAVENOUS; SUBCUTANEOUS at 20:19

## 2021-07-29 RX ADMIN — SODIUM CHLORIDE, SODIUM LACTATE, POTASSIUM CHLORIDE, CALCIUM CHLORIDE: 600; 310; 30; 20 INJECTION, SOLUTION INTRAVENOUS at 12:24

## 2021-07-29 RX ADMIN — CEFAZOLIN 2 G: 10 INJECTION, POWDER, FOR SOLUTION INTRAVENOUS at 16:21

## 2021-07-29 RX ADMIN — DEXAMETHASONE SODIUM PHOSPHATE 10 MG: 4 INJECTION, SOLUTION INTRAMUSCULAR; INTRAVENOUS at 12:29

## 2021-07-29 RX ADMIN — ROCURONIUM BROMIDE 30 MG: 10 SOLUTION INTRAVENOUS at 15:55

## 2021-07-29 RX ADMIN — HYDROMORPHONE HYDROCHLORIDE 0.5 MG: 1 INJECTION, SOLUTION INTRAMUSCULAR; INTRAVENOUS; SUBCUTANEOUS at 21:05

## 2021-07-29 RX ADMIN — HYDROMORPHONE HYDROCHLORIDE 0.5 MG: 2 INJECTION INTRAMUSCULAR; INTRAVENOUS; SUBCUTANEOUS at 17:23

## 2021-07-29 RX ADMIN — FENTANYL CITRATE 50 MCG: 50 INJECTION INTRAMUSCULAR; INTRAVENOUS at 13:14

## 2021-07-29 RX ADMIN — ROCURONIUM BROMIDE 70 MG: 10 SOLUTION INTRAVENOUS at 12:29

## 2021-07-29 RX ADMIN — DOCUSATE SODIUM 50 MG AND SENNOSIDES 8.6 MG 2 TABLET: 8.6; 5 TABLET, FILM COATED ORAL at 22:28

## 2021-07-29 RX ADMIN — HYDROMORPHONE HYDROCHLORIDE 0.5 MG: 1 INJECTION, SOLUTION INTRAMUSCULAR; INTRAVENOUS; SUBCUTANEOUS at 20:41

## 2021-07-29 RX ADMIN — Medication 80 MCG: at 18:15

## 2021-07-29 RX ADMIN — ROCURONIUM BROMIDE 20 MG: 10 SOLUTION INTRAVENOUS at 14:46

## 2021-07-29 RX ADMIN — OXYCODONE 10 MG: 5 TABLET ORAL at 22:28

## 2021-07-29 RX ADMIN — SODIUM CHLORIDE, SODIUM LACTATE, POTASSIUM CHLORIDE, AND CALCIUM CHLORIDE: .6; .31; .03; .02 INJECTION, SOLUTION INTRAVENOUS at 16:10

## 2021-07-29 RX ADMIN — Medication 80 MCG: at 18:16

## 2021-07-29 RX ADMIN — ONDANSETRON 4 MG: 2 INJECTION INTRAMUSCULAR; INTRAVENOUS at 12:29

## 2021-07-29 RX ADMIN — HYDROMORPHONE HYDROCHLORIDE 0.5 MG: 2 INJECTION, SOLUTION INTRAMUSCULAR; INTRAVENOUS; SUBCUTANEOUS at 23:33

## 2021-07-29 RX ADMIN — HYDROMORPHONE HYDROCHLORIDE 0.5 MG: 1 INJECTION, SOLUTION INTRAMUSCULAR; INTRAVENOUS; SUBCUTANEOUS at 21:51

## 2021-07-29 RX ADMIN — HYDRALAZINE HYDROCHLORIDE 5 MG: 20 INJECTION INTRAMUSCULAR; INTRAVENOUS at 21:33

## 2021-07-29 RX ADMIN — CEFAZOLIN SODIUM 1000 MG: 1 INJECTION, POWDER, FOR SOLUTION INTRAMUSCULAR; INTRAVENOUS at 22:28

## 2021-07-29 RX ADMIN — LABETALOL HYDROCHLORIDE 5 MG: 5 INJECTION, SOLUTION INTRAVENOUS at 13:38

## 2021-07-29 RX ADMIN — ROCURONIUM BROMIDE 30 MG: 10 SOLUTION INTRAVENOUS at 13:09

## 2021-07-29 RX ADMIN — LIDOCAINE HYDROCHLORIDE 100 MG: 20 INJECTION, SOLUTION INFILTRATION; PERINEURAL at 12:28

## 2021-07-29 RX ADMIN — PROPOFOL 100 MG: 10 INJECTION, EMULSION INTRAVENOUS at 12:28

## 2021-07-29 ASSESSMENT — PULMONARY FUNCTION TESTS
PIF_VALUE: 17
PIF_VALUE: 22
PIF_VALUE: 22
PIF_VALUE: 18
PIF_VALUE: 17
PIF_VALUE: 18
PIF_VALUE: 16
PIF_VALUE: 23
PIF_VALUE: 22
PIF_VALUE: 23
PIF_VALUE: 17
PIF_VALUE: 17
PIF_VALUE: 23
PIF_VALUE: 22
PIF_VALUE: 23
PIF_VALUE: 10
PIF_VALUE: 22
PIF_VALUE: 23
PIF_VALUE: 23
PIF_VALUE: 14
PIF_VALUE: 23
PIF_VALUE: 17
PIF_VALUE: 17
PIF_VALUE: 25
PIF_VALUE: 22
PIF_VALUE: 25
PIF_VALUE: 17
PIF_VALUE: 18
PIF_VALUE: 22
PIF_VALUE: 17
PIF_VALUE: 22
PIF_VALUE: 23
PIF_VALUE: 17
PIF_VALUE: 25
PIF_VALUE: 22
PIF_VALUE: 18
PIF_VALUE: 19
PIF_VALUE: 22
PIF_VALUE: 17
PIF_VALUE: 22
PIF_VALUE: 22
PIF_VALUE: 21
PIF_VALUE: 17
PIF_VALUE: 23
PIF_VALUE: 26
PIF_VALUE: 23
PIF_VALUE: 25
PIF_VALUE: 22
PIF_VALUE: 23
PIF_VALUE: 25
PIF_VALUE: 18
PIF_VALUE: 14
PIF_VALUE: 23
PIF_VALUE: 17
PIF_VALUE: 22
PIF_VALUE: 22
PIF_VALUE: 24
PIF_VALUE: 23
PIF_VALUE: 22
PIF_VALUE: 25
PIF_VALUE: 24
PIF_VALUE: 23
PIF_VALUE: 22
PIF_VALUE: 23
PIF_VALUE: 17
PIF_VALUE: 21
PIF_VALUE: 21
PIF_VALUE: 22
PIF_VALUE: 22
PIF_VALUE: 23
PIF_VALUE: 15
PIF_VALUE: 0
PIF_VALUE: 22
PIF_VALUE: 25
PIF_VALUE: 23
PIF_VALUE: 22
PIF_VALUE: 24
PIF_VALUE: 22
PIF_VALUE: 16
PIF_VALUE: 21
PIF_VALUE: 17
PIF_VALUE: 18
PIF_VALUE: 22
PIF_VALUE: 25
PIF_VALUE: 22
PIF_VALUE: 17
PIF_VALUE: 17
PIF_VALUE: 21
PIF_VALUE: 23
PIF_VALUE: 23
PIF_VALUE: 22
PIF_VALUE: 16
PIF_VALUE: 18
PIF_VALUE: 23
PIF_VALUE: 14
PIF_VALUE: 17
PIF_VALUE: 21
PIF_VALUE: 20
PIF_VALUE: 17
PIF_VALUE: 21
PIF_VALUE: 22
PIF_VALUE: 24
PIF_VALUE: 17
PIF_VALUE: 25
PIF_VALUE: 17
PIF_VALUE: 23
PIF_VALUE: 22
PIF_VALUE: 24
PIF_VALUE: 21
PIF_VALUE: 14
PIF_VALUE: 22
PIF_VALUE: 14
PIF_VALUE: 24
PIF_VALUE: 22
PIF_VALUE: 26
PIF_VALUE: 22
PIF_VALUE: 22
PIF_VALUE: 23
PIF_VALUE: 22
PIF_VALUE: 22
PIF_VALUE: 23
PIF_VALUE: 21
PIF_VALUE: 23
PIF_VALUE: 25
PIF_VALUE: 22
PIF_VALUE: 22
PIF_VALUE: 23
PIF_VALUE: 22
PIF_VALUE: 23
PIF_VALUE: 18
PIF_VALUE: 22
PIF_VALUE: 16
PIF_VALUE: 23
PIF_VALUE: 23
PIF_VALUE: 22
PIF_VALUE: 17
PIF_VALUE: 25
PIF_VALUE: 21
PIF_VALUE: 21
PIF_VALUE: 22
PIF_VALUE: 24
PIF_VALUE: 24
PIF_VALUE: 18
PIF_VALUE: 17
PIF_VALUE: 22
PIF_VALUE: 14
PIF_VALUE: 17
PIF_VALUE: 23
PIF_VALUE: 16
PIF_VALUE: 17
PIF_VALUE: 25
PIF_VALUE: 22
PIF_VALUE: 22
PIF_VALUE: 25
PIF_VALUE: 23
PIF_VALUE: 17
PIF_VALUE: 23
PIF_VALUE: 1
PIF_VALUE: 22
PIF_VALUE: 23
PIF_VALUE: 23
PIF_VALUE: 31
PIF_VALUE: 17
PIF_VALUE: 22
PIF_VALUE: 16
PIF_VALUE: 22
PIF_VALUE: 25
PIF_VALUE: 25
PIF_VALUE: 1
PIF_VALUE: 16
PIF_VALUE: 25
PIF_VALUE: 22
PIF_VALUE: 22
PIF_VALUE: 21
PIF_VALUE: 7
PIF_VALUE: 1
PIF_VALUE: 22
PIF_VALUE: 17
PIF_VALUE: 21
PIF_VALUE: 16
PIF_VALUE: 22
PIF_VALUE: 17
PIF_VALUE: 16
PIF_VALUE: 22
PIF_VALUE: 32
PIF_VALUE: 22
PIF_VALUE: 18
PIF_VALUE: 16
PIF_VALUE: 18
PIF_VALUE: 23
PIF_VALUE: 17
PIF_VALUE: 22
PIF_VALUE: 22
PIF_VALUE: 17
PIF_VALUE: 22
PIF_VALUE: 24
PIF_VALUE: 22
PIF_VALUE: 8
PIF_VALUE: 21
PIF_VALUE: 25
PIF_VALUE: 16
PIF_VALUE: 23
PIF_VALUE: 17
PIF_VALUE: 17
PIF_VALUE: 22
PIF_VALUE: 23
PIF_VALUE: 25
PIF_VALUE: 23
PIF_VALUE: 17
PIF_VALUE: 23
PIF_VALUE: 22
PIF_VALUE: 25
PIF_VALUE: 22
PIF_VALUE: 24
PIF_VALUE: 17
PIF_VALUE: 16
PIF_VALUE: 22
PIF_VALUE: 21
PIF_VALUE: 22
PIF_VALUE: 22
PIF_VALUE: 21
PIF_VALUE: 22
PIF_VALUE: 24
PIF_VALUE: 17
PIF_VALUE: 22
PIF_VALUE: 22
PIF_VALUE: 23
PIF_VALUE: 17
PIF_VALUE: 23
PIF_VALUE: 16
PIF_VALUE: 22
PIF_VALUE: 16
PIF_VALUE: 25
PIF_VALUE: 24
PIF_VALUE: 23
PIF_VALUE: 21
PIF_VALUE: 21
PIF_VALUE: 17
PIF_VALUE: 23
PIF_VALUE: 22
PIF_VALUE: 17
PIF_VALUE: 23
PIF_VALUE: 22
PIF_VALUE: 21
PIF_VALUE: 22
PIF_VALUE: 22
PIF_VALUE: 19
PIF_VALUE: 17
PIF_VALUE: 22
PIF_VALUE: 25
PIF_VALUE: 22
PIF_VALUE: 26
PIF_VALUE: 23
PIF_VALUE: 21
PIF_VALUE: 22
PIF_VALUE: 24
PIF_VALUE: 17
PIF_VALUE: 22
PIF_VALUE: 23
PIF_VALUE: 25
PIF_VALUE: 22
PIF_VALUE: 24
PIF_VALUE: 4
PIF_VALUE: 17
PIF_VALUE: 23
PIF_VALUE: 23
PIF_VALUE: 22
PIF_VALUE: 21
PIF_VALUE: 7
PIF_VALUE: 17
PIF_VALUE: 14
PIF_VALUE: 23
PIF_VALUE: 24
PIF_VALUE: 4
PIF_VALUE: 23
PIF_VALUE: 22
PIF_VALUE: 23
PIF_VALUE: 18
PIF_VALUE: 23
PIF_VALUE: 24
PIF_VALUE: 16
PIF_VALUE: 22
PIF_VALUE: 17
PIF_VALUE: 22
PIF_VALUE: 22
PIF_VALUE: 1
PIF_VALUE: 23
PIF_VALUE: 22
PIF_VALUE: 17
PIF_VALUE: 17
PIF_VALUE: 22
PIF_VALUE: 23
PIF_VALUE: 16
PIF_VALUE: 22
PIF_VALUE: 20
PIF_VALUE: 22
PIF_VALUE: 17
PIF_VALUE: 22
PIF_VALUE: 14
PIF_VALUE: 20
PIF_VALUE: 26
PIF_VALUE: 22
PIF_VALUE: 23
PIF_VALUE: 22
PIF_VALUE: 23
PIF_VALUE: 17
PIF_VALUE: 22
PIF_VALUE: 21
PIF_VALUE: 22
PIF_VALUE: 25
PIF_VALUE: 16
PIF_VALUE: 17
PIF_VALUE: 22
PIF_VALUE: 22
PIF_VALUE: 25
PIF_VALUE: 24
PIF_VALUE: 23
PIF_VALUE: 21
PIF_VALUE: 25
PIF_VALUE: 17
PIF_VALUE: 23
PIF_VALUE: 17
PIF_VALUE: 22
PIF_VALUE: 18
PIF_VALUE: 22
PIF_VALUE: 21
PIF_VALUE: 16
PIF_VALUE: 21
PIF_VALUE: 22
PIF_VALUE: 19
PIF_VALUE: 17
PIF_VALUE: 22
PIF_VALUE: 9
PIF_VALUE: 25
PIF_VALUE: 23
PIF_VALUE: 19
PIF_VALUE: 25
PIF_VALUE: 22
PIF_VALUE: 23
PIF_VALUE: 23
PIF_VALUE: 16
PIF_VALUE: 24
PIF_VALUE: 29
PIF_VALUE: 20
PIF_VALUE: 17
PIF_VALUE: 22
PIF_VALUE: 17
PIF_VALUE: 25
PIF_VALUE: 23
PIF_VALUE: 21
PIF_VALUE: 22
PIF_VALUE: 17
PIF_VALUE: 26
PIF_VALUE: 25
PIF_VALUE: 22
PIF_VALUE: 1
PIF_VALUE: 22
PIF_VALUE: 22
PIF_VALUE: 16
PIF_VALUE: 17
PIF_VALUE: 16
PIF_VALUE: 17
PIF_VALUE: 23
PIF_VALUE: 23
PIF_VALUE: 9
PIF_VALUE: 21
PIF_VALUE: 22
PIF_VALUE: 18
PIF_VALUE: 17
PIF_VALUE: 21
PIF_VALUE: 22
PIF_VALUE: 23
PIF_VALUE: 25
PIF_VALUE: 21
PIF_VALUE: 23
PIF_VALUE: 22
PIF_VALUE: 18
PIF_VALUE: 21
PIF_VALUE: 22
PIF_VALUE: 7
PIF_VALUE: 22
PIF_VALUE: 17
PIF_VALUE: 25
PIF_VALUE: 23
PIF_VALUE: 17
PIF_VALUE: 23
PIF_VALUE: 23
PIF_VALUE: 22
PIF_VALUE: 23
PIF_VALUE: 21
PIF_VALUE: 23
PIF_VALUE: 22
PIF_VALUE: 23
PIF_VALUE: 3
PIF_VALUE: 23
PIF_VALUE: 16
PIF_VALUE: 14
PIF_VALUE: 22
PIF_VALUE: 23
PIF_VALUE: 22
PIF_VALUE: 24
PIF_VALUE: 21
PIF_VALUE: 22
PIF_VALUE: 17
PIF_VALUE: 18
PIF_VALUE: 14
PIF_VALUE: 22
PIF_VALUE: 17
PIF_VALUE: 22
PIF_VALUE: 22
PIF_VALUE: 17
PIF_VALUE: 22
PIF_VALUE: 17
PIF_VALUE: 16
PIF_VALUE: 22
PIF_VALUE: 22
PIF_VALUE: 23
PIF_VALUE: 22
PIF_VALUE: 17

## 2021-07-29 ASSESSMENT — PAIN SCALES - GENERAL
PAINLEVEL_OUTOF10: 10
PAINLEVEL_OUTOF10: 4
PAINLEVEL_OUTOF10: 10
PAINLEVEL_OUTOF10: 9
PAINLEVEL_OUTOF10: 8
PAINLEVEL_OUTOF10: 8
PAINLEVEL_OUTOF10: 7
PAINLEVEL_OUTOF10: 8
PAINLEVEL_OUTOF10: 8
PAINLEVEL_OUTOF10: 9
PAINLEVEL_OUTOF10: 10

## 2021-07-29 ASSESSMENT — PAIN DESCRIPTION - LOCATION
LOCATION: BACK
LOCATION: ABDOMEN

## 2021-07-29 ASSESSMENT — PAIN DESCRIPTION - PROGRESSION: CLINICAL_PROGRESSION: GRADUALLY WORSENING

## 2021-07-29 ASSESSMENT — PAIN DESCRIPTION - PAIN TYPE
TYPE: SURGICAL PAIN
TYPE: CHRONIC PAIN

## 2021-07-29 ASSESSMENT — PAIN DESCRIPTION - FREQUENCY: FREQUENCY: CONTINUOUS

## 2021-07-29 ASSESSMENT — PAIN - FUNCTIONAL ASSESSMENT: PAIN_FUNCTIONAL_ASSESSMENT: PREVENTS OR INTERFERES SOME ACTIVE ACTIVITIES AND ADLS

## 2021-07-29 ASSESSMENT — PAIN DESCRIPTION - ONSET: ONSET: GRADUAL

## 2021-07-29 NOTE — H&P
H&P reviewed. The patient was examined and there are no changes to the H&P. Hp from hosp. See OP NOTES. Vitals:    07/29/21 1145   BP: 134/80   Pulse: 74   Resp: 16   Temp: 97.4 °F (36.3 °C)   SpO2: 97%         R/b/a of surgery re-reviewed. All questions answered. Side marked if side specific.

## 2021-07-29 NOTE — BRIEF OP NOTE
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    Post-Op Diagnosis: Bowel adhesions       Procedure(s):  DAVINCI LEFT NEPHROURETERECTOMY  Lap Lysis of bowel adhesions complicated  Stent removal    SEE DETAILED DICTATED PROCEDURE      Surgeon(s):  Beni Hayden MD    Assistant:  Surgical Assistant: Holli Akhtar    Anesthesia: General    Estimated Blood Loss (mL): 454AK    Complications: None    Specimens:   ID Type Source Tests Collected by Time Destination   A : LEFT KIDNEY AND URETER  Specimen Abdomen SURGICAL PATHOLOGY Beni Hayden MD 7/29/2021 1752        Implants:  * No implants in log *      Drains:   Closed/Suction Drain Left LUQ Bulb 19 Sudanese (Active)   Dressing Status Clean;Dry; Intact 07/29/21 1914       Urethral Catheter 20 fr (Active)       Findings: dictated  Lysis of bowel adhesions  Leave tucker 1 week  Lower left davis type extraction      Electronically signed by Beni Hayden MD on 7/29/2021 at 8:03 PM

## 2021-07-30 LAB
ANION GAP SERPL CALCULATED.3IONS-SCNC: 16 MMOL/L (ref 3–16)
BUN BLDV-MCNC: 23 MG/DL (ref 7–20)
CALCIUM SERPL-MCNC: 8.9 MG/DL (ref 8.3–10.6)
CHLORIDE BLD-SCNC: 101 MMOL/L (ref 99–110)
CO2: 20 MMOL/L (ref 21–32)
CREAT SERPL-MCNC: 1.5 MG/DL (ref 0.9–1.3)
CREATININE FLUID: 1.5 MG/DL
FLUID TYPE: NORMAL
GFR AFRICAN AMERICAN: 58
GFR NON-AFRICAN AMERICAN: 48
GLUCOSE BLD-MCNC: 135 MG/DL (ref 70–99)
HCT VFR BLD CALC: 40.3 % (ref 40.5–52.5)
HEMOGLOBIN: 13.6 G/DL (ref 13.5–17.5)
MCH RBC QN AUTO: 30.2 PG (ref 26–34)
MCHC RBC AUTO-ENTMCNC: 33.8 G/DL (ref 31–36)
MCV RBC AUTO: 89.1 FL (ref 80–100)
PDW BLD-RTO: 15.3 % (ref 12.4–15.4)
PLATELET # BLD: 195 K/UL (ref 135–450)
PLATELET SLIDE REVIEW: ADEQUATE
PMV BLD AUTO: 7.7 FL (ref 5–10.5)
POTASSIUM REFLEX MAGNESIUM: 4.7 MMOL/L (ref 3.5–5.1)
RBC # BLD: 4.52 M/UL (ref 4.2–5.9)
SODIUM BLD-SCNC: 137 MMOL/L (ref 136–145)
WBC # BLD: 14.1 K/UL (ref 4–11)

## 2021-07-30 PROCEDURE — 6360000002 HC RX W HCPCS: Performed by: UROLOGY

## 2021-07-30 PROCEDURE — 82570 ASSAY OF URINE CREATININE: CPT

## 2021-07-30 PROCEDURE — 85027 COMPLETE CBC AUTOMATED: CPT

## 2021-07-30 PROCEDURE — 1200000000 HC SEMI PRIVATE

## 2021-07-30 PROCEDURE — 80048 BASIC METABOLIC PNL TOTAL CA: CPT

## 2021-07-30 PROCEDURE — 6370000000 HC RX 637 (ALT 250 FOR IP): Performed by: UROLOGY

## 2021-07-30 PROCEDURE — 2580000003 HC RX 258: Performed by: UROLOGY

## 2021-07-30 RX ORDER — HYDROMORPHONE HCL 110MG/55ML
0.5 PATIENT CONTROLLED ANALGESIA SYRINGE INTRAVENOUS
Status: DISCONTINUED | OUTPATIENT
Start: 2021-07-30 | End: 2021-07-31 | Stop reason: HOSPADM

## 2021-07-30 RX ORDER — DIPHENHYDRAMINE HYDROCHLORIDE 50 MG/ML
25 INJECTION INTRAMUSCULAR; INTRAVENOUS EVERY 6 HOURS PRN
Status: DISCONTINUED | OUTPATIENT
Start: 2021-07-30 | End: 2021-07-31 | Stop reason: HOSPADM

## 2021-07-30 RX ORDER — HYDROMORPHONE HCL 110MG/55ML
2 PATIENT CONTROLLED ANALGESIA SYRINGE INTRAVENOUS
Status: DISCONTINUED | OUTPATIENT
Start: 2021-07-30 | End: 2021-07-31 | Stop reason: HOSPADM

## 2021-07-30 RX ORDER — HYDROMORPHONE HCL 110MG/55ML
1.5 PATIENT CONTROLLED ANALGESIA SYRINGE INTRAVENOUS ONCE
Status: COMPLETED | OUTPATIENT
Start: 2021-07-30 | End: 2021-07-30

## 2021-07-30 RX ADMIN — ONDANSETRON 4 MG: 2 INJECTION INTRAMUSCULAR; INTRAVENOUS at 00:41

## 2021-07-30 RX ADMIN — HYDROMORPHONE HYDROCHLORIDE 2 MG: 2 INJECTION, SOLUTION INTRAMUSCULAR; INTRAVENOUS; SUBCUTANEOUS at 07:50

## 2021-07-30 RX ADMIN — OXYCODONE 10 MG: 5 TABLET ORAL at 05:52

## 2021-07-30 RX ADMIN — CEFAZOLIN SODIUM 1000 MG: 1 INJECTION, POWDER, FOR SOLUTION INTRAMUSCULAR; INTRAVENOUS at 05:33

## 2021-07-30 RX ADMIN — HYDROMORPHONE HYDROCHLORIDE 2 MG: 2 INJECTION, SOLUTION INTRAMUSCULAR; INTRAVENOUS; SUBCUTANEOUS at 19:49

## 2021-07-30 RX ADMIN — HYDROMORPHONE HYDROCHLORIDE 0.5 MG: 2 INJECTION, SOLUTION INTRAMUSCULAR; INTRAVENOUS; SUBCUTANEOUS at 17:05

## 2021-07-30 RX ADMIN — HYDROMORPHONE HYDROCHLORIDE 2 MG: 2 INJECTION, SOLUTION INTRAMUSCULAR; INTRAVENOUS; SUBCUTANEOUS at 13:31

## 2021-07-30 RX ADMIN — CEFAZOLIN SODIUM 1000 MG: 1 INJECTION, POWDER, FOR SOLUTION INTRAMUSCULAR; INTRAVENOUS at 14:13

## 2021-07-30 RX ADMIN — SODIUM CHLORIDE: 9 INJECTION, SOLUTION INTRAVENOUS at 11:54

## 2021-07-30 RX ADMIN — HYDROMORPHONE HYDROCHLORIDE 2 MG: 2 INJECTION, SOLUTION INTRAMUSCULAR; INTRAVENOUS; SUBCUTANEOUS at 04:18

## 2021-07-30 RX ADMIN — DIPHENHYDRAMINE HYDROCHLORIDE 25 MG: 50 INJECTION, SOLUTION INTRAMUSCULAR; INTRAVENOUS at 22:07

## 2021-07-30 RX ADMIN — HYDROMORPHONE HYDROCHLORIDE 2 MG: 2 INJECTION, SOLUTION INTRAMUSCULAR; INTRAVENOUS; SUBCUTANEOUS at 01:34

## 2021-07-30 RX ADMIN — DOCUSATE SODIUM 50 MG AND SENNOSIDES 8.6 MG 2 TABLET: 8.6; 5 TABLET, FILM COATED ORAL at 20:28

## 2021-07-30 RX ADMIN — HYDROMORPHONE HYDROCHLORIDE 1.5 MG: 2 INJECTION, SOLUTION INTRAMUSCULAR; INTRAVENOUS; SUBCUTANEOUS at 00:34

## 2021-07-30 RX ADMIN — DOCUSATE SODIUM 50 MG AND SENNOSIDES 8.6 MG 2 TABLET: 8.6; 5 TABLET, FILM COATED ORAL at 07:55

## 2021-07-30 RX ADMIN — HYDROMORPHONE HYDROCHLORIDE 2 MG: 2 INJECTION, SOLUTION INTRAMUSCULAR; INTRAVENOUS; SUBCUTANEOUS at 11:20

## 2021-07-30 RX ADMIN — OXYCODONE 5 MG: 5 TABLET ORAL at 18:23

## 2021-07-30 RX ADMIN — ENOXAPARIN SODIUM 40 MG: 40 INJECTION SUBCUTANEOUS at 20:28

## 2021-07-30 RX ADMIN — OXYCODONE 10 MG: 5 TABLET ORAL at 09:43

## 2021-07-30 RX ADMIN — SODIUM CHLORIDE, PRESERVATIVE FREE 10 ML: 5 INJECTION INTRAVENOUS at 07:55

## 2021-07-30 ASSESSMENT — PAIN SCALES - GENERAL
PAINLEVEL_OUTOF10: 10
PAINLEVEL_OUTOF10: 7
PAINLEVEL_OUTOF10: 10
PAINLEVEL_OUTOF10: 7
PAINLEVEL_OUTOF10: 8
PAINLEVEL_OUTOF10: 6
PAINLEVEL_OUTOF10: 6
PAINLEVEL_OUTOF10: 8
PAINLEVEL_OUTOF10: 7
PAINLEVEL_OUTOF10: 6

## 2021-07-30 NOTE — ANESTHESIA POSTPROCEDURE EVALUATION
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        Anesthesia Post Evaluation    Comments: Postoperative Anesthesia Note    Name:    Andie Ocampo  MRN:      [id number]    Patient Vitals in the past 12 hrs:  07/29/21 2204, BP:(!) 166/91, Temp:97.7 °F (36.5 °C), Temp src:Oral, Pulse:101, Resp:18, SpO2:95 %, Height:6' 4\" (1.93 m), Weight:230 lb (104.3 kg)  07/29/21 2145, BP:(!) 158/90, Pulse:101  07/29/21 2130, BP:(!) 165/88, Pulse:99  07/29/21 2125, BP:(!) 172/87, Temp:97.1 °F (36.2 °C), Temp src:Temporal, Pulse:96, Resp:16, SpO2:95 %  07/29/21 2120, BP:(!) 167/83, Pulse:96, SpO2:95 %  07/29/21 2115, Pulse:98, SpO2:95 %  07/29/21 2110, BP:(!) 171/91, Pulse:100, SpO2:95 %  07/29/21 2105, BP:(!) 167/94, Pulse:100, SpO2:95 %  07/29/21 2100, Pulse:96, SpO2:93 %  07/29/21 2055, Pulse:96, SpO2:93 %  07/29/21 2050, BP:(!) 165/84, Pulse:94, SpO2:93 %  07/29/21 2045, BP:(!) 167/86, Pulse:94, SpO2:94 %  07/29/21 2040, BP:(!) 168/85, Pulse:94, Resp:8, SpO2:94 %  07/29/21 2035, BP:(!) 171/87, Pulse:95, Resp:12, SpO2:95 %  07/29/21 2030, BP:(!) 173/85, Pulse:96, Resp:18, SpO2:94 %  07/29/21 2025, BP:(!) 167/93, Pulse:95, Resp:18, SpO2:95 %  07/29/21 2020, BP:(!) 169/85, Pulse:94, Resp:16, SpO2:94 %  07/29/21 2015, BP:(!) 171/86,

## 2021-07-30 NOTE — PROGRESS NOTES
Patient in bed awake. A/O x 4. Assessment completed and charted. Respirations even and unlabored. Complaining of abdominal pain, Receiving PRN pain medication per order. Quinn in place, draining yellow urine. AUSTYN drain in place. Dressing changed due to soilage, draining sanguinous drainage. No clots. Bed in lowest position and locked. Call light in reach.

## 2021-07-30 NOTE — PROGRESS NOTES
Patient:  Chalo Bates  YOB: 1965   Date of Service:  07/30/21      Urology Attending Daily Progress Note    Chief complaint: \"feeling better\"    Interval HPI:  The patient did well overnight. Pain is controlled with medications. Tolerating diet. Denies nausea, vomiting, fevers, or chest pain. Ambulating minimally. Hgb stable  CR 1.5  Objective:    Patient Vitals for the past 8 hrs:   BP Temp Temp src Pulse Resp SpO2   07/30/21 1145 126/80 98.3 °F (36.8 °C) Oral 84 17 96 %   07/30/21 0757 119/68 98.4 °F (36.9 °C) Oral 87 17 97 %     I/O last 3 completed shifts: In: 3862.2 [P.O.:60; I.V.:3704.8; IV Piggyback:97.3]  Out: 2818 [Urine:2150; Drains:318; Blood:350]     Physical Exam:   Constitutional: comfortable in hospital bed, no acute distress  Abdomen: soft, nontender, no guarding, incisions c/d/i, AUSTYN ss  Genitourinary: urethal tucker draining clear urine  Psych: normal mood and affect, appropriately answers questions   Skin, extremities: stable, exposed skin intact, no digital cyanosis     Labs:     Lab Results   Component Value Date    PSA 3.81 03/04/2021    PSA 3.10 08/01/2019    PSA 2.36 08/30/2018    PSA 1.17 03/28/2018    PSA 1.12 03/26/2018    PSA 0.75 05/17/2017    PSA 1.98 04/21/2016    PSA 1.72 03/04/2014     Lab Results   Component Value Date    CREATININE 1.5 (H) 07/30/2021    CREATININE 1.0 07/23/2021    CREATININE 0.9 07/16/2021     Lab Results   Component Value Date    COLORU Yellow 07/23/2021    NITRU Negative 07/23/2021    GLUCOSEU Negative 07/23/2021    KETUA Negative 07/23/2021    UROBILINOGEN 0.2 07/23/2021    BILIRUBINUR Negative 07/23/2021    BILIRUBINUR large 06/08/2021     Lab Results   Component Value Date    WBC 14.1 (H) 07/30/2021    HGB 13.6 07/30/2021    HCT 40.3 (L) 07/30/2021    MCV 89.1 07/30/2021     07/30/2021       Radiology:  \"Imaging was independently reviewed by myself and I agree with the radiology interpretation    Assessment:  Chalo marquez a 60 y.o. male who is admitted after left nephU on 7/29    Plan:  -- Diet - regular - passing flatus  -- JPCr today - if c/w serum will likely remove tomorrow  -- encourage ambulation   -- continue sequential compression devices for DVT prophylaxis  -- Disposition - DC tomorrow hopefully with Xochilt Corbin MD  [facility]

## 2021-07-30 NOTE — OP NOTE
[facility]                                OPERATIVE REPORT    PATIENT NAME: Chitra Romero                       :        1965  MED REC NO:   [id number]                          ROOM:       0321  ACCOUNT NO:   [de-identified]                           ADMIT DATE: 2021  PROVIDER:     Moe Higgins MD    DATE OF PROCEDURE:  2021    PREOPERATIVE DIAGNOSES:  Urothelial carcinoma of the left renal pelvis,  james hematuria, retained foreign body, ureteral stent. 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 OPERATION PERFORMED:  1. Laparoscopic lysis of bowel adhesions, complicated due to extensive time and severity of adhesions. 2.  Robotic-assisted laparoscopic nephroureterectomy with excision of the bladder cuff with cystotomy and bladder closure. 3.  Removal of double-J ureteral stent. SURGEON:  Moe Higgins MD    ANESTHESIA:  General.    ANESTHESIOLOGIST:  Dr. Steve Cornejo. SURGICAL ASSISTANT:  Toan aBker. INDICATIONS:  The patient is a 12-year-old who has had james hematuria,  had a CAT scan showing a renal pelvis lesion, possible lesion. He had  an ureteroscopy with biopsy showing a high-grade urothelial carcinoma of  the renal pelvis. He was counseled about options including  nephroureterectomy. He has had stent indwelling. He does have a  complicated past medical history including a large midline abdominal  incision and some cardiac pathologies. See the previous history and  physical.  He signed the consent prior to surgery. OPERATIVE PROCEDURE:  The patient was brought to the operating theater  and anesthesia induced.   Genitalia and the lower abdomen were shaved and  prepped and draped after he was positioned in lateral decubitus  position. A time-out was performed. Since he had a large midline  incision, we tried to push our port slightly lateral.  We were able to  deploy the Veress needle after a sharp incision slightly farther off  from the lateral border of the rectus at the eleventh rib. Drop test  was negative. Opening insufflation pressures were low. We insufflated,  deployed the robotic trocar with camera and there were significant  omental adhesions immediately in front of us. We tried to manipulate  and look around. We cannot easily see so at this point, we decided to  put a 5-mm trocar far lateral and slightly more inferior about three or  four fingerbreadths off the costal margin lateral as a 5-mm port. We  used a 5-mm laparoscopic [employer] camera through that. We inspected from  the other direction and saw significant omental as well as small bowel  adhesions. At this point, we deployed another trocar laterally to help  lyse these adhesions. I spent extensive time, about an hour and half  lysing bowel adhesions using sharp dissection, intermittent cautery for  hemostasis. Once we had adequately freed the omentum and small bowel  from the anterior abdominal wall, we had enough visualization and  deployed the remainder of the robotic trocars. We deployed four  trocars, the robotic ones in the line and followed by an assistant port  slightly more lateral again due to the adhesions. We then docked the  robot. We sharply mobilized the colon medially. We encountered the  kidney. We identified the ureter inferiorly. We lifted off the psoas  muscle, identified the gonadal vein, traced the renal vein. We then  identified the artery slightly superior to the vein. This part also  took fair amount of time due to its large perinephric fat and some  inflammation related to his previous indwelling stent. We did find the  artery. We did put two Weck clips on the stay side, one on the go side.   We followed by the vein.  We used two Wecks on the stay and two on the  go. We also clipped the gonadal vein and transected there. We now  worked medially. Care was taken to avoid the adrenal gland. We worked  medial superiorly and freed the kidney superolaterally and then had the  kidney completely freed. We then continued work towards the pelvis. We  did flex the robot somewhat to aid in our dissection down the pelvic  area. We did not have to undock, based on original alignment and the  original docking at more of a 45 degree angle. Once we worked towards  the pelvis, we then traced the ureter down onto the bladder until we saw  detrusor muscle. We made a cystotomy there to identify the ureteral  stent. We then put a stay stitch with 2-0 Vicryl on the detrusor  mucosal junction. We then completed our transection of the ureter and  bladder cuff of our cystotomy and then we put a second 2-0 Vicryl on the  other corner as we came to the final part of the ureterovesical  junction. We put a Weck clip on that end of the ureter and left that in  the left quadrant. We then closed the mucosal layer, first the bladder  with a running 2-0 Vicryl, followed by a seromuscular layer of 2-0  Vicryl. Next, we put 300 mL in the bladder and we did not see any urine  leakage. We next deployed a Isai drain in the lower quadrant and that  was secured to the skin. Then, we extracted the specimen. We connected  two of our lateral ports in a partial Chirinos incision because there was  too much adhesions near the belt line to extract in that area. Once we  extracted, we then closed with a running PDS suture. We then  re-insufflated the abdomen and inspected there was some small oozing  which we did cauterize near the psoas muscle. We copiously irrigated  again and extracted the fluid. We ensured our drain was in the proper  position. We inspected the abdominal wall and there was no evidence of  bowel lesions or injuries.   We then closed our 12 mm assistant port  under direct vision with Adela. Again, there was no entrapped  bowel. We then closed remainder of the incisions with running  subcuticulars, dressed with Dermabond. He was emerged and brought to  Recovery. COMPLICATIONS:  None apparent. SPECIMENS:  Left kidney and ureter with bladder cuff. BLOOD LOSS:  250 mL. IV FLUIDS:  3000 mL.     FOLLOWUP:  He will follow up in one week with a nursing visit for his  Quinn catheter removal.        David Khan MD    D: 07/29/2021 20:02:28       T: 07/29/2021 22:40:14     [email or web address]  Job#: [id number]     Doc#: [id number]    CC:

## 2021-07-30 NOTE — PROGRESS NOTES
Pt admitted to room 321 via bed in stable condition. VSS. Pt a/ox4. Rating pain 8/10, pain meds given per mar, pt educated on pain meds. Pt given water and a cup of ice chips,pt tolerating diet. Pt reporting hard o take breaths r/t pain, o2 stats 96% on RA, but educated to take slow deep breaths. Pillow support in pace. Pt educated to call out if needing assistance. Bed check in place, wheels locked, call light and bedside table within reach, bed in lowest position.

## 2021-07-30 NOTE — CARE COORDINATION
Cm met with pt at bedside. From home with wife. Plans to return at dc. Independent prior to admission. Does not anticipate any needs for dc. Contact CM if needs arise.

## 2021-07-30 NOTE — PROGRESS NOTES
Perfect serve message sent to dr Abbi Saravia, \"Per PACU was hard to get pt's pain under control pt is still rating pain 10/10 after IV dilaudid and oral preeti given. ice packs also already applied as well. Are we able to increase the frequency of his dilaudid or the mg. pt has nothing do now and is moaning and per pt \"just wants to be knocked out. \" \", awaiting response.       Addendum; spoke with on call , new orders placed

## 2021-07-30 NOTE — PROGRESS NOTES
Family stated they would like an order when he discharges for walker and raised toilet seat.  Sticky note left for treatment team.

## 2021-07-30 NOTE — PLAN OF CARE
Problem: Pain:  Goal: Pain level will decrease  Description: Pain level will decrease  Outcome: Ongoing   Pt reports pain 8/10 lower abd where sx incisions are, surgical/acute pain. Ice pack applied, pt educated on prn pain meds, meds given per mar.

## 2021-07-30 NOTE — PROGRESS NOTES
Patient to PACU from OR. Report received from [facility] and JAYDEN Edwards. Patient alert, signs and symptoms of discomfort present, facial grimacing. Quinn catheter intact and draining urine. IVF infusing. Patient VS obtained and filed. SCD's in place. ICE applied. Patient with implanted pain ball to RLQ of abdomen per report. Will continue to monitor.

## 2021-07-31 VITALS
HEART RATE: 93 BPM | OXYGEN SATURATION: 97 % | DIASTOLIC BLOOD PRESSURE: 82 MMHG | RESPIRATION RATE: 18 BRPM | BODY MASS INDEX: 28.01 KG/M2 | WEIGHT: 230 LBS | SYSTOLIC BLOOD PRESSURE: 163 MMHG | TEMPERATURE: 97.6 F | HEIGHT: 76 IN

## 2021-07-31 LAB
ANION GAP SERPL CALCULATED.3IONS-SCNC: 9 MMOL/L (ref 3–16)
BUN BLDV-MCNC: 19 MG/DL (ref 7–20)
CALCIUM SERPL-MCNC: 9 MG/DL (ref 8.3–10.6)
CHLORIDE BLD-SCNC: 103 MMOL/L (ref 99–110)
CO2: 25 MMOL/L (ref 21–32)
CREAT SERPL-MCNC: 1.4 MG/DL (ref 0.9–1.3)
GFR AFRICAN AMERICAN: >60
GFR NON-AFRICAN AMERICAN: 52
GLUCOSE BLD-MCNC: 115 MG/DL (ref 70–99)
HCT VFR BLD CALC: 35.9 % (ref 40.5–52.5)
HEMOGLOBIN: 12.3 G/DL (ref 13.5–17.5)
MCH RBC QN AUTO: 30.4 PG (ref 26–34)
MCHC RBC AUTO-ENTMCNC: 34.4 G/DL (ref 31–36)
MCV RBC AUTO: 88.5 FL (ref 80–100)
PDW BLD-RTO: 15.1 % (ref 12.4–15.4)
PLATELET # BLD: 166 K/UL (ref 135–450)
PMV BLD AUTO: 7.5 FL (ref 5–10.5)
POTASSIUM SERPL-SCNC: 4 MMOL/L (ref 3.5–5.1)
RBC # BLD: 4.05 M/UL (ref 4.2–5.9)
SODIUM BLD-SCNC: 137 MMOL/L (ref 136–145)
WBC # BLD: 11 K/UL (ref 4–11)

## 2021-07-31 PROCEDURE — 2580000003 HC RX 258: Performed by: UROLOGY

## 2021-07-31 PROCEDURE — 85027 COMPLETE CBC AUTOMATED: CPT

## 2021-07-31 PROCEDURE — 6360000002 HC RX W HCPCS: Performed by: UROLOGY

## 2021-07-31 PROCEDURE — 6370000000 HC RX 637 (ALT 250 FOR IP): Performed by: UROLOGY

## 2021-07-31 PROCEDURE — 80048 BASIC METABOLIC PNL TOTAL CA: CPT

## 2021-07-31 RX ADMIN — OXYCODONE 10 MG: 5 TABLET ORAL at 09:45

## 2021-07-31 RX ADMIN — OXYCODONE 10 MG: 5 TABLET ORAL at 14:54

## 2021-07-31 RX ADMIN — HYDROMORPHONE HYDROCHLORIDE 0.5 MG: 2 INJECTION, SOLUTION INTRAMUSCULAR; INTRAVENOUS; SUBCUTANEOUS at 03:43

## 2021-07-31 RX ADMIN — OXYCODONE 10 MG: 5 TABLET ORAL at 04:52

## 2021-07-31 RX ADMIN — OXYCODONE 10 MG: 5 TABLET ORAL at 00:08

## 2021-07-31 RX ADMIN — SODIUM CHLORIDE, PRESERVATIVE FREE 10 ML: 5 INJECTION INTRAVENOUS at 09:45

## 2021-07-31 RX ADMIN — DOCUSATE SODIUM 50 MG AND SENNOSIDES 8.6 MG 2 TABLET: 8.6; 5 TABLET, FILM COATED ORAL at 09:45

## 2021-07-31 RX ADMIN — HYDROMORPHONE HYDROCHLORIDE 0.5 MG: 2 INJECTION, SOLUTION INTRAMUSCULAR; INTRAVENOUS; SUBCUTANEOUS at 13:07

## 2021-07-31 RX ADMIN — HYDROMORPHONE HYDROCHLORIDE 2 MG: 2 INJECTION, SOLUTION INTRAMUSCULAR; INTRAVENOUS; SUBCUTANEOUS at 07:09

## 2021-07-31 ASSESSMENT — PAIN SCALES - GENERAL
PAINLEVEL_OUTOF10: 5
PAINLEVEL_OUTOF10: 7
PAINLEVEL_OUTOF10: 6
PAINLEVEL_OUTOF10: 7
PAINLEVEL_OUTOF10: 6

## 2021-07-31 NOTE — DISCHARGE SUMMARY
Physician Discharge Summary     Patient ID:  Elle Fuentes  [id number]  53 y.o.  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 Iv pain meds. IVF. Shade drain  Pod#2,  shade removed. Tolerating diet.   Pain controlled  Dc home with tucker    Discharge Exam:  Physical Exam:   Constitutional: pleasant male in no acute distress, normal body habitus  Eyes: pink conjunctivae, no ptosis  ENT: lips without cyanosis, normal external appearance of ears and nose  Lungs: normal respiratory movements, no audible wheezes   Abdomen: soft, nt  Genitourinary: normal genitals, tucker catheter with clear urine  Vascular: lower extremities without edema, sequential compression devices in place   Musculoskeletal: no digital cyanosis, head normocephalic  Psych: normal mood and affect, alert and appropriate, answering questions      Disposition: home    In process/preliminary results:  Outstanding Order Results     Date and Time Order Name Status Description    7/29/2021  6:41 PM Surgical Pathology In process           Patient Instructions:   Discharge Medication List as of 7/31/2021  2:13 PM      START taking these medications    Details   sennosides-docusate sodium (SENOKOT-S) 8.6-50 MG tablet Take 2 tablets by mouth daily, Disp-14 tablet, R-0Print         CONTINUE these medications which have NOT CHANGED    Details   tamsulosin (FLOMAX) 0.4 MG capsule Take 1 capsule by mouth daily, Disp-30 capsule, R-5Normal      carvedilol (COREG) 12.5 MG tablet Take 1 tablet by mouth 2 times daily (with meals), Disp-60 tablet, R-2Normal      furosemide (LASIX) 20 MG tablet Take 2 tablets by mouth See Admin Instructions Daily as needed for weight gain of 3lbs in a day or 5lbs in a week., Disp-30 tablet, R-5Adjust Sig      sacubitril-valsartan (ENTRESTO) 49-51 MG per tablet Take 1 tablet by mouth 2 times daily, Disp-60 tablet, R-3Normal      atorvastatin (LIPITOR) 40 MG tablet TAKE 1 TABLET BY MOUTH EVERY DAY AT NIGHT, Disp-90 tablet, R-3Normal      escitalopram (LEXAPRO) 10 MG tablet TAKE 1 TABLET BY MOUTH EVERY DAY, Disp-90 tablet, R-1Normal      Multiple Vitamin (MULTI-VITAMIN DAILY PO) Take by mouthHistorical Med      aspirin 81 MG chewable tablet Take 1 tablet by mouth daily, Disp-30 tablet,R-0Print      sodium chloride 0.9 % SOLN 100 mL with morphine (PF) 10 MG/ML SOLN 100 mg Infuse intravenously continuous Along with dilaudidHistorical Med      oxyCODONE HCl (OXY-IR) 10 MG immediate release tablet TAKE 1 TABLET BY MOUTH EVERY 6 HOURS, R-0Historical Med           Activity: light walking 2 wks  Diet: regular diet  Wound Care: keep wound clean and dry    Follow-up with  Wed for catheter removal    7/31/2021  4:24 PM

## 2021-07-31 NOTE — PROGRESS NOTES
Pt assessment completed and charted. VSS. Pt a/ox4. Pt reports pain 6/10 in abd, acute/surgical pain. Pt educated on prn pain meds, meds given according to mar. Pt given ice packs to apply as well. Pt reporting that pain is more tolerable today. pt tolerating diet and denies any nausea or vomiting. Pt has f/c in place, patent. Quinn care preformed w/ CHG wipes. Pt has x4 ;ap incisions and a transverse, no drainage. Pt denies any other needs at this time. Pt calls out appropriately. Pt is a fall risk;  -Bed in lowest position and wheels locked. -Call light within reach.   -Bedside table within reach.   -Non-skid footwear in place.

## 2021-07-31 NOTE — PLAN OF CARE
Problem: Pain:  Goal: Pain level will decrease  Description: Pain level will decrease  Outcome: Ongoing   Pt reports pain 6/10 in abd, acute/surgical pain. Pt educated on prn pain meds, meds given according to mar. Pt given ice packs to apply as well. Pt reporting that pain is more tolerable today.

## 2021-07-31 NOTE — PLAN OF CARE
Problem: Pain:  Description: Pain management should include both nonpharmacologic and pharmacologic interventions. Goal: Control of acute pain  Description: Control of acute pain  Outcome: Ongoing  Note: Pt reporting pain 6/10 on the pain scale, PRN oral pain medication given per MAR. Will continue to monitor and reassess pain as needed.

## 2021-07-31 NOTE — PROGRESS NOTES
Shift assessment completed. Pt A&O x4, VSS. Pt has x4 lap incisions, C/D/I with surgical glue. Pt has L AUSTYN drain to bulb suction with bloody drainage. Quinn catheter intact draining clear, yellow urine. Pt tolerating diet, ate 100% of breakfast. Pt a standby assist. Denies any needs at this time. Bed locked and in lowest position. Call light and bedside table within reach. Will continue to monitor.

## 2021-07-31 NOTE — PROGRESS NOTES
Pt's verbal and written discharge instructions given, all questions answered. IV removed. Follow up appointments made and discussed. Pt instructed to follow up with Urology. Quinn catheter left in per MD order. Quinn catheter instructions given to pt and wife. Leg bag and extra supplies given to pt and wife. Instructed pt to take stool softener daily as well. Pt left in stable condition via wheelchair to private car with family.

## 2021-08-02 ENCOUNTER — TELEPHONE (OUTPATIENT)
Dept: FAMILY MEDICINE CLINIC | Age: 56
End: 2021-08-02

## 2021-08-02 LAB — PATHOLOGY/CYTOLOGY REPORT: NORMAL

## 2021-08-02 NOTE — PROGRESS NOTES
Physician Progress Note      PATIENTDebra Bro  CSN #:                  [id number]  :                       1965  ADMIT DATE:       2021 11:19 AM  DISCH DATE:        2021 3:43 PM  RESPONDING  PROVIDER #:        Dee Castellano MD          QUERY TEXT:    Patient admitted with urothelial carcinoma and is s/p Robotic-assisted   laparoscopic nephroureterectomy with excision of  the bladder cuff with cystotomy and bladder closure , noted to have creat 1.5. If possible, please document in progress notes and discharge summary if you   are evaluating and/or treating any of the following: The medical record reflects the following:  Risk Factors: S/P Robotic-assisted laparoscopic nephroureterectomy with   excision of the bladder cuff with cystotomy and bladder closure, urothelial   carcinoma  Clinical Indicators: Creat on admission 1 increased to 1.5  Treatment: IV fluids, serial labs, supportive care, I&Os    Thank you  [email or web address]  Options provided:  -- PHYLLIS  -- Creatinine 1.5 not clinically significant  -- Other - I will add my own diagnosis  -- Disagree - Not applicable / Not valid  -- Disagree - Clinically unable to determine / Unknown  -- Refer to Clinical Documentation Reviewer    PROVIDER RESPONSE TEXT:    This patient has an PHYLLIS.     Query created by: Graham Ojeda on 2021 2:26 PM      Electronically signed by:  Dee Castellano MD 2021 5:28 PM

## 2021-08-02 NOTE — TELEPHONE ENCOUNTER
[facility] Transitions Initial Follow Up Call    Outreach made within 2 business days of discharge: Yes    Patient: Daniella Harris Patient : 1965   MRN: [id number]  Reason for Admission: There are no discharge diagnoses documented for the most recent discharge. Discharge Date: 21       Spoke with: Cecilia Martínez    Discharge department/facility: [facility]    TCM Interactive Patient Contact:  Was patient able to fill all prescriptions:   Was patient instructed to bring all medications to the follow-up visit:   Is patient taking all medications as directed in the discharge summary? Does patient understand their discharge instructions:   Does patient have questions or concerns that need addressed prior to 7-14 day follow up office visit: no    Scheduled appointment with PCP within 7-14 days    Follow Up  No future appointments.     Dolores Flores

## 2021-08-02 NOTE — TELEPHONE ENCOUNTER
----- Message from [facility] sent at 8/2/2021  2:53 PM EDT -----  Subject: Message to Provider    QUESTIONS  Information for Provider? Patient would like to speak to the office   concerning his post op surgery. ---------------------------------------------------------------------------  --------------  Tim Hinders INFO  What is the best way for the office to contact you? OK to leave message on   voicemail  Preferred Call Back Phone Number? [phone]  ---------------------------------------------------------------------------  --------------  SCRIPT ANSWERS  Relationship to Patient?  Self

## 2021-08-02 NOTE — TELEPHONE ENCOUNTER
Is a taken pain medication? Pain medications can certainly cause constipation. As long as he did not have an abdominal surgery I would recommend him try over-the-counter Senokot, take as directed.

## 2021-08-02 NOTE — TELEPHONE ENCOUNTER
Patient is having big problems with constipation. He is taking stool softners and probiotics. He recently had surgergy.

## 2021-08-05 LAB
BUN / CREAT RATIO: 11.7 RATIO (ref 9–28)
BUN BLDV-MCNC: 14 MG/DL (ref 6–20)
CALCIUM SERPL-MCNC: 8.8 MG/DL (ref 8.6–10.3)
CHLORIDE BLD-SCNC: 97 MMOL/L (ref 97–107)
CO2: 28 MMOL/L (ref 21–31)
CREAT SERPL-MCNC: 1.2 MG/DL (ref 0.7–1.2)
GLUCOSE: 117 MG/DL (ref 74–106)
HCT VFR BLD CALC: 35.4 % (ref 40–51)
HEMOGLOBIN: 11.9 G/DL (ref 13.7–17.5)
MCH RBC QN AUTO: 30.5 PG (ref 26–34)
MCHC RBC AUTO-ENTMCNC: 33.5 G/DL (ref 30.7–35.5)
MCV RBC AUTO: 91 FL (ref 80–100)
PDW BLD-RTO: 14.7 % (ref 11.5–14.5)
PLATELETS: 228 10*3/UL (ref 155–369)
PMV BLD AUTO: 8.9 FL (ref 8.8–12.5)
POTASSIUM SERPL-SCNC: 3.9 MMOL/L (ref 3.6–5)
RBC: 3.88 10*6/UL (ref 4.6–6.1)
SODIUM BLD-SCNC: 135 MMOL/L (ref 135–145)
WBC: 7.7 10*3/UL (ref 3.7–10.3)

## 2021-08-15 LAB — PATHOLOGY/CYTOLOGY REPORT: NORMAL

## 2021-09-02 LAB
BUN / CREAT RATIO: 16.9 RATIO (ref 9–28)
BUN BLDV-MCNC: 22 MG/DL (ref 6–20)
CALCIUM SERPL-MCNC: 10 MG/DL (ref 8.6–10.3)
CHLORIDE BLD-SCNC: 97 MMOL/L (ref 97–107)
CO2: 29 MMOL/L (ref 21–31)
CREAT SERPL-MCNC: 1.3 MG/DL (ref 0.7–1.2)
GLUCOSE: 76 MG/DL (ref 74–106)
HCT VFR BLD CALC: 40.5 % (ref 40–51)
HEMOGLOBIN: 13.2 G/DL (ref 13.7–17.5)
MCH RBC QN AUTO: 30.9 PG (ref 26–34)
MCHC RBC AUTO-ENTMCNC: 32.5 G/DL (ref 30.7–35.5)
MCV RBC AUTO: 95 FL (ref 80–100)
PDW BLD-RTO: 14.8 % (ref 11.5–14.5)
PLATELETS: 193 10*3/UL (ref 155–369)
PMV BLD AUTO: 8.9 FL (ref 8.8–12.5)
POTASSIUM SERPL-SCNC: 4.1 MMOL/L (ref 3.6–5)
RBC: 4.26 10*6/UL (ref 4.6–6.1)
SODIUM BLD-SCNC: 132 MMOL/L (ref 135–145)
WBC: 6.6 10*3/UL (ref 3.7–10.3)

## 2021-09-02 RX ORDER — CARVEDILOL 12.5 MG/1
TABLET ORAL
Qty: 180 TABLET | Refills: 3 | Status: SHIPPED | OUTPATIENT
Start: 2021-09-02 | End: 2022-06-28

## 2021-09-13 ENCOUNTER — HOSPITAL ENCOUNTER (OUTPATIENT)
Age: 56
Discharge: HOME | End: 2021-09-13
Payer: COMMERCIAL

## 2021-09-13 ENCOUNTER — ANESTHESIA EVENT (OUTPATIENT)
Dept: OPERATING ROOM | Age: 56
End: 2021-09-13
Payer: COMMERCIAL

## 2021-09-13 DIAGNOSIS — Z01.818 PRE-OP TESTING: ICD-10-CM

## 2021-09-13 DIAGNOSIS — C68.9 UROTHELIAL CANCER (HCC): ICD-10-CM

## 2021-09-13 DIAGNOSIS — Z01.818 PRE-OP TESTING: Primary | ICD-10-CM

## 2021-09-13 PROCEDURE — U0005 INFEC AGEN DETEC AMPLI PROBE: HCPCS

## 2021-09-13 PROCEDURE — U0003 INFECTIOUS AGENT DETECTION BY NUCLEIC ACID (DNA OR RNA); SEVERE ACUTE RESPIRATORY SYNDROME CORONAVIRUS 2 (SARS-COV-2) (CORONAVIRUS DISEASE [COVID-19]), AMPLIFIED PROBE TECHNIQUE, MAKING USE OF HIGH THROUGHPUT TECHNOLOGIES AS DESCRIBED BY CMS-2020-01-R: HCPCS

## 2021-09-13 NOTE — PROGRESS NOTES
Preoperative Screening for Elective Surgery/Invasive Procedures While COVID-19 present in the community     Have you had any of the following symptoms?no  o Fever, chills  o Cough  o Shortness of breath  o Muscle aches/pain  o Diarrhea  o Abdominal pain, nausea, vomiting  o Loss or decrease in taste and / or smell   Risk of Exposure  o Have you recently been hospitalized for COVID-19 or flu-like illness, if so when?no  o Recently diagnosed with COVID-19, if so when?no  o Recently tested for COVID-19, if so when?yes 9/13/21 for surgery  o Have you been in close contact with a person or family member who currently has or recently had COVID-19? If yes, when and in what context?no  o Do you live with anybody who in the last 14 days has had fever, chills, shortness of breath, muscle aches, flu-like illness?no  o Do you have any close contacts or family members who are currently in the hospital for COVID-19 or flu-like illness? If yes, assess recent close contact with this person. no    Indicate if the patient has a positive screen by answering yes to one or more of the above questions. Patients who test positive or screen positive prior to surgery or on the day of surgery should be evaluated in conjunction with the surgeon/proceduralist/anesthesiologist to determine the urgency of the procedure.

## 2021-09-13 NOTE — PROGRESS NOTES
PRE OP INSTRUCTION SHEET   1. Do not eat or drink anything after 12 midnight  prior to surgery. This includes no water, chewing gum or mints. 2. Take the following pills will a small sip of water (see MAR)                                        3. Aspirin, Ibuprofen, Advil, Naproxen, Vitamin E, fish oil and other Anti-inflammatory products should be stopped for 5 days before surgery or as directed by your physician. 4. Check with your Doctor regarding stopping Plavix, Coumadin, Lovenox, Fragmin or other blood thinners   5. Do not smoke, and do not drink any alcoholic beverages 24 hours prior to surgery. This includes NA Beer. 6. You may brush your teeth and gargle the morning of surgery. DO NOT SWALLOW WATER   7. You MUST make arrangements for a responsible adult to take you home after your surgery. You will not be allowed to leave alone or drive yourself home. It is strongly suggested someone stay with you the first 24 hrs. Your surgery will be cancelled if you do not have a ride home. 8. A parent/legal guardian must accompany a child scheduled for surgery and plan to stay at the hospital until the child is discharged. Please do not bring other children with you. 9. Please wear simple, loose fitting clothing to the hospital.  Jerome Virgenner not bring valuables (money, credit cards, checkbooks, etc.) Do not wear any makeup (including no eye makeup) or nail polish on your fingers or toes. 10. DO NOT wear any jewelry or piercings on day of surgery. All body piercing jewelry must be removed. 11. If you have dentures,glasses, or contacts they will be removed before going to the OR; we will provide you a container. 12. Please see your family doctor/and cardiologist for a history & physical and/or concerning medications. Bring any test results/reports from your physician's office. Have history and labs faxed to [phone].  Remember to bring Blood Bank bracelet on the day of surgery. 14. If you have a Living Will and Durable Power of  for Healthcare, please bring in a copy. 13. Notify your Surgeon if you develop any illness between now and surgery  time, cough, cold, fever, sore throat, nausea, vomiting, etc.  Please notify your surgeon if you experience dizziness, shortness of breath or blurred vision between now & the time of your surgery   16. DO NOT shave your operative site 96 hours prior to surgery. For face & neck surgery, men may use an electric razor 48 hours prior to surgery. 17. Shower with _x__Antibacterial soap (x_chlorhexidine for total joint  Pt's) shower two times before surgery.(the morning of and the night before. 18. To provide excellent care visitors will be limited to one in the room at any given time.   Please call pre admission testing if you any further questions [phone] or [phone]

## 2021-09-14 LAB — SARS-COV-2: NOT DETECTED

## 2021-09-14 ASSESSMENT — ENCOUNTER SYMPTOMS: SHORTNESS OF BREATH: 1

## 2021-09-14 NOTE — ANESTHESIA PRE PROCEDURE
Department of Anesthesiology  Preprocedure Note       Name:  Natalya Yang   Age:  60 y.o.  :  1965                                          MRN:  [id number]         Date:  2021      Surgeon: Delmy Moore):  Jaz Blanco MD    Procedure: Procedure(s):  PORT INSERTION    Medications prior to admission:   Prior to Admission medications    Medication Sig Start Date End Date Taking? Authorizing Provider   carvedilol (COREG) 12.5 MG tablet TAKE 1 TABLET BY MOUTH TWICE A DAY WITH MEALS 21  Yes ERICA Stoner CNP   sennosides-docusate sodium (SENOKOT-S) 8.6-50 MG tablet Take 2 tablets by mouth daily 21  Yes Jon Leonard MD   tamsulosin [facility]) 0.4 MG capsule Take 1 capsule by mouth daily 21  Yes Jon Leonard MD   furosemide (LASIX) 20 MG tablet Take 2 tablets by mouth See Admin Instructions Daily as needed for weight gain of 3lbs in a day or 5lbs in a week. 6/10/21  Yes ERICA Stoner CNP   sacubitril-valsartan (ENTRESTO) 49-51 MG per tablet Take 1 tablet by mouth 2 times daily  Patient taking differently: Take 1 tablet by mouth 2 times daily LD 21  Yes ERICA Stoner CNP   atorvastatin (LIPITOR) 40 MG tablet TAKE 1 TABLET BY MOUTH EVERY DAY AT NIGHT 21  Yes Teresita Saldaña MD   escitalopram (LEXAPRO) 10 MG tablet TAKE 1 TABLET BY MOUTH EVERY DAY 21  Yes Katrin Burk MD   Multiple Vitamin (MULTI-VITAMIN DAILY PO) Take by mouth   Yes Historical Provider, MD   aspirin 81 MG chewable tablet Take 1 tablet by mouth daily 20  Yes Alcides Kaplan MD   sodium chloride 0.9 % SOLN 100 mL with morphine (PF) 10 MG/ML SOLN 100 mg Infuse intravenously continuous Along with dilaudid   Yes Historical Provider, MD   oxyCODONE HCl (OXY-IR) 10 MG immediate release tablet TAKE 1 TABLET BY MOUTH EVERY 6 HOURS 17  Yes Historical Provider, MD       Current medications:    No current facility-administered medications for this encounter. Current Outpatient Medications   Medication Sig Dispense Refill    carvedilol (COREG) 12.5 MG tablet TAKE 1 TABLET BY MOUTH TWICE A DAY WITH MEALS 180 tablet 3    sennosides-docusate sodium (SENOKOT-S) 8.6-50 MG tablet Take 2 tablets by mouth daily 14 tablet 0    tamsulosin (FLOMAX) 0.4 MG capsule Take 1 capsule by mouth daily 30 capsule 5    furosemide (LASIX) 20 MG tablet Take 2 tablets by mouth See Admin Instructions Daily as needed for weight gain of 3lbs in a day or 5lbs in a week. 30 tablet 5    sacubitril-valsartan (ENTRESTO) 49-51 MG per tablet Take 1 tablet by mouth 2 times daily (Patient taking differently: Take 1 tablet by mouth 2 times daily LD 7/27) 60 tablet 3    atorvastatin (LIPITOR) 40 MG tablet TAKE 1 TABLET BY MOUTH EVERY DAY AT NIGHT 90 tablet 3    escitalopram (LEXAPRO) 10 MG tablet TAKE 1 TABLET BY MOUTH EVERY DAY 90 tablet 1    Multiple Vitamin (MULTI-VITAMIN DAILY PO) Take by mouth      aspirin 81 MG chewable tablet Take 1 tablet by mouth daily 30 tablet 0    sodium chloride 0.9 % SOLN 100 mL with morphine (PF) 10 MG/ML SOLN 100 mg Infuse intravenously continuous Along with dilaudid      oxyCODONE HCl (OXY-IR) 10 MG immediate release tablet TAKE 1 TABLET BY MOUTH EVERY 6 HOURS  0       Allergies:     Allergies   Allergen Reactions    Vioxx Other (See Comments)     Oral ulcers         Problem List:    Patient Active Problem List   Diagnosis Code    Asthma J45.909    Restless legs syndrome (RLS) G25.81    Low testosterone R79.89    ED (erectile dysfunction) N52.9    Chronic bilateral low back pain without sciatica M54.5, G89.29    Mixed hyperlipidemia E78.2    Essential hypertension I10    Dupuytren's contracture M72.0    Severe obstructive sleep apnea G47.33    Tobacco abuse Z72.0    Ischemic cardiomyopathy I25.5    STEMI (ST elevation myocardial infarction) ([facility] [address] I21.3    Obesity E66.9    Coronary artery disease involving native coronary artery of native heart without angina pectoris I25.10    SOB (shortness of breath) R06.02    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    Adrenal nodule (HCC) E27.8    Urothelial carcinoma of kidney, left (HCC) C64.2    Urothelial cancer (HCC) C68.9       Past Medical History:        Diagnosis Date    Arthritis     Asthma     Back problem     Dr Luis Lincoln, Dr Abraham Easley Cholelithiasis without obstruction 2010    asymptomatic    Depression     ED (erectile dysfunction)     Gallbladder & bile duct stone     Gallbladder full of stones    GERD (gastroesophageal reflux disease)     Hernia     Hyperlipidemia     Hypertension     Low testosterone     MI (myocardial infarction) ([facility] [address] 04/13/2020    mLAD 100% FELICITY Xience Martha 3.5 x 18    Seasonal allergies     Sleep apnea     SVT [facility]supraventricular tachycardia) ([facility])     GXT 1998    Tendonitis     Urothelial carcinoma of kidney, left ([facility] [address] 7/23/2021       Past Surgical History:        Procedure Laterality Date    BACK SURGERY  2001    L5 FUSION, Dr Donnelly Notice      stent   [address] Right 2/11/2019    RIGHT CARPAL TUNNEL RELEASE performed by Vernon Guzman MD at [address] [facility]  2012    Dr. Noemi Shabazz  2010    [facility] states    CYSTOSCOPY Left 07/16/2021    LEFT RENAL  BIOPSY, LEFT URETEROSCOPY WITH STENT PLACEMENT    CYSTOSCOPY Left 7/16/2021    CYSTOSCOPY, LEFT RENAL  BIOPSY, LEFT URETEROSCOPY WITH STENT PLACEMENT performed by Felix Hurley MD at [address] [facility]  2012    Dr. Yumiko Toney Right     stimulator put in 3/10    KIDNEY SURGERY Left 7/29/2021    DAVINCI LEFT NEPHROURETERECTOMY performed by Felix Hurley MD at [address] [place] [address]  2004    Dr Yecenia Benitez SURGERY         Social History:    Social History     Tobacco Use    Smoking status: Former Smoker     Packs/day: 0.25     Years: 20.00     Pack years: 5.00     Types: Cigars     Quit date: 2020     Years since quittin.4    Smokeless tobacco: Never Used   Substance Use Topics    Alcohol use: Yes     Alcohol/week: 1.0 standard drinks     Types: 1 Shots of liquor per week     Comment: daily                                 Counseling given: Not Answered      Vital Signs (Current):   Vitals:    21 1425   Weight: 225 lb (102.1 kg)   Height: 6' 4\" (1.93 m)                                              BP Readings from Last 3 Encounters:   21 (!) 163/82   21 119/78   21 134/89       NPO Status:                                                                                 BMI:   Wt Readings from Last 3 Encounters:   21 225 lb (102.1 kg)   21 230 lb (104.3 kg)   21 234 lb (106.1 kg)     Body mass index is 27.39 kg/m². CBC:   Lab Results   Component Value Date    WBC 6.6 2021    WBC 11.0 2021    RBC 4.26 2021    HGB 13.2 2021    HCT 40.5 2021    MCV 95 2021    RDW 14.8 2021     2021     2021       CMP:   Lab Results   Component Value Date     2021    K 4.1 2021    K 4.7 2021    CL 97 2021    CO2 29 2021    BUN 22 2021    CREATININE 1.3 2021    GFRAA >60 2021    GFRAA >60 2013    AGRATIO 1.9 2021    LABGLOM 52 2021    GLUCOSE 76 2021    PROT 7.2 2021    PROT 6.7 2012    CALCIUM 10.0 2021    BILITOT 0.5 2021    ALKPHOS 103 2021    AST 34 2021    ALT 45 2021       POC Tests: No results for input(s): POCGLU, POCNA, POCK, POCCL, POCBUN, POCHEMO, POCHCT in the last 72 hours.     Coags:   Lab Results   Component Value Date    PROTIME 10.8 2021    INR 0.96 2021    APTT 31.2 2021       HCG (If Applicable): No results found for: PREGTESTUR, PREGSERUM, HCG, HCGQUANT     ABGs: No results found for: PHART, PO2ART, LDW7JZE, IYP1HRK, BEART, N6PTOQMC     Type & Screen (If Applicable):  No results found for: LABABO, LABRH    Drug/Infectious Status (If Applicable):  No results found for: HIV, HEPCAB    COVID-19 Screening (If Applicable):   Lab Results   Component Value Date    COVID19 Not Detected 09/13/2021           Anesthesia Evaluation  Patient summary reviewed and Nursing notes reviewed no history of anesthetic complications:   Airway: Mallampati: II  TM distance: >3 FB   Neck ROM: full  Mouth opening: > = 3 FB Dental: normal exam         Pulmonary:   (+) shortness of breath:  sleep apnea:  asthma:                            Cardiovascular:    (+) hypertension:, valvular problems/murmurs:, past MI:, CAD:, dysrhythmias: SVT,                   Neuro/Psych:   (+) psychiatric history:            GI/Hepatic/Renal: Neg GI/Hepatic/Renal ROS  (+) GERD:,      (-) liver disease and no renal disease       Endo/Other: Negative Endo/Other ROS   (+) malignancy/cancer (kidney). (-) diabetes mellitus               Abdominal:             Vascular: negative vascular ROS. Other Findings:           Anesthesia Plan      MAC     ASA 3       Induction: intravenous. Anesthetic plan and risks discussed with patient. Plan discussed with CRNA.                   Maggi Al MD   9/14/2021

## 2021-09-15 ENCOUNTER — APPOINTMENT (OUTPATIENT)
Dept: GENERAL RADIOLOGY | Age: 56
End: 2021-09-15
Attending: SURGERY
Payer: COMMERCIAL

## 2021-09-15 ENCOUNTER — TELEPHONE (OUTPATIENT)
Dept: SURGERY | Age: 56
End: 2021-09-15

## 2021-09-15 ENCOUNTER — HOSPITAL ENCOUNTER (OUTPATIENT)
Age: 56
Setting detail: OUTPATIENT SURGERY
Discharge: HOME | End: 2021-09-15
Attending: SURGERY | Admitting: SURGERY
Payer: COMMERCIAL

## 2021-09-15 ENCOUNTER — ANESTHESIA (OUTPATIENT)
Dept: OPERATING ROOM | Age: 56
End: 2021-09-15
Payer: COMMERCIAL

## 2021-09-15 VITALS
BODY MASS INDEX: 27.4 KG/M2 | TEMPERATURE: 97.5 F | HEIGHT: 76 IN | OXYGEN SATURATION: 97 % | RESPIRATION RATE: 14 BRPM | DIASTOLIC BLOOD PRESSURE: 74 MMHG | WEIGHT: 225 LBS | HEART RATE: 63 BPM | SYSTOLIC BLOOD PRESSURE: 114 MMHG

## 2021-09-15 VITALS — DIASTOLIC BLOOD PRESSURE: 56 MMHG | OXYGEN SATURATION: 99 % | SYSTOLIC BLOOD PRESSURE: 114 MMHG

## 2021-09-15 PROCEDURE — 7100000010 HC PHASE II RECOVERY - FIRST 15 MIN: Performed by: SURGERY

## 2021-09-15 PROCEDURE — 6360000002 HC RX W HCPCS: Performed by: SURGERY

## 2021-09-15 PROCEDURE — 3700000001 HC ADD 15 MINUTES (ANESTHESIA): Performed by: SURGERY

## 2021-09-15 PROCEDURE — 6360000002 HC RX W HCPCS: Performed by: NURSE ANESTHETIST, CERTIFIED REGISTERED

## 2021-09-15 PROCEDURE — 2500000003 HC RX 250 WO HCPCS: Performed by: ANESTHESIOLOGY

## 2021-09-15 PROCEDURE — C1788 PORT, INDWELLING, IMP: HCPCS | Performed by: SURGERY

## 2021-09-15 PROCEDURE — 2580000003 HC RX 258: Performed by: SURGERY

## 2021-09-15 PROCEDURE — 2500000003 HC RX 250 WO HCPCS: Performed by: NURSE ANESTHETIST, CERTIFIED REGISTERED

## 2021-09-15 PROCEDURE — 3600000002 HC SURGERY LEVEL 2 BASE: Performed by: SURGERY

## 2021-09-15 PROCEDURE — 7100000011 HC PHASE II RECOVERY - ADDTL 15 MIN: Performed by: SURGERY

## 2021-09-15 PROCEDURE — 77001 FLUOROGUIDE FOR VEIN DEVICE: CPT | Performed by: SURGERY

## 2021-09-15 PROCEDURE — 2580000003 HC RX 258: Performed by: NURSE ANESTHETIST, CERTIFIED REGISTERED

## 2021-09-15 PROCEDURE — 71045 X-RAY EXAM CHEST 1 VIEW: CPT

## 2021-09-15 PROCEDURE — 3600000012 HC SURGERY LEVEL 2 ADDTL 15MIN: Performed by: SURGERY

## 2021-09-15 PROCEDURE — 2709999900 HC NON-CHARGEABLE SUPPLY: Performed by: SURGERY

## 2021-09-15 PROCEDURE — 2500000003 HC RX 250 WO HCPCS: Performed by: SURGERY

## 2021-09-15 PROCEDURE — 3209999900 FLUORO FOR SURGICAL PROCEDURES

## 2021-09-15 PROCEDURE — 36561 INSERT TUNNELED CV CATH: CPT | Performed by: SURGERY

## 2021-09-15 PROCEDURE — 2580000003 HC RX 258: Performed by: ANESTHESIOLOGY

## 2021-09-15 PROCEDURE — 3700000000 HC ANESTHESIA ATTENDED CARE: Performed by: SURGERY

## 2021-09-15 DEVICE — PORT INFUS OD2.7MM ID1.5MM INTRO 8FR TI POLYUR CATH DETACH CT80STPD] ANGIODYNAMICS INC]: Type: IMPLANTABLE DEVICE | Status: FUNCTIONAL

## 2021-09-15 RX ORDER — OXYCODONE HYDROCHLORIDE AND ACETAMINOPHEN 5; 325 MG/1; MG/1
1 TABLET ORAL PRN
Status: DISCONTINUED | OUTPATIENT
Start: 2021-09-15 | End: 2021-09-15 | Stop reason: HOSPADM

## 2021-09-15 RX ORDER — HYDRALAZINE HYDROCHLORIDE 20 MG/ML
5 INJECTION INTRAMUSCULAR; INTRAVENOUS EVERY 10 MIN PRN
Status: DISCONTINUED | OUTPATIENT
Start: 2021-09-15 | End: 2021-09-15 | Stop reason: HOSPADM

## 2021-09-15 RX ORDER — OXYCODONE HYDROCHLORIDE AND ACETAMINOPHEN 5; 325 MG/1; MG/1
2 TABLET ORAL PRN
Status: DISCONTINUED | OUTPATIENT
Start: 2021-09-15 | End: 2021-09-15 | Stop reason: HOSPADM

## 2021-09-15 RX ORDER — FENTANYL CITRATE 50 UG/ML
INJECTION, SOLUTION INTRAMUSCULAR; INTRAVENOUS PRN
Status: DISCONTINUED | OUTPATIENT
Start: 2021-09-15 | End: 2021-09-15 | Stop reason: SDUPTHER

## 2021-09-15 RX ORDER — PROPOFOL 10 MG/ML
INJECTION, EMULSION INTRAVENOUS PRN
Status: DISCONTINUED | OUTPATIENT
Start: 2021-09-15 | End: 2021-09-15 | Stop reason: SDUPTHER

## 2021-09-15 RX ORDER — MEPERIDINE HYDROCHLORIDE 25 MG/ML
12.5 INJECTION INTRAMUSCULAR; INTRAVENOUS; SUBCUTANEOUS EVERY 5 MIN PRN
Status: DISCONTINUED | OUTPATIENT
Start: 2021-09-15 | End: 2021-09-15 | Stop reason: HOSPADM

## 2021-09-15 RX ORDER — SODIUM CHLORIDE, SODIUM LACTATE, POTASSIUM CHLORIDE, CALCIUM CHLORIDE 600; 310; 30; 20 MG/100ML; MG/100ML; MG/100ML; MG/100ML
INJECTION, SOLUTION INTRAVENOUS CONTINUOUS
Status: DISCONTINUED | OUTPATIENT
Start: 2021-09-15 | End: 2021-09-15 | Stop reason: HOSPADM

## 2021-09-15 RX ORDER — SODIUM CHLORIDE, SODIUM LACTATE, POTASSIUM CHLORIDE, CALCIUM CHLORIDE 600; 310; 30; 20 MG/100ML; MG/100ML; MG/100ML; MG/100ML
INJECTION, SOLUTION INTRAVENOUS CONTINUOUS PRN
Status: DISCONTINUED | OUTPATIENT
Start: 2021-09-15 | End: 2021-09-15 | Stop reason: SDUPTHER

## 2021-09-15 RX ORDER — LABETALOL HYDROCHLORIDE 5 MG/ML
5 INJECTION, SOLUTION INTRAVENOUS EVERY 10 MIN PRN
Status: DISCONTINUED | OUTPATIENT
Start: 2021-09-15 | End: 2021-09-15 | Stop reason: HOSPADM

## 2021-09-15 RX ORDER — SODIUM CHLORIDE 0.9 % (FLUSH) 0.9 %
10 SYRINGE (ML) INJECTION PRN
Status: DISCONTINUED | OUTPATIENT
Start: 2021-09-15 | End: 2021-09-15 | Stop reason: HOSPADM

## 2021-09-15 RX ORDER — HEPARIN SODIUM (PORCINE) LOCK FLUSH IV SOLN 100 UNIT/ML 100 UNIT/ML
SOLUTION INTRAVENOUS PRN
Status: DISCONTINUED | OUTPATIENT
Start: 2021-09-15 | End: 2021-09-15 | Stop reason: ALTCHOICE

## 2021-09-15 RX ORDER — LIDOCAINE HYDROCHLORIDE 20 MG/ML
INJECTION, SOLUTION INFILTRATION; PERINEURAL PRN
Status: DISCONTINUED | OUTPATIENT
Start: 2021-09-15 | End: 2021-09-15 | Stop reason: SDUPTHER

## 2021-09-15 RX ORDER — ONDANSETRON 2 MG/ML
4 INJECTION INTRAMUSCULAR; INTRAVENOUS EVERY 10 MIN PRN
Status: DISCONTINUED | OUTPATIENT
Start: 2021-09-15 | End: 2021-09-15 | Stop reason: HOSPADM

## 2021-09-15 RX ORDER — SODIUM CHLORIDE 0.9 % (FLUSH) 0.9 %
10 SYRINGE (ML) INJECTION EVERY 12 HOURS SCHEDULED
Status: DISCONTINUED | OUTPATIENT
Start: 2021-09-15 | End: 2021-09-15 | Stop reason: HOSPADM

## 2021-09-15 RX ORDER — SODIUM CHLORIDE 9 MG/ML
25 INJECTION, SOLUTION INTRAVENOUS PRN
Status: DISCONTINUED | OUTPATIENT
Start: 2021-09-15 | End: 2021-09-15 | Stop reason: HOSPADM

## 2021-09-15 RX ADMIN — FENTANYL CITRATE 50 MCG: 50 INJECTION INTRAMUSCULAR; INTRAVENOUS at 09:00

## 2021-09-15 RX ADMIN — PROPOFOL 200 MG: 10 INJECTION, EMULSION INTRAVENOUS at 09:00

## 2021-09-15 RX ADMIN — SODIUM CHLORIDE, POTASSIUM CHLORIDE, SODIUM LACTATE AND CALCIUM CHLORIDE: 600; 310; 30; 20 INJECTION, SOLUTION INTRAVENOUS at 07:28

## 2021-09-15 RX ADMIN — SODIUM CHLORIDE, POTASSIUM CHLORIDE, SODIUM LACTATE AND CALCIUM CHLORIDE: 600; 310; 30; 20 INJECTION, SOLUTION INTRAVENOUS at 08:54

## 2021-09-15 RX ADMIN — FAMOTIDINE 20 MG: 10 INJECTION, SOLUTION INTRAVENOUS at 07:27

## 2021-09-15 RX ADMIN — LIDOCAINE HYDROCHLORIDE 50 MG: 20 INJECTION, SOLUTION INFILTRATION; PERINEURAL at 09:00

## 2021-09-15 RX ADMIN — FENTANYL CITRATE 50 MCG: 50 INJECTION INTRAMUSCULAR; INTRAVENOUS at 08:54

## 2021-09-15 RX ADMIN — PROPOFOL 50 MG: 10 INJECTION, EMULSION INTRAVENOUS at 09:11

## 2021-09-15 ASSESSMENT — PULMONARY FUNCTION TESTS: PIF_VALUE: 0

## 2021-09-15 NOTE — BRIEF OP NOTE
Brief Postoperative Note      Patient: Sbe Lisa  YOB: 1965  MRN: [id number]    Date of Procedure: 9/15/2021    Pre-Op Diagnosis: UROTHELIAL CANCER    Post-Op Diagnosis: Same       Procedure(s):  PORT INSERTION    Surgeon(s):  Jillian Payne MD    Assistant:  Surgical Assistant: Idabel Dry Mascot    Anesthesia: Monitor Anesthesia Care    Estimated Blood Loss (mL): Minimal    Complications: None    Specimens:   * No specimens in log *    Implants:  Implant Name Type Inv. Item Serial No.  Lot No. LRB No. Used Action   PORT INFUS OD2.7MM ID1. 5MM INTRO 8FR TI POLYUR CATH DETACH [[id number]] [[employer]]  PORT INFUS OD2.7MM ID1. 5MM INTRO 8FR TI POLYUR CATH DETACH [[id number]] [[employer]]  [employer]WD [id number] N/A 1 Implanted         Drains:   Urethral Catheter 20 fr (Active)       Findings: As above    Electronically signed by Charissa Berry MD on 9/15/2021 at 9:22 AM

## 2021-09-15 NOTE — H&P
[facility] GENERAL SURGERY      The H&P was reviewed, the patient was examined, and no change has occurred in the patient's condition since the H&P was completed. The indications for the procedure were reviewed, and any questions were answered. I updated the progress note from 9/10/2021 from Dr. Lattie Olszewski which is the H&P and is located in the media section.     Vitals:    09/15/21 0715   BP: 107/69   Pulse: 70   Resp: 20   Temp: 97.7 °F (36.5 °C)   SpO2: 96%

## 2021-09-15 NOTE — TELEPHONE ENCOUNTER
Grace Mota from Dr. Harriett Rizo [facility] office [phone] called to discuss patient's chest x-ray.   Patient had port placement this morning 9/15 and x ray came back abnormal.

## 2021-09-15 NOTE — TELEPHONE ENCOUNTER
I spoke with Dr. Cosme Burnette, and he reviewed the xray. He states xray was done for port placement today. patient did not present with any signs of respiratory symptoms, and he did not have any post procedural complications. I called Dr. Nathan Acuna office and spoke to Shelbie Martinez and discussed this with her. She states patient called her with questions because [facility]. If he has any concerns, or signs of illness, then patient may follow up with their office. She verbalized understanding and she will call and discuss with patient.

## 2021-09-15 NOTE — ANESTHESIA POSTPROCEDURE EVALUATION
Department of Anesthesiology  Postprocedure Note    Patient: Chris Mcdowell  MRN: [id number]  YOB: 1965  Date of evaluation: 9/15/2021  Time:  11:38 AM     Procedure Summary     Date: 09/15/21 Room / Location: [address] [place] [address] / [facility]    Anesthesia Start: 0301 Anesthesia Stop: 6059    Procedure: PORT INSERTION (N/A ) Diagnosis: (UROTHELIAL CANCER)    Surgeons: Gonzalo Jason MD Responsible Provider: Katlyn Ribeiro MD    Anesthesia Type: MAC ASA Status: 3          Anesthesia Type: MAC    Kaitlyn Phase I: Kaitlyn Score: 10    Kaitlyn Phase II: Kaitlyn Score: 9    Last vitals: Reviewed and per EMR flowsheets.        Anesthesia Post Evaluation    Patient location during evaluation: PACU  Level of consciousness: awake  Airway patency: patent  Nausea & Vomiting: no nausea  Complications: no  Cardiovascular status: blood pressure returned to baseline  Respiratory status: acceptable  Hydration status: euvolemic Hook-up date: 2018-03-26   09:35:00       Duration: 24:00:00

Test Indications: ATRIAL FIBRILLATION

Medications: 

 

 

 

 

 

 

 

 17481 QRS complexes

  4115 Ventricular      ectopics which represent     4 % of total QRS comp.

     * Supraventricular ectopics which represent  **** % of total QRS comp.

     * Paced QRS complexs        which represent  **** % of total QRS comp.

     2 % of Time Classified as Noise

VENTRICULAR ECTOPY

  3712 Isolated

   589 Bigeminal Cycles

   198 Couplets

     0 Runs

     0 Beats in Runs

     * Beats LONGEST at   *  BPM at **:**:** ****-**-**

     * Beats FASTEST at   *  BPM at **:**:** ****-**-**

SUPRAVENTRICULAR ECTOPY

     * Isolated

     * Couplets

     * Runs

     * Beats in Runs

     * Beats LONGEST at   *  BPM at **:**:** ****-**-**

     * Beats FASTEST at   *  BPM at **:**:** ****-**-**

HEART RATES

    69 MIN at 14:34:37 2018-03-26

    70 AVG

   117 MAX at 11:23:49 2018-03-26

LONGEST RR

     0.832 secs at 14:09:05 2018-03-26   

SCANNED BY:  LEAH  3/27/18

 

Atrial fibrillation with v pacing at times.

Ventricular rate relatively controlled  range 69 to 117bpm with

average HR 70bpm

VPC s vs aberrant conduction (4115 beats)

No significant pauses noted with longest R-R interval 0.83 seconds

Confirmed by MD Paul, Tyrell ([id number]) on 3/27/2018 3:41:56 PM

Referred By: NIESHA ESTRADA           Overread By: yTrell Miller MD

## 2021-09-20 NOTE — OP NOTE
[address]                 [address] [address]                                OPERATIVE REPORT    PATIENT NAME: Eze Amaro                       :        1965  MED REC NO:   [id number]                          ROOM:  ACCOUNT NO:   [de-identified]                           ADMIT DATE: 09/15/2021  PROVIDER:     Steffen Kahn MD    DATE OF PROCEDURE:  09/15/2021    PREOPERATIVE DIAGNOSIS:  Urothelial carcinoma. POSTOPERATIVE DIAGNOSIS:  Urothelial carcinoma. OPERATION PERFORMED:  1. Insertion of left subclavian Port-A-Cath. 2.  Surgeon's use of fluoroscopy. SURGEON:  Steffen Kahn MD    ANESTHESIA:  Total intravenous anesthesia. COMPLICATIONS:  None. ESTIMATED BLOOD LOSS:  Less than 50 mL. INDICATIONS FOR OPERATION:  A 49-year-old male being treated for  urothelial cancer. Port-A-Cath is requested to facilitate these  treatments. The risks and benefits were explained. The patient  understood them, accepted them and elected to proceed. DESCRIPTION OF OPERATION:  The patient was brought to the operating  room. Total intravenous anesthesia was initiated. He was prepped and  draped in usual surgical sterile fashion. He was placed in  Trendelenburg position. Local anesthetic was infiltrated in the left  subclavian area. Single venipuncture was used to cannulate the left  subclavian vein. Guidewire passed easily. Its position was confirmed  with fluoroscopy. An incision was made medial and lateral to the exit  site of the wire. A pocket was created on the left chest wall. There  was a good draw and an easy flush through the system. The dilator and  introducer have been passed over the guidewire in a Seldinger technique. The dilator and guidewire were removed. Catheter was inserted through  the sheath. The sheath was peeled away. Catheter was pulled back to  length under fluoroscopic guidance.   The catheter was cut and the hub  and port were attached. Port was secured to the left chest wall with  Vicryl suture. There was a good draw and an easy flush through the  system. Course of the catheter was inspected from start to finish. There was no evident kink or compromise. 3-0 Vicryl was used to  reapproximate subcutaneous tissues. 4-0 Vicryl was used to  reapproximate the skin. Benzoin and Steri-Strip dressing were placed. DISPOSITION:  The patient tolerated the procedure without any acute  complication. Roberto Orr MD    D: 09/19/2021 18:15:51       T: 09/19/2021 18:19:16     [id number]  Job#: [id number]     Doc#: [id number]    CC:  Maxine Bravo.  MD Kaushik Bauer MD

## 2021-09-30 DIAGNOSIS — Z12.11 ENCOUNTER FOR SCREENING COLONOSCOPY: Primary | ICD-10-CM

## 2021-10-27 ENCOUNTER — TELEPHONE (OUTPATIENT)
Dept: FAMILY MEDICINE CLINIC | Age: 56
End: 2021-10-27

## 2021-10-27 NOTE — TELEPHONE ENCOUNTER
Patient called and he started 2 days ago with pro cough with brown in color, fluid behind his ears that makes him very dizzy when he bends over. He wanted to know if he should go to his chemo appt tomorrow. I advised it was best that Dr. Daja Vaughan decides that, I advised him to call his office. I also told him if that Dr. Daja Vaughan wants us to treat him for his cough to call us back.

## 2021-12-02 ENCOUNTER — TELEPHONE (OUTPATIENT)
Dept: CARDIOLOGY CLINIC | Age: 56
End: 2021-12-02

## 2021-12-02 NOTE — TELEPHONE ENCOUNTER
Pt is having a colonoscopy on 12/6/2021 with Dr. Alexx Lopez. Pt will be under general anesthesia. They are requesting that the Aspirin be held 7 days prior. Pt has already stopped taking the Aspirin. If clearance is given please fax to [address] [facility]. Pt did not have a fax number. Last OV 6/10/2021 with [facility].

## 2021-12-03 NOTE — TELEPHONE ENCOUNTER
Ok to proceed with elective colonoscopy from cardiology perspective without any additional cardiac testing

## 2021-12-06 ENCOUNTER — OFFICE VISIT (OUTPATIENT)
Dept: FAMILY MEDICINE CLINIC | Age: 56
End: 2021-12-06
Payer: COMMERCIAL

## 2021-12-06 VITALS
DIASTOLIC BLOOD PRESSURE: 69 MMHG | BODY MASS INDEX: 28.36 KG/M2 | OXYGEN SATURATION: 97 % | HEART RATE: 99 BPM | WEIGHT: 233 LBS | SYSTOLIC BLOOD PRESSURE: 106 MMHG

## 2021-12-06 DIAGNOSIS — M79.604 RIGHT LEG PAIN: ICD-10-CM

## 2021-12-06 DIAGNOSIS — Z23 NEEDS FLU SHOT: ICD-10-CM

## 2021-12-06 DIAGNOSIS — I25.5 ISCHEMIC CARDIOMYOPATHY: ICD-10-CM

## 2021-12-06 DIAGNOSIS — I10 ESSENTIAL HYPERTENSION: ICD-10-CM

## 2021-12-06 DIAGNOSIS — G47.33 SEVERE OBSTRUCTIVE SLEEP APNEA: ICD-10-CM

## 2021-12-06 DIAGNOSIS — C64.2 UROTHELIAL CARCINOMA OF KIDNEY, LEFT (HCC): Primary | ICD-10-CM

## 2021-12-06 DIAGNOSIS — I25.10 CORONARY ARTERY DISEASE INVOLVING NATIVE CORONARY ARTERY OF NATIVE HEART WITHOUT ANGINA PECTORIS: ICD-10-CM

## 2021-12-06 PROCEDURE — G8482 FLU IMMUNIZE ORDER/ADMIN: HCPCS | Performed by: FAMILY MEDICINE

## 2021-12-06 PROCEDURE — 99214 OFFICE O/P EST MOD 30 MIN: CPT | Performed by: FAMILY MEDICINE

## 2021-12-06 PROCEDURE — 1036F TOBACCO NON-USER: CPT | Performed by: FAMILY MEDICINE

## 2021-12-06 PROCEDURE — 90471 IMMUNIZATION ADMIN: CPT | Performed by: FAMILY MEDICINE

## 2021-12-06 PROCEDURE — G8417 CALC BMI ABV UP PARAM F/U: HCPCS | Performed by: FAMILY MEDICINE

## 2021-12-06 PROCEDURE — 90674 CCIIV4 VAC NO PRSV 0.5 ML IM: CPT | Performed by: FAMILY MEDICINE

## 2021-12-06 PROCEDURE — G8427 DOCREV CUR MEDS BY ELIG CLIN: HCPCS | Performed by: FAMILY MEDICINE

## 2021-12-06 PROCEDURE — 3017F COLORECTAL CA SCREEN DOC REV: CPT | Performed by: FAMILY MEDICINE

## 2021-12-06 RX ORDER — PROCHLORPERAZINE MALEATE 10 MG
10 TABLET ORAL 2 TIMES DAILY
COMMUNITY
Start: 2021-10-14 | End: 2021-12-08 | Stop reason: ALTCHOICE

## 2021-12-06 NOTE — PATIENT INSTRUCTIONS
Please read the healthy family handout that you were given and share it with your family. Please compare this printed medication list with your medications at home to be sure they are the same. If you have any medications that are different please contact us immediately at [phone]. Also review your allergies that we have listed, these may also include medications that you have not been able to tolerate, make sure everything listed is correct. If you have any allergies that are different please contact us immediately at [phone].

## 2021-12-06 NOTE — PROGRESS NOTES
2021    Franchesca Byrd (:  1965) is a 60 y.o. male, here for up of his chronic health problems and for a preoperative medicine evaluation. He is scheduled for a cystoscopy to follow-up on his history of renal cancer with Dr. Karthikeyan Garza on 12/10/2021 at [facility]. He states he finished his chemotherapy last week and he actually had a colonoscopy this morning. He has been off of his aspirin for the colonoscopy and the cystoscopy procedure which is okay by his cardiologist.  He states he started developing pain in his right leg it is from the top of the leg all the way down to the calf he states he has had slight swelling in the right leg but denies any increased warmth or redness of the leg and it really does not hurt when he pushes on it but it hurts worse when he stands on it. They have a Doppler ultrasound scheduled for tomorrow morning to rule out a DVT etiology of the pain. Patient Active Problem List   Diagnosis    Asthma    Restless legs syndrome (RLS)    Low testosterone    ED (erectile dysfunction)    Chronic bilateral low back pain without sciatica    Mixed hyperlipidemia    Essential hypertension    Dupuytren's contracture    Severe obstructive sleep apnea    Tobacco abuse    Ischemic cardiomyopathy    [facility]I [facility] elevation myocardial infarction) [facility]    Obesity    Coronary artery disease involving native coronary artery of native heart without angina pectoris    SOB (shortness of breath)    Adrenal nodule (HCC)    Urothelial carcinoma of kidney, left ([facility]       Review of Systems   Musculoskeletal:        Right leg pain   All other systems reviewed and are negative. Prior to Visit Medications    Medication Sig Taking?  Authorizing Provider   prochlorperazine (COMPAZINE) 10 MG tablet Take 10 mg by mouth 2 times daily Yes Historical Provider, MD   carvedilol (COREG) 12.5 MG tablet TAKE 1 TABLET BY MOUTH TWICE A DAY WITH MEALS Yes Pedrito Jimenez, APRN - CNP tamsulosin (FLOMAX) 0.4 MG capsule Take 1 capsule by mouth daily Yes Gary Heredia MD   sacubitril-valsartan (ENTRESTO) 49-51 MG per tablet Take 1 tablet by mouth 2 times daily  Patient taking differently: Take 1 tablet by mouth 2 times daily LD 7/27 Yes Christophe Pickering APRN - CNP   atorvastatin (LIPITOR) 40 MG tablet TAKE 1 TABLET BY MOUTH EVERY DAY AT NIGHT Yes Cinthya Umanzor MD   oxyCODONE HCl (OXY-IR) 10 MG immediate release tablet TAKE 1 TABLET BY MOUTH EVERY 6 HOURS Yes Historical Provider, MD   Multiple Vitamin (MULTI-VITAMIN DAILY PO) Take by mouth  Historical Provider, MD   aspirin 81 MG chewable tablet Take 1 tablet by mouth daily  Paxton Al MD        Allergies   Allergen Reactions    Vioxx Other (See Comments)     Oral ulcers         Past Medical History:   Diagnosis Date    Arthritis     Asthma     Back problem     Dr Cindy Casas, Dr Mason Gallardo Cholelithiasis without obstruction 2010    asymptomatic    Depression     ED (erectile dysfunction)     Gallbladder & bile duct stone     Gallbladder full of stones    GERD (gastroesophageal reflux disease)     Hernia     Hyperlipidemia     Hypertension     Low testosterone     MI (myocardial infarction) ([facility] 04/13/2020    mLAD 100% FELICITY Xience Martha 3.5 x 18    Seasonal allergies     Sleep apnea     SVT (supraventricular tachycardia) ([facility])     GXT 1998    Tendonitis     Urothelial carcinoma of kidney, left ([facility] 7/23/2021       Past Surgical History:   Procedure Laterality Date    BACK SURGERY  2001    L5 FUSION, Dr Marquis Bynum      stent   [address] Right 02/11/2019    RIGHT CARPAL TUNNEL RELEASE performed by Jovi Moran MD at [address] [facility]  2012    Dr. Mena Prince  2010    [facility] patient states    CYSTOSCOPY Left 07/16/2021    LEFT RENAL  BIOPSY, LEFT URETEROSCOPY WITH STENT PLACEMENT    CYSTOSCOPY Left 07/16/2021    CYSTOSCOPY, LEFT RENAL  BIOPSY, Head: Normocephalic. Neck:      Thyroid: No thyroid mass or thyromegaly. Cardiovascular:      Rate and Rhythm: Normal rate and regular rhythm. Heart sounds: Normal heart sounds. No murmur heard. Pulmonary:      Effort: No respiratory distress. Breath sounds: Normal breath sounds. No wheezing or rales. Chest:   Breasts:      Right: No supraclavicular adenopathy. Abdominal:      General: There is no distension. Palpations: Abdomen is soft. There is no mass. Tenderness: There is no abdominal tenderness. There is no guarding or rebound. Musculoskeletal:      Cervical back: Neck supple. Lymphadenopathy:      Cervical: No cervical adenopathy. Upper Body:      Right upper body: No supraclavicular adenopathy. Skin:     General: Skin is warm. Neurological:      Mental Status: He is alert and oriented to person, place, and time. Psychiatric:         Behavior: Behavior normal.         Thought Content: Thought content normal.         Judgment: Judgment normal.     mild swelling rt ankle        Immunization History   Administered Date(s) Administered    COVID-19, Moderna, Primary or Immunocompromised, PF, 100mcg/0.5mL 03/13/2021, 04/10/2021    Hepatitis B 01/18/2007, 02/23/2007, 10/03/2007    Influenza Virus Vaccine 10/01/2010, 11/17/2017    Influenza Whole 12/12/2009    Influenza, MDCK Quadv, IM, PF (Flucelvax 2 yrs and older) 12/06/2021    Influenza, Quadv, IM, (6 mo and older Fluzone, Flulaval, Fluarix and 3 yrs and older Afluria) 11/17/2017, 10/20/2020    Pneumococcal Conjugate 13-valent ([id number]) 11/17/2017    Pneumococcal Polysaccharide ([id number]) 08/01/2019    Tdap (Boostrix, Adacel) 05/17/2017        ASSESSMENT/PLAN:  1. Urothelial carcinoma of kidney, left ([address]    2. Needs flu shot    3. Right leg pain    4. Severe obstructive sleep apnea    5. Ischemic cardiomyopathy    6.  Coronary artery disease involving native coronary artery of native heart without angina pectoris    7. Essential hypertension      Orders Placed This Encounter   Procedures    INFLUENZA, MDCK QUADV, 2 YRS AND OLDER, IM, PF, PREFILL SYR OR SDV, 0.5ML (FLUCELVAX Magali Dejesus, PF)     My opinion is that the patient is currently medically stable for this low risk procedure provided venous Doppler negative for DVT tomorrow. He was advised to avoid aspirin, NSAIDs, fish oil supplements, vitamin E supplements and blood thinners one week before planned procedure. This note will be sent to the surgeon. Elijah Morales M.D. [address] [place] [address]. [address], [address] [place] [address]             An electronic signature was used to authenticate this note.     --Elijah Morales MD on 12/6/2021 at 2:31 PM

## 2021-12-06 NOTE — PROGRESS NOTES
Vaccine Information Sheet, \"Influenza - Inactivated\"  given to Maria Luz Damien, or parent/legal guardian of  Maria Luz Damien and verbalized understanding. Patient responses:    Have you ever had a reaction to a flu vaccine? No  Do you have any current illness? No  Have you ever had Guillian [place] Syndrome? No  Do you have a serious allergy to any of the follow: Neomycin, Polymyxin, Thimerosal, eggs or egg products? No    Flu vaccine given per order. Please see immunization tab. Risks and benefits explained. Current VIS given.

## 2021-12-07 ENCOUNTER — HOSPITAL ENCOUNTER (OUTPATIENT)
Dept: VASCULAR LAB | Age: 56
Discharge: HOME | End: 2021-12-07
Payer: COMMERCIAL

## 2021-12-07 DIAGNOSIS — M79.661 PAIN OF RIGHT LOWER LEG: ICD-10-CM

## 2021-12-07 DIAGNOSIS — C68.8 MALIGNANT NEOPLASM OF OVERLAPPING SITES OF URINARY ORGANS (HCC): ICD-10-CM

## 2021-12-07 PROCEDURE — 93971 EXTREMITY STUDY: CPT

## 2021-12-08 ENCOUNTER — TELEPHONE (OUTPATIENT)
Dept: FAMILY MEDICINE CLINIC | Age: 56
End: 2021-12-08

## 2021-12-08 NOTE — TELEPHONE ENCOUNTER
Received a call from [facility] surgery [facility] [place] [place] [phone] and patient had Venous doppler yesterday and it is positive for acute dvt right leg. I notified Dr Savanah Jacob and we wanted me to call to see patient to see if his physician had contacted him.

## 2021-12-08 NOTE — TELEPHONE ENCOUNTER
I spoke to patient he states that his [facility] cancer doctor did contact him and started him on Eliquis 5mg bid and that they contacted Dr. Aby Schroeder office and instructed the cancer doctor that the Eliquis is fine but he must stop it on 2 days before procedure and then can restart it after the procedure on Friday. Dr. Guillermo Mclean was notified of this and Dr. Guillermo Mclean has great concern of the risk of this and the potential of the blood clot to go to patient's lung. He asked me to call Dr. Marianela Encarnacion office to discuss with them. I called and had to leave a message for them to call me back. Patient has been notified of all of this.

## 2021-12-08 NOTE — TELEPHONE ENCOUNTER
Dr. Matthew Macario office called back and they are cancelling surgery until they can get clearance from us that he is okay to have surgery done. [facility]on Wallace surgery is aware and patient is aware.

## 2021-12-10 NOTE — TELEPHONE ENCOUNTER
Patient notified and he has been touch with the oncologist. He says his swelling is worse today and looks glassy today.  He didn't know if there was anything else he can do, like use heat or ice, or should he contact his oncologist?

## 2021-12-10 NOTE — TELEPHONE ENCOUNTER
Terese Landaverde have him take the blood thinner as ordered and see how long his oncologist wants him treated before being able to hold it for the urology procedure

## 2022-01-13 ENCOUNTER — HOSPITAL ENCOUNTER (OUTPATIENT)
Dept: GENERAL RADIOLOGY | Age: 57
Discharge: HOME | End: 2022-01-13
Payer: COMMERCIAL

## 2022-01-13 ENCOUNTER — HOSPITAL ENCOUNTER (OUTPATIENT)
Dept: PET IMAGING | Age: 57
Discharge: HOME | End: 2022-01-13
Payer: COMMERCIAL

## 2022-01-13 ENCOUNTER — HOSPITAL ENCOUNTER (OUTPATIENT)
Age: 57
Discharge: HOME | End: 2022-01-13
Payer: COMMERCIAL

## 2022-01-13 DIAGNOSIS — M48.062 SPINAL STENOSIS, LUMBAR REGION WITH NEUROGENIC CLAUDICATION: ICD-10-CM

## 2022-01-13 DIAGNOSIS — C68.8 MALIGNANT NEOPLASM OF OVERLAPPING SITES OF URINARY ORGANS (HCC): ICD-10-CM

## 2022-01-13 DIAGNOSIS — M47.814 THORACIC SPONDYLOSIS WITHOUT MYELOPATHY: ICD-10-CM

## 2022-01-13 PROCEDURE — A9552 F18 FDG: HCPCS | Performed by: NURSE PRACTITIONER

## 2022-01-13 PROCEDURE — 78815 PET IMAGE W/CT SKULL-THIGH: CPT

## 2022-01-13 PROCEDURE — 72110 X-RAY EXAM L-2 SPINE 4/>VWS: CPT

## 2022-01-13 PROCEDURE — 3430000000 HC RX DIAGNOSTIC RADIOPHARMACEUTICAL: Performed by: NURSE PRACTITIONER

## 2022-01-13 PROCEDURE — 72070 X-RAY EXAM THORAC SPINE 2VWS: CPT

## 2022-01-13 RX ORDER — FLUDEOXYGLUCOSE F 18 200 MCI/ML
13.75 INJECTION, SOLUTION INTRAVENOUS
Status: COMPLETED | OUTPATIENT
Start: 2022-01-13 | End: 2022-01-13

## 2022-01-13 RX ADMIN — FLUDEOXYGLUCOSE F 18 13.75 MILLICURIE: 200 INJECTION, SOLUTION INTRAVENOUS at 08:54

## 2022-01-26 ENCOUNTER — TELEPHONE (OUTPATIENT)
Dept: CARDIOLOGY CLINIC | Age: 57
End: 2022-01-26

## 2022-01-26 NOTE — TELEPHONE ENCOUNTER
CARDIAC CLEARANCE REQUEST    What type of procedure are you having:  Pain pump replacement. Are you taking any blood thinners:  Entresto 49-51 MG, Eliquis 5MG, Aspirin 81 MG  Type on anesthesia:  Not sure.    When is your procedure scheduled for:  2/7/2022  What physician is performing your procedure:  Dr. Wyatt Vaughan  Phone Number:  [phone]  Fax number to send the letter:  [phone]    Last OV 06/10/2021 [facility]B

## 2022-01-27 ENCOUNTER — OFFICE VISIT (OUTPATIENT)
Dept: FAMILY MEDICINE CLINIC | Age: 57
End: 2022-01-27
Payer: COMMERCIAL

## 2022-01-27 VITALS
SYSTOLIC BLOOD PRESSURE: 90 MMHG | DIASTOLIC BLOOD PRESSURE: 59 MMHG | OXYGEN SATURATION: 96 % | BODY MASS INDEX: 29.46 KG/M2 | TEMPERATURE: 99.3 F | HEART RATE: 85 BPM | WEIGHT: 242 LBS

## 2022-01-27 DIAGNOSIS — C64.2 UROTHELIAL CARCINOMA OF KIDNEY, LEFT (HCC): ICD-10-CM

## 2022-01-27 DIAGNOSIS — I82.411 ACUTE DEEP VEIN THROMBOSIS (DVT) OF FEMORAL VEIN OF RIGHT LOWER EXTREMITY (HCC): ICD-10-CM

## 2022-01-27 DIAGNOSIS — I25.10 CORONARY ARTERY DISEASE INVOLVING NATIVE CORONARY ARTERY OF NATIVE HEART WITHOUT ANGINA PECTORIS: ICD-10-CM

## 2022-01-27 DIAGNOSIS — I10 ESSENTIAL HYPERTENSION: ICD-10-CM

## 2022-01-27 DIAGNOSIS — I25.5 ISCHEMIC CARDIOMYOPATHY: Primary | ICD-10-CM

## 2022-01-27 DIAGNOSIS — G47.33 SEVERE OBSTRUCTIVE SLEEP APNEA: ICD-10-CM

## 2022-01-27 PROCEDURE — G8427 DOCREV CUR MEDS BY ELIG CLIN: HCPCS | Performed by: FAMILY MEDICINE

## 2022-01-27 PROCEDURE — 1036F TOBACCO NON-USER: CPT | Performed by: FAMILY MEDICINE

## 2022-01-27 PROCEDURE — G8417 CALC BMI ABV UP PARAM F/U: HCPCS | Performed by: FAMILY MEDICINE

## 2022-01-27 PROCEDURE — G8482 FLU IMMUNIZE ORDER/ADMIN: HCPCS | Performed by: FAMILY MEDICINE

## 2022-01-27 PROCEDURE — 3017F COLORECTAL CA SCREEN DOC REV: CPT | Performed by: FAMILY MEDICINE

## 2022-01-27 PROCEDURE — 99214 OFFICE O/P EST MOD 30 MIN: CPT | Performed by: FAMILY MEDICINE

## 2022-01-27 NOTE — PROGRESS NOTES
2022    Bruno Byrd (:  1965) is a 60 y.o. male, here for a preoperative medicine evaluation follow-up of his chronic health problems . He is scheduled for a cystoscopy to follow-up on his history of renal cancer with Dr. Fortino Allen and he will have his pain pump changed by Dr. Teetee Escoto on . The procedure with the urologist will be on . Patient was already scheduled for this but he was diagnosed with an acute DVT so the procedure was postponed. He states both of the surgeons told him it was okay to stay on his blood thinner Eliquis for the procedures. He received cardiac clearance by his cardiologist yesterday noted in the chart. He has had a little bit of fluid behind his eardrums lately but other than that he states he feels fine and had has improved. No fevers. Patient Active Problem List   Diagnosis    Asthma    Restless legs syndrome (RLS)    Low testosterone    ED (erectile dysfunction)    Chronic bilateral low back pain without sciatica    Mixed hyperlipidemia    Essential hypertension    Dupuytren's contracture    Severe obstructive sleep apnea    Tobacco abuse    Ischemic cardiomyopathy    [facility]I [facility] elevation myocardial infarction) [facility]    Obesity    Coronary artery disease involving native coronary artery of native heart without angina pectoris    SOB (shortness of breath)    Adrenal nodule (HCC)    Urothelial carcinoma of kidney, left (HCC)    Acute deep vein thrombosis (DVT) of femoral vein of right lower extremity ([facility]       Review of Systems   HENT: Positive for ear pain. All other systems reviewed and are negative. Prior to Visit Medications    Medication Sig Taking?  Authorizing Provider   apixaban (ELIQUIS) 5 MG TABS tablet Take 5 mg by mouth 2 times daily Yes Historical Provider, MD   carvedilol (COREG) 12.5 MG tablet TAKE 1 TABLET BY MOUTH TWICE A DAY WITH MEALS Yes ERICA Acuña - CNP   tamsulosin (FLOMAX) 0.4 MG capsule Take 1 capsule by mouth daily Yes Cheyenne Singh MD   sacubitril-valsartan (ENTRESTO) 49-51 MG per tablet Take 1 tablet by mouth 2 times daily  Patient taking differently: Take 1 tablet by mouth 2 times daily LD 7/27 Yes ERICA Cueva - CNP   atorvastatin (LIPITOR) 40 MG tablet TAKE 1 TABLET BY MOUTH EVERY DAY AT NIGHT Yes Caprice Yeboah MD   Multiple Vitamin (MULTI-VITAMIN DAILY PO) Take by mouth Yes Historical Provider, MD   aspirin 81 MG chewable tablet Take 1 tablet by mouth daily Yes Josemanuel Fowler MD   oxyCODONE HCl (OXY-IR) 10 MG immediate release tablet TAKE 1 TABLET BY MOUTH EVERY 6 HOURS Yes Historical Provider, MD        Allergies   Allergen Reactions    Vioxx Other (See Comments)     Oral ulcers         Past Medical History:   Diagnosis Date    Adrenal nodule ([facility] 2021    Arthritis     Asthma     Back problem     Dr Shaun Gillespie, Dr Cass Bauman Cholelithiasis without obstruction 2010    asymptomatic    Depression     ED (erectile dysfunction)     Gallbladder & bile duct stone     Gallbladder full of stones    GERD (gastroesophageal reflux disease)     Hernia     Hyperlipidemia     Hypertension     Low testosterone     [facility] 04/13/2020    mLAD 100% FELICITY Xience Martha 3.5 x 18    Seasonal allergies     Sleep apnea     SVT (supraventricular tachycardia) (HCC)     GXT 1998    Tendonitis     Urothelial carcinoma of kidney, left ([facility] 07/23/2021       Past Surgical History:   Procedure Laterality Date    BACK SURGERY  2001    L5 FUSION, Dr Farhad Miramontes      stent   [address] Right 02/11/2019    RIGHT CARPAL TUNNEL RELEASE performed by Chan Velasquez MD at [address]  2012    Dr. Ilda Smith  2010    [facility] patient states    CYSTOSCOPY Left 07/16/2021    LEFT RENAL  BIOPSY, LEFT URETEROSCOPY WITH STENT PLACEMENT    CYSTOSCOPY Left 07/16/2021    CYSTOSCOPY, LEFT RENAL BIOPSY, LEFT URETEROSCOPY WITH STENT PLACEMENT performed by Rubin Austin MD at [address]      Dr. Laine Nix Right     stimulator put in 3/10    KIDNEY REMOVAL Left 2021    [address] LEFT NEPHROURETERECTOMY by Dr Elijah Roque Left 2021    [address] NEPHROURETERECTOMY performed by Rubin Austin MD at [place] [address]      Dr Santiago Antunez N/A 09/15/2021    PORT INSERTION performed by Christophe Isidro MD at [address] TUNNELED VENOUS PORT PLACEMENT Left 09/15/2021       Social History     Socioeconomic History    Marital status:      Spouse name: Not on file    Number of children: Not on file    Years of education: Not on file    Highest education level: Not on file   Occupational History    Not on file   Tobacco Use    Smoking status: Former Smoker     Packs/day: 0.25     Years: 20.00     Pack years: 5.00     Types: Cigars     Quit date: 2020     Years since quittin.7    Smokeless tobacco: Never Used   Vaping Use    Vaping Use: Never used   Substance and Sexual Activity    Alcohol use: Yes     Alcohol/week: 1.0 standard drink     Types: 1 Shots of liquor per week     Comment: daily     Drug use: No    Sexual activity: Yes     Partners: Female   Other Topics Concern    Not on file   Social History Narrative    Not on file          Family History   Problem Relation Age of Onset    Heart Disease Father        ADVANCE DIRECTIVE: N, <no information>    Vitals:    22 1008   BP: (!) 90/59   Pulse: 85   Temp: 99.3 °F (37.4 °C)   SpO2: 96%   Weight: 242 lb (109.8 kg)     Estimated body mass index is 29.46 kg/m² as calculated from the following:    Height as of 21: 6' 4\" (1.93 m). Weight as of this encounter: 242 lb (109.8 kg).     Physical Exam  Vitals reviewed. Constitutional:       General: He is not in acute distress. Appearance: He is well-developed. He is not toxic-appearing. Eyes:      Conjunctiva/sclera: Conjunctivae normal.      Pupils: Pupils are equal, round, and reactive to light. Neck:      Thyroid: No thyromegaly. Vascular: No carotid bruit or JVD. Trachea: Trachea normal.   Cardiovascular:      Rate and Rhythm: Normal rate and regular rhythm. Heart sounds: Normal heart sounds. Pulmonary:      Effort: Pulmonary effort is normal.      Breath sounds: Normal breath sounds. No wheezing or rales. Abdominal:      Palpations: Abdomen is soft. There is no hepatomegaly, splenomegaly or mass. Tenderness: There is no abdominal tenderness. There is no guarding. Musculoskeletal:      Cervical back: Neck supple. Lymphadenopathy:      Cervical: No cervical adenopathy. Skin:     General: Skin is warm and dry. Neurological:      Mental Status: He is alert and oriented to person, place, and time. Psychiatric:         Behavior: Behavior normal. Behavior is cooperative. Clear fluid noted behind both tympanic membranes on ear exam.  No acute infection of the ears noted    Immunization History   Administered Date(s) Administered    COVID-19, Moderna, Primary or Immunocompromised, PF, 100mcg/0.5mL 03/13/2021, 04/10/2021    Hepatitis B 01/18/2007, 02/23/2007, 10/03/2007    Influenza Virus Vaccine 10/01/2010, 11/17/2017    Influenza Whole 12/12/2009    Influenza, MDCK Quadv, IM, PF (Flucelvax 2 yrs and older) 12/06/2021    Influenza, Quadv, IM, (6 mo and older Fluzone, Flulaval, Fluarix and 3 yrs and older Afluria) 11/17/2017, 10/20/2020    Pneumococcal Conjugate 13-valent ([id number]) 11/17/2017    Pneumococcal Polysaccharide ([id number]) 08/01/2019    Tdap (Boostrix, Adacel) 05/17/2017          ASSESSMENT/PLAN:  1. Ischemic cardiomyopathy  2.  Coronary artery disease involving native coronary artery of native heart without angina pectoris  3. Urothelial carcinoma of kidney, left ([address]  4. Acute deep vein thrombosis (DVT) of femoral vein of right lower extremity (HCC)  5. Severe obstructive sleep apnea  6. Essential hypertension  Blood pressure noted to be a little on the low side today, so I encouraged him to increase his water intake and call if blood pressures remain low. My opinion is that the patient is currently medically stable for both of these low risk procedures. He will continue on Eliquis for at least 6 months and be reevaluated at that time by his oncologist for the need for further anticoagulation for his DVT. Patient has already received cardiac clearance for both of these low risk procedures noted in electronic records. .  He was advised that he can continue his blood thinners before both procedures. This note will be sent to the surgeons. Genaro Duncan M.D. [facility]. [address], [address] [place] [address]    No follow-ups on file. An electronic signature was used to authenticate this note.     --Genaro Duncan MD on 1/27/2022 at 10:41 AM

## 2022-01-27 NOTE — TELEPHONE ENCOUNTER
Patient is okay to proceed at low risk. Naomi was not a blood thinner and therefore does not to be held. Eliquis is being written for noncardiac reasons and apparently for possible DVT so this needs to be discussed with his primary care physician and cardiology    Aspirin however I prefer to be continued but if this is going to be a spinal injection however it must be held that is okay to do so  (hold the medicine ) and I will leave a Dr. Garcia Number discretion.   He can also call me if he has any questions or concerns

## 2022-01-27 NOTE — PATIENT INSTRUCTIONS
Please read the healthy family handout that you were given and share it with your family. Please compare this printed medication list with your medications at home to be sure they are the same. If you have any medications that are different please contact us immediately at [phone]. Also review your allergies that we have listed, these may also include medications that you have not been able to tolerate, make sure everything listed is correct. If you have any allergies that are different please contact us immediately at [phone].

## 2022-01-28 ENCOUNTER — TELEPHONE (OUTPATIENT)
Dept: CARDIOLOGY CLINIC | Age: 57
End: 2022-01-28

## 2022-01-28 NOTE — LETTER
[address] Cardiology - [address] [place] [address] [facility]  Phone: [phone]  Fax: [phone]    Khang Aquino MD        January 31, 2022     Monalisa Rouse  [address] [place] [id number]  1965    To whom it may concern,          Monalisa Rouse is okay to proceed with cystoscopy at low risk. If possible I would like him to continue the aspirin as discussed previously. However as discussed previously,  Eliquis is being written for noncardiac reasons and apparently for possible DVT so this needs to be discussed with his primary care physician and not cards. If you have any questions or concerns, please don't hesitate to call.     Sincerely,        Khang Aquino MD

## 2022-01-28 NOTE — TELEPHONE ENCOUNTER
JJP- procedure is CYSTOSCOPY w possible bladder biopsy. They are not asking to hold eliquis or aspirin. Just need risk assessment.

## 2022-01-28 NOTE — TELEPHONE ENCOUNTER
CARDIAC CLEARANCE REQUEST    What type of procedure are you having:  Sciosophy with possible bladder biopsy  Are you taking any blood thinners:  Eliquis 5MG, Asprinin 81MG  Type on anesthesia:  general  When is your procedure scheduled for:  1/31/2022  What physician is performing your procedure:  Dr. Bessie Loyola  Phone Number:  [phone]  Fax number to send the letter:  [phone]    Last OV 6/10/2021 NPRB.

## 2022-02-04 ENCOUNTER — TELEPHONE (OUTPATIENT)
Dept: CARDIOLOGY CLINIC | Age: 57
End: 2022-02-04

## 2022-02-04 NOTE — TELEPHONE ENCOUNTER
CARDIAC CLEARANCE REQUEST    What type of procedure are you having: pain pump replacement with new medication    Are you taking any blood thinners:yes, eliquis hold for 2 days prior, resume night of procedure    Type on anesthesia:\"map\" ? ? When is your procedure scheduled for: Monday 2/7/22    What physician is performing your procedure:[facility] surgery    Phone Number:Yi [phone]    Fax number to send the letter:EMAIL LETTER OF CLX TO YI AT     [email or web address]

## 2022-02-07 LAB — PSA, TOTAL: 1.71 NG/ML (ref 0–4)

## 2022-02-18 LAB
PATHOLOGY/CYTOLOGY REPORT: NORMAL
PATHOLOGY/CYTOLOGY REPORT: NORMAL

## 2022-02-21 ENCOUNTER — PATIENT MESSAGE (OUTPATIENT)
Dept: CARDIOLOGY CLINIC | Age: 57
End: 2022-02-21

## 2022-02-22 RX ORDER — SACUBITRIL AND VALSARTAN 49; 51 MG/1; MG/1
1 TABLET, FILM COATED ORAL 2 TIMES DAILY
Qty: 180 TABLET | Refills: 2 | Status: SHIPPED | OUTPATIENT
Start: 2022-02-22

## 2022-02-23 ENCOUNTER — TELEPHONE (OUTPATIENT)
Dept: FAMILY MEDICINE CLINIC | Age: 57
End: 2022-02-23

## 2022-02-23 DIAGNOSIS — G89.29 CHRONIC BILATERAL LOW BACK PAIN WITHOUT SCIATICA: ICD-10-CM

## 2022-02-23 DIAGNOSIS — M54.50 CHRONIC BILATERAL LOW BACK PAIN WITHOUT SCIATICA: ICD-10-CM

## 2022-02-23 DIAGNOSIS — F41.9 ANXIETY: ICD-10-CM

## 2022-02-23 RX ORDER — ALPRAZOLAM 0.5 MG/1
TABLET ORAL
Qty: 4 TABLET | Refills: 0 | Status: SHIPPED | OUTPATIENT
Start: 2022-02-23 | End: 2022-03-26

## 2022-02-23 NOTE — TELEPHONE ENCOUNTER
Patient calling to see if you will prescribe xanax for him again, he is going on a trip and has taken this in the past for claustrophobia   (order pended)

## 2022-02-25 ENCOUNTER — TELEPHONE (OUTPATIENT)
Dept: CARDIOLOGY CLINIC | Age: 57
End: 2022-02-25

## 2022-02-25 NOTE — TELEPHONE ENCOUNTER
[employer] patient safety sent an adverse event report. Adverse event is reported as a blood clot in November 2021. I have scanned the report into pt.'s chart under he media tab for you to reference.

## 2022-02-25 NOTE — TELEPHONE ENCOUNTER
Pt states he did not know his daughter, Konrad Quigley sent in the adverse event report. Pt states he will talk with his daughter but he does not want it completed for now.

## 2022-03-05 LAB
PATHOLOGY/CYTOLOGY REPORT: NORMAL
PATHOLOGY/CYTOLOGY REPORT: NORMAL

## 2022-03-14 ENCOUNTER — OFFICE VISIT (OUTPATIENT)
Dept: FAMILY MEDICINE CLINIC | Age: 57
End: 2022-03-14
Payer: COMMERCIAL

## 2022-03-14 VITALS
DIASTOLIC BLOOD PRESSURE: 82 MMHG | HEART RATE: 80 BPM | SYSTOLIC BLOOD PRESSURE: 135 MMHG | OXYGEN SATURATION: 99 % | WEIGHT: 243.5 LBS | TEMPERATURE: 98.4 F | BODY MASS INDEX: 29.64 KG/M2

## 2022-03-14 DIAGNOSIS — R06.02 SOB (SHORTNESS OF BREATH): ICD-10-CM

## 2022-03-14 DIAGNOSIS — J30.9 ALLERGIC RHINITIS, UNSPECIFIED SEASONALITY, UNSPECIFIED TRIGGER: Primary | ICD-10-CM

## 2022-03-14 LAB — PATHOLOGY/CYTOLOGY REPORT: NORMAL

## 2022-03-14 PROCEDURE — 1036F TOBACCO NON-USER: CPT | Performed by: NURSE PRACTITIONER

## 2022-03-14 PROCEDURE — 3017F COLORECTAL CA SCREEN DOC REV: CPT | Performed by: NURSE PRACTITIONER

## 2022-03-14 PROCEDURE — G8482 FLU IMMUNIZE ORDER/ADMIN: HCPCS | Performed by: NURSE PRACTITIONER

## 2022-03-14 PROCEDURE — 99213 OFFICE O/P EST LOW 20 MIN: CPT | Performed by: NURSE PRACTITIONER

## 2022-03-14 PROCEDURE — G8427 DOCREV CUR MEDS BY ELIG CLIN: HCPCS | Performed by: NURSE PRACTITIONER

## 2022-03-14 PROCEDURE — G8417 CALC BMI ABV UP PARAM F/U: HCPCS | Performed by: NURSE PRACTITIONER

## 2022-03-14 RX ORDER — ALBUTEROL SULFATE 90 UG/1
2 AEROSOL, METERED RESPIRATORY (INHALATION) EVERY 6 HOURS PRN
Qty: 18 G | Refills: 1 | Status: SHIPPED | OUTPATIENT
Start: 2022-03-14 | End: 2022-04-28

## 2022-03-14 ASSESSMENT — ENCOUNTER SYMPTOMS
COUGH: 0
EYES NEGATIVE: 1
CHEST TIGHTNESS: 0
GASTROINTESTINAL NEGATIVE: 1
SHORTNESS OF BREATH: 1

## 2022-03-14 NOTE — PATIENT INSTRUCTIONS
Please read the healthy family handout that you were given and share it with your family. Please compare this printed medication list with your medications at home to be sure they are the same. If you have any medications that are different please contact us immediately at [phone]. Also review your allergies that we have listed, these may also include medications that you have not been able to tolerate, make sure everything listed is correct. If you have any allergies that are different please contact us immediately at [phone].

## 2022-03-14 NOTE — PROGRESS NOTES
CHIEF COMPLAINT  Chief Complaint   Patient presents with    Shortness of Breath        HPI   Kayleen Delong is a 60 y.o. male who presents to the office complaining of shortness of breath at night. Patient reports that he has had shortness of breath in the past however symptoms seem to improved until the past few months. Patient reports that he notices it when he goes to lay down at night. Patient has a known history of obstructive sleep apnea. Patient reports that he has tried multiple devices and systems for treatment. Patient reports that he was out of town last week and slept fine so now he feels they could be allergy related as well. Patient denies any chest pain, tightness, palpitations. No episodes of dizziness or lightheadedness. Patient denies any shortness of breath or dyspnea upon exertion at this time. No other complaints, modifying factors or associated symptoms. Nursing notes reviewed.    Past Medical History:   Diagnosis Date    Adrenal nodule ([facility] 2021    Arthritis     Asthma     Back problem     Dr Slava Loera, Dr Mahamed De La Torre Cholelithiasis without obstruction 2010    asymptomatic    Depression     ED (erectile dysfunction)     Gallbladder & bile duct stone     Gallbladder full of stones    GERD (gastroesophageal reflux disease)     Hernia     Hyperlipidemia     Hypertension     Low testosterone     MI (myocardial infarction) ([facility] 04/13/2020    mLAD 100% FELICITY Xience Martha 3.5 x 18    Seasonal allergies     Sleep apnea     SVT (supraventricular tachycardia) (HCC)     GXT 1998    Tendonitis     Urothelial carcinoma of kidney, left ([facility] 07/23/2021     Past Surgical History:   Procedure Laterality Date    BACK SURGERY  2001    L5 FUSION, Dr Dioni Monroy      stent   [address] Right 02/11/2019    RIGHT CARPAL TUNNEL RELEASE performed by Kerri Shrestha MD at [address]  2012    Dr. Maki Gibson     [facility] patient states    CYSTOSCOPY Left 2021    LEFT RENAL  BIOPSY, LEFT URETEROSCOPY WITH STENT PLACEMENT    CYSTOSCOPY Left 2021    CYSTOSCOPY, LEFT RENAL  BIOPSY, LEFT URETEROSCOPY WITH STENT PLACEMENT performed by Piyush Gambino MD at [facility]      Dr. Judy See Right     stimulator put in 3/10    KIDNEY REMOVAL Left 2021    [address] LEFT NEPHROURETERECTOMY by Dr Belvin Alpers Left 2021    [address] NEPHROURETERECTOMY performed by Piyush Gambino MD at [address] [place] [address] Dr Sonia Olvera Do N/A 09/15/2021    PORT INSERTION performed by Ilia Christianson MD at [address] TUNNELED VENOUS PORT PLACEMENT Left 09/15/2021     Family History   Problem Relation Age of Onset    Heart Disease Father      Social History     Socioeconomic History    Marital status:      Spouse name: Not on file    Number of children: Not on file    Years of education: Not on file    Highest education level: Not on file   Occupational History    Not on file   Tobacco Use    Smoking status: Former Smoker     Packs/day: 0.25     Years: 20.00     Pack years: 5.00     Types: Cigars     Quit date: 2020     Years since quittin.9    Smokeless tobacco: Never Used   Vaping Use    Vaping Use: Never used   Substance and Sexual Activity    Alcohol use:  Yes     Alcohol/week: 1.0 standard drink     Types: 1 Shots of liquor per week     Comment: daily     Drug use: No    Sexual activity: Yes     Partners: Female   Other Topics Concern    Not on file   Social History Narrative    Not on file     Social Determinants of Health     Financial Resource Strain:     Difficulty of Paying Living Expenses: Not on file   Food Insecurity:     Worried About Running Out of Food in the Last Year: Not on file    Ran Out of Food in the Last Year: Not on file   Transportation Needs:     Lack of Transportation (Medical): Not on file    Lack of Transportation (Non-Medical): Not on file   Physical Activity:     Days of Exercise per Week: Not on file    Minutes of Exercise per Session: Not on file   Stress:     Feeling of Stress : Not on file   Social Connections:     Frequency of Communication with Friends and Family: Not on file    Frequency of Social Gatherings with Friends and Family: Not on file    Attends [employer] Services: Not on file    Active Member of [address] [place] [address] [employer] or Organizations: Not on file    Attends Club or Organization Meetings: Not on file    Marital Status: Not on file   Intimate Partner Violence:     Fear of Current or Ex-Partner: Not on file    Emotionally Abused: Not on file    Physically Abused: Not on file    Sexually Abused: Not on file   Housing Stability:     Unable to Pay for Housing in the Last Year: Not on file    Number of [place] in the Last Year: Not on file    Unstable Housing in the Last Year: Not on file     Current Outpatient Medications   Medication Sig Dispense Refill    albuterol sulfate HFA (VENTOLIN HFA) 108 (90 Base) MCG/ACT inhaler Inhale 2 puffs into the lungs every 6 hours as needed for Shortness of Breath 18 g 1    ALPRAZolam (XANAX) 0.5 MG tablet 1 tablet 1/2 hour prior to long car ride.  4 tablet 0    sacubitril-valsartan (ENTRESTO) 49-51 MG per tablet Take 1 tablet by mouth 2 times daily 180 tablet 2    apixaban (ELIQUIS) 5 MG TABS tablet Take 5 mg by mouth 2 times daily      carvedilol (COREG) 12.5 MG tablet TAKE 1 TABLET BY MOUTH TWICE A DAY WITH MEALS 180 tablet 3    tamsulosin (FLOMAX) 0.4 MG capsule Take 1 capsule by mouth daily 30 capsule 5    atorvastatin (LIPITOR) 40 MG tablet TAKE 1 TABLET BY MOUTH EVERY DAY AT NIGHT 90 tablet 3    Multiple Vitamin (MULTI-VITAMIN DAILY PO) Take by mouth      aspirin 81 MG chewable tablet Take 1 tablet by mouth daily 30 tablet 0    oxyCODONE HCl (OXY-IR) 10 MG immediate release tablet TAKE 1 TABLET BY MOUTH EVERY 6 HOURS  0     No current facility-administered medications for this visit. Allergies   Allergen Reactions    Vioxx Other (See Comments)     Oral ulcers         REVIEW OF SYSTEMS  Review of Systems   Constitutional: Negative. HENT: Negative. Eyes: Negative. Respiratory: Positive for shortness of breath. Negative for cough and chest tightness. Cardiovascular: Negative. Gastrointestinal: Negative. Genitourinary: Negative. Musculoskeletal: Negative. Skin: Negative. Neurological: Negative. Psychiatric/Behavioral: Negative. PHYSICAL EXAM  /82   Pulse 80   Temp 98.4 °F (36.9 °C) (Oral)   Wt 243 lb 8 oz (110.5 kg)   SpO2 99%   BMI 29.64 kg/m²   Physical Exam  Constitutional:       Appearance: Normal appearance. He is not toxic-appearing. HENT:      Head: Normocephalic. Right Ear: Tympanic membrane normal.      Left Ear: Tympanic membrane normal.      Nose: Nose normal.      Mouth/Throat:      Mouth: Mucous membranes are moist.      Pharynx: Oropharynx is clear. Eyes:      Extraocular Movements: Extraocular movements intact. Conjunctiva/sclera: Conjunctivae normal.      Pupils: Pupils are equal, round, and reactive to light. Cardiovascular:      Rate and Rhythm: Normal rate and regular rhythm. Pulses: Normal pulses. Pulmonary:      Effort: Pulmonary effort is normal.      Breath sounds: Normal breath sounds. Abdominal:      Palpations: Abdomen is soft. Tenderness: There is no guarding. Musculoskeletal:         General: Normal range of motion. Cervical back: Normal range of motion and neck supple. Skin:     General: Skin is warm and dry. Capillary Refill: Capillary refill takes less than 2 seconds. Findings: No rash.    Neurological:      Mental Status: He is alert and oriented to person, place, and time.       ASSESSMENT/PLAN:   1. Allergic rhinitis, unspecified seasonality, unspecified trigger  Patient presents today with concerns of ongoing shortness of breath. Patient reports that he has had similar symptoms in the past.  Patient reports that he did not notice them until he had finished with chemotherapy a few months ago. Patient reports that he notices the shortness of breath worse at night. Patient reports that he does have a history of sleep apnea but has tried multiple devices and systems with no relief. Patient reports that he does not tolerate the masks. Patient reports that he did recently travel to [place] and reports that he slept well and denied any shortness of breath during that week. Patient reports as soon as he returned to home/[place] he began having what he contributes some allergy related symptoms. Patient denies any dizziness, lightheadedness, chest pain or chest tightness. Patient denies any numbness or tingling in extremities. Patient reports that shortness of breath is worse at night. Patient reports that he does not sleep well throughout the night because he wakes up multiple times. Patient reports that he does take loratadine but not on a regular basis. We did discuss taking the medications daily as well as use of inhaler as needed. Patient aware that if symptoms do not improve or worsen he would need to be reevaluated. We did discuss the possibility of following up with pulmonology or ENT to discuss further options for sleep apnea. Patient verbalized and acknowledges with plan of care at this time. 2. SOB (shortness of breath)  See above #1  - albuterol sulfate HFA (VENTOLIN HFA) 108 (90 Base) MCG/ACT inhaler; Inhale 2 puffs into the lungs every 6 hours as needed for Shortness of Breath  Dispense: 18 g; Refill: 1         The note was completed using Dragon voice recognition transcription.  Every effort was made to ensure accuracy; however, inadvertent  transcription errors may be present despite my best efforts to edit errors.     Flora Villafana, APRN - CNP

## 2022-03-31 NOTE — TELEPHONE ENCOUNTER
----- Message from Bernardo Dee LPN sent at 2/56/5458  1:33 PM EDT -----  Subject: Message to Provider    QUESTIONS  Information for Provider? Patient calling to give update about how he's   doing with the medication that she gave him. He states the medication does   seem to be helping and would like to see about getting a refill for the   Inhaler, He has been using the inhaler 3-4 times a day. Along with taking   the Claritin daily.  ---------------------------------------------------------------------------  --------------  CALL BACK INFO  What is the best way for the office to contact you? OK to leave message on   voicemail  Preferred Call Back Phone Number? [phone]  ---------------------------------------------------------------------------  --------------  SCRIPT ANSWERS  Relationship to Patient?  Self

## 2022-04-26 RX ORDER — ATORVASTATIN CALCIUM 40 MG/1
TABLET, FILM COATED ORAL
Qty: 90 TABLET | Refills: 0 | Status: SHIPPED | OUTPATIENT
Start: 2022-04-26 | End: 2022-06-28

## 2022-04-28 DIAGNOSIS — R06.02 SOB (SHORTNESS OF BREATH): ICD-10-CM

## 2022-04-28 RX ORDER — ALBUTEROL SULFATE 90 UG/1
2 AEROSOL, METERED RESPIRATORY (INHALATION) EVERY 6 HOURS PRN
Qty: 18 EACH | Refills: 1 | Status: SHIPPED | OUTPATIENT
Start: 2022-04-28 | End: 2022-06-14

## 2022-06-13 DIAGNOSIS — R06.02 SOB (SHORTNESS OF BREATH): ICD-10-CM

## 2022-06-13 NOTE — PROGRESS NOTES
[address] [facility]  Cardiovascular Office Note        PATIENT: Facundo Grayson  DATE: 2022  MRN: [id number]  CSN: [id number]  : 1965      Primary Care Doctor: Fred Nicholson MD  Reason for evaluation:   Follow-up, Coronary Artery Disease, Cardiomyopathy, Hyperlipidemia, Hypertension, and Chest Pain (occasional pressure in chest - started 2 months ago )      Subjective:   History of present illness on initial date of evaluation:   Facundo Grayson is a 60 y.o. patient who presented for follow up. PMH: HLD, CAD, HTN, SVT, asthma, GERD, depression. Patient presented to the ED on 2020 with complaints of chest pain. EMS was called to is home and pre-hospital EKG showed STEMI. Patient had emergent LHC with a successful PCI to mid LAD, stable V tach during case that broke on it's own. Today he states he is feeling better. He is down 10 lbs since last visit. He has had a couple of episodes of chest discomfort. This lasted 1-2 hours. It went away on its own. This did not feel similar to the chest pain when he had stent placement. He is on eliquis for history of blood clots. He had kidney cancer. He went through chemo and has had his kidney removed. Patient is taking all cardiac medications as prescribed and tolerates them well. Patient denies current edema, chest pain, sob, palpitations, dizziness or syncope.       Patient Active Problem List   Diagnosis    Asthma    Restless legs syndrome (RLS)    Low testosterone    ED (erectile dysfunction)    Chronic bilateral low back pain without sciatica    Mixed hyperlipidemia    Essential hypertension    Dupuytren's contracture    Severe obstructive sleep apnea    Tobacco abuse    Ischemic cardiomyopathy    STEMI (ST elevation myocardial infarction) ([facility] [address]    Obesity    Coronary artery disease involving native coronary artery of native heart without angina pectoris    SOB (shortness of breath)    Adrenal nodule ([address]  Urothelial carcinoma of kidney, left (HCC)    Acute deep vein thrombosis (DVT) of femoral vein of right lower extremity (HCC)         Past Medical History:   has a past medical history of Adrenal nodule ([facility] [address] Arthritis, Asthma, Back problem, Cholelithiasis without obstruction, Depression, ED (erectile dysfunction), Gallbladder & bile duct stone, GERD (gastroesophageal reflux disease), Hernia, Hyperlipidemia, Hypertension, Low testosterone, MI (myocardial infarction) ([address] Seasonal allergies, Sleep apnea, SVT (supraventricular tachycardia) ([address] Tendonitis, and Urothelial carcinoma of kidney, left ([facility] [address] Surgical History:   has a past surgical history that includes back surgery (2001); shoulder surgery; Sternum fracture surgery; hip surgery (Right); hernia repair (2012); Cholecystectomy (2012); Colonoscopy (2010); other surgical history; Lumbar spine surgery (2004); Carpal tunnel release (Right, 02/11/2019); Cystoscopy (Left, 07/16/2021); Cystoscopy (Left, 07/16/2021); Cardiac surgery; Kidney surgery (Left, 07/29/2021); Tunneled venous port placement (Left, 09/15/2021); Port Surgery (N/A, 09/15/2021); and Kidney removal (Left, 07/29/2021). Social History:   reports that he quit smoking about 2 years ago. His smoking use included cigars. He has a 5.00 pack-year smoking history. He has never used smokeless tobacco. He reports current alcohol use of about 1.0 standard drink of alcohol per week. He reports that he does not use drugs. Family History:  No evidence for sudden cardiac death or premature CAD    Home Medications:  Reviewed and are listed in nursing record.  and/or listed below  Current Outpatient Medications   Medication Sig Dispense Refill    albuterol sulfate  (90 Base) MCG/ACT inhaler INHALE 2 PUFFS INTO THE LUNGS EVERY 6 HOURS AS NEEDED FOR SHORTNESS OF BREATH 18 each 1    atorvastatin (LIPITOR) 40 MG tablet TAKE 1 TABLET BY MOUTH EVERY DAY AT NIGHT 90 tablet 0    sacubitril-valsartan (ENTRESTO) 49-51 MG per tablet Take 1 tablet by mouth 2 times daily 180 tablet 2    apixaban (ELIQUIS) 5 MG TABS tablet Take 5 mg by mouth 2 times daily      carvedilol (COREG) 12.5 MG tablet TAKE 1 TABLET BY MOUTH TWICE A DAY WITH MEALS 180 tablet 3    tamsulosin (FLOMAX) 0.4 MG capsule Take 1 capsule by mouth daily 30 capsule 5    Multiple Vitamin (MULTI-VITAMIN DAILY PO) Take by mouth      aspirin 81 MG chewable tablet Take 1 tablet by mouth daily 30 tablet 0    oxyCODONE HCl (OXY-IR) 10 MG immediate release tablet TAKE 1 TABLET BY MOUTH EVERY 6 HOURS  0     No current facility-administered medications for this visit. Allergies:  Vioxx     Review of Systems:   A 14 point review of symptoms completed. Pertinent positives identified in the HPI, all other review of symptoms negative as below. Objective:   PHYSICAL EXAM:      Vitals based on last recorded  /68 (06/14/22 1334)    Temp      Pulse 74 (06/14/22 1334)   Resp      SpO2 97 % (06/14/22 1334)       Wt Readings from Last 3 Encounters:   06/14/22 239 lb (108.4 kg)   03/14/22 243 lb 8 oz (110.5 kg)   01/27/22 242 lb (109.8 kg)         Physical Exam  Vitals reviewed. Constitutional:       Appearance: Normal appearance. HENT:      Head: Normocephalic and atraumatic. Nose: Nose normal.      Mouth/Throat:      Mouth: Mucous membranes are moist.      Pharynx: Oropharynx is clear. Eyes:      Conjunctiva/sclera: Conjunctivae normal.      Pupils: Pupils are equal, round, and reactive to light. Neck:      Comments: No jvd    Cardiovascular:      Rate and Rhythm: Normal rate and regular rhythm. Pulses: Normal pulses. Heart sounds: Normal heart sounds. No murmur heard. No friction rub. No gallop. Pulmonary:      Effort: Pulmonary effort is normal.      Breath sounds: Normal breath sounds. Abdominal:      General: Bowel sounds are normal.      Palpations: Abdomen is soft.    Musculoskeletal: Cervical back: Normal range of motion and neck supple. Right lower leg: Edema present. Left lower leg: Edema present. Skin:     General: Skin is warm and dry. Neurological:      General: No focal deficit present. Mental Status: He is alert. Psychiatric:         Mood and Affect: Mood normal.         Thought Content: Thought content normal.         Judgment: Judgment normal.             LABS   CBC:      Lab Results   Component Value Date    WBC 6.6 09/01/2021    WBC 11.0 07/31/2021    RBC 4.26 09/01/2021    HGB 13.2 09/01/2021    HCT 40.5 09/01/2021    MCV 95 09/01/2021    RDW 14.8 09/01/2021     09/01/2021     07/31/2021     CMP:  Lab Results   Component Value Date     09/01/2021    K 4.1 09/01/2021    K 4.7 07/30/2021    CL 97 09/01/2021    CO2 29 09/01/2021    BUN 22 09/01/2021    CREATININE 1.3 09/01/2021    GFRAA >60 07/31/2021    GFRAA >60 01/31/2013    AGRATIO 1.9 05/24/2021    LABGLOM 52 07/31/2021    GLUCOSE 76 09/01/2021    PROT 7.2 05/24/2021    PROT 6.7 02/28/2012    CALCIUM 10.0 09/01/2021    BILITOT 0.5 05/24/2021    ALKPHOS 103 05/24/2021    AST 34 05/24/2021    ALT 45 05/24/2021     DM:  Lab Results   Component Value Date    LABA1C 5.4 04/14/2020     Lipids:         Lab Results   Component Value Date    TRIG 159 (H) 02/03/2020    TRIG 236 (H) 08/01/2019    TRIG 167 03/09/2019            Lab Results   Component Value Date    HDL 27 (L) 02/03/2020    HDL 26 (L) 08/01/2019    HDL 28 (A) 03/09/2019            Lab Results   Component Value Date    LDLCALC 103 (H) 02/03/2020    LDLCALC 99 08/01/2019    LDLCALC 134 03/09/2019            Lab Results   Component Value Date    LABVLDL 32 02/03/2020    LABVLDL 47 08/01/2019    LABVLDL 38 08/30/2018           CARDIAC DATA   EKG:    ECHO 5/3/2021   Summary   LV systolic function is mildly reduced with an estimated EF of 45%. There is akinesis of the apical septum and distal apiex. Mild HK of the anteroseptal segment. There is mild concentric left ventricular hypertrophy. Grade I diastolic dysfunction with normal LV filling pressure. Inadequate tricuspid regurgitation jet to estimate systolic artery pressure   (SPAP). ECHO (limited) 9/14/20   Summary   Limited exam for LVEF. LV systolic function is mildly reduced with an ejection fraction of 45%. There is akinesis of the apical-septal and apical-inferior segments. ECHO 6/11/2020   Summary   Left ventricular systolic function is mildly reduced with ejection fraction   estimated at 40 %. There is dyskinesis of the apical septal and apical inferior wall segments. There is akinesis of the apical anterior wall segments. All remaining wall segments appear normal in function. Left ventricle size is normal.   Normal left ventricular wall thickness. Normal left ventricular diastolic filling pressure. No evidence of mitral valve stenosis. Mild mitral regurgitation. Mild tricuspid regurgitation. Systolic pulmonary artery pressure (SPAP) estimated at 43 mmHg (RA pressure   3 mmHg), consistent with mild pulmonary hypertension. STRESS TEST:    CARDIAC CATH:  CARDIAC CATH: 4/13/2020  LEFT HEART CATH  LM: luminals  LAD: 100% mid just after diag  LCX: luminals, 20% mid  RCA: dominant, large aneurysmal, mild diffuse disease with focal stenosis     LVEDP: 1   LVEF:45% with mid , apical anterior and apex Hypokinesis  No AS gradient  LESION1; PCI to mid LAD  STENT 3.5 x 18mm post dilated to      Assessment  1. Successful PCI to mid LAD using 1 drug stent                100%-->0%, ALEXSANDRA-3 flow  2. Stable V-tach during case that broke on its own, followed by Inferior ST elevations. ? Spasm as RCA was clear                - Amio x 18 hrs  3. ASA 81mg poqday for life, ticagrelor 90mg po BID for 1 yr w/o interruption  4. BB, statin as tolerated                - ACE/ARB once able  5. Heart block resolved prior to case  6.  Echo, ECg, cardiac rehab       VASCULAR/OTHER IMAGING:      Assessment and Plan   Facundo Grayson is a 60 y.o. male who presents today for the following problems:       1. CAD                - 4/13/2020 S/p STEMI and PCI to mid LAD  2. Chronic systolic CHF   Weight 000 lbs--> 248-->239lbs  3. Ischemic cardiomyopathy: improved    LVEF 35%-->45% +scar              - euvolemic and compensated  4. NSVT/VT: no issues     MD PLAN  1. Patient appears to be doing much better. He ejection fraction has improved slightly there is some scar tissue so unlikely to go back to normal.  No signs or symptoms of heart failure   2. He was having some fairly atypical chest pain that was difficult to discern, offered cardiac stress test to further evaluate and patient declined but did state if symptoms return to give us a call or go to ER  3. Check lipids and renal panel. 4.  Continue aspirin 81 mg, Lipitor, Coreg, Entresto. Patient on Eliquis for DVT and noncardiac reason      Patient Active Problem List   Diagnosis    Asthma    Restless legs syndrome (RLS)    Low testosterone    ED (erectile dysfunction)    Chronic bilateral low back pain without sciatica    Mixed hyperlipidemia    Essential hypertension    Dupuytren's contracture    Severe obstructive sleep apnea    Tobacco abuse    Ischemic cardiomyopathy    STEMI (ST elevation myocardial infarction) ([address]    Obesity    Coronary artery disease involving native coronary artery of native heart without angina pectoris    SOB (shortness of breath)    Adrenal nodule (HCC)    Urothelial carcinoma of kidney, left (HCC)    Acute deep vein thrombosis (DVT) of femoral vein of right lower extremity ([address]       Patient Plan:  1. Labs: fasting lipids, CMP  2. Continue current medications  3.  Follow up in 1 year with NP    This note was scribed in the presence of Prisca Altman MD by Steve Olmedo RN.        Hans Toure MD, personally performed the services described in this documentation as scribed by the above signed scribe in my presence, and it is both accurate and complete to the best of our ability and knowledge. I agree with the details independently gathered by my clinical support staff, while the remaining scribed note accurately describes my personal service to the patient. The above RN is working as a scribe for and in the presence of myself . Working as a scribe, the RN may have prepopulated components of this note with my historical intellectual property under my direct supervision. Any additions to this intellectual property were performed at my direction. Furthermore, the content and accuracy of this note have been reviewed by me to the best of my ability.

## 2022-06-14 ENCOUNTER — TELEPHONE (OUTPATIENT)
Dept: FAMILY MEDICINE CLINIC | Age: 57
End: 2022-06-14

## 2022-06-14 ENCOUNTER — OFFICE VISIT (OUTPATIENT)
Dept: CARDIOLOGY CLINIC | Age: 57
End: 2022-06-14
Payer: COMMERCIAL

## 2022-06-14 VITALS
DIASTOLIC BLOOD PRESSURE: 68 MMHG | BODY MASS INDEX: 29.1 KG/M2 | SYSTOLIC BLOOD PRESSURE: 104 MMHG | WEIGHT: 239 LBS | HEART RATE: 74 BPM | OXYGEN SATURATION: 97 % | HEIGHT: 76 IN

## 2022-06-14 DIAGNOSIS — I25.5 ISCHEMIC CARDIOMYOPATHY: ICD-10-CM

## 2022-06-14 DIAGNOSIS — I10 ESSENTIAL HYPERTENSION: ICD-10-CM

## 2022-06-14 DIAGNOSIS — I47.29 NSVT (NONSUSTAINED VENTRICULAR TACHYCARDIA): ICD-10-CM

## 2022-06-14 DIAGNOSIS — I25.10 CORONARY ARTERY DISEASE INVOLVING NATIVE CORONARY ARTERY OF NATIVE HEART WITHOUT ANGINA PECTORIS: Primary | ICD-10-CM

## 2022-06-14 PROCEDURE — 3017F COLORECTAL CA SCREEN DOC REV: CPT | Performed by: INTERNAL MEDICINE

## 2022-06-14 PROCEDURE — 99214 OFFICE O/P EST MOD 30 MIN: CPT | Performed by: INTERNAL MEDICINE

## 2022-06-14 PROCEDURE — 1036F TOBACCO NON-USER: CPT | Performed by: INTERNAL MEDICINE

## 2022-06-14 PROCEDURE — G8427 DOCREV CUR MEDS BY ELIG CLIN: HCPCS | Performed by: INTERNAL MEDICINE

## 2022-06-14 PROCEDURE — G8417 CALC BMI ABV UP PARAM F/U: HCPCS | Performed by: INTERNAL MEDICINE

## 2022-06-14 RX ORDER — ALBUTEROL SULFATE 90 UG/1
2 AEROSOL, METERED RESPIRATORY (INHALATION) EVERY 6 HOURS PRN
Qty: 18 EACH | Refills: 1 | Status: SHIPPED | OUTPATIENT
Start: 2022-06-14

## 2022-06-14 NOTE — PATIENT INSTRUCTIONS
Patient Plan:  1. Labs: fasting lipids, CMP  2. Continue current medications  3.  Follow up in 1 year with NP

## 2022-06-14 NOTE — TELEPHONE ENCOUNTER
Looks like patient saw Cardiology today. Recommend he discuss his symptoms with Dr. Constance Graves. Thank you.

## 2022-06-14 NOTE — TELEPHONE ENCOUNTER
Pt called back and scheduled his next follow up appt 09/01/2022          Refilled medication per verbal order from provider.

## 2022-06-14 NOTE — TELEPHONE ENCOUNTER
[phone] (Home Phone) Left detailed message asking pt to call back and schedule next follow up appointment with provider        Future appt scheduled 0 appt scheduled  Return in about 4 months (around 9/24/2021).                       Last appt 03/14/2022 (acute)      Last Written 04/28/2022    albuterol sulfate  (90 Base) MCG/ACT inhaler  18 each   1 RF

## 2022-06-14 NOTE — TELEPHONE ENCOUNTER
Pt informed, stating he was just seen at cardio, and he scheduled his next follow up appt here for pcp

## 2022-06-14 NOTE — TELEPHONE ENCOUNTER
Pt feels extremely fatigue, weak and wobbly. Pt states last night he fell over twice with no known injuries. Took an hour to get up. Pt states he went to his knees first.   Pt states he is eating and drinking as normal. Has had approx 2 liters of fluid today. This morning Bp was 95/69   p 97  Just now BP               84/54   p 57      Please advise.  Thank You

## 2022-06-28 RX ORDER — CARVEDILOL 12.5 MG/1
TABLET ORAL
Qty: 180 TABLET | Refills: 3 | Status: SHIPPED | OUTPATIENT
Start: 2022-06-28

## 2022-06-28 RX ORDER — ATORVASTATIN CALCIUM 40 MG/1
TABLET, FILM COATED ORAL
Qty: 90 TABLET | Refills: 0 | Status: SHIPPED | OUTPATIENT
Start: 2022-06-28

## 2022-07-22 ENCOUNTER — OFFICE VISIT (OUTPATIENT)
Dept: FAMILY MEDICINE CLINIC | Age: 57
End: 2022-07-22
Payer: COMMERCIAL

## 2022-07-22 VITALS
HEART RATE: 73 BPM | WEIGHT: 241 LBS | SYSTOLIC BLOOD PRESSURE: 98 MMHG | OXYGEN SATURATION: 96 % | BODY MASS INDEX: 29.34 KG/M2 | TEMPERATURE: 98.5 F | DIASTOLIC BLOOD PRESSURE: 70 MMHG

## 2022-07-22 DIAGNOSIS — G47.33 SEVERE OBSTRUCTIVE SLEEP APNEA: ICD-10-CM

## 2022-07-22 DIAGNOSIS — K21.9 GASTROESOPHAGEAL REFLUX DISEASE, UNSPECIFIED WHETHER ESOPHAGITIS PRESENT: Primary | ICD-10-CM

## 2022-07-22 PROCEDURE — G8417 CALC BMI ABV UP PARAM F/U: HCPCS | Performed by: FAMILY MEDICINE

## 2022-07-22 PROCEDURE — 3017F COLORECTAL CA SCREEN DOC REV: CPT | Performed by: FAMILY MEDICINE

## 2022-07-22 PROCEDURE — 99213 OFFICE O/P EST LOW 20 MIN: CPT | Performed by: FAMILY MEDICINE

## 2022-07-22 PROCEDURE — G8427 DOCREV CUR MEDS BY ELIG CLIN: HCPCS | Performed by: FAMILY MEDICINE

## 2022-07-22 PROCEDURE — 1036F TOBACCO NON-USER: CPT | Performed by: FAMILY MEDICINE

## 2022-07-22 RX ORDER — PANTOPRAZOLE SODIUM 40 MG/1
40 TABLET, DELAYED RELEASE ORAL
Qty: 30 TABLET | Refills: 0 | Status: SHIPPED | OUTPATIENT
Start: 2022-07-22

## 2022-07-22 ASSESSMENT — PATIENT HEALTH QUESTIONNAIRE - PHQ9
1. LITTLE INTEREST OR PLEASURE IN DOING THINGS: 0
SUM OF ALL RESPONSES TO PHQ QUESTIONS 1-9: 0
SUM OF ALL RESPONSES TO PHQ9 QUESTIONS 1 & 2: 0
SUM OF ALL RESPONSES TO PHQ QUESTIONS 1-9: 0
2. FEELING DOWN, DEPRESSED OR HOPELESS: 0
SUM OF ALL RESPONSES TO PHQ QUESTIONS 1-9: 0
SUM OF ALL RESPONSES TO PHQ QUESTIONS 1-9: 0

## 2022-07-22 NOTE — PROGRESS NOTES
Subjective:   He presents for issues at nighttime. he has been episodes at night waking him up 2 times at night where he gets SOB   He is allergic to vioxx. Objective:   BP 98/70   Pulse 73   Temp 98.5 °F (36.9 °C) (Oral)   Wt 241 lb (109.3 kg)   SpO2 96%   BMI 29.34 kg/m²   No results found for this visit on 07/22/22. Exam: Constitutional:  Well developed, well nourished, no acute distress, non-toxic appearance    HENT:  Atraumatic,oropharynx moist,  Neck-  no tenderness, supple   Respiratory:  No respiratory distress, normal breath sounds, no rales, no wheezing   Cardiovascular:  Normal rate, normal rhythm, no murmurs, no gallops, no rubs   GI:  Soft, nondistended, nontender, no organomegaly, no mass, no rebound, no guarding   Musculoskeletal:  No edema  Integument:  Well hydrated, no rash   Neurologic:  Alert & oriented x 3, no focal deficits noted   Psychiatric:  Speech and behavior appropriate, normal affect and mood  Assessment and Plan:   Diagnosis Orders   1. Gastroesophageal reflux disease, unspecified whether esophagitis present  pantoprazole (PROTONIX) 40 MG tablet      2. Severe obstructive sleep apnea        He tried multiple mask and was unable to treat his sleep apnea. He denies heartburn symptoms but he has mucus in the back of his throat when he wakes up in the night. He is currently taking antihistamines. Differential diagnosis includes his sleep apnea, GERD and postnasal drainage from allergies  We will do a 2-week trial of Protonix and see if helps episodes if this does not help he will do a 2-week trial of Flonase. If that does not help it is most likely his sleep apnea causing his nighttime symptoms call or return to office prn if these symptoms worsen or fail to improve as anticipated. I have recommended that the patient follow CDC guidelines for prevention of COVID-19 infection.   Melo Campos M.D.

## 2022-07-22 NOTE — PATIENT INSTRUCTIONS
Please read the healthy family handout that you were given and share it with your family. Please compare this printed medication list with your medications at home to be sure they are the same. If you have any medications that are different please contact us immediately at [phone]. Also review your allergies that we have listed, these may also include medications that you have not been able to tolerate, make sure everything listed is correct. If you have any allergies that are different please contact us immediately at [phone]. You may receive a survey in the mail or by email asking about your experience during your visit today. Please complete and return to us so we know how we are serving you.

## 2022-08-01 ENCOUNTER — HOSPITAL ENCOUNTER (OUTPATIENT)
Age: 57
Discharge: HOME | End: 2022-08-01
Payer: COMMERCIAL

## 2022-08-01 DIAGNOSIS — I25.10 CORONARY ARTERY DISEASE INVOLVING NATIVE CORONARY ARTERY OF NATIVE HEART WITHOUT ANGINA PECTORIS: ICD-10-CM

## 2022-08-01 LAB
A/G RATIO: 1.4 (ref 1.1–2.2)
ALBUMIN SERPL-MCNC: 4.3 G/DL (ref 3.4–5)
ALP BLD-CCNC: 111 U/L (ref 40–129)
ALT SERPL-CCNC: 17 U/L (ref 10–40)
ANION GAP SERPL CALCULATED.3IONS-SCNC: 11 MMOL/L (ref 3–16)
AST SERPL-CCNC: 15 U/L (ref 15–37)
BILIRUB SERPL-MCNC: 0.3 MG/DL (ref 0–1)
BUN BLDV-MCNC: 20 MG/DL (ref 7–20)
CALCIUM SERPL-MCNC: 9.4 MG/DL (ref 8.3–10.6)
CHLORIDE BLD-SCNC: 105 MMOL/L (ref 99–110)
CHOLESTEROL, TOTAL: 134 MG/DL (ref 0–199)
CO2: 26 MMOL/L (ref 21–32)
CREAT SERPL-MCNC: 1.2 MG/DL (ref 0.9–1.3)
GFR AFRICAN AMERICAN: >60
GFR NON-AFRICAN AMERICAN: >60
GLUCOSE BLD-MCNC: 114 MG/DL (ref 70–99)
HDLC SERPL-MCNC: 35 MG/DL (ref 40–60)
LDL CHOLESTEROL CALCULATED: 64 MG/DL
POTASSIUM SERPL-SCNC: 4.1 MMOL/L (ref 3.5–5.1)
SODIUM BLD-SCNC: 142 MMOL/L (ref 136–145)
TOTAL PROTEIN: 7.3 G/DL (ref 6.4–8.2)
TRIGL SERPL-MCNC: 174 MG/DL (ref 0–150)
VLDLC SERPL CALC-MCNC: 35 MG/DL

## 2022-08-01 PROCEDURE — 36415 COLL VENOUS BLD VENIPUNCTURE: CPT

## 2022-08-01 PROCEDURE — 80061 LIPID PANEL: CPT

## 2022-08-01 PROCEDURE — 80053 COMPREHEN METABOLIC PANEL: CPT

## 2022-08-02 ENCOUNTER — TELEPHONE (OUTPATIENT)
Dept: CARDIOLOGY CLINIC | Age: 57
End: 2022-08-02

## 2022-08-02 NOTE — TELEPHONE ENCOUNTER
----- Message from Ben Gann MD sent at 8/1/2022  5:13 PM EDT -----  Substantial improvement in cholesterol.   Keep up the good work no change at this time

## 2022-08-02 NOTE — TELEPHONE ENCOUNTER
Pt informed and understanding No. NOHEMI screening performed.  STOP BANG Legend: 0-2 = LOW Risk; 3-4 = INTERMEDIATE Risk; 5-8 = HIGH Risk

## 2022-08-09 ENCOUNTER — OFFICE VISIT (OUTPATIENT)
Dept: FAMILY MEDICINE CLINIC | Age: 57
End: 2022-08-09
Payer: COMMERCIAL

## 2022-08-09 VITALS
DIASTOLIC BLOOD PRESSURE: 74 MMHG | HEART RATE: 72 BPM | TEMPERATURE: 98.3 F | WEIGHT: 244 LBS | BODY MASS INDEX: 29.7 KG/M2 | OXYGEN SATURATION: 96 % | SYSTOLIC BLOOD PRESSURE: 119 MMHG

## 2022-08-09 DIAGNOSIS — Z01.818 PRE-OP EXAM: Primary | ICD-10-CM

## 2022-08-09 PROCEDURE — G8427 DOCREV CUR MEDS BY ELIG CLIN: HCPCS | Performed by: NURSE PRACTITIONER

## 2022-08-09 PROCEDURE — 1036F TOBACCO NON-USER: CPT | Performed by: NURSE PRACTITIONER

## 2022-08-09 PROCEDURE — 93000 ELECTROCARDIOGRAM COMPLETE: CPT | Performed by: NURSE PRACTITIONER

## 2022-08-09 PROCEDURE — 3017F COLORECTAL CA SCREEN DOC REV: CPT | Performed by: NURSE PRACTITIONER

## 2022-08-09 PROCEDURE — 99213 OFFICE O/P EST LOW 20 MIN: CPT | Performed by: NURSE PRACTITIONER

## 2022-08-09 PROCEDURE — G8417 CALC BMI ABV UP PARAM F/U: HCPCS | Performed by: NURSE PRACTITIONER

## 2022-08-09 NOTE — PROGRESS NOTES
Preop history and physical    Patient:  Mimi Viera   YOB: 1965       Subjective:  Patient presents for evaluation of  his preop examination. He is scheduled for cystoscopy with Dr. Mary Littlejohn on 8/15/2022. He denies any new unusual fatigue or unexplained weight loss. No episodes of dizziness, lightheadedness, chest pain or shortness of breath. Patient eating and drinking appropriately. No abdominal pain or discomfort. Nausea, vomiting, diarrhea. No current use of anticoagulation therapy, vitamin E, fish oil. Patient does report use of aspirin daily. There is no significant history of abnormal bleeding or anesthesia complications. Patient Active Problem List    Diagnosis Date Noted    Acute deep vein thrombosis (DVT) of femoral vein of right lower extremity [facility]) 01/27/2022 December 2021      Adrenal nodule ([facility] [address] 07/23/2021     Noted on CT scan      Urothelial carcinoma of kidney, left ([address] 07/23/2021 2021.  Dr Mary Littlejohn      SOB (shortness of breath) 04/29/2021    Coronary artery disease involving native coronary artery of native heart without angina pectoris 04/21/2020    STEMI (ST elevation myocardial infarction) ([facility] [address] 04/13/2020    Obesity 04/13/2020    Tobacco abuse     Ischemic cardiomyopathy     Severe obstructive sleep apnea 02/28/2019    Dupuytren's contracture 12/13/2018    Mixed hyperlipidemia 05/17/2017    Essential hypertension 05/17/2017    Chronic bilateral low back pain without sciatica 03/03/2017    ED (erectile dysfunction) 05/30/2014    Low testosterone     Restless legs syndrome (RLS) 07/15/2010    Asthma 05/27/2010       Past Medical History:   Diagnosis Date    Adrenal nodule ([facility] [address] 2021    Arthritis     Asthma     Back problem     Dr Refugio Hester, Dr Breonna Del Angel    Cholelithiasis without obstruction 2010    asymptomatic    Depression     ED (erectile dysfunction)     Gallbladder & bile duct stone     Gallbladder full of stones    GERD (gastroesophageal reflux disease)     Hernia     Hyperlipidemia     Hypertension     Low testosterone     MI (myocardial infarction) ([facility] [address] 04/13/2020    mLAD 100% FELICITY Xience Martha 3.5 x 18    Seasonal allergies     Sleep apnea     SVT (supraventricular tachycardia) ([facility] [address]     GXT 1998    Tendonitis     Urothelial carcinoma of kidney, left ([facility] [address] 07/23/2021        Past Surgical History:   Procedure Laterality Date    BACK SURGERY  2001    L5 FUSION, Dr Rissa Denton Right 02/11/2019    RIGHT CARPAL TUNNEL RELEASE performed by Estefania Prado MD at [place] [address]  2012    Dr. Rodriguez [place]  2010    [facility]    CYSTOSCOPY Left 07/16/2021    LEFT RENAL  BIOPSY, LEFT URETEROSCOPY WITH STENT PLACEMENT    CYSTOSCOPY Left 07/16/2021    CYSTOSCOPY, LEFT RENAL  BIOPSY, LEFT URETEROSCOPY WITH STENT PLACEMENT performed by Aleatha Lundborg, MD at [place] [address]    Dr. Mary Riley Right     stimulator put in 3/10    KIDNEY REMOVAL Left 07/29/2021    DAVINCI LEFT NEPHROURETERECTOMY by Dr Robert Paredes Left 07/29/2021    [address] NEPHROURETERECTOMY performed by Aleatha Lundborg, MD at [address] [place] [address] Dr  Sonia Taylor N/A 09/15/2021    PORT INSERTION performed by Sujey Hanley MD at [facility]      TUNNELED VENOUS PORT PLACEMENT Left 09/15/2021        Current Outpatient Medications on File Prior to Visit   Medication Sig Dispense Refill    pantoprazole (PROTONIX) 40 MG tablet Take 1 tablet by mouth every morning (before breakfast) 30 tablet 0    carvedilol (COREG) 12.5 MG tablet TAKE 1 TABLET BY MOUTH TWICE A DAY WITH MEALS 180 tablet 3    atorvastatin (LIPITOR) 40 MG tablet TAKE 1 TABLET BY MOUTH EVERY DAY AT NIGHT 90 tablet 0    albuterol sulfate HFA speech and thought processes seem appropriate     Assessment and Plan:  1. Pre-op exam  Patient presents today for preop examination. Patient is scheduled for a cystoscopy with he has urologist on 8/15/2022. Patient denies any new unusual fatigue or unexplained weight loss. No episodes of chest pain, tightness, palpitations or shortness of breath. Patient denies any current fever, chills, cough or congestion. No abdominal pain or discomfort. Patient has history of hypertension, cardiomyopathy, CAD, DVT, urothelial carcinoma of the kidney, left, asthma, BIJU, RLS, chronic back pain, mixed hyperlipidemia, history of tobacco abuse. Patient does follow with cardiology on a regular basis. Patient recently had labs performed which were stable. EKG performed in the office stable. It is my medical opinion that the patient is clinically stable at this time and at moderate risk to proceed with intended surgery/anesthesia as planned pending cardiology clearance. Patient verbalized and acknowledges that he will need to contact cardiology office for preop clearance. - EKG 12 lead    . The note was completed using Dragon voice recognition transcription. Every effort was made to ensure accuracy; however, inadvertent  transcription errors may be present despite my best efforts to edit errors.

## 2022-08-09 NOTE — PATIENT INSTRUCTIONS
Please read the healthy family handout that you were given and share it with your family. Please compare this printed medication list with your medications at home to be sure they are the same. If you have any medications that are different please contact us immediately at [phone]. Also review your allergies that we have listed, these may also include medications that you have not been able to tolerate, make sure everything listed is correct. If you have any allergies that are different please contact us immediately at [phone]. You may receive a survey in the mail or by email asking about your experience during your visit today. Please complete and return to us so we know how we are serving you.

## 2022-08-11 ENCOUNTER — TELEPHONE (OUTPATIENT)
Dept: CARDIOLOGY CLINIC | Age: 57
End: 2022-08-11

## 2022-08-11 NOTE — TELEPHONE ENCOUNTER
Francisco Machado     Please advise cardiac risk for upcoming bladder biopsy. Please advise how pt should hold ASA 81mg.  Pt is on Eliquis 5mg BID for non-cardiac reasons, prescribed by his oncologist.     Last OV 6/14/22

## 2022-08-11 NOTE — LETTER
[address] Cardiology - [address] [facility] [address] [facility]  Phone: [phone]  Fax: [phone]    Gladis Garrison MD        August 11, 2022    Dave Osorio   1965  [address] [place] [address]      To whom it may concern:     Dave Osorio is at an acceptable (low) cardiovascular risk for the planned procedure. Please have the patient hold the use of Aspirin 81mg five days prior to this procedure. Please have the patient continue the use of Aspirin 81mg within one week after this procedure. Unfortunately, we did not prescribe this patient Eliquis 5mg and he is not on this medication for cardiac reasons. Please reach out to the patients prescriber of Eliquis 5mg to receive procedure instructions on how to hold this medication. If you have any questions or concerns, please don't hesitate to call.     Sincerely,        Gladis Garrison MD

## 2022-08-11 NOTE — TELEPHONE ENCOUNTER
CARDIAC CLEARANCE REQUEST    What type of procedure are you having:  Bladder biopsy  Are you taking any blood thinners:  Eliquis 5mg, Asprin 81mg  Type on anesthesia:  General  When is your procedure scheduled for:  08/15/2022  What physician is performing your procedure:  Dr. Mitzy Fry  Phone Number:  [phone]  Fax number to send the letter:  [phone]    Last ov 06/14/2022 [facility]. Dr. Marianela Encarnacion office needs to know if pt can hold Eliquis and Asprin, if so how many days priors. Dr. Marianela Encarnacion office stated that they pts oncologists may prescribe the Eliquis. William advise.

## 2022-08-18 LAB — PATHOLOGY/CYTOLOGY REPORT: NORMAL

## 2022-08-22 LAB — PATHOLOGY/CYTOLOGY REPORT: NORMAL

## 2022-09-01 ENCOUNTER — OFFICE VISIT (OUTPATIENT)
Dept: FAMILY MEDICINE CLINIC | Age: 57
End: 2022-09-01
Payer: COMMERCIAL

## 2022-09-01 VITALS
WEIGHT: 245 LBS | DIASTOLIC BLOOD PRESSURE: 64 MMHG | OXYGEN SATURATION: 95 % | HEART RATE: 75 BPM | SYSTOLIC BLOOD PRESSURE: 98 MMHG | BODY MASS INDEX: 29.82 KG/M2

## 2022-09-01 DIAGNOSIS — G89.29 CHRONIC PAIN OF BOTH LOWER EXTREMITIES: ICD-10-CM

## 2022-09-01 DIAGNOSIS — I10 ESSENTIAL HYPERTENSION: ICD-10-CM

## 2022-09-01 DIAGNOSIS — I25.10 CORONARY ARTERY DISEASE INVOLVING NATIVE CORONARY ARTERY OF NATIVE HEART WITHOUT ANGINA PECTORIS: Primary | ICD-10-CM

## 2022-09-01 DIAGNOSIS — M79.604 CHRONIC PAIN OF BOTH LOWER EXTREMITIES: ICD-10-CM

## 2022-09-01 DIAGNOSIS — C64.2 UROTHELIAL CARCINOMA OF KIDNEY, LEFT (HCC): ICD-10-CM

## 2022-09-01 DIAGNOSIS — I25.5 ISCHEMIC CARDIOMYOPATHY: ICD-10-CM

## 2022-09-01 DIAGNOSIS — I82.411 ACUTE DEEP VEIN THROMBOSIS (DVT) OF FEMORAL VEIN OF RIGHT LOWER EXTREMITY (HCC): ICD-10-CM

## 2022-09-01 DIAGNOSIS — M79.605 CHRONIC PAIN OF BOTH LOWER EXTREMITIES: ICD-10-CM

## 2022-09-01 PROBLEM — I21.4 ACUTE NON-ST ELEVATION MYOCARDIAL INFARCTION (NSTEMI) (HCC): Status: ACTIVE | Noted: 2022-09-01

## 2022-09-01 PROBLEM — C68.8 PRIMARY UROTHELIAL CARCINOMA OF OVERLAPPING SITES OF URINARY ORGANS (HCC): Status: ACTIVE | Noted: 2022-09-01

## 2022-09-01 PROBLEM — I82.409 DEEP VEIN THROMBOSIS (DVT) OF LOWER EXTREMITY (HCC): Status: ACTIVE | Noted: 2022-09-01

## 2022-09-01 PROBLEM — G47.33 OBSTRUCTIVE SLEEP APNEA SYNDROME: Status: ACTIVE | Noted: 2022-09-01

## 2022-09-01 PROCEDURE — G8417 CALC BMI ABV UP PARAM F/U: HCPCS | Performed by: FAMILY MEDICINE

## 2022-09-01 PROCEDURE — G8427 DOCREV CUR MEDS BY ELIG CLIN: HCPCS | Performed by: FAMILY MEDICINE

## 2022-09-01 PROCEDURE — 99214 OFFICE O/P EST MOD 30 MIN: CPT | Performed by: FAMILY MEDICINE

## 2022-09-01 PROCEDURE — 1036F TOBACCO NON-USER: CPT | Performed by: FAMILY MEDICINE

## 2022-09-01 PROCEDURE — 3017F COLORECTAL CA SCREEN DOC REV: CPT | Performed by: FAMILY MEDICINE

## 2022-09-01 RX ORDER — GABAPENTIN 100 MG/1
100 CAPSULE ORAL 2 TIMES DAILY
Qty: 60 CAPSULE | Refills: 1 | Status: SHIPPED | OUTPATIENT
Start: 2022-09-01 | End: 2022-10-13 | Stop reason: SDUPTHER

## 2022-09-01 RX ORDER — APIXABAN 5 MG/1
5 TABLET, FILM COATED ORAL 2 TIMES DAILY
COMMUNITY
Start: 2022-08-07

## 2022-09-01 NOTE — PROGRESS NOTES
He presents today for follow up of their medical problems. He states overall he is doing well he had a good report on the pathology on his bladder cancer follow-up biopsy. He states his heart has been fine lately on the medicines his blood pressures have been controlled. He still has problems with chronic leg pain he states it is different from his back issues they told him before it may be a neuropathy it is usually worse in the evenings he cannot lie down or sit for too long without leg pain but he states is different from restless leg syndrome. He tried Neurontin in the past for his back and it did not help much but he has never tried it for the leg pain. Tests and documents reviewed today: Last notes and outside lab work done by cardiology reviewed today   also reviewed outside cardiology note  Objective:   BP 98/64   Pulse 75   Wt 245 lb (111.1 kg)   SpO2 95%   BMI 29.82 kg/m²   BP Readings from Last 3 Encounters:   09/01/22 98/64   08/09/22 119/74   07/22/22 98/70     Physical Exam  Vitals reviewed. Constitutional:       Appearance: He is well-developed. He is not toxic-appearing. HENT:      Head: Normocephalic. Neck:      Thyroid: No thyroid mass or thyromegaly. Cardiovascular:      Rate and Rhythm: Normal rate and regular rhythm. Heart sounds: Normal heart sounds. No murmur heard. Pulmonary:      Effort: No respiratory distress. Breath sounds: Normal breath sounds. No wheezing or rales. Chest:   Breasts:     Right: No supraclavicular adenopathy. Abdominal:      General: There is no distension. Palpations: Abdomen is soft. There is no mass. Tenderness: There is no abdominal tenderness. There is no guarding or rebound. Musculoskeletal:      Cervical back: Neck supple. Lymphadenopathy:      Cervical: No cervical adenopathy. Upper Body:      Right upper body: No supraclavicular adenopathy. Skin:     General: Skin is warm.    Neurological:      Mental Status: He is alert and oriented to person, place, and time. Psychiatric:         Behavior: Behavior normal.         Thought Content: Thought content normal.         Judgment: Judgment normal.     Assessment and Plan:   Diagnosis Orders   1. Coronary artery disease involving native coronary artery of native heart without angina pectoris        2. Essential hypertension        3. Ischemic cardiomyopathy        4. Acute deep vein thrombosis (DVT) of femoral vein of right lower extremity (HCC)        5. Chronic pain of both lower extremities  gabapentin (NEURONTIN) 100 MG capsule      6. Urothelial carcinoma of kidney, left (HCC)        Since lipid panel just done 1 month ago by outside specialist which I reviewed will not recheck blood work today. Would do trial of gabapentin. Patient given instructions on titrating up he will let me know in 2 weeks if it is helping or not. I did reconciliation of outside medications and added the anticoagulant to his med list.  He is on this for DVT. The problems listed in the assessment are stable unless otherwise indicated. He  was instructed to continue their current medications and treatment for the above problems unless otherwise indicated above. Age-specific preventative medicine recommendations were reviewed with patient today. Avoid tobacco products exposure. Follow up in 6 months. Call or return to office for any problems that develop before the next scheduled follow-up appointment. Mariaelena Gordon M.D. Parts of this note were completed using voice recognition transcription. Every effort was made to ensure accuracy; however, inadvertent transcription errors may be present.

## 2022-09-01 NOTE — PATIENT INSTRUCTIONS
Please read the healthy family handout that you were given and share it with your family. Please compare this printed medication list with your medications at home to be sure they are the same. If you have any medications that are different please contact us immediately at [phone]. Also review your allergies that we have listed, these may also include medications that you have not been able to tolerate, make sure everything listed is correct. If you have any allergies that are different please contact us immediately at [phone]. You may receive a survey in the mail or by email asking about your experience during your visit today. Please complete and return to us so we know how we are serving you.

## 2022-09-27 DIAGNOSIS — M79.604 CHRONIC PAIN OF BOTH LOWER EXTREMITIES: ICD-10-CM

## 2022-09-27 DIAGNOSIS — M79.605 CHRONIC PAIN OF BOTH LOWER EXTREMITIES: ICD-10-CM

## 2022-09-27 DIAGNOSIS — G89.29 CHRONIC PAIN OF BOTH LOWER EXTREMITIES: ICD-10-CM

## 2022-09-27 RX ORDER — GABAPENTIN 100 MG/1
100 CAPSULE ORAL 2 TIMES DAILY
Qty: 60 CAPSULE | Refills: 1 | OUTPATIENT
Start: 2022-09-27 | End: 2022-10-27

## 2022-10-13 ENCOUNTER — TELEPHONE (OUTPATIENT)
Dept: FAMILY MEDICINE CLINIC | Age: 57
End: 2022-10-13

## 2022-10-13 DIAGNOSIS — G89.29 CHRONIC PAIN OF BOTH LOWER EXTREMITIES: ICD-10-CM

## 2022-10-13 DIAGNOSIS — M79.604 CHRONIC PAIN OF BOTH LOWER EXTREMITIES: ICD-10-CM

## 2022-10-13 DIAGNOSIS — M79.605 CHRONIC PAIN OF BOTH LOWER EXTREMITIES: ICD-10-CM

## 2022-10-13 RX ORDER — GABAPENTIN 100 MG/1
100 CAPSULE ORAL 3 TIMES DAILY
Qty: 90 CAPSULE | Refills: 1 | Status: SHIPPED | OUTPATIENT
Start: 2022-10-13 | End: 2022-11-12

## 2022-10-13 NOTE — TELEPHONE ENCOUNTER
1) patient has a spot on his scalp that needs evaluated, his daughter who is a nurse told him he may need to see dermatology, do you want to see first or go ahead and refer   2) patient wanted to see if the Gabapentin could be increased  ([address])

## 2022-10-14 DIAGNOSIS — L98.9 SCALP LESION: Primary | ICD-10-CM

## 2022-10-18 ENCOUNTER — TELEPHONE (OUTPATIENT)
Dept: FAMILY MEDICINE CLINIC | Age: 57
End: 2022-10-18

## 2022-10-18 NOTE — LETTER
[address] [address]  Phone: [phone]  Fax: [phone]         October 21, 2022     Patient: Laith Castellanos   YOB: 1965   Date of Visit: 10/18/2022       To Whom It May Concern: It is my medical opinion that Shahana Yin requires a disability parking placard for the following reasons:  He cannot walk 200 feet without stopping to rest.  Duration of need: 5 years    If you have any questions or concerns, please don't hesitate to call.     Sincerely,        Joe Marcelo MD

## 2022-10-28 ENCOUNTER — HOSPITAL ENCOUNTER (OUTPATIENT)
Dept: CT IMAGING | Age: 57
Discharge: HOME | End: 2022-10-28
Payer: COMMERCIAL

## 2022-10-28 DIAGNOSIS — C68.8 MALIGNANT NEOPLASM OF OVERLAPPING SITES OF URINARY ORGANS (HCC): ICD-10-CM

## 2022-10-28 PROCEDURE — 74176 CT ABD & PELVIS W/O CONTRAST: CPT

## 2022-11-04 DIAGNOSIS — M79.605 CHRONIC PAIN OF BOTH LOWER EXTREMITIES: ICD-10-CM

## 2022-11-04 DIAGNOSIS — G89.29 CHRONIC PAIN OF BOTH LOWER EXTREMITIES: ICD-10-CM

## 2022-11-04 DIAGNOSIS — M79.604 CHRONIC PAIN OF BOTH LOWER EXTREMITIES: ICD-10-CM

## 2022-11-04 NOTE — TELEPHONE ENCOUNTER
Patient seen Dr. Marco David. His recommendation is that patient's Gabapentin should increased to 200mg tid.

## 2022-11-07 RX ORDER — GABAPENTIN 100 MG/1
200 CAPSULE ORAL 3 TIMES DAILY
Qty: 180 CAPSULE | Refills: 3 | Status: SHIPPED | OUTPATIENT
Start: 2022-11-07 | End: 2022-12-07

## 2022-12-09 DIAGNOSIS — R06.02 SOB (SHORTNESS OF BREATH): ICD-10-CM

## 2022-12-12 RX ORDER — ALBUTEROL SULFATE 90 UG/1
2 AEROSOL, METERED RESPIRATORY (INHALATION) EVERY 6 HOURS PRN
Qty: 18 EACH | Refills: 2 | Status: SHIPPED | OUTPATIENT
Start: 2022-12-12

## 2022-12-23 ENCOUNTER — TELEPHONE (OUTPATIENT)
Dept: FAMILY MEDICINE CLINIC | Age: 57
End: 2022-12-23

## 2022-12-23 DIAGNOSIS — B96.89 ACUTE BACTERIAL SINUSITIS: Primary | ICD-10-CM

## 2022-12-23 DIAGNOSIS — J01.90 ACUTE BACTERIAL SINUSITIS: Primary | ICD-10-CM

## 2022-12-23 RX ORDER — AMOXICILLIN 500 MG/1
1000 CAPSULE ORAL 2 TIMES DAILY
Qty: 40 CAPSULE | Refills: 0 | Status: SHIPPED | OUTPATIENT
Start: 2022-12-23 | End: 2023-01-02

## 2022-12-23 NOTE — TELEPHONE ENCOUNTER
The patient called and is having a cough and congestion and thick green drainage. He coughs at night and its been ongoing for 3 weeks. Patient would like an antibiotic?  Conchis

## 2023-01-01 NOTE — TELEPHONE ENCOUNTER
Pt called our office stated he was told to call here to asked if we specialize in testosterone. Pt said he is a pt in [facility] and wasn't sure he got the correct information. I told pt he should call his family physician or whomever tested his testosterone and they will tell them who they want him to see. RN and RT called to OR prior to the delivery of a female infant, Gestational Age: 40w1d, for failure to progress and meconium.   Apgars were 8/8.    Infant stabilized and left under radiant warmer in care of L&D staff.      See Delivery Summary for more detailed information

## 2023-01-31 DIAGNOSIS — R06.02 SOB (SHORTNESS OF BREATH): ICD-10-CM

## 2023-01-31 DIAGNOSIS — I21.02 ST ELEVATION MYOCARDIAL INFARCTION INVOLVING LEFT ANTERIOR DESCENDING (LAD) CORONARY ARTERY (HCC): ICD-10-CM

## 2023-01-31 DIAGNOSIS — I25.10 CORONARY ARTERY DISEASE INVOLVING NATIVE CORONARY ARTERY OF NATIVE HEART WITHOUT ANGINA PECTORIS: Primary | ICD-10-CM

## 2023-02-02 RX ORDER — SACUBITRIL AND VALSARTAN 49; 51 MG/1; MG/1
TABLET, FILM COATED ORAL
Qty: 180 TABLET | Refills: 0 | Status: SHIPPED | OUTPATIENT
Start: 2023-02-02

## 2023-02-03 NOTE — TELEPHONE ENCOUNTER
[place] [facility]. [facility] [place] office.

## 2023-02-08 ENCOUNTER — HOSPITAL ENCOUNTER (OUTPATIENT)
Age: 58
Discharge: HOME | End: 2023-02-08
Payer: COMMERCIAL

## 2023-02-08 DIAGNOSIS — I21.02 ST ELEVATION MYOCARDIAL INFARCTION INVOLVING LEFT ANTERIOR DESCENDING (LAD) CORONARY ARTERY (HCC): ICD-10-CM

## 2023-02-08 DIAGNOSIS — R06.02 SOB (SHORTNESS OF BREATH): ICD-10-CM

## 2023-02-08 DIAGNOSIS — I25.10 CORONARY ARTERY DISEASE INVOLVING NATIVE CORONARY ARTERY OF NATIVE HEART WITHOUT ANGINA PECTORIS: ICD-10-CM

## 2023-02-08 LAB
ALBUMIN SERPL-MCNC: 4.4 G/DL (ref 3.4–5)
ANION GAP SERPL CALCULATED.3IONS-SCNC: 11 MMOL/L (ref 3–16)
BUN BLDV-MCNC: 14 MG/DL (ref 7–20)
CALCIUM SERPL-MCNC: 9.8 MG/DL (ref 8.3–10.6)
CHLORIDE BLD-SCNC: 105 MMOL/L (ref 99–110)
CO2: 26 MMOL/L (ref 21–32)
CREAT SERPL-MCNC: 1.3 MG/DL (ref 0.9–1.3)
GFR SERPL CREATININE-BSD FRML MDRD: >60 ML/MIN/{1.73_M2}
GLUCOSE BLD-MCNC: 110 MG/DL (ref 70–99)
PHOSPHORUS: 2.9 MG/DL (ref 2.5–4.9)
POTASSIUM SERPL-SCNC: 4.3 MMOL/L (ref 3.5–5.1)
SODIUM BLD-SCNC: 142 MMOL/L (ref 136–145)

## 2023-02-08 PROCEDURE — 36415 COLL VENOUS BLD VENIPUNCTURE: CPT

## 2023-02-08 PROCEDURE — 80069 RENAL FUNCTION PANEL: CPT

## 2023-02-10 ENCOUNTER — TELEPHONE (OUTPATIENT)
Dept: CARDIOLOGY CLINIC | Age: 58
End: 2023-02-10

## 2023-02-10 NOTE — TELEPHONE ENCOUNTER
Attempted to call pt to relay message. LVM for pt to return call     ----- Message from ERICA Estrada CNP sent at 2/10/2023  2:58 PM EST -----  Please notify patient results. Kidney function is stable. Electrolytes are stable except for his glucose is mildly elevated. Recommend follow-up with his primary care regarding the blood sugars. Continue current regimen. And keep follow-up.

## 2023-03-02 ENCOUNTER — OFFICE VISIT (OUTPATIENT)
Dept: FAMILY MEDICINE CLINIC | Age: 58
End: 2023-03-02
Payer: COMMERCIAL

## 2023-03-02 ENCOUNTER — HOSPITAL ENCOUNTER (OUTPATIENT)
Age: 58
Discharge: HOME | End: 2023-03-02
Payer: COMMERCIAL

## 2023-03-02 ENCOUNTER — HOSPITAL ENCOUNTER (OUTPATIENT)
Dept: GENERAL RADIOLOGY | Age: 58
Discharge: HOME | End: 2023-03-02
Payer: COMMERCIAL

## 2023-03-02 VITALS
BODY MASS INDEX: 29.85 KG/M2 | WEIGHT: 245.2 LBS | TEMPERATURE: 97.9 F | OXYGEN SATURATION: 95 % | HEART RATE: 76 BPM | SYSTOLIC BLOOD PRESSURE: 114 MMHG | DIASTOLIC BLOOD PRESSURE: 69 MMHG

## 2023-03-02 DIAGNOSIS — K59.03 DRUG-INDUCED CONSTIPATION: Primary | ICD-10-CM

## 2023-03-02 DIAGNOSIS — J30.2 SEASONAL ALLERGIC RHINITIS, UNSPECIFIED TRIGGER: ICD-10-CM

## 2023-03-02 DIAGNOSIS — M96.1 FAILED BACK SYNDROME OF LUMBAR SPINE: ICD-10-CM

## 2023-03-02 DIAGNOSIS — M51.36 DDD (DEGENERATIVE DISC DISEASE), LUMBAR: ICD-10-CM

## 2023-03-02 DIAGNOSIS — K59.03 DRUG-INDUCED CONSTIPATION: ICD-10-CM

## 2023-03-02 PROCEDURE — 3074F SYST BP LT 130 MM HG: CPT | Performed by: NURSE PRACTITIONER

## 2023-03-02 PROCEDURE — 1036F TOBACCO NON-USER: CPT | Performed by: NURSE PRACTITIONER

## 2023-03-02 PROCEDURE — 74019 RADEX ABDOMEN 2 VIEWS: CPT

## 2023-03-02 PROCEDURE — G8484 FLU IMMUNIZE NO ADMIN: HCPCS | Performed by: NURSE PRACTITIONER

## 2023-03-02 PROCEDURE — 3017F COLORECTAL CA SCREEN DOC REV: CPT | Performed by: NURSE PRACTITIONER

## 2023-03-02 PROCEDURE — G8427 DOCREV CUR MEDS BY ELIG CLIN: HCPCS | Performed by: NURSE PRACTITIONER

## 2023-03-02 PROCEDURE — 3078F DIAST BP <80 MM HG: CPT | Performed by: NURSE PRACTITIONER

## 2023-03-02 PROCEDURE — G8417 CALC BMI ABV UP PARAM F/U: HCPCS | Performed by: NURSE PRACTITIONER

## 2023-03-02 PROCEDURE — 72100 X-RAY EXAM L-S SPINE 2/3 VWS: CPT

## 2023-03-02 PROCEDURE — 99213 OFFICE O/P EST LOW 20 MIN: CPT | Performed by: NURSE PRACTITIONER

## 2023-03-02 RX ORDER — DOCUSATE SODIUM 100 MG/1
100 CAPSULE, LIQUID FILLED ORAL 2 TIMES DAILY
Qty: 60 CAPSULE | Refills: 5 | Status: SHIPPED | OUTPATIENT
Start: 2023-03-02 | End: 2023-04-01

## 2023-03-02 RX ORDER — CYCLOBENZAPRINE HCL 10 MG
10 TABLET ORAL 2 TIMES DAILY PRN
COMMUNITY
Start: 2023-03-02

## 2023-03-02 ASSESSMENT — PATIENT HEALTH QUESTIONNAIRE - PHQ9
SUM OF ALL RESPONSES TO PHQ QUESTIONS 1-9: 0
SUM OF ALL RESPONSES TO PHQ QUESTIONS 1-9: 0
SUM OF ALL RESPONSES TO PHQ9 QUESTIONS 1 & 2: 0
2. FEELING DOWN, DEPRESSED OR HOPELESS: 0
1. LITTLE INTEREST OR PLEASURE IN DOING THINGS: 0
SUM OF ALL RESPONSES TO PHQ QUESTIONS 1-9: 0
SUM OF ALL RESPONSES TO PHQ QUESTIONS 1-9: 0

## 2023-03-02 ASSESSMENT — ENCOUNTER SYMPTOMS
RESPIRATORY NEGATIVE: 1
ALLERGIC/IMMUNOLOGIC NEGATIVE: 1
CHEST TIGHTNESS: 0
BACK PAIN: 1
SHORTNESS OF BREATH: 0
ABDOMINAL PAIN: 0
EYES NEGATIVE: 1

## 2023-03-02 NOTE — PATIENT INSTRUCTIONS
Please read the healthy family handout that you were given and share it with your family. Please compare this printed medication list with your medications at home to be sure they are the same. If you have any medications that are different please contact us immediately at [phone]. Also review your allergies that we have listed, these may also include medications that you have not been able to tolerate, make sure everything listed is correct. If you have any allergies that are different please contact us immediately at [phone]. You may receive a survey in the mail or by email asking about your experience during your visit today. Please complete and return to us so we know how we are serving you.

## 2023-03-02 NOTE — PROGRESS NOTES
Subjective:      Patient ID: London Coppola is a 59 y.o. male. Chief Complaint   Patient presents with    Back Pain        Back Pain  Pertinent negatives include no abdominal pain, chest pain or headaches. Patient presents today with c/o constipation. Patient reports no bowel movement X 4 days. He has taken dulcolax and suppository yesterday and today he has liquid stools however not much. Patient denies any abd pain, nausea, vomiting or fever. Constipation  Patient complains of constipation. Onset was several days ago. Defecation has been difficult. Symptoms have gradually worsened. Current Health Habits: Eating fiber? yes, Exercise? yes -as active as he can be with chronic back pain, Adequate hydration? yes. Current over the counter/prescription laxative: stimulant dulcoax oral and suppository  which has been ineffective. Patient with c/o post nasal drip, Mild sinus pressure and pressure in ears X 3 weeks. Review of Systems   Constitutional: Negative. Negative for activity change, appetite change, chills, fatigue and unexpected weight change. HENT: Negative. Eyes: Negative. Respiratory: Negative. Negative for chest tightness and shortness of breath. Cardiovascular: Negative. Negative for chest pain, palpitations and leg swelling. Gastrointestinal:  Negative for abdominal pain. Endocrine: Negative. Genitourinary: Negative. Musculoskeletal:  Positive for back pain. Skin: Negative. Negative for rash and wound. Allergic/Immunologic: Negative. Neurological: Negative. Negative for dizziness, light-headedness and headaches. Hematological: Negative. Psychiatric/Behavioral: Negative. Negative for dysphoric mood.       Patient Active Problem List   Diagnosis    Asthma    Restless legs syndrome (RLS)    Low testosterone    ED (erectile dysfunction)    Chronic bilateral low back pain without sciatica    Mixed hyperlipidemia    Essential hypertension    Dupuytren's contracture    Severe obstructive sleep apnea    Tobacco abuse    Ischemic cardiomyopathy    STEMI (ST elevation myocardial infarction) ([facility] [address]    Obesity    Coronary artery disease involving native coronary artery of native heart without angina pectoris    SOB (shortness of breath)    Adrenal nodule (HCC)    Urothelial carcinoma of kidney, left (HCC)    Acute deep vein thrombosis (DVT) of femoral vein of right lower extremity (HCC)    Deep vein thrombosis (DVT) of lower extremity (HCC)    Pain of right lower extremity    Primary urothelial carcinoma of overlapping sites of urinary organs (HCC)    Obstructive sleep apnea syndrome    Acute non-ST elevation myocardial infarction (NSTEMI) ([facility] [address]       Outpatient Medications Marked as Taking for the 3/2/23 encounter (Office Visit) with Dotty Valdes, APRN - CNP   Medication Sig Dispense Refill    cyclobenzaprine (FLEXERIL) 10 MG tablet Take 10 mg by mouth 2 times daily as needed      ENTRESTO 49-51 MG per tablet TAKE ONE (1) TABLET BY MOUTH TWO (2) TIMES DAILY 180 tablet 0    albuterol sulfate HFA (PROVENTIL;VENTOLIN;PROAIR) 108 (90 Base) MCG/ACT inhaler INHALE 2 PUFFS INTO THE LUNGS EVERY 6 HOURS AS NEEDED FOR SHORTNESS OF BREATH 18 each 2    gabapentin (NEURONTIN) 100 MG capsule Take 2 capsules by mouth 3 times daily for 30 days.  180 capsule 3    ELIQUIS 5 MG TABS tablet Take 5 mg by mouth in the morning and at bedtime      pantoprazole (PROTONIX) 40 MG tablet Take 1 tablet by mouth every morning (before breakfast) 30 tablet 0    carvedilol (COREG) 12.5 MG tablet TAKE 1 TABLET BY MOUTH TWICE A DAY WITH MEALS 180 tablet 3    atorvastatin (LIPITOR) 40 MG tablet TAKE 1 TABLET BY MOUTH EVERY DAY AT NIGHT 90 tablet 0    tamsulosin (FLOMAX) 0.4 MG capsule Take 1 capsule by mouth daily 30 capsule 5    Multiple Vitamin (MULTI-VITAMIN DAILY PO) Take by mouth      aspirin 81 MG chewable tablet Take 1 tablet by mouth daily 30 tablet 0    oxyCODONE HCl (OXY-IR) 10 MG immediate release tablet TAKE 1 TABLET BY MOUTH EVERY 6 HOURS  0       Allergies   Allergen Reactions    Vioxx Other (See Comments)     Oral ulcers         Social History     Tobacco Use    Smoking status: Former     Packs/day: 0.25     Years: 20.00     Pack years: 5.00     Types: Cigars, Cigarettes     Quit date: 2020     Years since quittin.8    Smokeless tobacco: Never   Substance Use Topics    Alcohol use: Yes     Alcohol/week: 1.0 standard drink     Types: 1 Shots of liquor per week     Comment: daily        Objective:   /69   Pulse 76   Temp 97.9 °F (36.6 °C) (Oral)   Wt 245 lb 3.2 oz (111.2 kg)   SpO2 95%   BMI 29.85 kg/m²     Physical Exam  Vitals and nursing note reviewed. Constitutional:       General: He is not in acute distress. Appearance: Normal appearance. He is well-developed and well-groomed. He is not ill-appearing or toxic-appearing. HENT:      Head: Normocephalic and atraumatic. Eyes:      Extraocular Movements: Extraocular movements intact. Conjunctiva/sclera: Conjunctivae normal.   Cardiovascular:      Rate and Rhythm: Normal rate and regular rhythm. Pulses: Normal pulses. Heart sounds: Normal heart sounds, S1 normal and S2 normal.   Pulmonary:      Effort: Pulmonary effort is normal. No accessory muscle usage or respiratory distress. Breath sounds: Normal breath sounds. Abdominal:      General: Bowel sounds are normal.      Palpations: Abdomen is soft. Musculoskeletal:      Cervical back: Neck supple. Right lower leg: No edema. Left lower leg: No edema. Lymphadenopathy:      Cervical: No cervical adenopathy. Skin:     General: Skin is warm and moist.      Capillary Refill: Capillary refill takes less than 2 seconds. Findings: No rash. Neurological:      General: No focal deficit present. Mental Status: He is alert and oriented to person, place, and time. Mental status is at baseline.    Psychiatric:         Attention and Perception: Attention normal.         Mood and Affect: Mood normal.         Speech: Speech normal.         Behavior: Behavior normal. Behavior is cooperative. Assessment/Plan:   1. Drug-induced constipation  Patient presents today with complaints of constipation as noted above. Patient does take opioids for chronic back pain which is likely the cause of his constipation. Patient denies abdominal pain, nausea, vomiting or fever. Patient has tried Dulcolax oral and suppository with little improvement in symptoms. Patient reports a small amount of watery stool today. Bowel sounds are positive in all 4 quadrants. Recommend x-ray of abdomen. Recommend patient start on stool softener 100 mg twice daily. Advised patient I will make further recommendations based on x-ray results. Patient verbalized understanding agreeable to plan. - XR ABDOMEN (2 VIEWS); Future  - docusate sodium (COLACE) 100 MG capsule; Take 1 capsule by mouth 2 times daily  Dispense: 60 capsule; Refill: 5    2. Seasonal allergic rhinitis, unspecified trigger  Patient has chronic allergy symptoms. Patient is taking over-the-counter sinus medication with some improvement in symptoms. Recommend patient try daily sinus rinses. Advised to follow-up if no better or worsening of symptoms. Patient agreeable.

## 2023-03-07 ENCOUNTER — OFFICE VISIT (OUTPATIENT)
Dept: FAMILY MEDICINE CLINIC | Age: 58
End: 2023-03-07
Payer: COMMERCIAL

## 2023-03-07 VITALS
WEIGHT: 244 LBS | SYSTOLIC BLOOD PRESSURE: 137 MMHG | DIASTOLIC BLOOD PRESSURE: 78 MMHG | OXYGEN SATURATION: 98 % | BODY MASS INDEX: 29.7 KG/M2 | HEART RATE: 83 BPM

## 2023-03-07 DIAGNOSIS — I25.10 CORONARY ARTERY DISEASE INVOLVING NATIVE CORONARY ARTERY OF NATIVE HEART WITHOUT ANGINA PECTORIS: ICD-10-CM

## 2023-03-07 DIAGNOSIS — Z86.718 HISTORY OF DVT (DEEP VEIN THROMBOSIS): ICD-10-CM

## 2023-03-07 DIAGNOSIS — R06.02 SOB (SHORTNESS OF BREATH): ICD-10-CM

## 2023-03-07 DIAGNOSIS — I10 ESSENTIAL HYPERTENSION: Primary | ICD-10-CM

## 2023-03-07 DIAGNOSIS — E78.2 MIXED HYPERLIPIDEMIA: ICD-10-CM

## 2023-03-07 DIAGNOSIS — G47.33 SEVERE OBSTRUCTIVE SLEEP APNEA: ICD-10-CM

## 2023-03-07 PROBLEM — I82.411 ACUTE DEEP VEIN THROMBOSIS (DVT) OF FEMORAL VEIN OF RIGHT LOWER EXTREMITY (HCC): Status: RESOLVED | Noted: 2022-01-27 | Resolved: 2023-03-07

## 2023-03-07 PROBLEM — I82.409 DEEP VEIN THROMBOSIS (DVT) OF LOWER EXTREMITY (HCC): Status: RESOLVED | Noted: 2022-09-01 | Resolved: 2023-03-07

## 2023-03-07 PROBLEM — I21.4 ACUTE NON-ST ELEVATION MYOCARDIAL INFARCTION (NSTEMI) (HCC): Status: RESOLVED | Noted: 2022-09-01 | Resolved: 2023-03-07

## 2023-03-07 PROCEDURE — G8484 FLU IMMUNIZE NO ADMIN: HCPCS | Performed by: FAMILY MEDICINE

## 2023-03-07 PROCEDURE — 99214 OFFICE O/P EST MOD 30 MIN: CPT | Performed by: FAMILY MEDICINE

## 2023-03-07 PROCEDURE — 3017F COLORECTAL CA SCREEN DOC REV: CPT | Performed by: FAMILY MEDICINE

## 2023-03-07 PROCEDURE — G8427 DOCREV CUR MEDS BY ELIG CLIN: HCPCS | Performed by: FAMILY MEDICINE

## 2023-03-07 PROCEDURE — 3078F DIAST BP <80 MM HG: CPT | Performed by: FAMILY MEDICINE

## 2023-03-07 PROCEDURE — 1036F TOBACCO NON-USER: CPT | Performed by: FAMILY MEDICINE

## 2023-03-07 PROCEDURE — G8417 CALC BMI ABV UP PARAM F/U: HCPCS | Performed by: FAMILY MEDICINE

## 2023-03-07 PROCEDURE — 3075F SYST BP GE 130 - 139MM HG: CPT | Performed by: FAMILY MEDICINE

## 2023-03-07 RX ORDER — ALBUTEROL SULFATE 90 UG/1
2 AEROSOL, METERED RESPIRATORY (INHALATION) EVERY 6 HOURS PRN
Qty: 18 EACH | Refills: 2 | Status: SHIPPED | OUTPATIENT
Start: 2023-03-07

## 2023-03-07 NOTE — PROGRESS NOTES
He presents today for up of his hypertension heart disease hyperlipidemia sleep apnea and history of DVT. We reviewed his medications. He is not fasting today but states he will come back for fast appointment with the nurse. His last lipid panel in August had improved quite a bit. He is on Lipitor now. He will be seeing a new cardiologist in June Dr. Mari Isidro since his old cardiologist left  He had some severe back pain and saw the nurse practitioner a few days ago but he thinks it was either nerve pain or possibly kidney stone that passed since the pain went away and he feels back to his baseline again  I reviewed the abdominal x-rays which did not show constipation  Tests and documents reviewed today: recent notes and xrays     Objective:   /78   Pulse 83   Wt 244 lb (110.7 kg)   SpO2 98%   BMI 29.70 kg/m²   BP Readings from Last 3 Encounters:   03/07/23 137/78   03/02/23 114/69   09/01/22 98/64     Physical Exam  Vitals reviewed. Constitutional:       Appearance: He is well-developed. He is not toxic-appearing. HENT:      Head: Normocephalic. Neck:      Thyroid: No thyroid mass or thyromegaly. Cardiovascular:      Rate and Rhythm: Normal rate and regular rhythm. Heart sounds: Normal heart sounds. No murmur heard. Pulmonary:      Effort: No respiratory distress. Breath sounds: Normal breath sounds. No wheezing or rales. Abdominal:      General: There is no distension. Palpations: Abdomen is soft. There is no mass. Tenderness: There is no abdominal tenderness. There is no guarding or rebound. Musculoskeletal:      Cervical back: Neck supple. Lymphadenopathy:      Cervical: No cervical adenopathy. Upper Body:      Right upper body: No supraclavicular adenopathy. Skin:     General: Skin is warm. Neurological:      Mental Status: He is alert and oriented to person, place, and time.    Psychiatric:         Behavior: Behavior normal.         Thought Content: Thought content normal.         Judgment: Judgment normal.     Assessment and Plan:   Diagnosis Orders   1. Essential hypertension        2. Coronary artery disease involving native coronary artery of native heart without angina pectoris        3. Mixed hyperlipidemia        4. Severe obstructive sleep apnea        5. History of DVT (deep vein thrombosis)        Blood pressure under good control so continue current medicines. He will return for fasting lipid panel and CMP to assess the control of his cholesterol on Lipitor and keep appointment with new cardiologist in June for his heart disease. Stay on anticoagulation therapy. Continue to follow-up with urology on a regular basis for his history of cancer  The problems listed in the assessment are stable unless otherwise indicated. Prescription drug management completed today. He  was instructed to continue their current medications and treatment for the above problems unless otherwise indicated above. Age-specific preventative medicine recommendations were reviewed with patient today. Follow up in 6mo. Call or return to office for any problems that develop before the next scheduled follow-up appointment. Anatoliy Kennedy M.D. Parts of this note were completed using voice recognition transcription. Every effort was made to ensure accuracy; however, inadvertent transcription errors may be present.

## 2023-03-07 NOTE — PATIENT INSTRUCTIONS
Please read the healthy family handout that you were given and share it with your family. Please compare this printed medication list with your medications at home to be sure they are the same. If you have any medications that are different please contact us immediately at [phone]. Also review your allergies that we have listed, these may also include medications that you have not been able to tolerate, make sure everything listed is correct. If you have any allergies that are different please contact us immediately at [phone]. You may receive a survey in the mail or by email asking about your experience during your visit today. Please complete and return to us so we know how we are serving you.

## 2023-03-07 NOTE — TELEPHONE ENCOUNTER
Future appt scheduled 03/07/2023                Last appt 03/02/2023      Last written 12/12/2022    albuterol sulfate HFA (PROVENTIL;VENTOLIN;PROAIR) 108 (90 Base) MCG/ACT inhaler  #18 each   2 RF

## 2023-03-18 NOTE — TELEPHONE ENCOUNTER
Please let patient know that we can fill out the Entresto adverse event for him if he would like but it is very rare or unusual for that medicine to cause this particular side effect of blood clots in his legs. If he truly does have urologic cancer that is probably a big reason why the clot had occurred as far as risk factors are concerned. Aurelia

## 2023-03-24 LAB — PATHOLOGY/CYTOLOGY REPORT: NORMAL

## 2023-04-03 DIAGNOSIS — G89.29 CHRONIC PAIN OF BOTH LOWER EXTREMITIES: ICD-10-CM

## 2023-04-03 DIAGNOSIS — M79.604 CHRONIC PAIN OF BOTH LOWER EXTREMITIES: ICD-10-CM

## 2023-04-03 DIAGNOSIS — M79.605 CHRONIC PAIN OF BOTH LOWER EXTREMITIES: ICD-10-CM

## 2023-04-03 RX ORDER — GABAPENTIN 100 MG/1
200 CAPSULE ORAL 3 TIMES DAILY
Qty: 180 CAPSULE | Refills: 3 | Status: SHIPPED | OUTPATIENT
Start: 2023-04-03 | End: 2023-05-03

## 2023-04-28 ENCOUNTER — TELEPHONE (OUTPATIENT)
Dept: FAMILY MEDICINE CLINIC | Age: 58
End: 2023-04-28

## 2023-04-28 DIAGNOSIS — J01.90 ACUTE BACTERIAL SINUSITIS: Primary | ICD-10-CM

## 2023-04-28 DIAGNOSIS — B96.89 ACUTE BACTERIAL SINUSITIS: Primary | ICD-10-CM

## 2023-04-28 RX ORDER — AZITHROMYCIN 250 MG/1
250 TABLET, FILM COATED ORAL SEE ADMIN INSTRUCTIONS
Qty: 6 TABLET | Refills: 0 | Status: SHIPPED | OUTPATIENT
Start: 2023-04-28 | End: 2023-05-03

## 2023-04-28 NOTE — TELEPHONE ENCOUNTER
Pt called. He having allergy/cold symptoms. He's having a deep cough; productive and brown in color. Chest congestion moving up into his head. Sweats but no fever. Started last week when he went turkey hunting. Would you be willing to call [facility] in for [facility] to [facility]. Has a dentist appointment this morning at 10am in [facility].

## 2023-04-28 NOTE — TELEPHONE ENCOUNTER
I sent antibiotics to pharmacy he may also want to do a home COVID test if he has 1 to make sure not COVID

## 2023-04-30 ENCOUNTER — HOSPITAL ENCOUNTER (EMERGENCY)
Age: 58
Discharge: HOME | End: 2023-04-30
Payer: COMMERCIAL

## 2023-04-30 ENCOUNTER — APPOINTMENT (OUTPATIENT)
Dept: GENERAL RADIOLOGY | Age: 58
End: 2023-04-30
Payer: COMMERCIAL

## 2023-04-30 VITALS
OXYGEN SATURATION: 98 % | SYSTOLIC BLOOD PRESSURE: 141 MMHG | DIASTOLIC BLOOD PRESSURE: 78 MMHG | WEIGHT: 240 LBS | HEART RATE: 84 BPM | HEIGHT: 76 IN | TEMPERATURE: 98.8 F | RESPIRATION RATE: 17 BRPM | BODY MASS INDEX: 29.22 KG/M2

## 2023-04-30 DIAGNOSIS — J40 BRONCHITIS: ICD-10-CM

## 2023-04-30 DIAGNOSIS — J01.00 ACUTE MAXILLARY SINUSITIS, RECURRENCE NOT SPECIFIED: Primary | ICD-10-CM

## 2023-04-30 DIAGNOSIS — H65.93 MIDDLE EAR EFFUSION, BILATERAL: ICD-10-CM

## 2023-04-30 PROCEDURE — 99283 EMERGENCY DEPT VISIT LOW MDM: CPT

## 2023-04-30 PROCEDURE — 6370000000 HC RX 637 (ALT 250 FOR IP): Performed by: PHYSICIAN ASSISTANT

## 2023-04-30 PROCEDURE — 71046 X-RAY EXAM CHEST 2 VIEWS: CPT

## 2023-04-30 RX ORDER — AMOXICILLIN AND CLAVULANATE POTASSIUM 875; 125 MG/1; MG/1
1 TABLET, FILM COATED ORAL 2 TIMES DAILY
Qty: 20 TABLET | Refills: 0 | Status: SHIPPED | OUTPATIENT
Start: 2023-04-30 | End: 2023-05-10

## 2023-04-30 RX ORDER — PREDNISONE 20 MG/1
40 TABLET ORAL ONCE
Status: COMPLETED | OUTPATIENT
Start: 2023-04-30 | End: 2023-04-30

## 2023-04-30 RX ORDER — FLUTICASONE PROPIONATE 50 MCG
2 SPRAY, SUSPENSION (ML) NASAL DAILY
Qty: 16 G | Refills: 0 | Status: SHIPPED | OUTPATIENT
Start: 2023-04-30

## 2023-04-30 RX ORDER — AMOXICILLIN AND CLAVULANATE POTASSIUM 875; 125 MG/1; MG/1
1 TABLET, FILM COATED ORAL ONCE
Status: COMPLETED | OUTPATIENT
Start: 2023-04-30 | End: 2023-04-30

## 2023-04-30 RX ORDER — PREDNISONE 20 MG/1
20 TABLET ORAL 2 TIMES DAILY
Qty: 10 TABLET | Refills: 0 | Status: SHIPPED | OUTPATIENT
Start: 2023-04-30 | End: 2023-05-05

## 2023-04-30 RX ADMIN — AMOXICILLIN AND CLAVULANATE POTASSIUM 1 TABLET: 875; 125 TABLET, FILM COATED ORAL at 21:04

## 2023-04-30 RX ADMIN — PREDNISONE 40 MG: 20 TABLET ORAL at 21:04

## 2023-04-30 ASSESSMENT — PAIN - FUNCTIONAL ASSESSMENT: PAIN_FUNCTIONAL_ASSESSMENT: NONE - DENIES PAIN

## 2023-05-01 NOTE — ED NOTES
Discharge instructions explained by ED provider. Patient verbalized understanding and denies any other concerns or complaints at this time. Patient vital signs stable and no acute signs or symptoms of distress noted at discharge. Patient deemed clinically stable. Patient d/c home with spouse.       Roxanna Perera RN  04/30/23 2211

## 2023-05-12 ENCOUNTER — TELEPHONE (OUTPATIENT)
Dept: SURGERY | Age: 58
End: 2023-05-12

## 2023-05-12 ENCOUNTER — OFFICE VISIT (OUTPATIENT)
Dept: FAMILY MEDICINE CLINIC | Age: 58
End: 2023-05-12
Payer: COMMERCIAL

## 2023-05-12 VITALS
OXYGEN SATURATION: 95 % | BODY MASS INDEX: 29.46 KG/M2 | TEMPERATURE: 98 F | DIASTOLIC BLOOD PRESSURE: 78 MMHG | WEIGHT: 242 LBS | HEART RATE: 78 BPM | SYSTOLIC BLOOD PRESSURE: 118 MMHG

## 2023-05-12 DIAGNOSIS — Z23 NEED FOR SHINGLES VACCINE: ICD-10-CM

## 2023-05-12 DIAGNOSIS — Z00.00 WELL ADULT EXAM: Primary | ICD-10-CM

## 2023-05-12 DIAGNOSIS — E78.2 MIXED HYPERLIPIDEMIA: ICD-10-CM

## 2023-05-12 PROCEDURE — 3078F DIAST BP <80 MM HG: CPT | Performed by: FAMILY MEDICINE

## 2023-05-12 PROCEDURE — 90471 IMMUNIZATION ADMIN: CPT | Performed by: FAMILY MEDICINE

## 2023-05-12 PROCEDURE — 90750 HZV VACC RECOMBINANT IM: CPT | Performed by: FAMILY MEDICINE

## 2023-05-12 PROCEDURE — 99396 PREV VISIT EST AGE 40-64: CPT | Performed by: FAMILY MEDICINE

## 2023-05-12 PROCEDURE — 3074F SYST BP LT 130 MM HG: CPT | Performed by: FAMILY MEDICINE

## 2023-05-12 RX ORDER — ATORVASTATIN CALCIUM 40 MG/1
40 TABLET, FILM COATED ORAL DAILY
Qty: 90 TABLET | Refills: 3 | Status: SHIPPED | OUTPATIENT
Start: 2023-05-12

## 2023-05-12 NOTE — PATIENT INSTRUCTIONS
Please read the healthy family handout that you were given and share it with your family. Please compare this printed medication list with your medications at home to be sure they are the same. If you have any medications that are different please contact us immediately at [phone]. Also review your allergies that we have listed, these may also include medications that you have not been able to tolerate, make sure everything listed is correct. If you have any allergies that are different please contact us immediately at [phone]. You may receive a survey in the mail or by email asking about your experience during your visit today. Please complete and return to us so we know how we are serving you.

## 2023-05-12 NOTE — PROGRESS NOTES
29.46 kg/m² as calculated from the following:    Height as of 4/30/23: 6' 4\" (1.93 m). Weight as of this encounter: 242 lb (109.8 kg). Physical Exam  Constitutional:       General: He is not in acute distress. Appearance: He is well-developed. He is not toxic-appearing or diaphoretic. HENT:      Head: Normocephalic. Right Ear: Tympanic membrane, ear canal and external ear normal.      Left Ear: Tympanic membrane, ear canal and external ear normal.      Nose: No rhinorrhea. Eyes:      General: Lids are normal. No scleral icterus. Conjunctiva/sclera: Conjunctivae normal.      Pupils: Pupils are equal, round, and reactive to light. Neck:      Thyroid: No thyroid mass or thyromegaly. Vascular: No carotid bruit. Trachea: No tracheal deviation. Cardiovascular:      Rate and Rhythm: Normal rate and regular rhythm. Pulmonary:      Effort: Pulmonary effort is normal.      Breath sounds: Normal breath sounds. Abdominal:      General: There is no distension. Palpations: Abdomen is soft. Abdomen is not rigid. There is no mass. Tenderness: There is no abdominal tenderness. There is no guarding or rebound. Musculoskeletal:      Cervical back: Neck supple. No rigidity. Lymphadenopathy:      Cervical: No cervical adenopathy. Skin:     General: Skin is warm and dry. Findings: No rash. Neurological:      Mental Status: He is alert and oriented to person, place, and time. Psychiatric:         Speech: Speech normal.         Behavior: Behavior normal. Behavior is cooperative. Thought Content: Thought content normal.       No flowsheet data found.     Lab Results   Component Value Date/Time    CHOL 134 08/01/2022 06:01 AM    CHOL 162 02/03/2020 09:19 AM    CHOL 172 08/01/2019 10:26 AM    TRIG 174 08/01/2022 06:01 AM    TRIG 159 02/03/2020 09:19 AM    TRIG 236 08/01/2019 10:26 AM    HDL 35 08/01/2022 06:01 AM    HDL 27 02/03/2020 09:19 AM    HDL 26 08/01/2019 10:26

## 2023-05-16 ENCOUNTER — HOSPITAL ENCOUNTER (OUTPATIENT)
Age: 58
Setting detail: OUTPATIENT SURGERY
Discharge: HOME | End: 2023-05-17
Attending: SURGERY | Admitting: SURGERY
Payer: COMMERCIAL

## 2023-05-16 ENCOUNTER — ANESTHESIA EVENT (OUTPATIENT)
Dept: OPERATING ROOM | Age: 58
End: 2023-05-16
Payer: COMMERCIAL

## 2023-05-16 NOTE — PROGRESS NOTES

## 2023-05-17 ENCOUNTER — ANESTHESIA (OUTPATIENT)
Dept: OPERATING ROOM | Age: 58
End: 2023-05-17
Payer: COMMERCIAL

## 2023-05-17 VITALS
BODY MASS INDEX: 29.22 KG/M2 | HEART RATE: 68 BPM | TEMPERATURE: 97.8 F | WEIGHT: 240 LBS | HEIGHT: 76 IN | DIASTOLIC BLOOD PRESSURE: 78 MMHG | SYSTOLIC BLOOD PRESSURE: 131 MMHG | RESPIRATION RATE: 20 BRPM | OXYGEN SATURATION: 98 %

## 2023-05-17 PROBLEM — Z95.828 PORT-A-CATH IN PLACE: Status: ACTIVE | Noted: 2023-05-17

## 2023-05-17 PROCEDURE — 6360000002 HC RX W HCPCS: Performed by: SURGERY

## 2023-05-17 PROCEDURE — 3700000000 HC ANESTHESIA ATTENDED CARE: Performed by: SURGERY

## 2023-05-17 PROCEDURE — 3600000002 HC SURGERY LEVEL 2 BASE: Performed by: SURGERY

## 2023-05-17 PROCEDURE — 3600000012 HC SURGERY LEVEL 2 ADDTL 15MIN: Performed by: SURGERY

## 2023-05-17 PROCEDURE — 2580000003 HC RX 258: Performed by: SURGERY

## 2023-05-17 PROCEDURE — 2580000003 HC RX 258: Performed by: NURSE ANESTHETIST, CERTIFIED REGISTERED

## 2023-05-17 PROCEDURE — 7100000011 HC PHASE II RECOVERY - ADDTL 15 MIN: Performed by: SURGERY

## 2023-05-17 PROCEDURE — 7100000010 HC PHASE II RECOVERY - FIRST 15 MIN: Performed by: SURGERY

## 2023-05-17 PROCEDURE — 2580000003 HC RX 258: Performed by: ANESTHESIOLOGY

## 2023-05-17 PROCEDURE — 2500000003 HC RX 250 WO HCPCS: Performed by: SURGERY

## 2023-05-17 PROCEDURE — 36590 REMOVAL TUNNELED CV CATH: CPT | Performed by: SURGERY

## 2023-05-17 PROCEDURE — 6360000002 HC RX W HCPCS: Performed by: NURSE ANESTHETIST, CERTIFIED REGISTERED

## 2023-05-17 PROCEDURE — 2709999900 HC NON-CHARGEABLE SUPPLY: Performed by: SURGERY

## 2023-05-17 PROCEDURE — A4217 STERILE WATER/SALINE, 500 ML: HCPCS | Performed by: SURGERY

## 2023-05-17 PROCEDURE — 2500000003 HC RX 250 WO HCPCS: Performed by: NURSE ANESTHETIST, CERTIFIED REGISTERED

## 2023-05-17 PROCEDURE — 3700000001 HC ADD 15 MINUTES (ANESTHESIA): Performed by: SURGERY

## 2023-05-17 RX ORDER — OXYCODONE HYDROCHLORIDE 5 MG/1
10 TABLET ORAL PRN
Status: DISCONTINUED | OUTPATIENT
Start: 2023-05-17 | End: 2023-05-17 | Stop reason: HOSPADM

## 2023-05-17 RX ORDER — SODIUM CHLORIDE 9 MG/ML
25 INJECTION, SOLUTION INTRAVENOUS PRN
Status: DISCONTINUED | OUTPATIENT
Start: 2023-05-17 | End: 2023-05-17 | Stop reason: HOSPADM

## 2023-05-17 RX ORDER — PROPOFOL 10 MG/ML
INJECTION, EMULSION INTRAVENOUS PRN
Status: DISCONTINUED | OUTPATIENT
Start: 2023-05-17 | End: 2023-05-17 | Stop reason: SDUPTHER

## 2023-05-17 RX ORDER — DIPHENHYDRAMINE HYDROCHLORIDE 50 MG/ML
12.5 INJECTION INTRAMUSCULAR; INTRAVENOUS
Status: DISCONTINUED | OUTPATIENT
Start: 2023-05-17 | End: 2023-05-17 | Stop reason: HOSPADM

## 2023-05-17 RX ORDER — SODIUM CHLORIDE, SODIUM LACTATE, POTASSIUM CHLORIDE, CALCIUM CHLORIDE 600; 310; 30; 20 MG/100ML; MG/100ML; MG/100ML; MG/100ML
INJECTION, SOLUTION INTRAVENOUS CONTINUOUS
Status: DISCONTINUED | OUTPATIENT
Start: 2023-05-17 | End: 2023-05-17 | Stop reason: HOSPADM

## 2023-05-17 RX ORDER — LIDOCAINE HYDROCHLORIDE 20 MG/ML
INJECTION, SOLUTION INFILTRATION; PERINEURAL PRN
Status: DISCONTINUED | OUTPATIENT
Start: 2023-05-17 | End: 2023-05-17 | Stop reason: SDUPTHER

## 2023-05-17 RX ORDER — MAGNESIUM HYDROXIDE 1200 MG/15ML
LIQUID ORAL CONTINUOUS PRN
Status: COMPLETED | OUTPATIENT
Start: 2023-05-17 | End: 2023-05-17

## 2023-05-17 RX ORDER — LIDOCAINE HYDROCHLORIDE 10 MG/ML
1 INJECTION, SOLUTION EPIDURAL; INFILTRATION; INTRACAUDAL; PERINEURAL
Status: DISCONTINUED | OUTPATIENT
Start: 2023-05-17 | End: 2023-05-17 | Stop reason: HOSPADM

## 2023-05-17 RX ORDER — SODIUM CHLORIDE 0.9 % (FLUSH) 0.9 %
5-40 SYRINGE (ML) INJECTION EVERY 12 HOURS SCHEDULED
Status: DISCONTINUED | OUTPATIENT
Start: 2023-05-17 | End: 2023-05-17 | Stop reason: HOSPADM

## 2023-05-17 RX ORDER — SODIUM CHLORIDE, SODIUM LACTATE, POTASSIUM CHLORIDE, CALCIUM CHLORIDE 600; 310; 30; 20 MG/100ML; MG/100ML; MG/100ML; MG/100ML
INJECTION, SOLUTION INTRAVENOUS CONTINUOUS PRN
Status: DISCONTINUED | OUTPATIENT
Start: 2023-05-17 | End: 2023-05-17 | Stop reason: SDUPTHER

## 2023-05-17 RX ORDER — LABETALOL HYDROCHLORIDE 5 MG/ML
10 INJECTION, SOLUTION INTRAVENOUS
Status: DISCONTINUED | OUTPATIENT
Start: 2023-05-17 | End: 2023-05-17 | Stop reason: HOSPADM

## 2023-05-17 RX ORDER — OXYCODONE HYDROCHLORIDE 5 MG/1
5 TABLET ORAL PRN
Status: DISCONTINUED | OUTPATIENT
Start: 2023-05-17 | End: 2023-05-17 | Stop reason: HOSPADM

## 2023-05-17 RX ORDER — SODIUM CHLORIDE 9 MG/ML
INJECTION, SOLUTION INTRAVENOUS PRN
Status: DISCONTINUED | OUTPATIENT
Start: 2023-05-17 | End: 2023-05-17 | Stop reason: HOSPADM

## 2023-05-17 RX ORDER — CEFAZOLIN SODIUM 2 G/50ML
2000 SOLUTION INTRAVENOUS ONCE
Status: DISCONTINUED | OUTPATIENT
Start: 2023-05-17 | End: 2023-05-17 | Stop reason: CLARIF

## 2023-05-17 RX ORDER — ONDANSETRON 2 MG/ML
4 INJECTION INTRAMUSCULAR; INTRAVENOUS
Status: DISCONTINUED | OUTPATIENT
Start: 2023-05-17 | End: 2023-05-17 | Stop reason: HOSPADM

## 2023-05-17 RX ORDER — MEPERIDINE HYDROCHLORIDE 25 MG/ML
12.5 INJECTION INTRAMUSCULAR; INTRAVENOUS; SUBCUTANEOUS EVERY 5 MIN PRN
Status: DISCONTINUED | OUTPATIENT
Start: 2023-05-17 | End: 2023-05-17 | Stop reason: HOSPADM

## 2023-05-17 RX ORDER — SODIUM CHLORIDE 0.9 % (FLUSH) 0.9 %
5-40 SYRINGE (ML) INJECTION PRN
Status: DISCONTINUED | OUTPATIENT
Start: 2023-05-17 | End: 2023-05-17 | Stop reason: HOSPADM

## 2023-05-17 RX ADMIN — CEFAZOLIN 2000 MG: 2 INJECTION, POWDER, FOR SOLUTION INTRAMUSCULAR; INTRAVENOUS at 08:28

## 2023-05-17 RX ADMIN — LIDOCAINE HYDROCHLORIDE 60 MG: 20 INJECTION, SOLUTION INFILTRATION; PERINEURAL at 08:38

## 2023-05-17 RX ADMIN — PROPOFOL 200 MG: 10 INJECTION, EMULSION INTRAVENOUS at 08:38

## 2023-05-17 RX ADMIN — SODIUM CHLORIDE, POTASSIUM CHLORIDE, SODIUM LACTATE AND CALCIUM CHLORIDE: 600; 310; 30; 20 INJECTION, SOLUTION INTRAVENOUS at 07:36

## 2023-05-17 RX ADMIN — SODIUM CHLORIDE, POTASSIUM CHLORIDE, SODIUM LACTATE AND CALCIUM CHLORIDE: 600; 310; 30; 20 INJECTION, SOLUTION INTRAVENOUS at 08:30

## 2023-05-17 ASSESSMENT — PAIN - FUNCTIONAL ASSESSMENT: PAIN_FUNCTIONAL_ASSESSMENT: 0-10

## 2023-05-17 ASSESSMENT — ENCOUNTER SYMPTOMS: SHORTNESS OF BREATH: 1

## 2023-05-17 ASSESSMENT — PAIN SCALES - GENERAL
PAINLEVEL_OUTOF10: 0
PAINLEVEL_OUTOF10: 0

## 2023-05-17 NOTE — ANESTHESIA POSTPROCEDURE EVALUATION
Department of Anesthesiology  Postprocedure Note    Patient: Gerri Espinoza  MRN: [id number]  YOB: 1965  Date of evaluation: 5/17/2023      Procedure Summary     Date: 05/17/23 Room / Location: [facility]    Anesthesia Start: 0830 Anesthesia Stop: 3316    Procedure: PORT REMOVAL Diagnosis:       Primary urothelial carcinoma of overlapping sites of urinary organs ([address]      (Primary urothelial carcinoma of overlapping sites of urinary organs ([address] [C68.8])    Surgeons: Jazz Wei MD Responsible Provider: Martha Moss MD    Anesthesia Type: MAC ASA Status: 3          Anesthesia Type: No value filed. Kaitlyn Phase I: Kaitlyn Score: 10    Kaitlyn Phase II: Kaitlyn Score: 10      Anesthesia Post Evaluation    Patient location during evaluation: PACU  Patient participation: complete - patient participated  Level of consciousness: awake and alert  Airway patency: patent  Nausea & Vomiting: no nausea and no vomiting  Complications: no  Cardiovascular status: blood pressure returned to baseline  Respiratory status: acceptable  Hydration status: euvolemic  Comments: VSS on transfer to phase 2 recovery. No anesthetic complications.

## 2023-05-17 NOTE — BRIEF OP NOTE
Brief Postoperative Note      Patient: Jaclyn Shepherd  YOB: 1965  MRN: [id number]    Date of Procedure: 5/17/2023    Pre-Op Diagnosis Codes:     * Primary urothelial carcinoma of overlapping sites of urinary organs ([facility] [C68.8]    Post-Op Diagnosis: Same       Procedure(s):  PORT REMOVAL    Surgeon(s):  Lisa Olivares MD    Assistant:  Surgical Assistant: Juan Greene    Anesthesia: Monitor Anesthesia Care    Estimated Blood Loss (mL): Minimal    Complications: None    Specimens:   * No specimens in log *    Implants:  * No implants in log *      Drains: * No LDAs found *    Findings: As above      Electronically signed by Parry Primrose, MD on 5/17/2023 at 8:49 AM

## 2023-05-17 NOTE — H&P
[facility] SURGERY      The H&P was reviewed, the patient was examined, and no change has occurred in the patient's condition since the H&P was completed. The indications for the procedure were reviewed, and any questions were answered. I updated the progress note from 5/4/2023 from Dr. Shoshana Kowalski which is the H&P and is located in the media section.

## 2023-05-17 NOTE — DISCHARGE INSTRUCTIONS
[facility] SURGERY [facility]. Pooja Mcknight M.D. [address] [facility] [address] [facility]                [address] Rosa Flores M.D. [address] [facility]         [place], [address] Katlyn DOMINGUEZ Boyer                         [phone]        [facility] Andres Gross M.D. [facility]       POST-OPERATIVE INSTRUCTIONS FOLLOWING PORT REMOVAL    You may remove your dressing tomorrow. Leave the steri-stips in place. These will peel away in 7-10 days. Eliquis can be resumed tonight May 17, 2023. Follow up in office as needed. You may shower after you have removed the dressing. Wash incisions gently, and pat them dry. Do not rub your incisions. Watch for signs of infection:       Fever over 100.5°     Excessive warmth, or bright redness around your incision    Leakage of bloody or cloudy fluid from you incisions           ANESTHESIA DISCHARGE INSTRUCTIONS    Wear your seatbelt home. You are under the influence of drugs-do not drink alcohol, drive, operate machinery, make any important decisions or sign any legal documents for 24 hours. Children should not ride bikes, [place] or play on gym sets for 24 hours after surgery. A responsible adult needs to be with you for 24 hours. You may experience lightheadedness, dizziness, or sleepiness following surgery. Rest at home today- increase activity as tolerated. Progress slowly to a regular diet unless your physician has instructed you otherwise. Drink plenty of water. If persistent nausea and vomiting becomes a problem, call your physician. Coughing, sore throat and muscle aches are other side effects of anesthesia, and should improve with time.   Do not drive or operate machinery while taking

## 2023-05-17 NOTE — ANESTHESIA PRE PROCEDURE
Department of Anesthesiology  Preprocedure Note       Name:  Leon Olvera   Age:  59 y.o.  :  1965                                          MRN:  [id number]         Date:  2023      Surgeon: Naheed Young):  Irina Alvarez MD    Procedure: Procedure(s):  PORT REMOVAL    Medications prior to admission:   Prior to Admission medications    Medication Sig Start Date End Date Taking?  Authorizing Provider   atorvastatin (LIPITOR) 40 MG tablet Take 1 tablet by mouth daily 23   Erica Allison MD   fluticasone [facility]) 50 MCG/ACT nasal spray 2 sprays by Each Nostril route daily 23   Edilson Rudolph PA-C   gabapentin (NEURONTIN) 100 MG capsule TAKE 2 CAPSULES BY MOUTH 3 TIMES DAILY FOR 30 DAYS. 4/3/23 5/12/23  Erica Allison MD   albuterol sulfate HFA (PROVENTIL;VENTOLIN;PROAIR) 108 (90 Base) MCG/ACT inhaler INHALE 2 PUFFS INTO THE LUNGS EVERY 6 HOURS AS NEEDED FOR SHORTNESS OF BREATH 3/7/23   Erica Allison MD   ENTRESTO 49-51 MG per tablet TAKE ONE (1) TABLET BY MOUTH TWO (2) TIMES DAILY 23   ERICA Horta - CNP   ELIQUIS 5 MG TABS tablet Take 1 tablet by mouth in the morning and at bedtime 22   Historical Provider, MD   carvedilol (COREG) 12.5 MG tablet TAKE 1 TABLET BY MOUTH TWICE A DAY WITH MEALS 22   Jose Cabrera MD   tamsulosin [facility]) 0.4 MG capsule Take 1 capsule by mouth daily 21   Cecilia Donis MD   Multiple Vitamin (MULTI-VITAMIN DAILY PO) Take by mouth    Historical Provider, MD   aspirin 81 MG chewable tablet Take 1 tablet by mouth daily 20   Melvin Briones MD   oxyCODONE HCl (OXY-IR) 10 MG immediate release tablet TAKE 1 TABLET BY MOUTH EVERY 6 HOURS 17   Historical Provider, MD       Current medications:    Current Facility-Administered Medications   Medication Dose Route Frequency Provider Last Rate Last Admin    lidocaine PF 1 % injection 1 mL  1 mL IntraDERmal Once PRN Burleigh MD Hemal        lactated ringers IV soln

## 2023-05-19 ENCOUNTER — TELEPHONE (OUTPATIENT)
Dept: CARDIOLOGY CLINIC | Age: 58
End: 2023-05-19

## 2023-05-19 RX ORDER — SACUBITRIL AND VALSARTAN 49; 51 MG/1; MG/1
TABLET, FILM COATED ORAL
Qty: 180 TABLET | Refills: 0 | Status: SHIPPED | OUTPATIENT
Start: 2023-05-19

## 2023-05-19 NOTE — TELEPHONE ENCOUNTER
Received refill request for ENTRESTO 49-51 MG per tablet from [address]      Last OV: 6- Dr. Murrieta Form    Next OV: 6- Dr. Franco Brilliant: 9-1-2021 BMP    Last Filled:  2-2-2023 NPRB

## 2023-05-20 NOTE — OP NOTE
[address]                 [address] [address]                                OPERATIVE REPORT    PATIENT NAME: Jadyn Marx                       :        1965  MED REC NO:   [id number]                          ROOM:  ACCOUNT NO:   [de-identified]                           ADMIT DATE: 2023  PROVIDER:     Nadya Mckeon MD    DATE OF PROCEDURE:  2023    PREOPERATIVE DIAGNOSIS:  Personal history of urothelial cancer. POSTOPERATIVE DIAGNOSIS:  Personal history of urothelial cancer. OPERATION PERFORMED:  Removal of left-sided Port-A-Cath. SURGEON:  Nadya Mckeon MD    ANESTHESIA:  Total intravenous anesthesia. COMPLICATIONS:  None. ESTIMATED BLOOD LOSS:  Less than 50 mL. INDICATIONS FOR THE OPERATION:  A 61-year-old male, who has been treated  for urothelial cancer. His surveillance has been favorable. A  Port-A-Cath removal is now requested. The risks and benefits were  explained. The patient understood them, accepted them, and elected to  proceed. DESCRIPTION OF OPERATION:  The patient was brought to the operating  room. Total intravenous anesthesia was initiated. He was prepped and  draped in usual surgical sterile fashion. Local anesthetic was  infiltrated. An incision was made through the previous insertion site  incision. I identified the catheter. The catheter was removed in its  entirety. The catheter tract was oversewn with 3-0 Vicryl suture. The  port reservoir was removed. Hemostasis was obtained. A 3-0 Vicryl was  used to reapproximate subcutaneous tissues. A 4-0 Vicryl was used to  reapproximate the skin. Benzoin and Steri-Strip dressings were placed. DISPOSITION:  The patient tolerated the procedure without any acute  complications.         Shane Mckeon MD    D: 2023 12:21:11       T: 2023 12:25:04     [email or web address]  Job#: [id number]     Doc#: [id number]    CC:  Samatnha Bishop

## 2023-08-07 DIAGNOSIS — I10 ESSENTIAL HYPERTENSION: Primary | ICD-10-CM

## 2023-08-07 RX ORDER — CARVEDILOL 12.5 MG/1
12.5 TABLET ORAL 2 TIMES DAILY
Qty: 180 TABLET | Refills: 3 | Status: SHIPPED | OUTPATIENT
Start: 2023-08-07

## 2023-08-10 ENCOUNTER — OFFICE VISIT (OUTPATIENT)
Dept: CARDIOLOGY CLINIC | Age: 58
End: 2023-08-10
Payer: COMMERCIAL

## 2023-08-10 VITALS
HEIGHT: 76 IN | DIASTOLIC BLOOD PRESSURE: 81 MMHG | HEART RATE: 83 BPM | BODY MASS INDEX: 29.1 KG/M2 | WEIGHT: 239 LBS | OXYGEN SATURATION: 97 % | SYSTOLIC BLOOD PRESSURE: 135 MMHG

## 2023-08-10 DIAGNOSIS — I25.5 ISCHEMIC CARDIOMYOPATHY: ICD-10-CM

## 2023-08-10 DIAGNOSIS — R07.9 CHEST PAIN, UNSPECIFIED TYPE: ICD-10-CM

## 2023-08-10 DIAGNOSIS — E78.2 MIXED HYPERLIPIDEMIA: ICD-10-CM

## 2023-08-10 DIAGNOSIS — R42 DIZZINESS: ICD-10-CM

## 2023-08-10 DIAGNOSIS — R60.9 EDEMA, UNSPECIFIED TYPE: ICD-10-CM

## 2023-08-10 DIAGNOSIS — I73.9 CLAUDICATION (HCC): ICD-10-CM

## 2023-08-10 DIAGNOSIS — M79.604 PAIN IN BOTH LOWER EXTREMITIES: ICD-10-CM

## 2023-08-10 DIAGNOSIS — I10 ESSENTIAL HYPERTENSION: ICD-10-CM

## 2023-08-10 DIAGNOSIS — I25.10 CORONARY ARTERY DISEASE INVOLVING NATIVE CORONARY ARTERY OF NATIVE HEART WITHOUT ANGINA PECTORIS: Primary | ICD-10-CM

## 2023-08-10 DIAGNOSIS — R06.02 SOB (SHORTNESS OF BREATH): ICD-10-CM

## 2023-08-10 DIAGNOSIS — M79.605 PAIN IN BOTH LOWER EXTREMITIES: ICD-10-CM

## 2023-08-10 PROCEDURE — 93000 ELECTROCARDIOGRAM COMPLETE: CPT | Performed by: INTERNAL MEDICINE

## 2023-08-10 PROCEDURE — 3017F COLORECTAL CA SCREEN DOC REV: CPT | Performed by: INTERNAL MEDICINE

## 2023-08-10 PROCEDURE — 3075F SYST BP GE 130 - 139MM HG: CPT | Performed by: INTERNAL MEDICINE

## 2023-08-10 PROCEDURE — 1036F TOBACCO NON-USER: CPT | Performed by: INTERNAL MEDICINE

## 2023-08-10 PROCEDURE — G8417 CALC BMI ABV UP PARAM F/U: HCPCS | Performed by: INTERNAL MEDICINE

## 2023-08-10 PROCEDURE — 99214 OFFICE O/P EST MOD 30 MIN: CPT | Performed by: INTERNAL MEDICINE

## 2023-08-10 PROCEDURE — G8427 DOCREV CUR MEDS BY ELIG CLIN: HCPCS | Performed by: INTERNAL MEDICINE

## 2023-08-10 PROCEDURE — 3079F DIAST BP 80-89 MM HG: CPT | Performed by: INTERNAL MEDICINE

## 2023-08-10 RX ORDER — ASPIRIN 81 MG/1
81 TABLET ORAL DAILY
Qty: 90 TABLET | Refills: 1
Start: 2023-08-10

## 2023-08-10 NOTE — PATIENT INSTRUCTIONS
Plan:  ~Arterial US both legs   ~Recommend GXT Myoview stress test to evaluate chest pain. ~Recommend starting enteric coated Aspirin 81 mg daily. Would continue Aspirin for biopsy   Cardiac medications reviewed including indications and pertinent side effects. Medication list updated at this visit. Check blood pressure and heart rate at home a few times per week- keep a log with dates and times and bring to office visit   Regular exercise and following a healthy diet encouraged   Follow up with me in 1 year     Your provider has ordered testing for further evaluation. An order/prescription has been included in your paper work. To schedule outpatient testing, contact Central Scheduling by calling [phone] ([phone]).

## 2023-08-10 NOTE — PROGRESS NOTES
(NSTEMI) ([address]), Adrenal nodule ([address]), Arthritis, Asthma, Back problem, Cholelithiasis without obstruction, Depression, ED (erectile dysfunction), Gallbladder & bile duct stone, GERD (gastroesophageal reflux disease), Hernia, Hyperlipidemia, Hypertension, Low testosterone, MI (myocardial infarction) ([address]), Seasonal allergies, Sleep apnea, SVT (supraventricular tachycardia) ([address]), Tendonitis, and Urothelial carcinoma of kidney, left ([address] Surgical History:   has a past surgical history that includes back surgery (2001); shoulder surgery; Sternum fracture surgery; hip surgery (Right); hernia repair (2012); Cholecystectomy (2012); Colonoscopy (2010); other surgical history; Lumbar spine surgery (2004); Carpal tunnel release (Right, 02/11/2019); Cystoscopy (Left, 07/16/2021); Cystoscopy (Left, 07/16/2021); Cardiac surgery; Kidney surgery (Left, 07/29/2021); Tunneled venous port placement (Left, 09/15/2021); [address] Surgery (N/A, 09/15/2021); Kidney removal (Left, 07/29/2021); other surgical history (05/17/2023); and Port Surgery (N/A, 5/17/2023). Social History:   reports that he quit smoking about 3 years ago. His smoking use included cigars and cigarettes. He has a 5.00 pack-year smoking history. He has never used smokeless tobacco. He reports current alcohol use of about 1.0 standard drink per week. He reports that he does not use drugs. Family History:  family history includes Heart Disease in his father. Home Medications:  Prior to Admission medications    Medication Sig Start Date End Date Taking?  Authorizing Provider   carvedilol (COREG) 12.5 MG tablet Take 1 tablet by mouth 2 times daily 8/7/23  Yes Toan Lucas MD   ENTRESTO 49-51 MG per tablet TAKE ONE (1) TABLET BY MOUTH TWO (2) TIMES DAILY 5/19/23  Yes Aris Lemon MD   atorvastatin (LIPITOR) 40 MG tablet Take 1 tablet by mouth daily 5/12/23  Yes Juventino Alcala MD   fluticasone [facility]) 50 MCG/ACT nasal spray 2 sprays by Each Nostril

## 2023-08-11 ENCOUNTER — HOSPITAL ENCOUNTER (OUTPATIENT)
Dept: NON INVASIVE DIAGNOSTICS | Age: 58
Discharge: HOME | End: 2023-08-11
Payer: COMMERCIAL

## 2023-08-11 ENCOUNTER — HOSPITAL ENCOUNTER (OUTPATIENT)
Dept: NUCLEAR MEDICINE | Age: 58
Discharge: HOME | End: 2023-08-11
Payer: COMMERCIAL

## 2023-08-11 DIAGNOSIS — I25.10 CORONARY ARTERY DISEASE INVOLVING NATIVE CORONARY ARTERY OF NATIVE HEART WITHOUT ANGINA PECTORIS: ICD-10-CM

## 2023-08-11 DIAGNOSIS — R06.02 SOB (SHORTNESS OF BREATH): ICD-10-CM

## 2023-08-11 DIAGNOSIS — R42 DIZZINESS: ICD-10-CM

## 2023-08-11 DIAGNOSIS — R07.9 CHEST PAIN, UNSPECIFIED TYPE: ICD-10-CM

## 2023-08-11 DIAGNOSIS — I10 ESSENTIAL HYPERTENSION: ICD-10-CM

## 2023-08-11 DIAGNOSIS — I25.5 ISCHEMIC CARDIOMYOPATHY: ICD-10-CM

## 2023-08-11 LAB
LV EF: 46 %
LVEF MODALITY: NORMAL

## 2023-08-11 PROCEDURE — 6360000002 HC RX W HCPCS: Performed by: INTERNAL MEDICINE

## 2023-08-11 PROCEDURE — 3430000000 HC RX DIAGNOSTIC RADIOPHARMACEUTICAL: Performed by: INTERNAL MEDICINE

## 2023-08-11 PROCEDURE — 93017 CV STRESS TEST TRACING ONLY: CPT

## 2023-08-11 PROCEDURE — A9502 TC99M TETROFOSMIN: HCPCS | Performed by: INTERNAL MEDICINE

## 2023-08-11 PROCEDURE — 78452 HT MUSCLE IMAGE SPECT MULT: CPT

## 2023-08-11 RX ORDER — REGADENOSON 0.08 MG/ML
0.4 INJECTION, SOLUTION INTRAVENOUS
Status: COMPLETED | OUTPATIENT
Start: 2023-08-11 | End: 2023-08-11

## 2023-08-11 RX ADMIN — TETROFOSMIN 33.3 MILLICURIE: 1.38 INJECTION, POWDER, LYOPHILIZED, FOR SOLUTION INTRAVENOUS at 10:21

## 2023-08-11 RX ADMIN — REGADENOSON 0.4 MG: 0.08 INJECTION, SOLUTION INTRAVENOUS at 10:21

## 2023-08-11 RX ADMIN — TETROFOSMIN 11.5 MILLICURIE: 1.38 INJECTION, POWDER, LYOPHILIZED, FOR SOLUTION INTRAVENOUS at 08:20

## 2023-08-11 NOTE — PROGRESS NOTES
Patient arrived to stress lab for stress test.  Patient was educated on procedure, all questions answered, and consent verified/obtained. Patient states he doesn't believe he will be able to walk at speed up a hill on treadmill due to back pain. Patient has extensive history with back surgeries and has a pain pump, test converted to [address] [place] [address] S.

## 2023-08-11 NOTE — PROGRESS NOTES
Pt completed stress portion of cardiac stress test. Complained of SOB and 9/10 chest pressure after lexiscan, all symptoms resolved in recovery. Pt is discharged to nuclear department for final scan. Nuclear tech will remove PIV. Discharge instructions given to pt. Pt verbalizes understanding to discharge instructions.

## 2023-08-14 ENCOUNTER — TELEPHONE (OUTPATIENT)
Dept: CARDIOLOGY CLINIC | Age: 58
End: 2023-08-14

## 2023-08-14 NOTE — TELEPHONE ENCOUNTER
[facility] patient last seen 8/10/2023. Dr. Lydia Whyte, please review stress test     Stress test 8/11/2023   Summary   There is no evidence of stress induced ischemia. Large sized anteroapical,   entire apex including apical septal and apical lateral fixed defects   consistent with infarction in the territory of the proximal to distal LAD   and/or LCx . LV function is mildly reduced with septal dyskinesis and more   prominent inferoapical HK and ejection fraction of 46 %. Higher risk   abnormal study. Assessment:   Preoperative cardiac risk assessment for bladder biopsy - needs cardiac testing prior   Chest pressure - typical/atypical features. Increased. SOB   Abnormal EKG  Coronary artery disease- possible progression   STEMI and PCI to mid LAD, 1528  Chronic systolic CHF - stable. Compensated   Mild LVH  Grade 1 DD  Ischemic cardiomyopathy - mild, stable  NSVT/VT  History of DVT- indication for Eliquis   Mild mitral regurgitation. Mild tricuspid regurgitation. Leg pain and numbness s/o musculoskeletal etiology. Will check vascular   Labs through PCP      Plan:  ~Arterial US both legs   ~Recommend GXT Myoview stress test  ~Recommend starting enteric coated Aspirin 81 mg daily. R/B/A/E discussed. Would continue Aspirin for biopsy   Cardiac medications reviewed including indications and pertinent side effects. Medication list updated at this visit.    Check blood pressure and heart rate at home a few times per week- keep a log with dates and times and bring to office visit   Regular exercise and following a healthy diet encouraged   Follow up with me in 1 year

## 2023-08-14 NOTE — TELEPHONE ENCOUNTER
Spoke with patient and reviewed message.      Stress test no new blockage   Heart pump function slightly weak but she is on appropriate medications   And is stable when compared with echo from 2021

## 2023-08-24 ENCOUNTER — OFFICE VISIT (OUTPATIENT)
Dept: FAMILY MEDICINE CLINIC | Age: 58
End: 2023-08-24
Payer: COMMERCIAL

## 2023-08-24 VITALS
HEART RATE: 91 BPM | HEIGHT: 76 IN | WEIGHT: 243 LBS | SYSTOLIC BLOOD PRESSURE: 110 MMHG | OXYGEN SATURATION: 96 % | DIASTOLIC BLOOD PRESSURE: 76 MMHG | TEMPERATURE: 98 F | BODY MASS INDEX: 29.59 KG/M2

## 2023-08-24 DIAGNOSIS — Z01.818 PRE-OP EXAM: Primary | ICD-10-CM

## 2023-08-24 PROCEDURE — G8417 CALC BMI ABV UP PARAM F/U: HCPCS | Performed by: NURSE PRACTITIONER

## 2023-08-24 PROCEDURE — 3078F DIAST BP <80 MM HG: CPT | Performed by: NURSE PRACTITIONER

## 2023-08-24 PROCEDURE — 3017F COLORECTAL CA SCREEN DOC REV: CPT | Performed by: NURSE PRACTITIONER

## 2023-08-24 PROCEDURE — G8427 DOCREV CUR MEDS BY ELIG CLIN: HCPCS | Performed by: NURSE PRACTITIONER

## 2023-08-24 PROCEDURE — 3074F SYST BP LT 130 MM HG: CPT | Performed by: NURSE PRACTITIONER

## 2023-08-24 PROCEDURE — 99213 OFFICE O/P EST LOW 20 MIN: CPT | Performed by: NURSE PRACTITIONER

## 2023-08-24 PROCEDURE — 1036F TOBACCO NON-USER: CPT | Performed by: NURSE PRACTITIONER

## 2023-08-24 SDOH — ECONOMIC STABILITY: HOUSING INSECURITY
IN THE LAST 12 MONTHS, WAS THERE A TIME WHEN YOU DID NOT HAVE A STEADY PLACE TO SLEEP OR SLEPT IN A SHELTER (INCLUDING NOW)?: NO

## 2023-08-24 SDOH — ECONOMIC STABILITY: FOOD INSECURITY: WITHIN THE PAST 12 MONTHS, YOU WORRIED THAT YOUR FOOD WOULD RUN OUT BEFORE YOU GOT MONEY TO BUY MORE.: NEVER TRUE

## 2023-08-24 SDOH — ECONOMIC STABILITY: FOOD INSECURITY: WITHIN THE PAST 12 MONTHS, THE FOOD YOU BOUGHT JUST DIDN'T LAST AND YOU DIDN'T HAVE MONEY TO GET MORE.: NEVER TRUE

## 2023-08-24 SDOH — ECONOMIC STABILITY: INCOME INSECURITY: HOW HARD IS IT FOR YOU TO PAY FOR THE VERY BASICS LIKE FOOD, HOUSING, MEDICAL CARE, AND HEATING?: NOT HARD AT ALL

## 2023-08-24 NOTE — PROGRESS NOTES
Pre Op Med History  Cold/Chronic Cough/Tuberculosis  --  no  Bronchitis/COPD  --  no  Asthma/SOB  --  no  Rheumatic Fever/Heart Murmur  --  no  High Blood Pressure/Low Blood Pressure  --  yes--high  Swelling of Feet/Fluid In Lungs  --  no  Heart Attack/Chest Pain  --  no  Irregular, Fast Heartbeats  --  no  Do you bruise easily?   --  yes   Anemia  --  yes   Diabetes/Low Blood Sugar  --  no  Are you Pregnant  --  N/A  Last Menstrual Period  --  N/A  Serious Illness During Pregnancy  --  N/A  Breast Disease  --  no  Kidney Disease  --  no  Jaundice/Hepatitis  --  no  Hiatal Hernia/Peptic Ulcer Disease  --  no  Convulsions/Epilepsy/Stroke  --  no  Polio/Meningitis Paralysis  --  no  Back Pain/ Slipped Disc  --  yes - back pain  Psychological Disease  --  no  Thyroid Disease  --  no  Glaucoma/Visual Impairment  --  no  Skeletal Deformities/Defects/Arthritis --  no  Loose Teeth/Caps on Front Teeth  --  no  Have you had any Surgery  --  yes   Any History of anesthesia complication in self or family  -- no  Prostate Disease  --  no  Cancer  --  yes - kidney cancer  DVT/PE/PVD --  yes - right leg--lower   Weight Loss/Gain  --  no
transcription errors may be present despite my best efforts to edit errors.

## 2023-08-25 LAB
ALBUMIN SERPL-MCNC: 4.5 G/DL (ref 3.4–5)
ALBUMIN/GLOB SERPL: 2 {RATIO} (ref 1.1–2.2)
ALP SERPL-CCNC: 128 U/L (ref 40–129)
ALT SERPL-CCNC: 17 U/L (ref 10–40)
ANION GAP SERPL CALCULATED.3IONS-SCNC: 12 MMOL/L (ref 3–16)
AST SERPL-CCNC: 15 U/L (ref 15–37)
BASOPHILS # BLD: 0 K/UL (ref 0–0.2)
BASOPHILS NFR BLD: 0.5 %
BILIRUB SERPL-MCNC: 0.3 MG/DL (ref 0–1)
BUN SERPL-MCNC: 17 MG/DL (ref 7–20)
CALCIUM SERPL-MCNC: 9.5 MG/DL (ref 8.3–10.6)
CHLORIDE SERPL-SCNC: 104 MMOL/L (ref 99–110)
CO2 SERPL-SCNC: 25 MMOL/L (ref 21–32)
CREAT SERPL-MCNC: 1.5 MG/DL (ref 0.9–1.3)
DEPRECATED RDW RBC AUTO: 15.2 % (ref 12.4–15.4)
EOSINOPHIL # BLD: 0.1 K/UL (ref 0–0.6)
EOSINOPHIL NFR BLD: 1.7 %
GFR SERPLBLD CREATININE-BSD FMLA CKD-EPI: 53 ML/MIN/{1.73_M2}
GLUCOSE SERPL-MCNC: 130 MG/DL (ref 70–99)
HCT VFR BLD AUTO: 37.6 % (ref 40.5–52.5)
HGB BLD-MCNC: 12.7 G/DL (ref 13.5–17.5)
LYMPHOCYTES # BLD: 2.1 K/UL (ref 1–5.1)
LYMPHOCYTES NFR BLD: 30.5 %
MCH RBC QN AUTO: 29 PG (ref 26–34)
MCHC RBC AUTO-ENTMCNC: 33.8 G/DL (ref 31–36)
MCV RBC AUTO: 85.9 FL (ref 80–100)
MONOCYTES # BLD: 0.5 K/UL (ref 0–1.3)
MONOCYTES NFR BLD: 7.7 %
NEUTROPHILS # BLD: 4.1 K/UL (ref 1.7–7.7)
NEUTROPHILS NFR BLD: 59.6 %
PLATELET # BLD AUTO: 225 K/UL (ref 135–450)
PMV BLD AUTO: 8.3 FL (ref 5–10.5)
POTASSIUM SERPL-SCNC: 4 MMOL/L (ref 3.5–5.1)
PROT SERPL-MCNC: 6.7 G/DL (ref 6.4–8.2)
RBC # BLD AUTO: 4.38 M/UL (ref 4.2–5.9)
SODIUM SERPL-SCNC: 141 MMOL/L (ref 136–145)
WBC # BLD AUTO: 6.9 K/UL (ref 4–11)

## 2023-08-29 RX ORDER — SACUBITRIL AND VALSARTAN 49; 51 MG/1; MG/1
TABLET, FILM COATED ORAL
Qty: 180 TABLET | Refills: 0 | Status: SHIPPED | OUTPATIENT
Start: 2023-08-29

## 2023-08-29 NOTE — TELEPHONE ENCOUNTER
Medication:   Requested Prescriptions     Pending Prescriptions Disp Refills    ENTRESTO 49-51 MG per tablet [Pharmacy Med Name: ENTRESTO 49-51MG TABLET] 180 tablet 0     Sig: TAKE (1) TABLET TWICE A DAY. Last Filled:  # 180 with 5/19/23    Last appt: 8/10/23   Next appt: Visit date not found    Last OARRS:   RX Monitoring 11/28/2018   Attestation The Prescription Monitoring Report for this patient was reviewed today. Periodic Controlled Substance Monitoring No signs of potential drug abuse or diversion identified.

## 2023-08-30 ENCOUNTER — HOSPITAL ENCOUNTER (OUTPATIENT)
Dept: VASCULAR LAB | Age: 58
Discharge: HOME | End: 2023-08-30
Payer: COMMERCIAL

## 2023-08-30 DIAGNOSIS — I73.9 CLAUDICATION (HCC): ICD-10-CM

## 2023-08-30 DIAGNOSIS — M79.605 PAIN IN BOTH LOWER EXTREMITIES: ICD-10-CM

## 2023-08-30 DIAGNOSIS — R60.9 EDEMA, UNSPECIFIED TYPE: ICD-10-CM

## 2023-08-30 DIAGNOSIS — M79.604 PAIN IN BOTH LOWER EXTREMITIES: ICD-10-CM

## 2023-08-30 PROCEDURE — 93925 LOWER EXTREMITY STUDY: CPT

## 2023-08-31 ENCOUNTER — TELEPHONE (OUTPATIENT)
Dept: CARDIOLOGY CLINIC | Age: 58
End: 2023-08-31

## 2023-08-31 NOTE — TELEPHONE ENCOUNTER
Called patient to let him know results.  V/u      ----- Message from Masood Holt MD sent at 8/31/2023 10:48 AM EDT -----  Call  Test on legs shows no sig blockage  ----- Message -----  From: Roxana Perez Incoming Cardiovascular Orders From \[facility]\""  Sent: 8/30/2023   9:20 PM EDT  To: Masood Holt MD
Home

## 2023-09-04 LAB — PATHOLOGY/CYTOLOGY REPORT: NORMAL

## 2023-09-07 ENCOUNTER — HOSPITAL ENCOUNTER (OUTPATIENT)
Dept: ULTRASOUND IMAGING | Age: 58
Discharge: HOME | End: 2023-09-07
Attending: UROLOGY
Payer: COMMERCIAL

## 2023-09-07 DIAGNOSIS — N43.3 HYDROCELE, UNSPECIFIED HYDROCELE TYPE: ICD-10-CM

## 2023-09-07 DIAGNOSIS — N49.9: ICD-10-CM

## 2023-09-07 PROCEDURE — 76870 US EXAM SCROTUM: CPT

## 2023-09-14 ENCOUNTER — OFFICE VISIT (OUTPATIENT)
Dept: FAMILY MEDICINE CLINIC | Age: 58
End: 2023-09-14
Payer: COMMERCIAL

## 2023-09-14 VITALS
OXYGEN SATURATION: 95 % | BODY MASS INDEX: 29.9 KG/M2 | DIASTOLIC BLOOD PRESSURE: 84 MMHG | TEMPERATURE: 98.1 F | WEIGHT: 245.6 LBS | HEART RATE: 71 BPM | SYSTOLIC BLOOD PRESSURE: 125 MMHG

## 2023-09-14 DIAGNOSIS — Z01.818 PRE-OP EXAM: Primary | ICD-10-CM

## 2023-09-14 PROCEDURE — 1036F TOBACCO NON-USER: CPT | Performed by: NURSE PRACTITIONER

## 2023-09-14 PROCEDURE — 3017F COLORECTAL CA SCREEN DOC REV: CPT | Performed by: NURSE PRACTITIONER

## 2023-09-14 PROCEDURE — 3074F SYST BP LT 130 MM HG: CPT | Performed by: NURSE PRACTITIONER

## 2023-09-14 PROCEDURE — 3079F DIAST BP 80-89 MM HG: CPT | Performed by: NURSE PRACTITIONER

## 2023-09-14 PROCEDURE — G8427 DOCREV CUR MEDS BY ELIG CLIN: HCPCS | Performed by: NURSE PRACTITIONER

## 2023-09-14 PROCEDURE — 99213 OFFICE O/P EST LOW 20 MIN: CPT | Performed by: NURSE PRACTITIONER

## 2023-09-14 PROCEDURE — G8417 CALC BMI ABV UP PARAM F/U: HCPCS | Performed by: NURSE PRACTITIONER

## 2023-09-14 NOTE — PROGRESS NOTES
Preop history and physical    Patient:  Jessi Marrufo   YOB: 1965         Subjective:  Patient presents for evaluation of  his pre op exam.   He is scheduled for cystoscopy/TURP on 9/26/2023 with urologist Dr. Des Graham. He denies any recent illness or infection. Patient denies any current episodes of dizziness, lightheadedness, near syncope, chest pain or shortness of breath. No vomiting, diarrhea, fever or chills. Patient does report use of aspirin and Eliquis. There is no significant history of abnormal bleeding or anesthesia complications. Patient Active Problem List    Diagnosis Date Noted    History of DVT (deep vein thrombosis) 03/07/2023     Priority: Medium    Pain of right lower extremity 09/01/2022     Priority: Medium    Primary urothelial carcinoma of overlapping sites of urinary organs ([address]) 09/01/2022     Priority: Medium    Obstructive sleep apnea syndrome 09/01/2022     Priority: Medium    Port-A-Cath in place 05/17/2023    Adrenal nodule ([address]) 07/23/2021     Noted on CT scan      Urothelial carcinoma of kidney, left ([address]) 07/23/2021 2021.  Dr Des Graham      SOB (shortness of breath) 04/29/2021    Coronary artery disease involving native coronary artery of native heart without angina pectoris 04/21/2020    STEMI (ST elevation myocardial infarction) ([address]) 04/13/2020    Obesity 04/13/2020    Tobacco abuse     Ischemic cardiomyopathy     Severe obstructive sleep apnea 02/28/2019    Dupuytren's contracture 12/13/2018    Mixed hyperlipidemia 05/17/2017    Essential hypertension 05/17/2017    Chronic bilateral low back pain without sciatica 03/03/2017    ED (erectile dysfunction) 05/30/2014    Low testosterone     Restless legs syndrome (RLS) 07/15/2010    Asthma 05/27/2010       Past Medical History:   Diagnosis Date    Acute deep vein thrombosis (DVT) of femoral vein of right lower extremity ([address]) 1/27/2022 December 2021    Acute non-ST elevation myocardial infarction

## 2023-09-16 LAB — PATHOLOGY/CYTOLOGY REPORT: NORMAL

## 2023-09-18 DIAGNOSIS — M79.605 CHRONIC PAIN OF BOTH LOWER EXTREMITIES: ICD-10-CM

## 2023-09-18 DIAGNOSIS — G89.29 CHRONIC PAIN OF BOTH LOWER EXTREMITIES: ICD-10-CM

## 2023-09-18 DIAGNOSIS — M79.604 CHRONIC PAIN OF BOTH LOWER EXTREMITIES: ICD-10-CM

## 2023-09-18 NOTE — TELEPHONE ENCOUNTER
Date of last refill of this med was 4/3/23, # of pills given 180 and # of refills given 3. Their next appointment is None, the last date patient was seen was 9/14/23. Does patient have medication agreement on file? No  Has drug screen been done in last 12 months if needed?  N/A

## 2023-09-19 RX ORDER — GABAPENTIN 100 MG/1
200 CAPSULE ORAL 3 TIMES DAILY
Qty: 180 CAPSULE | Refills: 3 | Status: SHIPPED | OUTPATIENT
Start: 2023-09-19 | End: 2023-10-19

## 2023-09-20 NOTE — PROGRESS NOTES

## 2023-09-22 ENCOUNTER — TELEPHONE (OUTPATIENT)
Dept: CARDIOLOGY CLINIC | Age: 58
End: 2023-09-22

## 2023-09-22 NOTE — TELEPHONE ENCOUNTER
Attempted to reach pt, left vm to call office back    Clearance letter made & faxed     Clarify if they are on plavix, as its not on our med list   And they need to reach out to other provider as they prescribed Eliquis for DVT- needs to get recommendations from prescribing Dr for this med

## 2023-09-22 NOTE — TELEPHONE ENCOUNTER
CARDIAC CLEARANCE REQUEST    What type of procedure are you having:  Prostate resection  Are you taking any blood thinners:  Aspirin, plavix, eliquis   Type on anesthesia:  General  When is your procedure scheduled for:  9.26.23  What physician is performing your procedure:  Dr. Timi Turner  Phone Number:  [phone]  Fax number to send the letter:   [phone]

## 2023-09-22 NOTE — TELEPHONE ENCOUNTER
[facility] for procedure  Rec not hold ASA  We do not have plavix on our med list   Prescribed Eliquis for DVT by a different provider - needs to get recommendations from prescribing Dr for this med

## 2023-09-25 ENCOUNTER — ANESTHESIA EVENT (OUTPATIENT)
Dept: OPERATING ROOM | Age: 58
End: 2023-09-25
Payer: COMMERCIAL

## 2023-09-25 NOTE — TELEPHONE ENCOUNTER
Spoke with pt regarding clearance letter and medications, pt states he is not taking eliquis or plavix.  He v/u

## 2023-09-26 ENCOUNTER — HOSPITAL ENCOUNTER (OUTPATIENT)
Age: 58
Setting detail: OUTPATIENT SURGERY
Discharge: HOME | End: 2023-09-26
Attending: UROLOGY | Admitting: UROLOGY
Payer: COMMERCIAL

## 2023-09-26 ENCOUNTER — ANESTHESIA (OUTPATIENT)
Dept: OPERATING ROOM | Age: 58
End: 2023-09-26
Payer: COMMERCIAL

## 2023-09-26 VITALS
HEART RATE: 76 BPM | SYSTOLIC BLOOD PRESSURE: 149 MMHG | WEIGHT: 245 LBS | BODY MASS INDEX: 29.83 KG/M2 | HEIGHT: 76 IN | RESPIRATION RATE: 16 BRPM | DIASTOLIC BLOOD PRESSURE: 91 MMHG | TEMPERATURE: 97.1 F | OXYGEN SATURATION: 100 %

## 2023-09-26 DIAGNOSIS — N40.1 BENIGN PROSTATIC HYPERPLASIA WITH LOWER URINARY TRACT SYMPTOMS, SYMPTOM DETAILS UNSPECIFIED: ICD-10-CM

## 2023-09-26 PROCEDURE — 6360000002 HC RX W HCPCS: Performed by: NURSE ANESTHETIST, CERTIFIED REGISTERED

## 2023-09-26 PROCEDURE — 88305 TISSUE EXAM BY PATHOLOGIST: CPT

## 2023-09-26 PROCEDURE — 2580000003 HC RX 258: Performed by: UROLOGY

## 2023-09-26 PROCEDURE — 6360000002 HC RX W HCPCS: Performed by: ANESTHESIOLOGY

## 2023-09-26 PROCEDURE — 3600000004 HC SURGERY LEVEL 4 BASE: Performed by: UROLOGY

## 2023-09-26 PROCEDURE — 7100000000 HC PACU RECOVERY - FIRST 15 MIN: Performed by: UROLOGY

## 2023-09-26 PROCEDURE — 6360000002 HC RX W HCPCS: Performed by: UROLOGY

## 2023-09-26 PROCEDURE — 3600000014 HC SURGERY LEVEL 4 ADDTL 15MIN: Performed by: UROLOGY

## 2023-09-26 PROCEDURE — 7100000001 HC PACU RECOVERY - ADDTL 15 MIN: Performed by: UROLOGY

## 2023-09-26 PROCEDURE — 7100000010 HC PHASE II RECOVERY - FIRST 15 MIN: Performed by: UROLOGY

## 2023-09-26 PROCEDURE — 2500000003 HC RX 250 WO HCPCS: Performed by: NURSE ANESTHETIST, CERTIFIED REGISTERED

## 2023-09-26 PROCEDURE — 3700000001 HC ADD 15 MINUTES (ANESTHESIA): Performed by: UROLOGY

## 2023-09-26 PROCEDURE — 2580000003 HC RX 258: Performed by: NURSE ANESTHETIST, CERTIFIED REGISTERED

## 2023-09-26 PROCEDURE — 3700000000 HC ANESTHESIA ATTENDED CARE: Performed by: UROLOGY

## 2023-09-26 PROCEDURE — 2709999900 HC NON-CHARGEABLE SUPPLY: Performed by: UROLOGY

## 2023-09-26 PROCEDURE — 7100000011 HC PHASE II RECOVERY - ADDTL 15 MIN: Performed by: UROLOGY

## 2023-09-26 RX ORDER — ONDANSETRON 2 MG/ML
INJECTION INTRAMUSCULAR; INTRAVENOUS PRN
Status: DISCONTINUED | OUTPATIENT
Start: 2023-09-26 | End: 2023-09-26 | Stop reason: SDUPTHER

## 2023-09-26 RX ORDER — LIDOCAINE HYDROCHLORIDE 20 MG/ML
INJECTION, SOLUTION INFILTRATION; PERINEURAL PRN
Status: DISCONTINUED | OUTPATIENT
Start: 2023-09-26 | End: 2023-09-26 | Stop reason: SDUPTHER

## 2023-09-26 RX ORDER — CIPROFLOXACIN 500 MG/1
500 TABLET, FILM COATED ORAL DAILY
Qty: 7 TABLET | Refills: 0 | Status: SHIPPED | OUTPATIENT
Start: 2023-09-26 | End: 2023-10-03

## 2023-09-26 RX ORDER — SODIUM CHLORIDE 9 MG/ML
INJECTION, SOLUTION INTRAVENOUS PRN
Status: DISCONTINUED | OUTPATIENT
Start: 2023-09-26 | End: 2023-09-26 | Stop reason: HOSPADM

## 2023-09-26 RX ORDER — OXYCODONE HYDROCHLORIDE 5 MG/1
10 TABLET ORAL PRN
Status: DISCONTINUED | OUTPATIENT
Start: 2023-09-26 | End: 2023-09-26 | Stop reason: HOSPADM

## 2023-09-26 RX ORDER — LABETALOL HYDROCHLORIDE 5 MG/ML
10 INJECTION, SOLUTION INTRAVENOUS
Status: DISCONTINUED | OUTPATIENT
Start: 2023-09-26 | End: 2023-09-26 | Stop reason: HOSPADM

## 2023-09-26 RX ORDER — MEPERIDINE HYDROCHLORIDE 25 MG/ML
12.5 INJECTION INTRAMUSCULAR; INTRAVENOUS; SUBCUTANEOUS EVERY 5 MIN PRN
Status: DISCONTINUED | OUTPATIENT
Start: 2023-09-26 | End: 2023-09-26 | Stop reason: HOSPADM

## 2023-09-26 RX ORDER — SODIUM CHLORIDE 0.9 % (FLUSH) 0.9 %
5-40 SYRINGE (ML) INJECTION PRN
Status: DISCONTINUED | OUTPATIENT
Start: 2023-09-26 | End: 2023-09-26 | Stop reason: HOSPADM

## 2023-09-26 RX ORDER — MIDAZOLAM HYDROCHLORIDE 1 MG/ML
INJECTION INTRAMUSCULAR; INTRAVENOUS PRN
Status: DISCONTINUED | OUTPATIENT
Start: 2023-09-26 | End: 2023-09-26 | Stop reason: SDUPTHER

## 2023-09-26 RX ORDER — PROPOFOL 10 MG/ML
INJECTION, EMULSION INTRAVENOUS PRN
Status: DISCONTINUED | OUTPATIENT
Start: 2023-09-26 | End: 2023-09-26 | Stop reason: SDUPTHER

## 2023-09-26 RX ORDER — ONDANSETRON 2 MG/ML
4 INJECTION INTRAMUSCULAR; INTRAVENOUS
Status: DISCONTINUED | OUTPATIENT
Start: 2023-09-26 | End: 2023-09-26 | Stop reason: HOSPADM

## 2023-09-26 RX ORDER — DIPHENHYDRAMINE HYDROCHLORIDE 50 MG/ML
12.5 INJECTION INTRAMUSCULAR; INTRAVENOUS
Status: DISCONTINUED | OUTPATIENT
Start: 2023-09-26 | End: 2023-09-26 | Stop reason: HOSPADM

## 2023-09-26 RX ORDER — ROCURONIUM BROMIDE 10 MG/ML
INJECTION, SOLUTION INTRAVENOUS PRN
Status: DISCONTINUED | OUTPATIENT
Start: 2023-09-26 | End: 2023-09-26 | Stop reason: SDUPTHER

## 2023-09-26 RX ORDER — SODIUM CHLORIDE 0.9 % (FLUSH) 0.9 %
5-40 SYRINGE (ML) INJECTION EVERY 12 HOURS SCHEDULED
Status: DISCONTINUED | OUTPATIENT
Start: 2023-09-26 | End: 2023-09-26 | Stop reason: HOSPADM

## 2023-09-26 RX ORDER — FENTANYL CITRATE 50 UG/ML
INJECTION, SOLUTION INTRAMUSCULAR; INTRAVENOUS PRN
Status: DISCONTINUED | OUTPATIENT
Start: 2023-09-26 | End: 2023-09-26 | Stop reason: SDUPTHER

## 2023-09-26 RX ORDER — OXYBUTYNIN CHLORIDE 10 MG/1
10 TABLET, EXTENDED RELEASE ORAL DAILY PRN
Qty: 15 TABLET | Refills: 1 | Status: SHIPPED | OUTPATIENT
Start: 2023-09-26

## 2023-09-26 RX ORDER — LIDOCAINE HYDROCHLORIDE 10 MG/ML
1 INJECTION, SOLUTION EPIDURAL; INFILTRATION; INTRACAUDAL; PERINEURAL
Status: DISCONTINUED | OUTPATIENT
Start: 2023-09-26 | End: 2023-09-26 | Stop reason: HOSPADM

## 2023-09-26 RX ORDER — OXYCODONE HYDROCHLORIDE 5 MG/1
5 TABLET ORAL PRN
Status: DISCONTINUED | OUTPATIENT
Start: 2023-09-26 | End: 2023-09-26 | Stop reason: HOSPADM

## 2023-09-26 RX ORDER — SODIUM CHLORIDE, SODIUM LACTATE, POTASSIUM CHLORIDE, CALCIUM CHLORIDE 600; 310; 30; 20 MG/100ML; MG/100ML; MG/100ML; MG/100ML
INJECTION, SOLUTION INTRAVENOUS CONTINUOUS
Status: DISCONTINUED | OUTPATIENT
Start: 2023-09-26 | End: 2023-09-26 | Stop reason: HOSPADM

## 2023-09-26 RX ORDER — SODIUM CHLORIDE, SODIUM LACTATE, POTASSIUM CHLORIDE, CALCIUM CHLORIDE 600; 310; 30; 20 MG/100ML; MG/100ML; MG/100ML; MG/100ML
INJECTION, SOLUTION INTRAVENOUS CONTINUOUS PRN
Status: DISCONTINUED | OUTPATIENT
Start: 2023-09-26 | End: 2023-09-26 | Stop reason: SDUPTHER

## 2023-09-26 RX ADMIN — LIDOCAINE HYDROCHLORIDE 100 MG: 20 INJECTION, SOLUTION INFILTRATION; PERINEURAL at 15:01

## 2023-09-26 RX ADMIN — ONDANSETRON HYDROCHLORIDE 4 MG: 2 INJECTION, SOLUTION INTRAMUSCULAR; INTRAVENOUS at 15:59

## 2023-09-26 RX ADMIN — HYDROMORPHONE HYDROCHLORIDE 0.5 MG: 1 INJECTION, SOLUTION INTRAMUSCULAR; INTRAVENOUS; SUBCUTANEOUS at 16:31

## 2023-09-26 RX ADMIN — FENTANYL CITRATE 50 MCG: 50 INJECTION INTRAMUSCULAR; INTRAVENOUS at 14:36

## 2023-09-26 RX ADMIN — SUGAMMADEX 200 MG: 100 INJECTION, SOLUTION INTRAVENOUS at 15:59

## 2023-09-26 RX ADMIN — HYDROMORPHONE HYDROCHLORIDE 0.5 MG: 1 INJECTION, SOLUTION INTRAMUSCULAR; INTRAVENOUS; SUBCUTANEOUS at 16:42

## 2023-09-26 RX ADMIN — ROCURONIUM BROMIDE 30 MG: 10 INJECTION INTRAVENOUS at 15:01

## 2023-09-26 RX ADMIN — PROPOFOL 200 MG: 10 INJECTION, EMULSION INTRAVENOUS at 15:01

## 2023-09-26 RX ADMIN — MEPERIDINE HYDROCHLORIDE 12.5 MG: 25 INJECTION, SOLUTION INTRAMUSCULAR; INTRAVENOUS; SUBCUTANEOUS at 16:53

## 2023-09-26 RX ADMIN — CEFAZOLIN 2000 MG: 2 INJECTION, POWDER, FOR SOLUTION INTRAMUSCULAR; INTRAVENOUS at 15:02

## 2023-09-26 RX ADMIN — FENTANYL CITRATE 50 MCG: 50 INJECTION INTRAMUSCULAR; INTRAVENOUS at 15:28

## 2023-09-26 RX ADMIN — SODIUM CHLORIDE, POTASSIUM CHLORIDE, SODIUM LACTATE AND CALCIUM CHLORIDE: 600; 310; 30; 20 INJECTION, SOLUTION INTRAVENOUS at 14:36

## 2023-09-26 RX ADMIN — MIDAZOLAM 2 MG: 1 INJECTION INTRAMUSCULAR; INTRAVENOUS at 14:36

## 2023-09-26 ASSESSMENT — LIFESTYLE VARIABLES: SMOKING_STATUS: 1

## 2023-09-26 ASSESSMENT — PAIN DESCRIPTION - LOCATION
LOCATION: PENIS

## 2023-09-26 ASSESSMENT — PAIN DESCRIPTION - ORIENTATION
ORIENTATION: PROXIMAL

## 2023-09-26 ASSESSMENT — PAIN - FUNCTIONAL ASSESSMENT
PAIN_FUNCTIONAL_ASSESSMENT: PREVENTS OR INTERFERES SOME ACTIVE ACTIVITIES AND ADLS
PAIN_FUNCTIONAL_ASSESSMENT: PREVENTS OR INTERFERES SOME ACTIVE ACTIVITIES AND ADLS
PAIN_FUNCTIONAL_ASSESSMENT: PREVENTS OR INTERFERES WITH MANY ACTIVE NOT PASSIVE ACTIVITIES
PAIN_FUNCTIONAL_ASSESSMENT: PREVENTS OR INTERFERES SOME ACTIVE ACTIVITIES AND ADLS
PAIN_FUNCTIONAL_ASSESSMENT: 0-10

## 2023-09-26 ASSESSMENT — PAIN DESCRIPTION - DESCRIPTORS
DESCRIPTORS: ACHING
DESCRIPTORS: BURNING
DESCRIPTORS: BURNING

## 2023-09-26 ASSESSMENT — PAIN SCALES - GENERAL
PAINLEVEL_OUTOF10: 7

## 2023-09-26 ASSESSMENT — ENCOUNTER SYMPTOMS: SHORTNESS OF BREATH: 1

## 2023-09-26 NOTE — OP NOTE
Operative Note      Patient: Oh Powers  YOB: 1965  MRN: [id number]    Date of Procedure: 9/26/2023    Pre-Op Diagnosis Codes: * Benign prostatic hyperplasia with lower urinary tract symptoms, symptom details unspecified [N40.1]     Postoperative diagnosis: Same ,  meatal tightness    Procedure Performed:   1 cystoscopy urethral dilation with metal sound dilators   2. transurethral resection of the prostate (using bipolar energy)      Surgeon:  Giovanny Gay MD  Assistant: Scrub  Anesthesia: General   Drains: 22F 3-way Quinn with 30cc of sterile water in the balloon   Specimens: TURP chips  Estimated Blood Loss: <85 mL  Complications: None       Findings:   -Moderately trabeculated bladder,  no evidence of tumors, stones. -Prostatic urethra showed evidence of significant lateral hypertrophy with a prominent median lobe   -Open channel at the end of the case from the bladder neck to the verumontanum   -No damage to ureteral orifices or the external sphincter        Indications;    57-year-old male with BPH and voiding dysfunction. Cystoscopy revealed median bar, abscess present 50-60 g    PMH: Anticoagulant restarted (1/2022). MI yrs ago. Cardiology recommended continuing aspirin perioperative  SURG: (7/29/21) left robotic nephroureterectomy with excision of the bladder cuff with cystotomy and bladder closure. PATH: Urothelial carcinoma of the LEFT renal pelvis, invasive Histologic Grade: High-grade Tumor Extent: Invades muscularis. T2  TREATMENT: Completed adjuvant chemotherapy with medical oncology ([facility]), DVT (1.5mo into dvr rx) during chemo so he stopped. IMG: CT c/a/p noncon (10/28/22) Post nephrectomy changes on the left. No definite metastatic disease. Small right renal stone (approx 2mm). PSA 0.5 (2023)      Procedure:  After obtaining informed consent (see office notes), the patient was brought to the operating room and placed supine on the operating room table. After adequate anesthesia, he was then repositioned in the dorsal lithotomy position and prepped and draped in the standard surgical fashion. He did have a bit of a tight urethra meatus and try to avoid bleeding the I began the case by doing urethral dilatation from 20 Malag[place] up to 29 [place] to make sure that the cystoscope as well as the 2 6 Malagasy sheath were inserted easily. Rigid cystoscopy with a 21 Malagasy sheath and a 30 degree lens. The anterior urethra was within normal limits. Other findings are above. Median bar is fairly prominent and the focus of the resection and we can work on the lateral lobes some as well. I then placed the resectoscope sheath along with the obturator. Once in the bladder, I exchanged the obturator for the working bridge with the bipolar loop. The median lobe was completely shaved down, to the floor the bladder care was taken to avoid ureter orifices. A fairly decent channel even just with taken down median lobe. I then proceeded with resection of his left lateral lobe. I then concentrated on his right lateral lobe and this was resected in a similar manner. Finally, some apical prostate tissue was resected as well. Bleeding was minimized by performing immediate electrocautery of any bleeding vessels. The TURP chips were evacuated periodically and then again at the case conclusion. My resection went from his bladder neck back to the area of his verumontanum. He had an open channel at the end of the case. We did not put on a capsule as did not feel necessary to open channel. Prostate size by 50-60 g as above,    Hemostasis was excellent as there was no active bleeding. His external sphincter and ureteral orifices were intact without injury. There was no evidence of any undermining at the bladder neck. I placed a 22 Malagasy 3-way Quinn catheter with 40 mL of sterile water into the balloon.  His catheter was connected to continuous bladder irrigation with

## 2023-09-26 NOTE — ANESTHESIA POSTPROCEDURE EVALUATION
Department of Anesthesiology  Postprocedure Note    Patient: Jesus Deleon  MRN: [id number]  YOB: 1965  Date of evaluation: 9/26/2023      Procedure Summary     Date: 09/26/23 Room / Location: [address] [place] [address] / [facility]    Anesthesia Start: 4724 Anesthesia Stop: 3129    Procedure: CYSTOSCOPY TRANSURETHRAL RESECTION PROSTATE (Bladder) Diagnosis:       Benign prostatic hyperplasia with lower urinary tract symptoms, symptom details unspecified      (Benign prostatic hyperplasia with lower urinary tract symptoms, symptom details unspecified [N40.1])    Surgeons: Kee Chaney MD Responsible Provider: Amber Senior MD    Anesthesia Type: general ASA Status: 3          Anesthesia Type: No value filed. Kaitlyn Phase I: Kaitlyn Score: 9    Kaitlyn Phase II: Kaitlyn Score: 9      Anesthesia Post Evaluation    Patient location during evaluation: PACU  Patient participation: complete - patient participated  Level of consciousness: awake and alert  Airway patency: patent  Nausea & Vomiting: no nausea and no vomiting  Complications: no  Cardiovascular status: blood pressure returned to baseline  Respiratory status: acceptable  Hydration status: euvolemic  Comments: VSS on transfer to phase 2 recovery. No anesthetic complications.   Pain management: adequate

## 2023-09-26 NOTE — DISCHARGE INSTRUCTIONS
surgery). But they may not ejaculate when they have an orgasm. Semen may go into the bladder instead of out through the penis. This is called retrograde ejaculation. It does not hurt and is not harmful to your health. This care sheet gives you a general idea about how long it will take for you to recover. But each person recovers at a different pace. Follow the steps below to get better as quickly as possible. How can you care for yourself at home? Activity    Rest when you feel tired. Be active. Walking is a good choice. Allow your body to heal. Don't move quickly or lift anything heavy until you are feeling better. Ask your doctor when you can drive again. Many people are able to return to work within 1 to 3 weeks after surgery. It depends on the type of work you do and how you feel. Do not put anything in your rectum, such as an enema or suppository, for 4 to 6 weeks after the surgery. You may shower and take baths when your doctor says it is okay. Ask your doctor when it is okay for you to have sex. Diet    You can eat your normal diet. If your stomach is upset, try bland, low-fat foods like plain rice, broiled chicken, toast, and yogurt. If your bowel movements are not regular right after surgery, try to avoid constipation and straining. Drink plenty of water. Your doctor may suggest fiber, a stool softener, or a mild laxative. Medicines    Your doctor will tell you if and when you can restart your medicines. He or she will also give you instructions about taking any new medicines. If you take aspirin or some other blood thinner, be sure to talk to your doctor. He or she will tell you if and when to start taking those medicines again. Make sure that you understand exactly what your doctor wants you to do. Be safe with medicines. Read and follow all instructions on the label. If the doctor gave you a prescription medicine for pain, take it as prescribed.   If you are not taking a prescription pain medicine, ask your doctor if you can take an over-the-counter medicine. Take your antibiotics as directed. Do not stop taking them just because you feel better. You need to take the full course of antibiotics. Follow-up care is a key part of your treatment and safety. Be sure to make and go to all appointments, and call your doctor if you are having problems. It's also a good idea to know your test results and keep a list of the medicines you take. When should you call for help? Call 911 anytime you think you may need emergency care. For example, call if:    You passed out (lost consciousness). You have chest pain, are short of breath, or cough up blood. Call your doctor now or seek immediate medical care if:    You have pain that does not get better after you take pain medicine. You have loose stitches or your incision comes open. Bright red blood soaks through the bandage. You have signs of infection, such as: Increased pain, swelling, warmth, or redness. Red streaks leading from the area. Pus draining from the area. A fever. You cannot urinate. You have symptoms of a urinary tract infection. These may include:  Pain or burning when you urinate. A frequent need to urinate without being able to pass much urine. Pain in the flank, which is just below the rib cage and above the waist on either side of the back. Blood in your urine. A fever. You are sick to your stomach and cannot drink fluids. You have signs of a blood clot in your leg (called a deep vein thrombosis), such as:  Pain in your calf, back of the knee, thigh, or groin. Redness or swelling in your leg or groin. Watch closely for changes in your health, and be sure to contact your doctor if you have any problems. Where can you learn more? Go to [email or web address] and sign in to your [employer] account.  Enter R261 in the [address]

## 2023-09-26 NOTE — CONSULTS
Referring Provider:  No ref.  provider found   Primary Care Provider:  Rayna Lyon MD       Urology Attending H/P Note     Reason for consultation: Voiding dysfunction BPH, history of cancer as below    HPI:  Parisa Benitez is a 59 y.o. male who presented with history of intermittent dysfunction fluids incomplete emptying, BPH        Past Medical History:   Diagnosis Date    Acute deep vein thrombosis (DVT) of femoral vein of right lower extremity ([address]) 1/27/2022 December 2021    Acute non-ST elevation myocardial infarction (NSTEMI) ([address]) 9/1/2022    Adrenal nodule ([address]) 2021    Arthritis     Asthma     Back problem     Dr Vaughn George, Dr Betty Rodriguez    Cholelithiasis without obstruction 2010    asymptomatic    Depression     ED (erectile dysfunction)     Gallbladder & bile duct stone     Gallbladder full of stones    GERD (gastroesophageal reflux disease)     Hernia     Hyperlipidemia     Hypertension     Low testosterone     MI (myocardial infarction) ([address]) 04/13/2020    mLAD 100% FELICITY Xience Martha 3.5 x 18    Seasonal allergies     Sleep apnea     SVT (supraventricular tachycardia) ([address])     GXT 1998    Tendonitis     Urothelial carcinoma of kidney, left ([address]) 07/23/2021       Past Surgical History:   Procedure Laterality Date    BACK SURGERY  2001    L5 FUSION, Dr Onesimo Moore      stent    [facility] Right 02/11/2019    RIGHT CARPAL TUNNEL RELEASE performed by Cristofer Alves MD at [address] [facility]  2012    Dr. Nereida Gallegos    COLONOSCOPY  2010    [facility] [place] patient states    CYSTOSCOPY Left 07/16/2021    LEFT RENAL  BIOPSY, LEFT URETEROSCOPY WITH STENT PLACEMENT    CYSTOSCOPY Left 07/16/2021    CYSTOSCOPY, LEFT RENAL  BIOPSY, LEFT URETEROSCOPY WITH STENT PLACEMENT performed by Maru Lewis MD at [address] [facility]  2012    Dr. Tammy Tyler Right     stimulator put in 3/10    KIDNEY REMOVAL Left 07/29/2021    DAVINCI LEFT NEPHROURETERECTOMY by  masses   Back: normal posture, stable back  Skin: exposed skin is warm and dry, no obvious rashes, lesions, or ulcers  Musculoskeletal: normal muscle tone, no digital cyanosis, head normocephalic  Psych: normal mood and affect, alert and appropriately answers questions  : bladder not palpable, stable, no tucker catheter    Labs:    Lab Results   Component Value Date    PSA 3.81 03/04/2021    PSA 3.10 08/01/2019    PSA 2.36 08/30/2018    PSA 1.17 03/28/2018    PSA 1.12 03/26/2018    PSA 0.75 05/17/2017    PSA 1.98 04/21/2016    PSA 1.72 03/04/2014       Lab Results   Component Value Date    PSA 3.81 03/04/2021    PSA 3.10 08/01/2019    PSA 2.36 08/30/2018     No results for input(s): \"WBC\", \"HGB\", \"HCT\", \"MCV\", \"PLT\" in the last 720 hours.   Lab Results   Component Value Date    LABA1C 5.4 04/14/2020    LABA1C 5.8 02/28/2012     Lab Results   Component Value Date    LDLCALC 64 08/01/2022    CREATININE 1.5 (H) 08/24/2023     Lab Results   Component Value Date     08/24/2023    K 4.0 08/24/2023     08/24/2023    CO2 25 08/24/2023    BUN 17 08/24/2023    CREATININE 1.5 (H) 08/24/2023    GLUCOSE 130 (H) 08/24/2023    CALCIUM 9.5 08/24/2023    PROT 6.7 08/24/2023    LABALBU 4.5 08/24/2023    BILITOT 0.3 08/24/2023    ALKPHOS 128 08/24/2023    AST 15 08/24/2023    ALT 17 08/24/2023    LABGLOM 53 (A) 08/24/2023    GFRAA >60 08/01/2022    AGRATIO 2.0 08/24/2023    GLOB 2.5 05/24/2021     Lab Results   Component Value Date    CREATININE 1.5 (H) 08/24/2023    CREATININE 1.3 02/08/2023    CREATININE 1.2 08/01/2022       Lab Results   Component Value Date/Time    COLORU Yellow 07/23/2021 01:02 PM    NITRU Negative 07/23/2021 01:02 PM    GLUCOSEU Negative 07/23/2021 01:02 PM    KETUA Negative 07/23/2021 01:02 PM    UROBILINOGEN 0.2 07/23/2021 01:02 PM    BILIRUBINUR Negative 07/23/2021 01:02 PM    BILIRUBINUR large 06/08/2021 11:15 AM       Impression:  History of urothelial carcinoma of the ureter kidney  Voiding

## 2023-09-26 NOTE — ANESTHESIA PRE PROCEDURE
Department of Anesthesiology  Preprocedure Note       Name:  Greg Solano   Age:  59 y.o.  :  1965                                          MRN:  [id number]         Date:  2023      Surgeon: Coleman Huitron):  Daquan Duval MD    Procedure: Procedure(s):  CYSTOSCOPY TRANSURETHRAL RESECTION PROSTATE    Medications prior to admission:   Prior to Admission medications    Medication Sig Start Date End Date Taking? Authorizing Provider   MORPHINE, PAIN PUMP REFILL CHARGE,    Yes Historical Provider, MD   sodium chloride 0.9 % SOLN 98 mL with HYDROmorphone HCl PF 50 MG/5ML SOLN 20 mg Infuse intravenously continuous. Yes Historical Provider, MD   gabapentin (NEURONTIN) 100 MG capsule Take 2 capsules by mouth 3 times daily for 30 days.  9/19/23 10/19/23  Yesi Spencer MD   ENTRESTO 49-51 MG per tablet TAKE (1) TABLET TWICE A DAY. 23   Danny John MD   aspirin 81 MG EC tablet Take 1 tablet by mouth daily 8/10/23   Danny John MD   carvedilol (COREG) 12.5 MG tablet Take 1 tablet by mouth 2 times daily 23   Aakash Rizo MD   atorvastatin (LIPITOR) 40 MG tablet Take 1 tablet by mouth daily 23   Yesi Spencer MD   albuterol sulfate HFA (PROVENTIL;VENTOLIN;PROAIR) 108 (90 Base) MCG/ACT inhaler INHALE 2 PUFFS INTO THE LUNGS EVERY 6 HOURS AS NEEDED FOR SHORTNESS OF BREATH 3/7/23   Yesi Spencer MD   ELIQUIS 5 MG TABS tablet Take 1 tablet by mouth in the morning and at bedtime 22   Historical Provider, MD   tamsulosin (FLOMAX) 0.4 MG capsule Take 1 capsule by mouth daily 21   Daquan Duval MD   Multiple Vitamin (MULTI-VITAMIN DAILY PO) Take 1 tablet by mouth daily    Historical Provider, MD   oxyCODONE HCl (OXY-IR) 10 MG immediate release tablet TAKE 1 TABLET BY MOUTH EVERY 6 HOURS  Patient not taking: Reported on 2023   Historical Provider, MD       Current medications:    Current Facility-Administered Medications   Medication Dose Route Frequency

## 2023-09-29 LAB — PATHOLOGY/CYTOLOGY REPORT: NORMAL

## 2023-10-02 ENCOUNTER — TELEPHONE (OUTPATIENT)
Dept: FAMILY MEDICINE CLINIC | Age: 58
End: 2023-10-02

## 2023-10-02 NOTE — TELEPHONE ENCOUNTER
Patient is having increased pain in his legs and feet wanting to see if his gabapentin can be increased, currently taking 100 mg 2 tabs tid, would like sent to [address] [facility].

## 2023-10-03 RX ORDER — GABAPENTIN 300 MG/1
300 CAPSULE ORAL 3 TIMES DAILY
Qty: 270 CAPSULE | Refills: 1 | Status: SHIPPED | OUTPATIENT
Start: 2023-10-03 | End: 2024-03-31

## 2023-10-08 LAB — PATHOLOGY/CYTOLOGY REPORT: NORMAL

## 2023-10-17 ENCOUNTER — TELEPHONE (OUTPATIENT)
Dept: FAMILY MEDICINE CLINIC | Age: 58
End: 2023-10-17

## 2023-10-17 DIAGNOSIS — F41.9 ANXIETY: Primary | ICD-10-CM

## 2023-10-17 RX ORDER — DIAZEPAM 5 MG/1
5 TABLET ORAL ONCE
Qty: 1 TABLET | Refills: 0 | Status: SHIPPED | OUTPATIENT
Start: 2023-10-17 | End: 2023-10-17

## 2023-10-17 NOTE — TELEPHONE ENCOUNTER
Patient is requesting medication to take prior to having CT scan, he had thought he was prescribed valium in the past but the only thing I could find was the xanax he usually gets.    [address]  [facility]

## 2023-10-31 ENCOUNTER — HOSPITAL ENCOUNTER (OUTPATIENT)
Age: 58
Discharge: HOME | End: 2023-10-31
Attending: INTERNAL MEDICINE
Payer: COMMERCIAL

## 2023-10-31 ENCOUNTER — HOSPITAL ENCOUNTER (OUTPATIENT)
Dept: CT IMAGING | Age: 58
Discharge: HOME | End: 2023-10-31
Attending: INTERNAL MEDICINE
Payer: COMMERCIAL

## 2023-10-31 DIAGNOSIS — C68.8 MALIGNANT NEOPLASM OF OVERLAPPING SITES OF URINARY ORGANS (HCC): ICD-10-CM

## 2023-10-31 LAB
BUN SERPL-MCNC: 16 MG/DL (ref 7–20)
CREAT SERPL-MCNC: 1.3 MG/DL (ref 0.8–1.3)
GFR SERPLBLD CREATININE-BSD FMLA CKD-EPI: >60 ML/MIN/{1.73_M2}

## 2023-10-31 PROCEDURE — 36415 COLL VENOUS BLD VENIPUNCTURE: CPT

## 2023-10-31 PROCEDURE — 6360000004 HC RX CONTRAST MEDICATION: Performed by: INTERNAL MEDICINE

## 2023-10-31 PROCEDURE — 82565 ASSAY OF CREATININE: CPT

## 2023-10-31 PROCEDURE — 71260 CT THORAX DX C+: CPT

## 2023-10-31 PROCEDURE — 84520 ASSAY OF UREA NITROGEN: CPT

## 2023-10-31 RX ADMIN — DIATRIZOATE MEGLUMINE AND DIATRIZOATE SODIUM 12 ML: 660; 100 LIQUID ORAL; RECTAL at 09:10

## 2023-10-31 RX ADMIN — IOMEPROL INJECTION 75 ML: 714 INJECTION, SOLUTION INTRAVASCULAR at 09:20

## 2023-11-14 ENCOUNTER — OFFICE VISIT (OUTPATIENT)
Dept: FAMILY MEDICINE CLINIC | Age: 58
End: 2023-11-14
Payer: COMMERCIAL

## 2023-11-14 VITALS
HEART RATE: 85 BPM | DIASTOLIC BLOOD PRESSURE: 63 MMHG | SYSTOLIC BLOOD PRESSURE: 95 MMHG | WEIGHT: 243 LBS | BODY MASS INDEX: 29.58 KG/M2 | OXYGEN SATURATION: 92 %

## 2023-11-14 DIAGNOSIS — I25.10 CORONARY ARTERY DISEASE INVOLVING NATIVE CORONARY ARTERY OF NATIVE HEART WITHOUT ANGINA PECTORIS: ICD-10-CM

## 2023-11-14 DIAGNOSIS — G47.33 SEVERE OBSTRUCTIVE SLEEP APNEA: ICD-10-CM

## 2023-11-14 DIAGNOSIS — R29.898 WEAKNESS OF RIGHT LOWER EXTREMITY: ICD-10-CM

## 2023-11-14 DIAGNOSIS — Z86.718 HISTORY OF DVT (DEEP VEIN THROMBOSIS): ICD-10-CM

## 2023-11-14 DIAGNOSIS — I25.5 ISCHEMIC CARDIOMYOPATHY: ICD-10-CM

## 2023-11-14 DIAGNOSIS — I10 ESSENTIAL HYPERTENSION: Primary | ICD-10-CM

## 2023-11-14 DIAGNOSIS — Z23 NEEDS FLU SHOT: ICD-10-CM

## 2023-11-14 PROCEDURE — G8482 FLU IMMUNIZE ORDER/ADMIN: HCPCS | Performed by: FAMILY MEDICINE

## 2023-11-14 PROCEDURE — 3074F SYST BP LT 130 MM HG: CPT | Performed by: FAMILY MEDICINE

## 2023-11-14 PROCEDURE — G8427 DOCREV CUR MEDS BY ELIG CLIN: HCPCS | Performed by: FAMILY MEDICINE

## 2023-11-14 PROCEDURE — G8417 CALC BMI ABV UP PARAM F/U: HCPCS | Performed by: FAMILY MEDICINE

## 2023-11-14 PROCEDURE — 99215 OFFICE O/P EST HI 40 MIN: CPT | Performed by: FAMILY MEDICINE

## 2023-11-14 PROCEDURE — 90471 IMMUNIZATION ADMIN: CPT | Performed by: FAMILY MEDICINE

## 2023-11-14 PROCEDURE — 1036F TOBACCO NON-USER: CPT | Performed by: FAMILY MEDICINE

## 2023-11-14 PROCEDURE — 90674 CCIIV4 VAC NO PRSV 0.5 ML IM: CPT | Performed by: FAMILY MEDICINE

## 2023-11-14 PROCEDURE — 3078F DIAST BP <80 MM HG: CPT | Performed by: FAMILY MEDICINE

## 2023-11-14 PROCEDURE — 3017F COLORECTAL CA SCREEN DOC REV: CPT | Performed by: FAMILY MEDICINE

## 2023-11-14 NOTE — PATIENT INSTRUCTIONS
Please read the healthy family handout that you were given and share it with your family. Please compare this printed medication list with your medications at home to be sure they are the same. If you have any medications that are different please contact us immediately at [phone]. Also review your allergies that we have listed, these may also include medications that you have not been able to tolerate, make sure everything listed is correct. If you have any allergies that are different please contact us immediately at [phone]. You may receive a survey in the mail or by email asking about your experience during your visit today. Please complete and return to us so we know how we are serving you.

## 2023-11-14 NOTE — PROGRESS NOTES
2023    Dang Byrd (:  1965) is a 59 y.o. male, : 1965   Patient presents for follow up of their chronic medical problems and for a preoperative medical evaluation regarding the stability of his medical problems. He has Essential hypertension; Severe obstructive sleep apnea; Ischemic cardiomyopathy; STEMI (ST elevation myocardial infarction) ([address]); Coronary artery disease involving native coronary artery of native heart without angina pectoris; and Urothelial carcinoma of kidney, left (HCC) on their pertinent problem list.   He is scheduled for bilateral hydrocelectomy scrotal exploration on 2023 for chronic scrotal swelling. Patient also has been having increased issues with his lumbar back issues where he wakes up in the middle of the night with his right leg being numb and he cannot move his right leg. It is happened to him 3x1 time and only lasted for 20 minutes but it also lasted for 3 hours a different time. He denies any issues right now and it is always in the middle of the night. He does have history of chronic pain and issues with his spine. He had lumbar surgery in  and  by Dr. Malissa Ayala.       Patient Active Problem List   Diagnosis    Asthma    Restless legs syndrome (RLS)    Low testosterone    ED (erectile dysfunction)    Chronic bilateral low back pain without sciatica    Mixed hyperlipidemia    Essential hypertension    Dupuytren's contracture    Severe obstructive sleep apnea    Tobacco abuse    Ischemic cardiomyopathy    STEMI (ST elevation myocardial infarction) (HCC)    Obesity    Coronary artery disease involving native coronary artery of native heart without angina pectoris    SOB (shortness of breath)    Adrenal nodule (HCC)    Urothelial carcinoma of kidney, left (HCC)    Pain of right lower extremity    Primary urothelial carcinoma of overlapping sites of urinary organs (HCC)    Obstructive sleep apnea syndrome    History of DVT (deep vein

## 2023-11-17 RX ORDER — SACUBITRIL AND VALSARTAN 49; 51 MG/1; MG/1
TABLET, FILM COATED ORAL
Qty: 180 TABLET | Refills: 3 | Status: SHIPPED | OUTPATIENT
Start: 2023-11-17

## 2023-11-17 NOTE — TELEPHONE ENCOUNTER
Last ov: 08/10/23 [facility]  Upcoming ov: no upcoming    [facility]- 08/24/23

## 2024-01-04 ENCOUNTER — TELEPHONE (OUTPATIENT)
Dept: FAMILY MEDICINE CLINIC | Age: 59
End: 2024-01-04

## 2024-01-04 DIAGNOSIS — F41.9 ANXIETY: Primary | ICD-10-CM

## 2024-01-04 NOTE — TELEPHONE ENCOUNTER
Patient has tried to do MRI several times and he is just not able. [facility] suggested that he go to hospital that can do MRI with sedation they told him that [facility] did that. I will call tomorrow to find out.

## 2024-01-04 NOTE — TELEPHONE ENCOUNTER
----- Message from Charlette Bal sent at 1/4/2024  2:33 PM EST -----  Subject: Referral Request    Reason for referral request? MRI with Sedation   Provider patient wants to be referred to(if known):     Provider Phone Number(if known):    Additional Information for Provider? Patient called in to request for his   provider to give him an order for an MRI with Sedation. Patient stated   that he has tried to the MRI but is not able to do it. Please contact   patient to further assist.   ---------------------------------------------------------------------------  --------------  CALL BACK INFO    [phone]; OK to leave message on voicemail  ---------------------------------------------------------------------------  --------------

## 2024-01-05 NOTE — TELEPHONE ENCOUNTER
Patient is wanting an MRI done with sedation, I spoke to [facility] they do not do sedation, I called [facility] they do not do MRI's with sedation. He can be given oral medication prescribed by his pcp to relax him. I left a message for him to call to see if this was a route he wanted to take

## 2024-01-09 RX ORDER — LORAZEPAM 1 MG/1
1-2 TABLET ORAL SEE ADMIN INSTRUCTIONS
Qty: 2 TABLET | Refills: 0 | Status: SHIPPED | OUTPATIENT
Start: 2024-01-09 | End: 2024-01-10

## 2024-02-06 ENCOUNTER — TELEPHONE (OUTPATIENT)
Dept: FAMILY MEDICINE CLINIC | Age: 59
End: 2024-02-06

## 2024-03-06 ENCOUNTER — TELEPHONE (OUTPATIENT)
Dept: FAMILY MEDICINE CLINIC | Age: 59
End: 2024-03-06

## 2024-03-06 NOTE — TELEPHONE ENCOUNTER
Patient is requesting to have order for referral to Dr Judy Ace neurosurgeon at  for back pain   His fax# is [phone]

## 2024-03-07 DIAGNOSIS — R29.898 WEAKNESS OF RIGHT LOWER EXTREMITY: ICD-10-CM

## 2024-03-07 DIAGNOSIS — G89.29 CHRONIC BILATERAL LOW BACK PAIN WITHOUT SCIATICA: Primary | ICD-10-CM

## 2024-03-07 DIAGNOSIS — M54.50 CHRONIC BILATERAL LOW BACK PAIN WITHOUT SCIATICA: Primary | ICD-10-CM

## 2024-03-11 ENCOUNTER — TELEPHONE (OUTPATIENT)
Dept: FAMILY MEDICINE CLINIC | Age: 59
End: 2024-03-11

## 2024-03-11 NOTE — TELEPHONE ENCOUNTER
----- Message from Natividad Madsen sent at 3/11/2024  9:17 AM EDT -----  Subject: Message to Provider    QUESTIONS  Information for Provider? Patient is asking for a copy of his latest MRI   results to be sent to his pain management Dr. Candelario eDnis. [facility]. [phone] [address], [place] [address]. Please call   back to confirm.  ---------------------------------------------------------------------------  --------------  CALL BACK INFO  [phone]; OK to leave message on voicemail  ---------------------------------------------------------------------------  --------------  SCRIPT ANSWERS  Relationship to Patient? Self

## 2024-04-23 NOTE — PROGRESS NOTES
Detail Level: Detailed REPORT FROM OR AND ANESTHESIA Add 1585x Cpt? (Do Not Bill If You Billed For The Procedure Placing The Sutures. This Is An Add-On Code That Must Be Billed With An E/M Visit Code): No bladder sono

## 2024-04-25 ENCOUNTER — TELEPHONE (OUTPATIENT)
Dept: CARDIOLOGY CLINIC | Age: 59
End: 2024-04-25

## 2024-04-25 ENCOUNTER — OFFICE VISIT (OUTPATIENT)
Dept: FAMILY MEDICINE CLINIC | Age: 59
End: 2024-04-25
Payer: COMMERCIAL

## 2024-04-25 VITALS
TEMPERATURE: 98.4 F | SYSTOLIC BLOOD PRESSURE: 116 MMHG | OXYGEN SATURATION: 95 % | BODY MASS INDEX: 29.85 KG/M2 | WEIGHT: 245.13 LBS | DIASTOLIC BLOOD PRESSURE: 81 MMHG | HEART RATE: 72 BPM | HEIGHT: 76 IN

## 2024-04-25 DIAGNOSIS — Z01.818 PRE-OP EXAM: Primary | ICD-10-CM

## 2024-04-25 PROCEDURE — G8417 CALC BMI ABV UP PARAM F/U: HCPCS | Performed by: NURSE PRACTITIONER

## 2024-04-25 PROCEDURE — 3074F SYST BP LT 130 MM HG: CPT | Performed by: NURSE PRACTITIONER

## 2024-04-25 PROCEDURE — 99213 OFFICE O/P EST LOW 20 MIN: CPT | Performed by: NURSE PRACTITIONER

## 2024-04-25 PROCEDURE — G8427 DOCREV CUR MEDS BY ELIG CLIN: HCPCS | Performed by: NURSE PRACTITIONER

## 2024-04-25 PROCEDURE — 3017F COLORECTAL CA SCREEN DOC REV: CPT | Performed by: NURSE PRACTITIONER

## 2024-04-25 PROCEDURE — 1036F TOBACCO NON-USER: CPT | Performed by: NURSE PRACTITIONER

## 2024-04-25 PROCEDURE — 3079F DIAST BP 80-89 MM HG: CPT | Performed by: NURSE PRACTITIONER

## 2024-04-25 PROCEDURE — 93000 ELECTROCARDIOGRAM COMPLETE: CPT | Performed by: NURSE PRACTITIONER

## 2024-04-25 RX ORDER — ALBUTEROL SULFATE 90 UG/1
1 AEROSOL, METERED RESPIRATORY (INHALATION) NIGHTLY PRN
Qty: 18 G | Refills: 1 | Status: SHIPPED | OUTPATIENT
Start: 2024-04-25

## 2024-04-25 ASSESSMENT — PATIENT HEALTH QUESTIONNAIRE - PHQ9
SUM OF ALL RESPONSES TO PHQ QUESTIONS 1-9: 0
SUM OF ALL RESPONSES TO PHQ9 QUESTIONS 1 & 2: 0
SUM OF ALL RESPONSES TO PHQ QUESTIONS 1-9: 0
SUM OF ALL RESPONSES TO PHQ QUESTIONS 1-9: 0
1. LITTLE INTEREST OR PLEASURE IN DOING THINGS: NOT AT ALL
2. FEELING DOWN, DEPRESSED OR HOPELESS: NOT AT ALL
SUM OF ALL RESPONSES TO PHQ QUESTIONS 1-9: 0

## 2024-04-25 NOTE — TELEPHONE ENCOUNTER
Seven Byrd, 1965    Cardiac Risk Assessment    What type of procedure are you having?  Left total knee orthoplasty with computer assisted surgical navigation    When is your procedure scheduled for?  05/20/2024    Medications to be stopped.  Eliquis- please advise how long pt should hold blood thinner prior to procedure    What physician is performing your procedure?  Dr. Acosta Early    Phone Number:   [phone]    Fax number to send the letter:   [phone]    Cardiologist:   Dr. Currie    Last Appointment:   08/10/2023    Next Appointment:   NONE    Are you on any blood thinners?   yes

## 2024-04-25 NOTE — PROGRESS NOTES
MD Atiya at [facility] OR    LUMBAR SPINE SURGERY      Dr Rowley    OTHER SURGICAL HISTORY      PAIN PUMP IMPLANT    OTHER SURGICAL HISTORY  2023    port removal    PORT SURGERY N/A 09/15/2021    PORT INSERTION performed by Pj Mora MD at [facility] [place]    PORT SURGERY N/A 2023    PORT REMOVAL performed by Pj Mora MD at [facility] OR    SHOULDER SURGERY      LEFT     STERNUM FRACTURE SURGERY      TUNNELED VENOUS PORT PLACEMENT Left 09/15/2021    TURP N/A 2023    CYSTOSCOPY TRANSURETHRAL RESECTION PROSTATE performed by Ziyad Rajput MD at [facility] OR     Family History   Problem Relation Age of Onset    Heart Disease Father      Social History     Socioeconomic History    Marital status:      Spouse name: Not on file    Number of children: Not on file    Years of education: Not on file    Highest education level: Not on file   Occupational History    Not on file   Tobacco Use    Smoking status: Former     Current packs/day: 0.00     Average packs/day: 0.3 packs/day for 20.0 years (5.0 ttl pk-yrs)     Types: Cigars, Cigarettes     Start date: 2000     Quit date: 2020     Years since quittin.0    Smokeless tobacco: Never   Vaping Use    Vaping Use: Never used   Substance and Sexual Activity    Alcohol use: Yes     Alcohol/week: 1.0 standard drink of alcohol     Types: 1 Shots of liquor per week     Comment: daily     Drug use: No    Sexual activity: Yes     Partners: Female   Other Topics Concern    Not on file   Social History Narrative    Not on file     Social Determinants of Health     Financial Resource Strain: Low Risk  (2023)    Overall Financial Resource Strain (CARDIA)     Difficulty of Paying Living Expenses: Not hard at all   Food Insecurity: Not on file (2023)   Transportation Needs: Unknown (2023)    PRAPARE - Transportation     Lack of Transportation (Medical): Not on file     Lack of Transportation (Non-Medical): No   Physical Activity: 
home and checks his bottle.  Preop labs ordered per orthopedic surgeon in which patient will go to outside facility for test.  EKG performed in the office revealed no acute changes from previous EKG 8/2023.  Chronic conditions are stable.  It is in the medical opinion this patient is clinically stable at this time and at moderate risk to proceed with intended surgery/anesthesia as planned.  Patient advised that he must contact prescribing provider for anticoagulant therapy and call the office back with directions on holding medications prior to procedure.  - EKG 12 Lead - Clinic Performed        The note was completed using Dragon voice recognition transcription. Every effort was made to ensure accuracy; however, inadvertent  transcription errors may be present despite my best efforts to edit errors.

## 2024-04-26 ENCOUNTER — HOSPITAL ENCOUNTER (OUTPATIENT)
Age: 59
Discharge: HOME | End: 2024-04-26
Payer: COMMERCIAL

## 2024-04-26 LAB
ALBUMIN SERPL-MCNC: 4.4 G/DL (ref 3.4–5)
ANION GAP SERPL CALCULATED.3IONS-SCNC: 11 MMOL/L (ref 3–16)
APTT BLD: 30.1 SEC (ref 22.1–36.4)
BACTERIA URNS QL MICRO: ABNORMAL /HPF
BASOPHILS # BLD: 0 K/UL (ref 0–0.2)
BASOPHILS NFR BLD: 0.5 %
BILIRUB UR QL STRIP.AUTO: NEGATIVE
BUN SERPL-MCNC: 16 MG/DL (ref 7–20)
CALCIUM SERPL-MCNC: 9 MG/DL (ref 8.3–10.6)
CHLORIDE SERPL-SCNC: 104 MMOL/L (ref 99–110)
CLARITY UR: CLEAR
CO2 SERPL-SCNC: 26 MMOL/L (ref 21–32)
COLOR UR: YELLOW
CREAT SERPL-MCNC: 1.1 MG/DL (ref 0.8–1.3)
DEPRECATED RDW RBC AUTO: 14.9 % (ref 12.4–15.4)
EOSINOPHIL # BLD: 0 K/UL (ref 0–0.6)
EOSINOPHIL NFR BLD: 0.1 %
EPI CELLS #/AREA URNS HPF: ABNORMAL /HPF (ref 0–5)
GFR SERPLBLD CREATININE-BSD FMLA CKD-EPI: 77 ML/MIN/{1.73_M2}
GLUCOSE SERPL-MCNC: 149 MG/DL (ref 70–99)
GLUCOSE UR STRIP.AUTO-MCNC: NEGATIVE MG/DL
HCT VFR BLD AUTO: 38.8 % (ref 40.5–52.5)
HGB BLD-MCNC: 12.9 G/DL (ref 13.5–17.5)
HGB UR QL STRIP.AUTO: NEGATIVE
INR PPP: 1.11 (ref 0.85–1.15)
KETONES UR STRIP.AUTO-MCNC: NEGATIVE MG/DL
LEUKOCYTE ESTERASE UR QL STRIP.AUTO: NEGATIVE
LYMPHOCYTES # BLD: 1.5 K/UL (ref 1–5.1)
LYMPHOCYTES NFR BLD: 19.7 %
MCH RBC QN AUTO: 28.7 PG (ref 26–34)
MCHC RBC AUTO-ENTMCNC: 33.3 G/DL (ref 31–36)
MCV RBC AUTO: 86.1 FL (ref 80–100)
MONOCYTES # BLD: 0.3 K/UL (ref 0–1.3)
MONOCYTES NFR BLD: 4.6 %
MUCOUS THREADS #/AREA URNS LPF: ABNORMAL /LPF
NEUTROPHILS # BLD: 5.6 K/UL (ref 1.7–7.7)
NEUTROPHILS NFR BLD: 75.1 %
NITRITE UR QL STRIP.AUTO: NEGATIVE
PH UR STRIP.AUTO: 6 [PH] (ref 5–8)
PLATELET # BLD AUTO: 194 K/UL (ref 135–450)
PMV BLD AUTO: 7.8 FL (ref 5–10.5)
POTASSIUM SERPL-SCNC: 4.1 MMOL/L (ref 3.5–5.1)
PROT UR STRIP.AUTO-MCNC: ABNORMAL MG/DL
PROTHROMBIN TIME: 14.6 SEC (ref 11.9–14.9)
RBC # BLD AUTO: 4.51 M/UL (ref 4.2–5.9)
RBC #/AREA URNS HPF: ABNORMAL /HPF (ref 0–4)
SODIUM SERPL-SCNC: 141 MMOL/L (ref 136–145)
SP GR UR STRIP.AUTO: >=1.03 (ref 1–1.03)
UA DIPSTICK W REFLEX MICRO PNL UR: YES
URN SPEC COLLECT METH UR: ABNORMAL
UROBILINOGEN UR STRIP-ACNC: 0.2 E.U./DL
WBC # BLD AUTO: 7.5 K/UL (ref 4–11)
WBC #/AREA URNS HPF: ABNORMAL /HPF (ref 0–5)

## 2024-04-26 PROCEDURE — 83036 HEMOGLOBIN GLYCOSYLATED A1C: CPT

## 2024-04-26 PROCEDURE — 85610 PROTHROMBIN TIME: CPT

## 2024-04-26 PROCEDURE — 85730 THROMBOPLASTIN TIME PARTIAL: CPT

## 2024-04-26 PROCEDURE — 81001 URINALYSIS AUTO W/SCOPE: CPT

## 2024-04-26 PROCEDURE — 82040 ASSAY OF SERUM ALBUMIN: CPT

## 2024-04-26 PROCEDURE — 85025 COMPLETE CBC W/AUTO DIFF WBC: CPT

## 2024-04-26 PROCEDURE — 80048 BASIC METABOLIC PNL TOTAL CA: CPT

## 2024-04-26 PROCEDURE — 87086 URINE CULTURE/COLONY COUNT: CPT

## 2024-04-26 PROCEDURE — 36415 COLL VENOUS BLD VENIPUNCTURE: CPT

## 2024-04-26 NOTE — TELEPHONE ENCOUNTER
Ok for procedure  Rec not hold ASA  Prescribed Eliquis for DVT by a different provider - needs to get recommendations from prescriber for this med

## 2024-04-27 LAB
EST. AVERAGE GLUCOSE BLD GHB EST-MCNC: 111.2 MG/DL
HBA1C MFR BLD: 5.5 %

## 2024-04-28 LAB — BACTERIA UR CULT: NORMAL

## 2024-05-06 RX ORDER — GABAPENTIN 300 MG/1
CAPSULE ORAL
Qty: 270 CAPSULE | Refills: 1 | Status: SHIPPED | OUTPATIENT
Start: 2024-05-06 | End: 2024-11-02

## 2024-05-07 ENCOUNTER — HOSPITAL ENCOUNTER (OUTPATIENT)
Dept: CT IMAGING | Age: 59
Discharge: HOME | End: 2024-05-07
Attending: ORTHOPAEDIC SURGERY
Payer: COMMERCIAL

## 2024-05-07 DIAGNOSIS — M17.12 ARTHRITIS OF LEFT KNEE: ICD-10-CM

## 2024-05-07 PROCEDURE — 73700 CT LOWER EXTREMITY W/O DYE: CPT

## 2024-05-15 DIAGNOSIS — E78.2 MIXED HYPERLIPIDEMIA: ICD-10-CM

## 2024-05-15 RX ORDER — ATORVASTATIN CALCIUM 40 MG/1
40 TABLET, FILM COATED ORAL DAILY
Qty: 90 TABLET | Refills: 3 | Status: SHIPPED | OUTPATIENT
Start: 2024-05-15

## 2024-05-15 NOTE — TELEPHONE ENCOUNTER
[phone] ([employer])   Called pt and scheduled next follow up appt       Last Written 05/12/2023    atorvastatin (LIPITOR) 40 MG tablet   #90  3 RF

## 2024-07-22 ENCOUNTER — OFFICE VISIT (OUTPATIENT)
Dept: FAMILY MEDICINE CLINIC | Age: 59
End: 2024-07-22
Payer: COMMERCIAL

## 2024-07-22 VITALS
DIASTOLIC BLOOD PRESSURE: 74 MMHG | OXYGEN SATURATION: 92 % | TEMPERATURE: 97.1 F | HEART RATE: 62 BPM | SYSTOLIC BLOOD PRESSURE: 129 MMHG | HEIGHT: 76 IN | BODY MASS INDEX: 29.13 KG/M2 | WEIGHT: 239.25 LBS

## 2024-07-22 DIAGNOSIS — Z00.00 ENCOUNTER FOR WELL ADULT EXAM WITHOUT ABNORMAL FINDINGS: Primary | ICD-10-CM

## 2024-07-22 LAB
ALBUMIN SERPL-MCNC: 4.6 G/DL (ref 3.4–5)
ALBUMIN/GLOB SERPL: 1.9 {RATIO} (ref 1.1–2.2)
ALP SERPL-CCNC: 117 U/L (ref 40–129)
ALT SERPL-CCNC: 17 U/L (ref 10–40)
ANION GAP SERPL CALCULATED.3IONS-SCNC: 11 MMOL/L (ref 3–16)
AST SERPL-CCNC: 15 U/L (ref 15–37)
BASOPHILS # BLD: 0 K/UL (ref 0–0.2)
BASOPHILS NFR BLD: 0.5 %
BILIRUB SERPL-MCNC: 0.4 MG/DL (ref 0–1)
BUN SERPL-MCNC: 20 MG/DL (ref 7–20)
CALCIUM SERPL-MCNC: 9.4 MG/DL (ref 8.3–10.6)
CHLORIDE SERPL-SCNC: 104 MMOL/L (ref 99–110)
CHOLEST SERPL-MCNC: 139 MG/DL (ref 0–199)
CO2 SERPL-SCNC: 26 MMOL/L (ref 21–32)
CREAT SERPL-MCNC: 1.2 MG/DL (ref 0.8–1.3)
DEPRECATED RDW RBC AUTO: 15.8 % (ref 12.4–15.4)
EOSINOPHIL # BLD: 0.1 K/UL (ref 0–0.6)
EOSINOPHIL NFR BLD: 1.5 %
EST. AVERAGE GLUCOSE BLD GHB EST-MCNC: 102.5 MG/DL
GFR SERPLBLD CREATININE-BSD FMLA CKD-EPI: 69 ML/MIN/{1.73_M2}
GLUCOSE SERPL-MCNC: 100 MG/DL (ref 70–99)
HBA1C MFR BLD: 5.2 %
HCT VFR BLD AUTO: 36.3 % (ref 40.5–52.5)
HDLC SERPL-MCNC: 45 MG/DL (ref 40–60)
HGB BLD-MCNC: 12.2 G/DL (ref 13.5–17.5)
LDL CHOLESTEROL: 67 MG/DL
LYMPHOCYTES # BLD: 2 K/UL (ref 1–5.1)
LYMPHOCYTES NFR BLD: 31.4 %
MCH RBC QN AUTO: 30.1 PG (ref 26–34)
MCHC RBC AUTO-ENTMCNC: 33.7 G/DL (ref 31–36)
MCV RBC AUTO: 89.2 FL (ref 80–100)
MONOCYTES # BLD: 0.6 K/UL (ref 0–1.3)
MONOCYTES NFR BLD: 8.5 %
NEUTROPHILS # BLD: 3.7 K/UL (ref 1.7–7.7)
NEUTROPHILS NFR BLD: 58.1 %
PLATELET # BLD AUTO: 172 K/UL (ref 135–450)
PMV BLD AUTO: 8.4 FL (ref 5–10.5)
POTASSIUM SERPL-SCNC: 4.5 MMOL/L (ref 3.5–5.1)
PROT SERPL-MCNC: 7 G/DL (ref 6.4–8.2)
PSA SERPL DL<=0.01 NG/ML-MCNC: 0.81 NG/ML (ref 0–4)
RBC # BLD AUTO: 4.07 M/UL (ref 4.2–5.9)
SODIUM SERPL-SCNC: 141 MMOL/L (ref 136–145)
TRIGL SERPL-MCNC: 136 MG/DL (ref 0–150)
VLDLC SERPL CALC-MCNC: 27 MG/DL
WBC # BLD AUTO: 6.4 K/UL (ref 4–11)

## 2024-07-22 PROCEDURE — 99396 PREV VISIT EST AGE 40-64: CPT | Performed by: NURSE PRACTITIONER

## 2024-07-22 PROCEDURE — 3078F DIAST BP <80 MM HG: CPT | Performed by: NURSE PRACTITIONER

## 2024-07-22 PROCEDURE — 36415 COLL VENOUS BLD VENIPUNCTURE: CPT | Performed by: NURSE PRACTITIONER

## 2024-07-22 PROCEDURE — 3074F SYST BP LT 130 MM HG: CPT | Performed by: NURSE PRACTITIONER

## 2024-07-22 ASSESSMENT — ENCOUNTER SYMPTOMS
RESPIRATORY NEGATIVE: 1
EYES NEGATIVE: 1
GASTROINTESTINAL NEGATIVE: 1

## 2024-07-22 NOTE — PROGRESS NOTES
overall doing well.  No acute concerns at today's visit.  Patient reports he medications as directed.  Preventative screenings and testing performed at today's visit.  Labs ordered and pending.  Continue current treatment follow-up in 1 year, sooner for new or concerning symptoms.  - Lipid, Fasting  - Comprehensive Metabolic Panel  - CBC with Auto Differential  - PSA, Prostatic Specific Antigen ([place])  - Hemoglobin A1C         The note was completed using Dragon voice recognition transcription. Every effort was made to ensure accuracy; however, inadvertent  transcription errors may be present despite my best efforts to edit errors.    Lidya Funes, ERICA - CNP

## 2024-07-23 ENCOUNTER — TELEMEDICINE (OUTPATIENT)
Dept: FAMILY MEDICINE CLINIC | Age: 59
End: 2024-07-23
Payer: COMMERCIAL

## 2024-07-23 DIAGNOSIS — Z00.00 INITIAL MEDICARE ANNUAL WELLNESS VISIT: Primary | ICD-10-CM

## 2024-07-23 PROCEDURE — G0438 PPPS, INITIAL VISIT: HCPCS | Performed by: NURSE PRACTITIONER

## 2024-07-23 PROCEDURE — 3017F COLORECTAL CA SCREEN DOC REV: CPT | Performed by: NURSE PRACTITIONER

## 2024-07-23 NOTE — PATIENT INSTRUCTIONS
programs and medicines. These can increase your chances of quitting for good. Quitting is one of the most important things you can do to protect your heart. It is never too late to quit. Try to avoid secondhand smoke too.     Stay at a weight that's healthy for you. Talk to your doctor if you need help losing weight.     Try to get 7 to 9 hours of sleep each night.     Limit alcohol to 2 drinks a day for men and 1 drink a day for women. Too much alcohol can cause health problems.     Manage other health problems such as diabetes, high blood pressure, and high cholesterol. If you think you may have a problem with alcohol or drug use, talk to your doctor.   Medicines    Take your medicines exactly as prescribed. Call your doctor if you think you are having a problem with your medicine.     If your doctor recommends aspirin, take the amount directed each day. Make sure you take aspirin and not another kind of pain reliever, such as acetaminophen (Tylenol).   When should you call for help?   Call 911 if you have symptoms of a heart attack. These may include:    Chest pain or pressure, or a strange feeling in the chest.     Sweating.     Shortness of breath.     Pain, pressure, or a strange feeling in the back, neck, jaw, or upper belly or in one or both shoulders or arms.     Lightheadedness or sudden weakness.     A fast or irregular heartbeat.   After you call 911, the  may tell you to chew 1 adult-strength or 2 to 4 low-dose aspirin. Wait for an ambulance. Do not try to drive yourself.  Watch closely for changes in your health, and be sure to contact your doctor if you have any problems.  Where can you learn more?  Go to https://www.Fl[employer].net/patientEd and enter F075 to learn more about \"A Healthy Heart: Care Instructions.\"  Current as of: June 24, 2023  Content Version: 14.1  © 4415-9335 Healthwise, Incorporated.   Care instructions adapted under license by [employer]. If you have questions about a

## 2024-07-23 NOTE — PROGRESS NOTES
Medicare Annual Wellness Visit    [facility] By[facility] is here for Medicare AWV    Assessment & Plan   Initial Medicare annual wellness visit  Recommendations for Preventive Services Due: see orders and patient instructions/AVS.  Recommended screening schedule for the next 5-10 years is provided to the patient in written form: see Patient Instructions/AVS.     Return in 1 year (on 7/23/2025) for Medicare AWV.     Subjective   Patient presents today for annual Medicare wellness visit.    Patient's complete Health Risk Assessment and screening values have been reviewed and are found in Flowsheets. The following problems were reviewed today and where indicated follow up appointments were made and/or referrals ordered.    Positive Risk Factor Screenings with Interventions:            Controlled Medication Review:    Today's Pain Level: No data recorded   Opioid Risk: (Low risk score <55) Opioid risk score: 14    Patient is low risk for opioid use disorder or overdose.    Last PDMP Pj as Reviewed:  Review User Review Instant Review Result   DEB STONE S 9/14/2020 12:59 PM     Reviewed PDMP [1]     Last Controlled Substance Monitoring Documentation      Flowsheet [facility] from 7/23/2024 in [facility]   Periodic Controlled Substance Monitoring No signs of potential drug abuse or diversion identified., Possible medication side effects, risk of tolerance/dependence & alternative treatments discussed. filed at 07/23/2024 9217             Self-assessment of health:  In general, how would you say your health is?: (!) Poor    Interventions:  Patient comments: Limitations to mobility due to chronic pain in back and knees.  Patient declines any further evaluation or treatment     Inactivity:  On average, how many days per week do you engage in moderate to strenuous exercise (like a brisk walk)?: 1 day (!) Abnormal  On average, how many minutes do you engage in exercise at this level?: 10

## 2024-08-29 DIAGNOSIS — I10 ESSENTIAL HYPERTENSION: ICD-10-CM

## 2024-08-29 RX ORDER — CARVEDILOL 12.5 MG/1
12.5 TABLET ORAL 2 TIMES DAILY
Qty: 120 TABLET | Refills: 0 | Status: SHIPPED | OUTPATIENT
Start: 2024-08-29

## 2024-08-29 NOTE — TELEPHONE ENCOUNTER
Spoke to pt.  Scheduled with [facility] [place] on 10/31/24. Please send medication refill

## 2024-08-29 NOTE — TELEPHONE ENCOUNTER
Last ov: 08/10/23  Upcoming ov: None     BMP: 07/22/24    Front: Please schedule pt appt     Follow up with me in 1 year

## 2024-09-28 DIAGNOSIS — I10 ESSENTIAL HYPERTENSION: ICD-10-CM

## 2024-09-30 RX ORDER — CARVEDILOL 12.5 MG/1
12.5 TABLET ORAL 2 TIMES DAILY
Qty: 60 TABLET | Refills: 0 | Status: SHIPPED | OUTPATIENT
Start: 2024-09-30

## 2024-09-30 NOTE — TELEPHONE ENCOUNTER
Last ov: 8/10/23 [facility]  Upcoming ov: 10/31/24 [facility]    BMP- 7/22/24

## 2024-10-01 ENCOUNTER — OFFICE VISIT (OUTPATIENT)
Dept: FAMILY MEDICINE CLINIC | Age: 59
End: 2024-10-01
Payer: COMMERCIAL

## 2024-10-01 VITALS
BODY MASS INDEX: 29.58 KG/M2 | HEART RATE: 59 BPM | WEIGHT: 243 LBS | SYSTOLIC BLOOD PRESSURE: 130 MMHG | DIASTOLIC BLOOD PRESSURE: 80 MMHG | OXYGEN SATURATION: 95 %

## 2024-10-01 DIAGNOSIS — G89.29 CHRONIC BILATERAL LOW BACK PAIN WITH BILATERAL SCIATICA: ICD-10-CM

## 2024-10-01 DIAGNOSIS — M54.42 CHRONIC BILATERAL LOW BACK PAIN WITH BILATERAL SCIATICA: ICD-10-CM

## 2024-10-01 DIAGNOSIS — C64.2 UROTHELIAL CARCINOMA OF KIDNEY, LEFT (HCC): ICD-10-CM

## 2024-10-01 DIAGNOSIS — I25.10 CORONARY ARTERY DISEASE INVOLVING NATIVE CORONARY ARTERY OF NATIVE HEART WITHOUT ANGINA PECTORIS: ICD-10-CM

## 2024-10-01 DIAGNOSIS — Z86.718 HISTORY OF DVT (DEEP VEIN THROMBOSIS): ICD-10-CM

## 2024-10-01 DIAGNOSIS — F41.9 ANXIETY: ICD-10-CM

## 2024-10-01 DIAGNOSIS — I10 ESSENTIAL HYPERTENSION: Primary | ICD-10-CM

## 2024-10-01 DIAGNOSIS — M54.41 CHRONIC BILATERAL LOW BACK PAIN WITH BILATERAL SCIATICA: ICD-10-CM

## 2024-10-01 PROCEDURE — 99214 OFFICE O/P EST MOD 30 MIN: CPT | Performed by: FAMILY MEDICINE

## 2024-10-01 PROCEDURE — G8417 CALC BMI ABV UP PARAM F/U: HCPCS | Performed by: FAMILY MEDICINE

## 2024-10-01 PROCEDURE — 3079F DIAST BP 80-89 MM HG: CPT | Performed by: FAMILY MEDICINE

## 2024-10-01 PROCEDURE — 3075F SYST BP GE 130 - 139MM HG: CPT | Performed by: FAMILY MEDICINE

## 2024-10-01 PROCEDURE — G8484 FLU IMMUNIZE NO ADMIN: HCPCS | Performed by: FAMILY MEDICINE

## 2024-10-01 PROCEDURE — 3017F COLORECTAL CA SCREEN DOC REV: CPT | Performed by: FAMILY MEDICINE

## 2024-10-01 PROCEDURE — 1036F TOBACCO NON-USER: CPT | Performed by: FAMILY MEDICINE

## 2024-10-01 PROCEDURE — G8427 DOCREV CUR MEDS BY ELIG CLIN: HCPCS | Performed by: FAMILY MEDICINE

## 2024-10-01 RX ORDER — CELECOXIB 200 MG/1
200 CAPSULE ORAL DAILY
COMMUNITY
Start: 2024-09-26

## 2024-10-01 RX ORDER — LORAZEPAM 2 MG/1
2 TABLET ORAL ONCE
Qty: 1 TABLET | Refills: 0 | Status: SHIPPED | OUTPATIENT
Start: 2024-10-01 | End: 2024-10-01

## 2024-10-01 NOTE — PROGRESS NOTES
Skin:     General: Skin is warm.   Neurological:      Mental Status: He is alert and oriented to person, place, and time.   Psychiatric:         Behavior: Behavior normal.         Thought Content: Thought content normal.         Judgment: Judgment normal.       Assessment and Plan:   Diagnosis Orders   1. Essential hypertension        2. Coronary artery disease involving native coronary artery of native heart without angina pectoris        3. Urothelial carcinoma of kidney, left (HCC)        4. History of DVT (deep vein thrombosis)        5. Anxiety  LORazepam (ATIVAN) 2 MG tablet      6. Chronic bilateral low back pain with bilateral sciatica        Will give Ativan to take 1 hour before MRI.  Someone else will drive him to the procedure.  Patient no longer on anticoagulation.  We discussed his upcoming back surgery.  He will keep follow-up with urology to follow-up on his bladder cancer and keep follow-up with cardiology.  Blood pressure controlled.  He does not want to take something daily for anxiety at this time unless it gets worse.  The problems listed in the assessment are stable unless otherwise indicated.  Prescription drug management completed today. He  was instructed to continue their current medications and treatment for the above problems unless otherwise indicated above.   Age-specific preventative medicine recommendations were reviewed with patient today.     Follow up in 6 months. Call or return to office for any problems that develop before the next scheduled follow-up appointment.     Candelario Mckeon M.D.    Parts of this note were completed using voice recognition transcription.  Every effort was made to ensure accuracy; however, inadvertent transcription errors may be present.

## 2024-10-01 NOTE — PATIENT INSTRUCTIONS
Please read the healthy family handout that you were given and share it with your family.       Please compare this printed medication list with your medications at home to be sure they are the same.  If you have any medications that are different please contact us immediately at [phone].     Also review your allergies that we have listed, these may also include medications that you have not been able to tolerate, make sure everything listed is correct. If you have any allergies that are different please contact us immediately at [phone].     You may receive a survey in the mail or by email asking about your experience during your visit today. Please complete and return to us so we know how we are serving you.

## 2024-10-09 DIAGNOSIS — I10 ESSENTIAL HYPERTENSION: ICD-10-CM

## 2024-10-10 RX ORDER — CARVEDILOL 12.5 MG/1
12.5 TABLET ORAL 2 TIMES DAILY
Qty: 60 TABLET | Refills: 0 | Status: SHIPPED | OUTPATIENT
Start: 2024-10-10

## 2024-10-22 ENCOUNTER — HOSPITAL ENCOUNTER (OUTPATIENT)
Age: 59
Discharge: HOME | End: 2024-10-22
Attending: INTERNAL MEDICINE

## 2024-10-22 ENCOUNTER — HOSPITAL ENCOUNTER (OUTPATIENT)
Dept: CT IMAGING | Age: 59
Discharge: HOME | End: 2024-10-22
Attending: INTERNAL MEDICINE

## 2024-10-22 DIAGNOSIS — C68.8 MALIGNANT NEOPLASM OF OVERLAPPING SITES OF URINARY ORGANS (HCC): ICD-10-CM

## 2024-10-22 RX ORDER — IOPAMIDOL 755 MG/ML
75 INJECTION, SOLUTION INTRAVASCULAR
Status: DISCONTINUED | OUTPATIENT
Start: 2024-10-22 | End: 2024-10-22

## 2024-10-22 RX ORDER — DIATRIZOATE MEGLUMINE AND DIATRIZOATE SODIUM 660; 100 MG/ML; MG/ML
12 SOLUTION ORAL; RECTAL
Status: DISCONTINUED | OUTPATIENT
Start: 2024-10-22 | End: 2024-10-22

## 2024-10-23 DIAGNOSIS — F41.9 ANXIETY: ICD-10-CM

## 2024-10-23 RX ORDER — LORAZEPAM 2 MG/1
2 TABLET ORAL ONCE
Qty: 1 TABLET | Refills: 0 | Status: SHIPPED | OUTPATIENT
Start: 2024-10-23 | End: 2024-10-23

## 2024-10-23 NOTE — TELEPHONE ENCOUNTER
Lorazepam 2 MG    [facility]    Patient had been given for CT that was supposed to have been done yesterday but they were unable to get IV access and will need to reschedule patient for the scan.

## 2024-10-30 NOTE — PROGRESS NOTES
behavior.  Psychiatric: No anxiety, no depression.  Endocrine: No malaise, fatigue or temperature intolerance. No excessive thirst, fluid intake, or urination. No tremor.  Hematologic/Lymphatic: No abnormal bruising or bleeding, blood clots or swollen lymph nodes.  Allergic/Immunologic: No nasal congestion or hives.    Physical Examination:    /70   Pulse 76   Ht 1.93 m (6' 3.98\")   Wt 110.2 kg (243 lb)   SpO2 95%   BMI 29.59 kg/m²    Vitals:    10/31/24 0801   BP: 102/70   Pulse: 76   SpO2: 95%          Constitutional and General Appearance: NAD   Respiratory:  Normal excursion and expansion without use of accessory muscles  Resp Auscultation: Normal breath sounds without dullness  Cardiovascular:  The apical impulses not displaced  Heart tones are crisp and normal  Cervical veins are not engorged  The carotid upstroke is normal in amplitude and contour without delay or bruit  Normal S1S2, No S3, No Murmur  Peripheral pulses are symmetrical and full  There is no clubbing, cyanosis of the extremities.  No edema  Femoral Arteries: 2+ and equal  Pedal Pulses: 2+ and equal   Abdomen:  No masses or tenderness  Liver/Spleen: No Abnormalities Noted  Neurological/Psychiatric:  Alert and oriented in all spheres  Moves all extremities well  Exhibits normal gait balance and coordination  No abnormalities of mood, affect, memory, mentation, or behavior are noted      Left heart cath 2007  CONCLUSIONS:    1.  Normal coronaries with mild ectasia.     CARDIAC CATH: 4/13/2020  LM: luminals  LAD: 100% mid just after diag  LCX: luminals, 20% mid  RCA: dominant, large aneurysmal, mild diffuse disease with focal stenosis  LVEDP: 1   LVEF:45% with mid , apical anterior and apex Hypokinesis  No AS gradient  LESION1; PCI to mid LAD  STENT 3.5 x 18mm post dilated to      Assessment  1. Successful PCI to mid LAD using 1 drug stent                100%-->0%, ALEXSANDRA-3 flow  2. Stable V-tach during case that broke on its own, followed

## 2024-10-31 ENCOUNTER — HOSPITAL ENCOUNTER (OUTPATIENT)
Dept: CT IMAGING | Age: 59
Discharge: HOME | End: 2024-10-31
Attending: INTERNAL MEDICINE
Payer: COMMERCIAL

## 2024-10-31 ENCOUNTER — OFFICE VISIT (OUTPATIENT)
Age: 59
End: 2024-10-31
Payer: COMMERCIAL

## 2024-10-31 ENCOUNTER — HOSPITAL ENCOUNTER (OUTPATIENT)
Age: 59
Discharge: HOME | End: 2024-10-31
Attending: INTERNAL MEDICINE
Payer: COMMERCIAL

## 2024-10-31 VITALS
HEART RATE: 76 BPM | OXYGEN SATURATION: 95 % | HEIGHT: 76 IN | SYSTOLIC BLOOD PRESSURE: 102 MMHG | WEIGHT: 243 LBS | BODY MASS INDEX: 29.59 KG/M2 | DIASTOLIC BLOOD PRESSURE: 70 MMHG

## 2024-10-31 DIAGNOSIS — E78.2 MIXED HYPERLIPIDEMIA: ICD-10-CM

## 2024-10-31 DIAGNOSIS — I10 ESSENTIAL HYPERTENSION: ICD-10-CM

## 2024-10-31 DIAGNOSIS — I25.10 CORONARY ARTERY DISEASE INVOLVING NATIVE CORONARY ARTERY OF NATIVE HEART WITHOUT ANGINA PECTORIS: Primary | ICD-10-CM

## 2024-10-31 DIAGNOSIS — C68.8 MALIGNANT NEOPLASM OF OVERLAPPING SITES OF URINARY ORGANS (HCC): ICD-10-CM

## 2024-10-31 DIAGNOSIS — I25.5 ISCHEMIC CARDIOMYOPATHY: ICD-10-CM

## 2024-10-31 LAB
BUN SERPL-MCNC: 18 MG/DL (ref 7–20)
CREAT SERPL-MCNC: 1.1 MG/DL (ref 0.8–1.3)
GFR SERPLBLD CREATININE-BSD FMLA CKD-EPI: 76 ML/MIN/{1.73_M2}

## 2024-10-31 PROCEDURE — G8417 CALC BMI ABV UP PARAM F/U: HCPCS | Performed by: INTERNAL MEDICINE

## 2024-10-31 PROCEDURE — 1036F TOBACCO NON-USER: CPT | Performed by: INTERNAL MEDICINE

## 2024-10-31 PROCEDURE — 82565 ASSAY OF CREATININE: CPT

## 2024-10-31 PROCEDURE — 99214 OFFICE O/P EST MOD 30 MIN: CPT | Performed by: INTERNAL MEDICINE

## 2024-10-31 PROCEDURE — G8427 DOCREV CUR MEDS BY ELIG CLIN: HCPCS | Performed by: INTERNAL MEDICINE

## 2024-10-31 PROCEDURE — 36415 COLL VENOUS BLD VENIPUNCTURE: CPT

## 2024-10-31 PROCEDURE — 3074F SYST BP LT 130 MM HG: CPT | Performed by: INTERNAL MEDICINE

## 2024-10-31 PROCEDURE — 3017F COLORECTAL CA SCREEN DOC REV: CPT | Performed by: INTERNAL MEDICINE

## 2024-10-31 PROCEDURE — 84520 ASSAY OF UREA NITROGEN: CPT

## 2024-10-31 PROCEDURE — G8484 FLU IMMUNIZE NO ADMIN: HCPCS | Performed by: INTERNAL MEDICINE

## 2024-10-31 PROCEDURE — 6360000004 HC RX CONTRAST MEDICATION: Performed by: INTERNAL MEDICINE

## 2024-10-31 PROCEDURE — 71260 CT THORAX DX C+: CPT

## 2024-10-31 PROCEDURE — 3078F DIAST BP <80 MM HG: CPT | Performed by: INTERNAL MEDICINE

## 2024-10-31 RX ORDER — DIATRIZOATE MEGLUMINE AND DIATRIZOATE SODIUM 660; 100 MG/ML; MG/ML
12 SOLUTION ORAL; RECTAL
Status: DISCONTINUED | OUTPATIENT
Start: 2024-10-31 | End: 2024-11-01 | Stop reason: HOSPADM

## 2024-10-31 RX ORDER — IOPAMIDOL 755 MG/ML
75 INJECTION, SOLUTION INTRAVASCULAR
Status: COMPLETED | OUTPATIENT
Start: 2024-10-31 | End: 2024-10-31

## 2024-10-31 RX ADMIN — IOPAMIDOL 75 ML: 755 INJECTION, SOLUTION INTRAVENOUS at 10:00

## 2024-10-31 RX ADMIN — DIATRIZOATE MEGLUMINE AND DIATRIZOATE SODIUM 12 ML: 660; 100 LIQUID ORAL; RECTAL at 10:00

## 2024-11-13 ENCOUNTER — APPOINTMENT (OUTPATIENT)
Dept: CT IMAGING | Age: 59
End: 2024-11-13
Payer: COMMERCIAL

## 2024-11-13 ENCOUNTER — HOSPITAL ENCOUNTER (EMERGENCY)
Age: 59
Discharge: HOME | End: 2024-11-14
Attending: EMERGENCY MEDICINE
Payer: COMMERCIAL

## 2024-11-13 ENCOUNTER — APPOINTMENT (OUTPATIENT)
Dept: GENERAL RADIOLOGY | Age: 59
End: 2024-11-13
Payer: COMMERCIAL

## 2024-11-13 DIAGNOSIS — I95.9 TRANSIENT HYPOTENSION: Primary | ICD-10-CM

## 2024-11-13 LAB
ABO + RH BLD: NORMAL
ALBUMIN SERPL-MCNC: 3.2 G/DL (ref 3.4–5)
ALBUMIN/GLOB SERPL: 1.3 {RATIO} (ref 1.1–2.2)
ALP SERPL-CCNC: 87 U/L (ref 40–129)
ALT SERPL-CCNC: 50 U/L (ref 10–40)
ANION GAP SERPL CALCULATED.3IONS-SCNC: 10 MMOL/L (ref 3–16)
ANISOCYTOSIS BLD QL SMEAR: ABNORMAL
AST SERPL-CCNC: 39 U/L (ref 15–37)
BASE EXCESS BLDV CALC-SCNC: -3.1 MMOL/L (ref -3–3)
BASOPHILS # BLD: 0 K/UL (ref 0–0.2)
BASOPHILS NFR BLD: 0 %
BILIRUB SERPL-MCNC: 0.9 MG/DL (ref 0–1)
BLD GP AB SCN SERPL QL: NORMAL
BUN SERPL-MCNC: 22 MG/DL (ref 7–20)
CALCIUM SERPL-MCNC: 7.6 MG/DL (ref 8.3–10.6)
CHLORIDE SERPL-SCNC: 104 MMOL/L (ref 99–110)
CO2 BLDV-SCNC: 23 MMOL/L
CO2 SERPL-SCNC: 25 MMOL/L (ref 21–32)
COHGB MFR BLDV: 1.3 % (ref 0–1.5)
CREAT SERPL-MCNC: 1.2 MG/DL (ref 0.8–1.3)
DEPRECATED RDW RBC AUTO: 15.6 % (ref 12.4–15.4)
EOSINOPHIL # BLD: 0 K/UL (ref 0–0.6)
EOSINOPHIL NFR BLD: 0 %
GFR SERPLBLD CREATININE-BSD FMLA CKD-EPI: 69 ML/MIN/{1.73_M2}
GLUCOSE SERPL-MCNC: 99 MG/DL (ref 70–99)
HCO3 BLDV-SCNC: 21.5 MMOL/L (ref 23–29)
HCT VFR BLD AUTO: 22.4 % (ref 40.5–52.5)
HGB BLD-MCNC: 7.6 G/DL (ref 13.5–17.5)
LACTATE BLDV-SCNC: 1.6 MMOL/L (ref 0.4–1.9)
LYMPHOCYTES # BLD: 2.1 K/UL (ref 1–5.1)
LYMPHOCYTES NFR BLD: 22 %
MCH RBC QN AUTO: 31.3 PG (ref 26–34)
MCHC RBC AUTO-ENTMCNC: 33.9 G/DL (ref 31–36)
MCV RBC AUTO: 92.2 FL (ref 80–100)
METHGB MFR BLDV: 0.3 %
MONOCYTES # BLD: 0.7 K/UL (ref 0–1.3)
MONOCYTES NFR BLD: 7 %
NEUTROPHILS # BLD: 6.9 K/UL (ref 1.7–7.7)
NEUTROPHILS NFR BLD: 70 %
NEUTS BAND NFR BLD MANUAL: 1 % (ref 0–7)
NT-PROBNP SERPL-MCNC: 263 PG/ML (ref 0–124)
O2 CT VFR BLDV CALC: 9 VOL %
O2 THERAPY: ABNORMAL
OVALOCYTES BLD QL SMEAR: ABNORMAL
PCO2 BLDV: 36.8 MMHG (ref 40–50)
PH BLDV: 7.38 [PH] (ref 7.35–7.45)
PLATELET # BLD AUTO: 222 K/UL (ref 135–450)
PLATELET BLD QL SMEAR: ADEQUATE
PMV BLD AUTO: 7.7 FL (ref 5–10.5)
PO2 BLDV: 46.1 MMHG (ref 25–40)
POIKILOCYTOSIS BLD QL SMEAR: ABNORMAL
POLYCHROMASIA BLD QL SMEAR: ABNORMAL
POTASSIUM SERPL-SCNC: 3.9 MMOL/L (ref 3.5–5.1)
PROCALCITONIN SERPL IA-MCNC: 0.09 NG/ML (ref 0–0.15)
PROT SERPL-MCNC: 5.7 G/DL (ref 6.4–8.2)
RBC # BLD AUTO: 2.43 M/UL (ref 4.2–5.9)
SAO2 % BLDV: 82 %
SLIDE REVIEW: ABNORMAL
SODIUM SERPL-SCNC: 139 MMOL/L (ref 136–145)
TROPONIN, HIGH SENSITIVITY: 59 NG/L (ref 0–22)
TROPONIN, HIGH SENSITIVITY: 65 NG/L (ref 0–22)
WBC # BLD AUTO: 9.7 K/UL (ref 4–11)

## 2024-11-13 PROCEDURE — 96361 HYDRATE IV INFUSION ADD-ON: CPT

## 2024-11-13 PROCEDURE — 80053 COMPREHEN METABOLIC PANEL: CPT

## 2024-11-13 PROCEDURE — 71260 CT THORAX DX C+: CPT

## 2024-11-13 PROCEDURE — 36415 COLL VENOUS BLD VENIPUNCTURE: CPT

## 2024-11-13 PROCEDURE — 6360000004 HC RX CONTRAST MEDICATION: Performed by: NURSE PRACTITIONER

## 2024-11-13 PROCEDURE — 83605 ASSAY OF LACTIC ACID: CPT

## 2024-11-13 PROCEDURE — 71045 X-RAY EXAM CHEST 1 VIEW: CPT

## 2024-11-13 PROCEDURE — 82803 BLOOD GASES ANY COMBINATION: CPT

## 2024-11-13 PROCEDURE — 96360 HYDRATION IV INFUSION INIT: CPT

## 2024-11-13 PROCEDURE — 84484 ASSAY OF TROPONIN QUANT: CPT

## 2024-11-13 PROCEDURE — 6370000000 HC RX 637 (ALT 250 FOR IP): Performed by: NURSE PRACTITIONER

## 2024-11-13 PROCEDURE — 86901 BLOOD TYPING SEROLOGIC RH(D): CPT

## 2024-11-13 PROCEDURE — 84145 PROCALCITONIN (PCT): CPT

## 2024-11-13 PROCEDURE — 83880 ASSAY OF NATRIURETIC PEPTIDE: CPT

## 2024-11-13 PROCEDURE — 6370000000 HC RX 637 (ALT 250 FOR IP): Performed by: EMERGENCY MEDICINE

## 2024-11-13 PROCEDURE — 99285 EMERGENCY DEPT VISIT HI MDM: CPT

## 2024-11-13 PROCEDURE — 85025 COMPLETE CBC W/AUTO DIFF WBC: CPT

## 2024-11-13 PROCEDURE — 86900 BLOOD TYPING SEROLOGIC ABO: CPT

## 2024-11-13 PROCEDURE — 76937 US GUIDE VASCULAR ACCESS: CPT

## 2024-11-13 PROCEDURE — 93005 ELECTROCARDIOGRAM TRACING: CPT | Performed by: NURSE PRACTITIONER

## 2024-11-13 PROCEDURE — 2580000003 HC RX 258: Performed by: NURSE PRACTITIONER

## 2024-11-13 PROCEDURE — 86850 RBC ANTIBODY SCREEN: CPT

## 2024-11-13 RX ORDER — LIDOCAINE 4 G/G
1 PATCH TOPICAL ONCE
Status: DISCONTINUED | OUTPATIENT
Start: 2024-11-13 | End: 2024-11-14 | Stop reason: HOSPADM

## 2024-11-13 RX ORDER — 0.9 % SODIUM CHLORIDE 0.9 %
1000 INTRAVENOUS SOLUTION INTRAVENOUS ONCE
Status: DISCONTINUED | OUTPATIENT
Start: 2024-11-13 | End: 2024-11-13

## 2024-11-13 RX ORDER — 0.9 % SODIUM CHLORIDE 0.9 %
500 INTRAVENOUS SOLUTION INTRAVENOUS ONCE
Status: COMPLETED | OUTPATIENT
Start: 2024-11-13 | End: 2024-11-13

## 2024-11-13 RX ORDER — HYDROCODONE BITARTRATE AND ACETAMINOPHEN 5; 325 MG/1; MG/1
1 TABLET ORAL
Status: COMPLETED | OUTPATIENT
Start: 2024-11-13 | End: 2024-11-13

## 2024-11-13 RX ORDER — IOPAMIDOL 755 MG/ML
75 INJECTION, SOLUTION INTRAVASCULAR
Status: COMPLETED | OUTPATIENT
Start: 2024-11-13 | End: 2024-11-13

## 2024-11-13 RX ORDER — HYDROCODONE BITARTRATE AND ACETAMINOPHEN 5; 325 MG/1; MG/1
1 TABLET ORAL ONCE
Status: COMPLETED | OUTPATIENT
Start: 2024-11-13 | End: 2024-11-13

## 2024-11-13 RX ADMIN — SODIUM CHLORIDE 500 ML: 9 INJECTION, SOLUTION INTRAVENOUS at 17:43

## 2024-11-13 RX ADMIN — IOPAMIDOL 75 ML: 755 INJECTION, SOLUTION INTRAVENOUS at 20:11

## 2024-11-13 RX ADMIN — HYDROCODONE BITARTRATE AND ACETAMINOPHEN 1 TABLET: 5; 325 TABLET ORAL at 18:19

## 2024-11-13 RX ADMIN — HYDROCODONE BITARTRATE AND ACETAMINOPHEN 1 TABLET: 5; 325 TABLET ORAL at 22:30

## 2024-11-13 ASSESSMENT — PAIN DESCRIPTION - PAIN TYPE: TYPE: ACUTE PAIN

## 2024-11-13 ASSESSMENT — LIFESTYLE VARIABLES
HOW MANY STANDARD DRINKS CONTAINING ALCOHOL DO YOU HAVE ON A TYPICAL DAY: PATIENT DOES NOT DRINK
HOW OFTEN DO YOU HAVE A DRINK CONTAINING ALCOHOL: NEVER

## 2024-11-13 ASSESSMENT — PAIN - FUNCTIONAL ASSESSMENT: PAIN_FUNCTIONAL_ASSESSMENT: 0-10

## 2024-11-13 ASSESSMENT — PAIN SCALES - GENERAL: PAINLEVEL_OUTOF10: 7

## 2024-11-13 NOTE — PROGRESS NOTES
[facility]  Ultrasound Guided Peripheral Insertion Procedure Note.     Seven Byrd   Admitted- 11/13/2024  4:37 PM  Admission diagnosis- No admission diagnoses are documented for this encounter.      Attending Physician- No att. providers found  Ordering Physician- Finn MALDONADO  Indication for Insertion: Limited Access          USG PIV placed to left forearm using sterile technique after multiple attempts. Brisk blood return noted, line flushes with ease. Patient tolerated well. Bed returned to lowest position, call light within reach. See US images below.              Kalli Baig RN  Vascular Access Team

## 2024-11-14 VITALS
HEART RATE: 87 BPM | HEIGHT: 75 IN | OXYGEN SATURATION: 99 % | BODY MASS INDEX: 30.21 KG/M2 | SYSTOLIC BLOOD PRESSURE: 132 MMHG | DIASTOLIC BLOOD PRESSURE: 69 MMHG | TEMPERATURE: 98.5 F | RESPIRATION RATE: 18 BRPM | WEIGHT: 243 LBS

## 2024-11-14 LAB
EKG ATRIAL RATE: 81 BPM
EKG DIAGNOSIS: NORMAL
EKG P AXIS: 36 DEGREES
EKG P-R INTERVAL: 160 MS
EKG Q-T INTERVAL: 394 MS
EKG QRS DURATION: 80 MS
EKG QTC CALCULATION (BAZETT): 457 MS
EKG R AXIS: -43 DEGREES
EKG T AXIS: 15 DEGREES
EKG VENTRICULAR RATE: 81 BPM

## 2024-11-14 PROCEDURE — 93010 ELECTROCARDIOGRAM REPORT: CPT | Performed by: INTERNAL MEDICINE

## 2024-11-14 NOTE — DISCHARGE INSTRUCTIONS
Please return to the urgency department with any worsening symptoms including but not limited to: Passing out, continued low blood pressures, fever, shortness of breath, chest pain.  Please increase your oral fluid intake.  Please follow-up with your primary care physician as soon as possible to ensure your symptoms are improving

## 2024-11-14 NOTE — ED PROVIDER NOTES
opacification.  2. No acute cardiopulmonary process.  3. Stable left adrenal nodule.  4. Stable pancreatic tail lipoma.  5. Bilateral gynecomastia.    Received 500 mL liter NS bolus in department, and home dose oral Norco and lidocaine patch for his postsurgical back pain.  On chart review, patient was evaluated by cardiology for episode of SVT during his recent hospitalization for back surgery at  who recommended rate control with beta blocker.  During his hospitalization at , troponins were elevated to 961, 1486 11/9, 649 11/9 at that time.  Patient monitored in emergency department for several hours without any vital sign abnormality or any symptoms. His hemoglobin prior to discharge at  was 7.5. Today it is 7.6.  He continues to deny any symptoms including presyncope, lightheadedness, nausea, chest pain, shortness of breath.  He denies any bleeding.  Discussed patient with cardiology on-call- low suspicion for acute etiology including ACS at this time given his lack of symptoms and downtrending troponins.  At this time feel that patient may be discharged with plan for close outpatient follow-up with his specialists as scheduled.  Low suspicion for acute etiology currently including sepsis, acute anemia, ACS.  Discussed findings and plan of care with patient and family at bedside who expressed understanding and agreement with plan.  Recommended follow-up and return to ED with any new or worsening symptoms as specified on discharge instructions.          EKG  The Ekg interpreted by me in the absence of a cardiologist shows.  normal sinus rhythm with a rate of 81  Axis is   Left axis deviation  QTc is  normal  Intervals and Durations are unremarkable.      No specific ST-T wave changes appreciated.  No evidence of acute ischemia.   No significant change from prior EKG dated 4/25/2024        SEP-1  Is this patient to be included in the SEP-1 Core Measure due to severe sepsis or septic shock?   No Exclusion 
    (Please note that portions of this note were completed with a voice recognition program.  Efforts were made to edit the dictations but occasionally words are mis-transcribed.)    ERICA David CNP (electronically signed)     Finn Daniels APRN - CNP  11/13/24 9188

## 2024-11-20 ENCOUNTER — TELEPHONE (OUTPATIENT)
Dept: FAMILY MEDICINE CLINIC | Age: 59
End: 2024-11-20

## 2024-11-20 NOTE — TELEPHONE ENCOUNTER
Liyah with [facility] home health calling to report patient will ne discharging OVM tomorrow. Would like to know if PCP will be signing for skilled nursing, physical therapy and, occupational therapy? Please advise.

## 2024-11-21 ENCOUNTER — TELEPHONE (OUTPATIENT)
Dept: FAMILY MEDICINE CLINIC | Age: 59
End: 2024-11-21

## 2024-11-21 RX ORDER — METOPROLOL TARTRATE 75 MG/1
75 TABLET ORAL 2 TIMES DAILY
COMMUNITY
Start: 2024-11-12 | End: 2024-11-21 | Stop reason: ALTCHOICE

## 2024-11-21 NOTE — TELEPHONE ENCOUNTER
Yes he may discontinue the metoprolol and start back on the Coreg.  He needs to ask the surgeon if it is okay for him to start back on the Celebrex.

## 2024-11-21 NOTE — TELEPHONE ENCOUNTER
Nurse from [facility]  called patient was discharged from [facility] nursing [place] and was seen by nurse today.    She states that while in the hospital he had episode where his heart rate went up real high, he was taken off of Coreg and placed on Metropolol- he had this same episode in 2000 after another surgery because of the anesthesia and to be on Metoprolol just for a week.   His BP this morning was 97/62 so he did not take it. He wants to know if can stop the Metoprolol and go back on his Coreg.    Also his Celebrex 200mg was stopped before surgery and he has not had it since and wanted to know if this can be restarted?  He always has joint pain.     HC will be seeing him 1 x a week for 8 weeks.

## 2024-11-22 ENCOUNTER — TELEPHONE (OUTPATIENT)
Dept: FAMILY MEDICINE CLINIC | Age: 59
End: 2024-11-22

## 2024-11-22 DIAGNOSIS — C64.2 UROTHELIAL CARCINOMA OF KIDNEY, LEFT (HCC): Primary | ICD-10-CM

## 2024-11-22 DIAGNOSIS — Z98.890 STATUS POST LUMBAR SPINE OPERATION: ICD-10-CM

## 2024-11-22 NOTE — TELEPHONE ENCOUNTER
Pt just had lumbar surgery and is requesting a high rise toilet or bedside commode.   I called Jackelyn and she said that [employer] will only cover the bedside commode.   Order sent to [facility].  Will fax to [facility].  Let the family know when this has been faxed.   Thanks.

## 2024-11-25 RX ORDER — SACUBITRIL AND VALSARTAN 49; 51 MG/1; MG/1
TABLET, FILM COATED ORAL
Qty: 180 TABLET | Refills: 3 | OUTPATIENT
Start: 2024-11-25

## 2024-11-25 NOTE — TELEPHONE ENCOUNTER
Requested Prescriptions     Pending Prescriptions Disp Refills    ENTRESTO 49-51 MG per tablet [Pharmacy Med Name: ENTRESTO 49-51MG TABLET] 180 tablet 3     Sig: TAKE (1) TABLET TWICE A DAY.

## 2024-11-26 ENCOUNTER — OFFICE VISIT (OUTPATIENT)
Dept: FAMILY MEDICINE CLINIC | Age: 59
End: 2024-11-26
Payer: COMMERCIAL

## 2024-11-26 VITALS — DIASTOLIC BLOOD PRESSURE: 66 MMHG | HEART RATE: 85 BPM | SYSTOLIC BLOOD PRESSURE: 100 MMHG | OXYGEN SATURATION: 98 %

## 2024-11-26 DIAGNOSIS — I25.5 ISCHEMIC CARDIOMYOPATHY: ICD-10-CM

## 2024-11-26 DIAGNOSIS — D62 ACUTE POSTOPERATIVE ANEMIA DUE TO EXPECTED BLOOD LOSS: Primary | ICD-10-CM

## 2024-11-26 DIAGNOSIS — K59.09 CHRONIC CONSTIPATION: ICD-10-CM

## 2024-11-26 PROCEDURE — G8484 FLU IMMUNIZE NO ADMIN: HCPCS | Performed by: FAMILY MEDICINE

## 2024-11-26 PROCEDURE — 3017F COLORECTAL CA SCREEN DOC REV: CPT | Performed by: FAMILY MEDICINE

## 2024-11-26 PROCEDURE — 1036F TOBACCO NON-USER: CPT | Performed by: FAMILY MEDICINE

## 2024-11-26 PROCEDURE — G8417 CALC BMI ABV UP PARAM F/U: HCPCS | Performed by: FAMILY MEDICINE

## 2024-11-26 PROCEDURE — 3074F SYST BP LT 130 MM HG: CPT | Performed by: FAMILY MEDICINE

## 2024-11-26 PROCEDURE — G8427 DOCREV CUR MEDS BY ELIG CLIN: HCPCS | Performed by: FAMILY MEDICINE

## 2024-11-26 PROCEDURE — 99214 OFFICE O/P EST MOD 30 MIN: CPT | Performed by: FAMILY MEDICINE

## 2024-11-26 PROCEDURE — 3078F DIAST BP <80 MM HG: CPT | Performed by: FAMILY MEDICINE

## 2024-11-26 RX ORDER — ASPIRIN 81 MG/1
81 TABLET ORAL DAILY
COMMUNITY

## 2024-11-26 RX ORDER — SILODOSIN 4 MG/1
4 CAPSULE ORAL EVERY EVENING
COMMUNITY
Start: 2024-11-04

## 2024-11-26 RX ORDER — AMOXICILLIN 250 MG
1 CAPSULE ORAL 2 TIMES DAILY
COMMUNITY
Start: 2024-11-12

## 2024-11-26 RX ORDER — LACTULOSE 10 G/15ML
10 SOLUTION ORAL DAILY PRN
Qty: 473 ML | Refills: 2 | Status: SHIPPED | OUTPATIENT
Start: 2024-11-26 | End: 2025-02-24

## 2024-11-26 RX ORDER — PHENOL 1.4 %
10 AEROSOL, SPRAY (ML) MUCOUS MEMBRANE NIGHTLY
Qty: 90 TABLET | Refills: 1 | Status: SHIPPED | OUTPATIENT
Start: 2024-11-26

## 2024-11-26 NOTE — PROGRESS NOTES
He presents today for hospital nursing home follow-up.  He had major back surgery and then was at [facility].  He goes back to have staples removed tomorrow.  He has had problems with low blood pressures on and off since being in the hospital.  They had him on Lopressor but he is off of that and back on Coreg and he still on Entresto.  Daughter states he has not been drinking enough water and his blood pressure seems to be lower when he does not push the fluids.  He had significant postoperative anemia and they actually gave him some blood but did not want to give him any more because he had already had 4 units.  He has not had recent recheck his labs but his last hemoglobin in the system was 7.1.  He has very difficult to get blood from.  Tests and documents reviewed today: Medication discharged from nursing home     Objective:   /66   Pulse 85   SpO2 98%   BP Readings from Last 3 Encounters:   11/26/24 100/66   11/14/24 132/69   10/31/24 102/70     Physical Exam  Vitals reviewed.   Constitutional:       Appearance: He is well-developed.   HENT:      Head: Normocephalic.   Neck:      Thyroid: No thyromegaly.   Cardiovascular:      Rate and Rhythm: Normal rate and regular rhythm.      Heart sounds: Normal heart sounds. No murmur heard.  Pulmonary:      Effort: No respiratory distress.      Breath sounds: Normal breath sounds. No wheezing or rales.   Abdominal:      Palpations: Abdomen is soft.      Tenderness: There is no abdominal tenderness.   Musculoskeletal:      Cervical back: Neck supple.   Lymphadenopathy:      Cervical: No cervical adenopathy.   Neurological:      Mental Status: He is alert.   Psychiatric:         Behavior: Behavior normal.       Assessment and Plan:   Diagnosis Orders   1. Acute postoperative anemia due to expected blood loss  CBC    Ferritin    Iron and TIBC      2. Ischemic cardiomyopathy  Basic Metabolic Panel      3. Chronic constipation  lactulose (CHRONULAC) 10 GM/15ML

## 2024-11-26 NOTE — PATIENT INSTRUCTIONS
Please read the healthy family handout that you were given and share it with your family.       Please compare this printed medication list with your medications at home to be sure they are the same.  If you have any medications that are different please contact us immediately at [phone].     Also review your allergies that we have listed, these may also include medications that you have not been able to tolerate, make sure everything listed is correct. If you have any allergies that are different please contact us immediately at [phone].     You may receive a survey in the mail or by email asking about your experience during your visit today. Please complete and return to us so we know how we are serving you.

## 2024-12-05 RX ORDER — METHOCARBAMOL 500 MG/1
500 TABLET, FILM COATED ORAL EVERY 6 HOURS PRN
COMMUNITY
Start: 2024-11-19 | End: 2024-12-05 | Stop reason: SDUPTHER

## 2024-12-05 RX ORDER — SACUBITRIL AND VALSARTAN 49; 51 MG/1; MG/1
TABLET, FILM COATED ORAL
Qty: 180 TABLET | Refills: 3 | Status: SHIPPED | OUTPATIENT
Start: 2024-12-05

## 2024-12-05 RX ORDER — METHOCARBAMOL 500 MG/1
500 TABLET, FILM COATED ORAL EVERY 6 HOURS PRN
Qty: 90 TABLET | Refills: 5 | Status: SHIPPED | OUTPATIENT
Start: 2024-12-05

## 2024-12-05 RX ORDER — AMOXICILLIN 250 MG
2 CAPSULE ORAL 2 TIMES DAILY
Qty: 120 TABLET | Refills: 5 | Status: SHIPPED | OUTPATIENT
Start: 2024-12-05

## 2024-12-05 NOTE — TELEPHONE ENCOUNTER
Received refill request for  ENTRESTO 49-51 MG per tablet  from [facility] pharmacy.     Last OV: 10/31/2024     Next OV: 5/8/2025    Last Labs: 4/26/2024 BMP    Last Filled: 11/17/2023

## 2024-12-18 ENCOUNTER — CARE COORDINATION (OUTPATIENT)
Dept: CARE COORDINATION | Age: 59
End: 2024-12-18

## 2024-12-18 NOTE — CARE COORDINATION
Ambulatory Care Coordination Note     2024 12:19 PM     Patient Current Location:  Home: [address]  [place] [address]     This patient was received as a referral from [facility] .    ACM contacted the patient by telephone. Verified name and  with patient as identifiers. Provided introduction to self, and explanation of the ACM role.   Patient declined care management services at this time.          ACM: Christina Summerlin (Interim)     Challenges to be reviewed by the provider   Additional needs identified to be addressed with provider No  none               Method of communication with provider: none.    Utilization: N/A - Initial Call     Care Summary Note: ACM completed an enrollment outreach call to patient. ACM offered care management services to patient. Patient declined care management services stating that he has no needs or concerns at this time. ACM will enroll into care management if needs ever arise in the future.    PCP/Specialist follow up:   Future Appointments         Provider Specialty Dept Phone    2025 9:00 AM Candelario Mckeon MD Family Medicine [phone]    2025 8:00 AM Adilson Currie MD Cardiology [phone]            Follow Up:   No further Ambulatory Care Management follow-up scheduled at this time.

## 2024-12-26 ENCOUNTER — TELEPHONE (OUTPATIENT)
Dept: FAMILY MEDICINE CLINIC | Age: 59
End: 2024-12-26

## 2024-12-26 DIAGNOSIS — M54.41 CHRONIC BILATERAL LOW BACK PAIN WITH BILATERAL SCIATICA: Primary | ICD-10-CM

## 2024-12-26 DIAGNOSIS — G89.29 CHRONIC BILATERAL LOW BACK PAIN WITH BILATERAL SCIATICA: Primary | ICD-10-CM

## 2024-12-26 DIAGNOSIS — M54.42 CHRONIC BILATERAL LOW BACK PAIN WITH BILATERAL SCIATICA: Primary | ICD-10-CM

## 2024-12-26 NOTE — TELEPHONE ENCOUNTER
1.Tizanidine 4 MG    2. Patient requests pain medication, states back surgeon is no longer going to prescribe the oxycodone- wants to know if PCP will prescribe something for pain, states it does not have to be as strong as the oxycodone.     [facility]    Last OV- 11/26/24    Next OV- 4/22/25

## 2024-12-27 ENCOUNTER — HOSPITAL ENCOUNTER (OUTPATIENT)
Age: 59
Discharge: HOME | End: 2024-12-27
Payer: COMMERCIAL

## 2024-12-27 DIAGNOSIS — I25.5 ISCHEMIC CARDIOMYOPATHY: ICD-10-CM

## 2024-12-27 DIAGNOSIS — D62 ACUTE POSTOPERATIVE ANEMIA DUE TO EXPECTED BLOOD LOSS: ICD-10-CM

## 2024-12-27 LAB
ANION GAP SERPL CALCULATED.3IONS-SCNC: 13 MMOL/L (ref 3–16)
BUN SERPL-MCNC: 10 MG/DL (ref 7–20)
CALCIUM SERPL-MCNC: 9.5 MG/DL (ref 8.3–10.6)
CHLORIDE SERPL-SCNC: 104 MMOL/L (ref 99–110)
CO2 SERPL-SCNC: 24 MMOL/L (ref 21–32)
CREAT SERPL-MCNC: 1.1 MG/DL (ref 0.8–1.3)
DEPRECATED RDW RBC AUTO: 15.1 % (ref 12.4–15.4)
FERRITIN SERPL IA-MCNC: 62.7 NG/ML (ref 30–400)
GFR SERPLBLD CREATININE-BSD FMLA CKD-EPI: 76 ML/MIN/{1.73_M2}
GLUCOSE SERPL-MCNC: 164 MG/DL (ref 70–99)
HCT VFR BLD AUTO: 38.9 % (ref 40.5–52.5)
HGB BLD-MCNC: 12.2 G/DL (ref 13.5–17.5)
IRON SATN MFR SERPL: 42 % (ref 20–50)
IRON SERPL-MCNC: 146 UG/DL (ref 59–158)
MCH RBC QN AUTO: 27.7 PG (ref 26–34)
MCHC RBC AUTO-ENTMCNC: 31.5 G/DL (ref 31–36)
MCV RBC AUTO: 88 FL (ref 80–100)
PLATELET # BLD AUTO: 231 K/UL (ref 135–450)
PMV BLD AUTO: 7.7 FL (ref 5–10.5)
POTASSIUM SERPL-SCNC: 4.1 MMOL/L (ref 3.5–5.1)
RBC # BLD AUTO: 4.42 M/UL (ref 4.2–5.9)
SODIUM SERPL-SCNC: 141 MMOL/L (ref 136–145)
TIBC SERPL-MCNC: 348 UG/DL (ref 260–445)
WBC # BLD AUTO: 5.8 K/UL (ref 4–11)

## 2024-12-27 PROCEDURE — 80048 BASIC METABOLIC PNL TOTAL CA: CPT

## 2024-12-27 PROCEDURE — 36415 COLL VENOUS BLD VENIPUNCTURE: CPT

## 2024-12-27 PROCEDURE — 83550 IRON BINDING TEST: CPT

## 2024-12-27 PROCEDURE — 82728 ASSAY OF FERRITIN: CPT

## 2024-12-27 PROCEDURE — 85027 COMPLETE CBC AUTOMATED: CPT

## 2024-12-27 PROCEDURE — 83540 ASSAY OF IRON: CPT

## 2024-12-27 RX ORDER — TRAMADOL HYDROCHLORIDE 50 MG/1
100 TABLET ORAL EVERY 6 HOURS PRN
Qty: 112 TABLET | Refills: 0 | OUTPATIENT
Start: 2024-12-27 | End: 2025-01-10

## 2024-12-27 NOTE — TELEPHONE ENCOUNTER
Spoke to Dr. Mckeon verbally and he wants patient to have 14 day supply of Tramadol 2 every 6 hours prn. Both Rx's were phone in verbally. Patient was notified.  Please sign the attached prescriptions to put on his med list.

## 2024-12-30 ENCOUNTER — TELEPHONE (OUTPATIENT)
Dept: ADMINISTRATIVE | Age: 59
End: 2024-12-30

## 2024-12-30 NOTE — TELEPHONE ENCOUNTER
Submitted PA for traMADol HCl 50MG tablets   Via CMM Key: [id number] STATUS: PENDING.    Follow up done daily; if no decision with in three days we will refax.  If another three days goes by with no decision will call the insurance for status.

## 2025-01-02 ENCOUNTER — TELEPHONE (OUTPATIENT)
Dept: FAMILY MEDICINE CLINIC | Age: 60
End: 2025-01-02

## 2025-01-02 NOTE — TELEPHONE ENCOUNTER
Patient was discharged from home health on 12/31 from all services. His insurance did not want to continue to cover.

## 2025-01-08 DIAGNOSIS — M54.41 CHRONIC BILATERAL LOW BACK PAIN WITH BILATERAL SCIATICA: ICD-10-CM

## 2025-01-08 DIAGNOSIS — M54.42 CHRONIC BILATERAL LOW BACK PAIN WITH BILATERAL SCIATICA: ICD-10-CM

## 2025-01-08 DIAGNOSIS — G89.29 CHRONIC BILATERAL LOW BACK PAIN WITH BILATERAL SCIATICA: ICD-10-CM

## 2025-01-08 RX ORDER — TRAMADOL HYDROCHLORIDE 50 MG/1
50 TABLET ORAL EVERY 6 HOURS PRN
Qty: 112 TABLET | Refills: 0 | Status: SHIPPED | OUTPATIENT
Start: 2025-01-08 | End: 2025-02-07

## 2025-01-08 RX ORDER — GABAPENTIN 300 MG/1
CAPSULE ORAL
Qty: 90 CAPSULE | Refills: 0 | OUTPATIENT
Start: 2025-01-08 | End: 2025-07-07

## 2025-01-12 ENCOUNTER — HOSPITAL ENCOUNTER (EMERGENCY)
Age: 60
Discharge: HOME | End: 2025-01-12
Attending: STUDENT IN AN ORGANIZED HEALTH CARE EDUCATION/TRAINING PROGRAM
Payer: COMMERCIAL

## 2025-01-12 ENCOUNTER — APPOINTMENT (OUTPATIENT)
Dept: CT IMAGING | Age: 60
End: 2025-01-12
Payer: COMMERCIAL

## 2025-01-12 ENCOUNTER — APPOINTMENT (OUTPATIENT)
Dept: GENERAL RADIOLOGY | Age: 60
End: 2025-01-12
Payer: COMMERCIAL

## 2025-01-12 VITALS
BODY MASS INDEX: 26.02 KG/M2 | WEIGHT: 220.4 LBS | HEART RATE: 62 BPM | DIASTOLIC BLOOD PRESSURE: 80 MMHG | TEMPERATURE: 98.2 F | HEIGHT: 77 IN | OXYGEN SATURATION: 98 % | SYSTOLIC BLOOD PRESSURE: 135 MMHG | RESPIRATION RATE: 17 BRPM

## 2025-01-12 DIAGNOSIS — R12 HEARTBURN: Primary | ICD-10-CM

## 2025-01-12 DIAGNOSIS — R07.9 CHEST PAIN, UNSPECIFIED TYPE: ICD-10-CM

## 2025-01-12 DIAGNOSIS — R10.13 EPIGASTRIC PAIN: ICD-10-CM

## 2025-01-12 LAB
ALBUMIN SERPL-MCNC: 4.6 G/DL (ref 3.4–5)
ALBUMIN/GLOB SERPL: 1.3 {RATIO} (ref 1.1–2.2)
ALP SERPL-CCNC: 144 U/L (ref 40–129)
ALT SERPL-CCNC: 12 U/L (ref 10–40)
ANION GAP SERPL CALCULATED.3IONS-SCNC: 13 MMOL/L (ref 3–16)
AST SERPL-CCNC: 13 U/L (ref 15–37)
BASOPHILS # BLD: 0.1 K/UL (ref 0–0.2)
BASOPHILS NFR BLD: 0.6 %
BILIRUB SERPL-MCNC: 0.6 MG/DL (ref 0–1)
BUN SERPL-MCNC: 9 MG/DL (ref 7–20)
CALCIUM SERPL-MCNC: 10 MG/DL (ref 8.3–10.6)
CHLORIDE SERPL-SCNC: 102 MMOL/L (ref 99–110)
CO2 SERPL-SCNC: 25 MMOL/L (ref 21–32)
CREAT SERPL-MCNC: 1 MG/DL (ref 0.8–1.3)
DEPRECATED RDW RBC AUTO: 14.6 % (ref 12.4–15.4)
EKG ATRIAL RATE: 75 BPM
EKG DIAGNOSIS: NORMAL
EKG P AXIS: 53 DEGREES
EKG P-R INTERVAL: 180 MS
EKG Q-T INTERVAL: 366 MS
EKG QRS DURATION: 86 MS
EKG QTC CALCULATION (BAZETT): 408 MS
EKG R AXIS: -75 DEGREES
EKG T AXIS: 60 DEGREES
EKG VENTRICULAR RATE: 75 BPM
EOSINOPHIL # BLD: 0 K/UL (ref 0–0.6)
EOSINOPHIL NFR BLD: 0.5 %
GFR SERPLBLD CREATININE-BSD FMLA CKD-EPI: 85 ML/MIN/{1.73_M2}
GLUCOSE SERPL-MCNC: 114 MG/DL (ref 70–99)
HCT VFR BLD AUTO: 41.6 % (ref 40.5–52.5)
HGB BLD-MCNC: 13.4 G/DL (ref 13.5–17.5)
LYMPHOCYTES # BLD: 2.3 K/UL (ref 1–5.1)
LYMPHOCYTES NFR BLD: 27.5 %
MCH RBC QN AUTO: 27.5 PG (ref 26–34)
MCHC RBC AUTO-ENTMCNC: 32.1 G/DL (ref 31–36)
MCV RBC AUTO: 85.5 FL (ref 80–100)
MONOCYTES # BLD: 0.8 K/UL (ref 0–1.3)
MONOCYTES NFR BLD: 9.1 %
NEUTROPHILS # BLD: 5.3 K/UL (ref 1.7–7.7)
NEUTROPHILS NFR BLD: 62.3 %
PLATELET # BLD AUTO: 243 K/UL (ref 135–450)
PMV BLD AUTO: 8.4 FL (ref 5–10.5)
POTASSIUM SERPL-SCNC: 3.7 MMOL/L (ref 3.5–5.1)
PROT SERPL-MCNC: 8.1 G/DL (ref 6.4–8.2)
RBC # BLD AUTO: 4.87 M/UL (ref 4.2–5.9)
SODIUM SERPL-SCNC: 140 MMOL/L (ref 136–145)
TROPONIN, HIGH SENSITIVITY: 15 NG/L (ref 0–22)
TROPONIN, HIGH SENSITIVITY: 17 NG/L (ref 0–22)
WBC # BLD AUTO: 8.5 K/UL (ref 4–11)

## 2025-01-12 PROCEDURE — 6370000000 HC RX 637 (ALT 250 FOR IP): Performed by: STUDENT IN AN ORGANIZED HEALTH CARE EDUCATION/TRAINING PROGRAM

## 2025-01-12 PROCEDURE — 6360000002 HC RX W HCPCS: Performed by: STUDENT IN AN ORGANIZED HEALTH CARE EDUCATION/TRAINING PROGRAM

## 2025-01-12 PROCEDURE — 80053 COMPREHEN METABOLIC PANEL: CPT

## 2025-01-12 PROCEDURE — 93010 ELECTROCARDIOGRAM REPORT: CPT | Performed by: INTERNAL MEDICINE

## 2025-01-12 PROCEDURE — 93005 ELECTROCARDIOGRAM TRACING: CPT | Performed by: STUDENT IN AN ORGANIZED HEALTH CARE EDUCATION/TRAINING PROGRAM

## 2025-01-12 PROCEDURE — 85025 COMPLETE CBC W/AUTO DIFF WBC: CPT

## 2025-01-12 PROCEDURE — 96375 TX/PRO/DX INJ NEW DRUG ADDON: CPT

## 2025-01-12 PROCEDURE — 2500000003 HC RX 250 WO HCPCS: Performed by: STUDENT IN AN ORGANIZED HEALTH CARE EDUCATION/TRAINING PROGRAM

## 2025-01-12 PROCEDURE — 99285 EMERGENCY DEPT VISIT HI MDM: CPT

## 2025-01-12 PROCEDURE — 96374 THER/PROPH/DIAG INJ IV PUSH: CPT

## 2025-01-12 PROCEDURE — 71046 X-RAY EXAM CHEST 2 VIEWS: CPT

## 2025-01-12 PROCEDURE — 84484 ASSAY OF TROPONIN QUANT: CPT

## 2025-01-12 PROCEDURE — 74176 CT ABD & PELVIS W/O CONTRAST: CPT

## 2025-01-12 PROCEDURE — 36415 COLL VENOUS BLD VENIPUNCTURE: CPT

## 2025-01-12 PROCEDURE — 2580000003 HC RX 258

## 2025-01-12 RX ORDER — KETAMINE HYDROCHLORIDE 100 MG/ML
0.1 INJECTION, SOLUTION INTRAMUSCULAR; INTRAVENOUS ONCE
Status: COMPLETED | OUTPATIENT
Start: 2025-01-12 | End: 2025-01-12

## 2025-01-12 RX ORDER — ONDANSETRON 4 MG/1
4 TABLET, ORALLY DISINTEGRATING ORAL 3 TIMES DAILY PRN
Qty: 21 TABLET | Refills: 0 | Status: SHIPPED | OUTPATIENT
Start: 2025-01-12

## 2025-01-12 RX ORDER — SODIUM CHLORIDE 9 MG/ML
INJECTION, SOLUTION INTRAVENOUS
Status: COMPLETED
Start: 2025-01-12 | End: 2025-01-12

## 2025-01-12 RX ORDER — LORAZEPAM 2 MG/ML
1 INJECTION INTRAMUSCULAR ONCE
Status: COMPLETED | OUTPATIENT
Start: 2025-01-12 | End: 2025-01-12

## 2025-01-12 RX ORDER — PANTOPRAZOLE SODIUM 40 MG/1
40 TABLET, DELAYED RELEASE ORAL
Qty: 14 TABLET | Refills: 0 | Status: SHIPPED | OUTPATIENT
Start: 2025-01-12 | End: 2025-01-26

## 2025-01-12 RX ORDER — ASPIRIN 81 MG/1
324 TABLET, CHEWABLE ORAL ONCE
Status: COMPLETED | OUTPATIENT
Start: 2025-01-12 | End: 2025-01-12

## 2025-01-12 RX ADMIN — LIDOCAINE HYDROCHLORIDE: 20 SOLUTION ORAL at 12:57

## 2025-01-12 RX ADMIN — KETAMINE HYDROCHLORIDE 10 MG: 100 INJECTION, SOLUTION, CONCENTRATE INTRAMUSCULAR; INTRAVENOUS at 12:52

## 2025-01-12 RX ADMIN — ASPIRIN 324 MG: 81 TABLET, CHEWABLE ORAL at 12:52

## 2025-01-12 RX ADMIN — SODIUM CHLORIDE 100 ML: 900 INJECTION INTRAVENOUS at 12:55

## 2025-01-12 RX ADMIN — LORAZEPAM 1 MG: 2 INJECTION INTRAMUSCULAR; INTRAVENOUS at 12:48

## 2025-01-12 ASSESSMENT — PAIN SCALES - GENERAL: PAINLEVEL_OUTOF10: 7

## 2025-01-12 ASSESSMENT — HEART SCORE: ECG: NORMAL

## 2025-01-12 NOTE — ED PROVIDER NOTES
Troponin negative. [DG]      ED Course User Index  [DG] Jermaine Frank MD       Diagnostic tests considered but not performed:    External records reviewed:      Diagnostics interpreted by me:  EKG my interpretation reveals normal sinus rhythm with a regular rate of 75, normal PAT, narrow QRS, left axis deviation, normal QTc, no ST or T wave changes concerning for acute ischemia noted.  On comparison to EKG from 11/13/2024 no significant changes.    Discussions with other clinicians:        Total critical care time today provided was minutes. This excludes seperately billable procedure. Critical care time provided for concerns/conditions that required close evaluation and/or intervention with concern for patient decompensation.      ED Medications managed:  Medications   aspirin chewable tablet 324 mg (324 mg Oral Given 1/12/25 1252)   aluminum & magnesium hydroxide-simethicone (MAALOX) 30 mL, lidocaine viscous hcl (XYLOCAINE) 5 mL (GI COCKTAIL) ( Oral Given 1/12/25 1257)   ketamine (KETALAR) injection 10 mg (10 mg IntraVENous Given 1/12/25 1252)   LORazepam (ATIVAN) injection 1 mg (1 mg IntraVENous Given 1/12/25 1248)   sodium chloride 0.9 % infusion (0 mL/hr  Stopped 1/12/25 1359)       PROCEDURES:  Unless otherwise noted below, none.     Procedures    FINAL IMPRESSION      1. Heartburn    2. Chest pain, unspecified type    3. Epigastric pain          DISPOSITION    Decision To Discharge 01/12/2025 03:49:51 PM      PATIENT REFERRED TO:  Candelario Mckeon MD  [id number] [facility] [id number] [phone]    In 3 days      [facility]  [address] [id number] [phone]    If symptoms worsen      DISCHARGE MEDICATIONS:  Discharge Medication List as of 1/12/2025  4:05 PM        START taking these medications    Details   pantoprazole (PROTONIX) 40 MG tablet Take 1 tablet by mouth every morning (before breakfast) for 14 days, Disp-14 tablet, R-0Normal

## 2025-01-12 NOTE — DISCHARGE INSTRUCTIONS
Thank you for trusting us with your care, it was nice to meet you, as we discussed please follow-up with your primary doctor in the next 4 to 5 days, sooner if symptoms persist, speak to your pain management team about your ongoing back pain.  If you have worsening symptoms, worsening chest pain, shortness of breath, sweatiness, dizziness or other symptoms you find concerning for emergent illness or injury please return to the ED.

## 2025-01-19 ENCOUNTER — PATIENT MESSAGE (OUTPATIENT)
Dept: FAMILY MEDICINE CLINIC | Age: 60
End: 2025-01-19

## 2025-01-20 RX ORDER — PANTOPRAZOLE SODIUM 40 MG/1
40 TABLET, DELAYED RELEASE ORAL
Qty: 90 TABLET | Refills: 1 | Status: SHIPPED | OUTPATIENT
Start: 2025-01-20

## 2025-01-27 ENCOUNTER — OFFICE VISIT (OUTPATIENT)
Dept: FAMILY MEDICINE CLINIC | Age: 60
End: 2025-01-27

## 2025-01-27 VITALS
BODY MASS INDEX: 25.5 KG/M2 | SYSTOLIC BLOOD PRESSURE: 114 MMHG | TEMPERATURE: 97.9 F | DIASTOLIC BLOOD PRESSURE: 81 MMHG | OXYGEN SATURATION: 98 % | WEIGHT: 215 LBS | HEART RATE: 82 BPM

## 2025-01-27 DIAGNOSIS — R10.13 DYSPEPSIA: ICD-10-CM

## 2025-01-27 DIAGNOSIS — M54.41 CHRONIC BILATERAL LOW BACK PAIN WITH BILATERAL SCIATICA: ICD-10-CM

## 2025-01-27 DIAGNOSIS — R10.84 GENERALIZED ABDOMINAL PAIN: Primary | ICD-10-CM

## 2025-01-27 DIAGNOSIS — R82.90 ABNORMAL URINE: ICD-10-CM

## 2025-01-27 DIAGNOSIS — G89.29 CHRONIC BILATERAL LOW BACK PAIN WITH BILATERAL SCIATICA: ICD-10-CM

## 2025-01-27 DIAGNOSIS — M54.42 CHRONIC BILATERAL LOW BACK PAIN WITH BILATERAL SCIATICA: ICD-10-CM

## 2025-01-27 LAB
BILIRUBIN, POC: ABNORMAL
BLOOD URINE, POC: ABNORMAL
CLARITY, POC: CLEAR
COLOR, POC: ABNORMAL
GLUCOSE URINE, POC: ABNORMAL MG/DL
KETONES, POC: ABNORMAL MG/DL
LEUKOCYTE EST, POC: ABNORMAL
NITRITE, POC: ABNORMAL
PH, POC: 5.5
PROTEIN, POC: 30 MG/DL
SPECIFIC GRAVITY, POC: >1.03
UROBILINOGEN, POC: 0.2 MG/DL

## 2025-01-27 RX ORDER — TRAMADOL HYDROCHLORIDE 50 MG/1
50 TABLET ORAL EVERY 6 HOURS PRN
Qty: 112 TABLET | Refills: 0 | Status: SHIPPED | OUTPATIENT
Start: 2025-01-27 | End: 2025-02-26

## 2025-01-27 RX ORDER — PANTOPRAZOLE SODIUM 40 MG/1
40 TABLET, DELAYED RELEASE ORAL
Qty: 30 TABLET | Refills: 3 | Status: SHIPPED | OUTPATIENT
Start: 2025-01-27 | End: 2025-05-27

## 2025-01-27 RX ORDER — SUCRALFATE ORAL 1 G/10ML
1 SUSPENSION ORAL 4 TIMES DAILY
Qty: 1200 ML | Refills: 3 | Status: SHIPPED | OUTPATIENT
Start: 2025-01-27

## 2025-01-27 SDOH — ECONOMIC STABILITY: FOOD INSECURITY: WITHIN THE PAST 12 MONTHS, THE FOOD YOU BOUGHT JUST DIDN'T LAST AND YOU DIDN'T HAVE MONEY TO GET MORE.: NEVER TRUE

## 2025-01-27 SDOH — ECONOMIC STABILITY: FOOD INSECURITY: WITHIN THE PAST 12 MONTHS, YOU WORRIED THAT YOUR FOOD WOULD RUN OUT BEFORE YOU GOT MONEY TO BUY MORE.: NEVER TRUE

## 2025-01-27 ASSESSMENT — ENCOUNTER SYMPTOMS
EYES NEGATIVE: 1
RESPIRATORY NEGATIVE: 1
CONSTIPATION: 1
ABDOMINAL PAIN: 1

## 2025-01-27 ASSESSMENT — PATIENT HEALTH QUESTIONNAIRE - PHQ9
1. LITTLE INTEREST OR PLEASURE IN DOING THINGS: NOT AT ALL
SUM OF ALL RESPONSES TO PHQ QUESTIONS 1-9: 0
SUM OF ALL RESPONSES TO PHQ9 QUESTIONS 1 & 2: 0
2. FEELING DOWN, DEPRESSED OR HOPELESS: NOT AT ALL

## 2025-01-27 NOTE — PROGRESS NOTES
CHIEF COMPLAINT  Chief Complaint   Patient presents with    ulcer    GI Problem        HPI   Seven Byrd is a 59 y.o. male who presents to the office complaining of abdominal discomfort and decreased appetite.  Patient reports that he is unable to eat without stomach discomfort.  Patient reports mid abdominal to the lower abdominal discomfort.  Patient has had symptoms over the past month.  Patient did go to the emergency department earlier this month where he was evaluated and discharged home.  Patient is accompanied by his daughter today.  Patient's daughter concerned with weight loss.  Patient reports that anything he eats causes his stomach to be upset.  Patient denies any nausea, vomiting or diarrhea.  Patient does report history of constipation with concerns at today's visit.  Patient has been taking medications as directed for acid reflux/indigestion and constipation.  No fever or chills.  Patient denies any urinary complaints.  No other complaints, modifying factors or associated symptoms.     Nursing notes reviewed.   Past Medical History:   Diagnosis Date    Acute deep vein thrombosis (DVT) of femoral vein of right lower extremity ([facility]) 1/27/2022 December 2021    Acute non-ST elevation myocardial infarction (NSTEMI) ([facility]) 9/1/2022    Adrenal nodule ([facility]) 2021    Arthritis     Asthma     Back problem     Dr Gonzalez/Dr brysno, Dr Rowley    Cholelithiasis without obstruction 2010    asymptomatic    Depression     ED (erectile dysfunction)     Gallbladder & bile duct stone     Gallbladder full of stones    GERD (gastroesophageal reflux disease)     Hernia     Hyperlipidemia     Hypertension     Low testosterone     MI (myocardial infarction) ([facility]) 04/13/2020    mLAD 100% FELICITY Xience Martha 3.5 x 18    Seasonal allergies     Sleep apnea     SVT (supraventricular tachycardia) ([facility])     GXT 1998    Tendonitis     Urothelial carcinoma of kidney, left ([facility]) 07/23/2021     Past Surgical History:   Procedure

## 2025-01-27 NOTE — PATIENT INSTRUCTIONS
Please read the healthy family handout that you were given and share it with your family.       Please compare this printed medication list with your medications at home to be sure they are the same.  If you have any medications that are different please contact us immediately at [phone].     Also review your allergies that we have listed, these may also include medications that you have not been able to tolerate, make sure everything listed is correct. If you have any allergies that are different please contact us immediately at [phone].     You may receive a survey in the mail or by email asking about your experience during your visit today. Please complete and return to us so we know how we are serving you.

## 2025-01-28 LAB
ALBUMIN SERPL-MCNC: 4.7 G/DL (ref 3.4–5)
ALBUMIN/GLOB SERPL: 1.8 {RATIO} (ref 1.1–2.2)
ALP SERPL-CCNC: 126 U/L (ref 40–129)
ALT SERPL-CCNC: 23 U/L (ref 10–40)
ANION GAP SERPL CALCULATED.3IONS-SCNC: 7 MMOL/L (ref 3–16)
AST SERPL-CCNC: 13 U/L (ref 15–37)
BASOPHILS # BLD: 0.1 K/UL (ref 0–0.2)
BASOPHILS NFR BLD: 0.5 %
BILIRUB SERPL-MCNC: 0.5 MG/DL (ref 0–1)
BUN SERPL-MCNC: 18 MG/DL (ref 7–20)
CALCIUM SERPL-MCNC: 9.5 MG/DL (ref 8.3–10.6)
CHLORIDE SERPL-SCNC: 105 MMOL/L (ref 99–110)
CO2 SERPL-SCNC: 30 MMOL/L (ref 21–32)
CREAT SERPL-MCNC: 1.2 MG/DL (ref 0.8–1.3)
DEPRECATED RDW RBC AUTO: 14.8 % (ref 12.4–15.4)
EOSINOPHIL # BLD: 0.3 K/UL (ref 0–0.6)
EOSINOPHIL NFR BLD: 2.6 %
GFR SERPLBLD CREATININE-BSD FMLA CKD-EPI: 69 ML/MIN/{1.73_M2}
GLUCOSE SERPL-MCNC: 103 MG/DL (ref 70–99)
HCT VFR BLD AUTO: 43.3 % (ref 40.5–52.5)
HGB BLD-MCNC: 14 G/DL (ref 13.5–17.5)
LYMPHOCYTES # BLD: 2.7 K/UL (ref 1–5.1)
LYMPHOCYTES NFR BLD: 22.7 %
MCH RBC QN AUTO: 27.4 PG (ref 26–34)
MCHC RBC AUTO-ENTMCNC: 32.4 G/DL (ref 31–36)
MCV RBC AUTO: 84.7 FL (ref 80–100)
MONOCYTES # BLD: 1 K/UL (ref 0–1.3)
MONOCYTES NFR BLD: 8.5 %
NEUTROPHILS # BLD: 7.9 K/UL (ref 1.7–7.7)
NEUTROPHILS NFR BLD: 65.7 %
PLATELET # BLD AUTO: 276 K/UL (ref 135–450)
PMV BLD AUTO: 8.9 FL (ref 5–10.5)
POTASSIUM SERPL-SCNC: 4.8 MMOL/L (ref 3.5–5.1)
PROT SERPL-MCNC: 7.3 G/DL (ref 6.4–8.2)
RBC # BLD AUTO: 5.11 M/UL (ref 4.2–5.9)
SODIUM SERPL-SCNC: 142 MMOL/L (ref 136–145)
WBC # BLD AUTO: 12 K/UL (ref 4–11)

## 2025-01-29 LAB — BACTERIA UR CULT: NORMAL

## 2025-01-29 RX ORDER — LEVOFLOXACIN 500 MG/1
500 TABLET, FILM COATED ORAL DAILY
Qty: 10 TABLET | Refills: 0 | Status: SHIPPED | OUTPATIENT
Start: 2025-01-29 | End: 2025-02-08

## 2025-01-30 ENCOUNTER — TELEPHONE (OUTPATIENT)
Dept: ADMINISTRATIVE | Age: 60
End: 2025-01-30

## 2025-01-30 NOTE — TELEPHONE ENCOUNTER
Submitted PA for traMADol HCl 50MG tablets   [facility] Key: [id number]  STATUS: Approved today by [employer] 2017  PA Case: [id number], Status: Approved, Coverage Starts on: 1/30/2025 12:00:00 AM, Coverage Ends on: 7/29/2025 12:00:00 AM.  Authorization Expiration Date: 7/28/2025    Follow up done daily; if no decision with in three days we will refax.  If another three days goes by with no decision will call the insurance for status.

## 2025-03-05 ENCOUNTER — OFFICE VISIT (OUTPATIENT)
Dept: FAMILY MEDICINE CLINIC | Age: 60
End: 2025-03-05

## 2025-03-05 VITALS
BODY MASS INDEX: 25.92 KG/M2 | HEART RATE: 76 BPM | SYSTOLIC BLOOD PRESSURE: 126 MMHG | DIASTOLIC BLOOD PRESSURE: 76 MMHG | WEIGHT: 218.6 LBS | OXYGEN SATURATION: 99 %

## 2025-03-05 DIAGNOSIS — R09.82 POST-NASAL DRIP: ICD-10-CM

## 2025-03-05 DIAGNOSIS — H65.03 NON-RECURRENT ACUTE SEROUS OTITIS MEDIA OF BOTH EARS: Primary | ICD-10-CM

## 2025-03-05 RX ORDER — METHYLPREDNISOLONE 4 MG/1
TABLET ORAL
Qty: 1 KIT | Refills: 0 | Status: SHIPPED | OUTPATIENT
Start: 2025-03-05 | End: 2025-03-11

## 2025-03-05 RX ORDER — AZELASTINE 1 MG/ML
2 SPRAY, METERED NASAL 2 TIMES DAILY
Qty: 120 ML | Refills: 1 | Status: SHIPPED | OUTPATIENT
Start: 2025-03-05

## 2025-03-05 NOTE — PROGRESS NOTES
Assessment & Plan    Assessment & Plan  1. Ear pain.  The etiology of the ear pain is likely environmental allergies, as evidenced by the clear fluid behind the Tm's and the absence of redness in the eardrums. The accumulation of fluid in the inner ear, due to inadequate drainage through the eustachian tube, could be causing his intermittent feeling of imbalance. This condition can also affect hearing, resulting in crackling sounds. A short course of Medrol Dosepak will be initiated. Additionally, a nasal spray will be prescribed to alleviate the symptoms. He is advised to maintain adequate hydration to facilitate better drainage.    2. Upset stomach.  The upset stomach is suspected to be due to an H. pylori infection. He has been experiencing significant weight loss, over 40 pounds in 3 months, and has had a colonoscopy and endoscopy performed. He is currently awaiting results from these tests. Antibiotics will be avoided at this time to prevent further gastrointestinal upset. He is advised to follow up with his gastroenterologist for the results and subsequent treatment plan.    PROCEDURE  Colonoscopy and endoscopy were performed.  Non-recurrent acute serous otitis media of both ears  -     methylPREDNISolone (MEDROL DOSEPACK) 4 MG tablet; Take by mouth., Disp-1 kit, R-0Normal  Post-nasal drip  -     azelastine (ASTELIN) 0.1 % nasal spray; 2 sprays by Nasal route 2 times daily Use in each nostril as directed, Disp-120 mL, R-1Normal         Subjective   History of Present Illness  The patient is a 59-year-old white male who presents today with concerns of ear pain and an upset stomach.    He reports persistent gastrointestinal issues, which led to an emergency room visit approximately 6 to 7 weeks ago. Since then, he has been under the care of Dr. SINGH, who performed a colonoscopy and endoscopy. The current suspicion is a bacterial infection in the gut, specifically Helicobacter pylori. He was previously prescribed

## 2025-03-12 ENCOUNTER — TRANSCRIBE ORDERS (OUTPATIENT)
Dept: ADMINISTRATIVE | Age: 60
End: 2025-03-12

## 2025-03-12 DIAGNOSIS — R10.30 LOWER ABDOMINAL PAIN: Primary | ICD-10-CM

## 2025-03-14 ENCOUNTER — HOSPITAL ENCOUNTER (OUTPATIENT)
Dept: GENERAL RADIOLOGY | Age: 60
Discharge: HOME | End: 2025-03-14
Attending: INTERNAL MEDICINE
Payer: COMMERCIAL

## 2025-03-14 DIAGNOSIS — R10.30 LOWER ABDOMINAL PAIN: ICD-10-CM

## 2025-03-14 PROCEDURE — 74250 X-RAY XM SM INT 1CNTRST STD: CPT

## 2025-04-14 DIAGNOSIS — G89.29 CHRONIC BILATERAL LOW BACK PAIN WITH BILATERAL SCIATICA: ICD-10-CM

## 2025-04-14 DIAGNOSIS — M54.42 CHRONIC BILATERAL LOW BACK PAIN WITH BILATERAL SCIATICA: ICD-10-CM

## 2025-04-14 DIAGNOSIS — M54.41 CHRONIC BILATERAL LOW BACK PAIN WITH BILATERAL SCIATICA: ICD-10-CM

## 2025-04-14 RX ORDER — TRAMADOL HYDROCHLORIDE 50 MG/1
50 TABLET ORAL EVERY 6 HOURS PRN
Qty: 112 TABLET | Refills: 0 | Status: SHIPPED | OUTPATIENT
Start: 2025-04-14 | End: 2025-05-14

## 2025-04-22 ENCOUNTER — OFFICE VISIT (OUTPATIENT)
Dept: FAMILY MEDICINE CLINIC | Age: 60
End: 2025-04-22

## 2025-04-22 VITALS
OXYGEN SATURATION: 97 % | SYSTOLIC BLOOD PRESSURE: 139 MMHG | TEMPERATURE: 98.8 F | DIASTOLIC BLOOD PRESSURE: 88 MMHG | WEIGHT: 222 LBS | BODY MASS INDEX: 26.33 KG/M2 | HEART RATE: 77 BPM

## 2025-04-22 DIAGNOSIS — M54.42 CHRONIC BILATERAL LOW BACK PAIN WITH BILATERAL SCIATICA: ICD-10-CM

## 2025-04-22 DIAGNOSIS — Z23 NEED FOR SHINGLES VACCINE: ICD-10-CM

## 2025-04-22 DIAGNOSIS — C68.8 PRIMARY UROTHELIAL CARCINOMA OF OVERLAPPING SITES OF URINARY ORGANS (HCC): ICD-10-CM

## 2025-04-22 DIAGNOSIS — M54.41 CHRONIC BILATERAL LOW BACK PAIN WITH BILATERAL SCIATICA: ICD-10-CM

## 2025-04-22 DIAGNOSIS — I25.10 CORONARY ARTERY DISEASE INVOLVING NATIVE CORONARY ARTERY OF NATIVE HEART WITHOUT ANGINA PECTORIS: Primary | ICD-10-CM

## 2025-04-22 DIAGNOSIS — G47.33 SEVERE OBSTRUCTIVE SLEEP APNEA: ICD-10-CM

## 2025-04-22 DIAGNOSIS — F51.01 PRIMARY INSOMNIA: ICD-10-CM

## 2025-04-22 DIAGNOSIS — G89.29 CHRONIC BILATERAL LOW BACK PAIN WITH BILATERAL SCIATICA: ICD-10-CM

## 2025-04-22 PROBLEM — I25.2 HISTORY OF MI (MYOCARDIAL INFARCTION): Status: ACTIVE | Noted: 2020-04-13

## 2025-04-22 LAB
ALT SERPL-CCNC: 18 U/L (ref 10–40)
ANION GAP SERPL CALCULATED.3IONS-SCNC: 12 MMOL/L (ref 3–16)
BUN SERPL-MCNC: 11 MG/DL (ref 7–20)
CALCIUM SERPL-MCNC: 9.6 MG/DL (ref 8.3–10.6)
CHLORIDE SERPL-SCNC: 104 MMOL/L (ref 99–110)
CHOLEST SERPL-MCNC: 132 MG/DL (ref 0–199)
CO2 SERPL-SCNC: 24 MMOL/L (ref 21–32)
CREAT SERPL-MCNC: 1 MG/DL (ref 0.8–1.3)
EST. AVERAGE GLUCOSE BLD GHB EST-MCNC: 119.8 MG/DL
GFR SERPLBLD CREATININE-BSD FMLA CKD-EPI: 85 ML/MIN/{1.73_M2}
GLUCOSE SERPL-MCNC: 100 MG/DL (ref 70–99)
HBA1C MFR BLD: 5.8 %
HDLC SERPL-MCNC: 38 MG/DL (ref 40–60)
LDLC SERPL CALC-MCNC: 57 MG/DL
POTASSIUM SERPL-SCNC: 4.1 MMOL/L (ref 3.5–5.1)
SODIUM SERPL-SCNC: 140 MMOL/L (ref 136–145)
TRIGL SERPL-MCNC: 183 MG/DL (ref 0–150)
VLDLC SERPL CALC-MCNC: 37 MG/DL

## 2025-04-22 RX ORDER — DOXEPIN HYDROCHLORIDE 25 MG/1
25 CAPSULE ORAL NIGHTLY PRN
Qty: 30 CAPSULE | Refills: 2 | Status: SHIPPED | OUTPATIENT
Start: 2025-04-22 | End: 2025-07-21

## 2025-04-22 NOTE — PATIENT INSTRUCTIONS
Please read the healthy family handout that you were given and share it with your family.       Please compare this printed medication list with your medications at home to be sure they are the same.  If you have any medications that are different please contact us immediately at [phone].     Also review your allergies that we have listed, these may also include medications that you have not been able to tolerate, make sure everything listed is correct. If you have any allergies that are different please contact us immediately at [phone].     You may receive a survey in the mail or by email asking about your experience during your visit today. Please complete and return to us so we know how we are serving you.

## 2025-04-22 NOTE — PROGRESS NOTES
He presents today for routine follow-up of his heart disease chronic back pain , sleep apnea cancer and recent problems with abdominal discomfort .he states he had them lower his pain pump morphine to see if he was any better after his procedure and he states he still has some discomfort but it is not bad .he had multiple test on his stomach back in February trying to figure out what was wrong with them and everything came back normal .he had lost about 40 pounds in 30 days and cannot eat anything.  He went to a different doctor and they treated him for small bowel bacterial overgrowth and now he is starting to feel better over the last 3 weeks .he saw his cardiologist on 2/17/2025 and everything checked out good and he denies any recent problems with his heart.  He is having a lot of problems with insomnia.  He has history of sleep apnea which he never tolerated CPAP and has never treated it he is considered the inspire.  He tried melatonin for insomnia and it did not help any  Tests and documents reviewed today: Last labs     Objective:   /88   Pulse 77   Temp 98.8 °F (37.1 °C) (Oral)   Wt 100.7 kg (222 lb)   SpO2 97%   BMI 26.33 kg/m²   BP Readings from Last 3 Encounters:   04/22/25 139/88   03/05/25 126/76   01/27/25 114/81     Physical Exam  Vitals reviewed.   Constitutional:       Appearance: He is well-developed. He is not toxic-appearing.   HENT:      Head: Normocephalic.   Neck:      Thyroid: No thyroid mass or thyromegaly.   Cardiovascular:      Rate and Rhythm: Normal rate and regular rhythm.      Heart sounds: Normal heart sounds. No murmur heard.  Pulmonary:      Effort: No respiratory distress.      Breath sounds: Normal breath sounds. No wheezing or rales.   Abdominal:      General: There is no distension.      Palpations: Abdomen is soft. There is no mass.      Tenderness: There is no abdominal tenderness. There is no guarding or rebound.   Musculoskeletal:      Cervical back: Neck supple.

## 2025-04-23 ENCOUNTER — RESULTS FOLLOW-UP (OUTPATIENT)
Dept: FAMILY MEDICINE CLINIC | Age: 60
End: 2025-04-23

## 2025-05-19 DIAGNOSIS — G89.29 CHRONIC BILATERAL LOW BACK PAIN WITH BILATERAL SCIATICA: ICD-10-CM

## 2025-05-19 DIAGNOSIS — M54.41 CHRONIC BILATERAL LOW BACK PAIN WITH BILATERAL SCIATICA: ICD-10-CM

## 2025-05-19 DIAGNOSIS — M54.42 CHRONIC BILATERAL LOW BACK PAIN WITH BILATERAL SCIATICA: ICD-10-CM

## 2025-05-19 NOTE — TELEPHONE ENCOUNTER
Date of last refill of this med was 4/14/25, # of pills given 112 and # of refills given 0.  Their next appointment is 11/18/25, the last date patient was seen was 4/22/25.  Does patient have medication agreement on file? No  Has drug screen been done in last 12 months if needed? no

## 2025-05-20 RX ORDER — TRAMADOL HYDROCHLORIDE 50 MG/1
50 TABLET ORAL EVERY 6 HOURS PRN
Qty: 112 TABLET | Refills: 0 | Status: SHIPPED | OUTPATIENT
Start: 2025-05-20 | End: 2025-06-19

## 2025-06-23 DIAGNOSIS — G89.29 CHRONIC BILATERAL LOW BACK PAIN WITH BILATERAL SCIATICA: ICD-10-CM

## 2025-06-23 DIAGNOSIS — M54.42 CHRONIC BILATERAL LOW BACK PAIN WITH BILATERAL SCIATICA: ICD-10-CM

## 2025-06-23 DIAGNOSIS — E78.2 MIXED HYPERLIPIDEMIA: ICD-10-CM

## 2025-06-23 DIAGNOSIS — M54.41 CHRONIC BILATERAL LOW BACK PAIN WITH BILATERAL SCIATICA: ICD-10-CM

## 2025-06-23 RX ORDER — ATORVASTATIN CALCIUM 40 MG/1
40 TABLET, FILM COATED ORAL DAILY
Qty: 90 TABLET | Refills: 3 | Status: SHIPPED | OUTPATIENT
Start: 2025-06-23

## 2025-06-23 RX ORDER — TRAMADOL HYDROCHLORIDE 50 MG/1
50 TABLET ORAL EVERY 6 HOURS PRN
Qty: 112 TABLET | Refills: 0 | Status: SHIPPED | OUTPATIENT
Start: 2025-06-23 | End: 2025-07-23

## 2025-07-15 DIAGNOSIS — F51.01 PRIMARY INSOMNIA: ICD-10-CM

## 2025-07-15 RX ORDER — DOXEPIN HYDROCHLORIDE 25 MG/1
CAPSULE ORAL
Qty: 30 CAPSULE | Refills: 2 | Status: SHIPPED | OUTPATIENT
Start: 2025-07-15

## 2025-07-29 ENCOUNTER — TELEMEDICINE (OUTPATIENT)
Dept: FAMILY MEDICINE CLINIC | Age: 60
End: 2025-07-29

## 2025-07-29 DIAGNOSIS — Z00.00 MEDICARE ANNUAL WELLNESS VISIT, SUBSEQUENT: Primary | ICD-10-CM

## 2025-07-29 RX ORDER — TAMSULOSIN HYDROCHLORIDE 0.4 MG/1
0.4 CAPSULE ORAL DAILY
COMMUNITY

## 2025-07-29 RX ORDER — TRAMADOL HYDROCHLORIDE 50 MG/1
50 TABLET ORAL 2 TIMES DAILY
COMMUNITY
End: 2025-07-31

## 2025-07-29 ASSESSMENT — PATIENT HEALTH QUESTIONNAIRE - PHQ9
SUM OF ALL RESPONSES TO PHQ QUESTIONS 1-9: 0
2. FEELING DOWN, DEPRESSED OR HOPELESS: NOT AT ALL
1. LITTLE INTEREST OR PLEASURE IN DOING THINGS: NOT AT ALL
SUM OF ALL RESPONSES TO PHQ QUESTIONS 1-9: 0

## 2025-07-29 ASSESSMENT — LIFESTYLE VARIABLES
HAVE YOU OR SOMEONE ELSE BEEN INJURED AS A RESULT OF YOUR DRINKING: NO
HOW OFTEN DURING THE LAST YEAR HAVE YOU FAILED TO DO WHAT WAS NORMALLY EXPECTED FROM YOU BECAUSE OF DRINKING: NEVER
HOW OFTEN DURING THE LAST YEAR HAVE YOU BEEN UNABLE TO REMEMBER WHAT HAPPENED THE NIGHT BEFORE BECAUSE YOU HAD BEEN DRINKING: NEVER
HOW OFTEN DURING THE LAST YEAR HAVE YOU HAD A FEELING OF GUILT OR REMORSE AFTER DRINKING: NEVER
HOW OFTEN DURING THE LAST YEAR HAVE YOU FOUND THAT YOU WERE NOT ABLE TO STOP DRINKING ONCE YOU HAD STARTED: NEVER
HOW MANY STANDARD DRINKS CONTAINING ALCOHOL DO YOU HAVE ON A TYPICAL DAY: 1 OR 2
HOW OFTEN DO YOU HAVE A DRINK CONTAINING ALCOHOL: 4 OR MORE TIMES A WEEK
HAS A RELATIVE, FRIEND, DOCTOR, OR ANOTHER HEALTH PROFESSIONAL EXPRESSED CONCERN ABOUT YOUR DRINKING OR SUGGESTED YOU CUT DOWN: NO
HOW OFTEN DURING THE LAST YEAR HAVE YOU NEEDED AN ALCOHOLIC DRINK FIRST THING IN THE MORNING TO GET YOURSELF GOING AFTER A NIGHT OF HEAVY DRINKING: NEVER

## 2025-07-29 NOTE — PATIENT INSTRUCTIONS
attack. These may include:    Chest pain or pressure, or a strange feeling in the chest.     Sweating.     Shortness of breath.     Pain, pressure, or a strange feeling in the back, neck, jaw, or upper belly or in one or both shoulders or arms.     Lightheadedness or sudden weakness.     A fast or irregular heartbeat.   After you call 911, the  may tell you to chew 1 adult-strength or 2 to 4 low-dose aspirin. Wait for an ambulance. Do not try to drive yourself.  Watch closely for changes in your health, and be sure to contact your doctor if you have any problems.  Where can you learn more?  Go to https://www.[employer].net/patientEd and enter F075 to learn more about \"A Healthy Heart: Care Instructions.\"  Current as of: July 31, 2024  Content Version: 14.5  © 6012-3285 [employer].   Care instructions adapted under license by [employer]. If you have questions about a medical condition or this instruction, always ask your healthcare professional. [employer], disclaims any warranty or liability for your use of this information.    Personalized Preventive Plan for Seven Byrd - 7/29/2025  Medicare offers a range of preventive health benefits. Some of the tests and screenings are paid in full while other may be subject to a deductible, co-insurance, and/or copay.  Some of these benefits include a comprehensive review of your medical history including lifestyle, illnesses that may run in your family, and various assessments and screenings as appropriate.  After reviewing your medical record and screening and assessments performed today your provider may have ordered immunizations, labs, imaging, and/or referrals for you.  A list of these orders (if applicable) as well as your Preventive Care list are included within your After Visit Summary for your review.

## 2025-07-29 NOTE — PROGRESS NOTES
Medicare Annual Wellness Visit    Seven Byrd is here for Medicare AWV    Assessment & Plan   Medicare annual wellness visit, subsequent     Return in 1 year (on 7/29/2026) for Medicare AWV.     Subjective   Patient's complete Health Risk Assessment and screening values have been reviewed and are found in Flowsheets. The following problems were reviewed today and where indicated follow up appointments were made and/or referrals ordered.    Positive Risk Factor Screenings with Interventions:          Controlled Medication Review:    Today's Pain Level: No data recorded   Opioid Risk: (Low risk score <55) Opioid risk score: 24    Patient is low risk for opioid use disorder or overdose.    Last PDMP Pj as Reviewed:  Review User Review Instant Review Result   STONE BOYD 10/1/2024  9:49 AM     Reviewed PDMP [1]     Last Controlled Substance Monitoring Documentation      Flowsheet Row Telemedicine from 7/23/2024 in [facility]   Periodic Controlled Substance Monitoring No signs of potential drug abuse or diversion identified., Possible medication side effects, risk of tolerance/dependence & alternative treatments discussed. filed at 07/23/2024 0747            Opioid Risk: (Low risk score <= 55)  Opioid risk score: 24    Any opioid medication usage will be addressed by their licensed provider at a future visit.       General HRA Questions:  Select all that apply: (!) New or Increased Pain (Increased back pain from previous back surgery - Following up with TIM Durant)  Interventions - Pain:  See above           Vision Screen:  Do you have difficulty driving, watching TV, or doing any of your daily activities because of your eyesight?: (!) Yes (Far sided, using reader galsses at this time)  Have you had an eye exam within the past year?: (!) No  Interventions:   Patient encouraged to make appointment with their eye specialist       Objective    Patient-Reported Vitals  Patient-Reported

## 2025-07-31 DIAGNOSIS — M54.42 CHRONIC BILATERAL LOW BACK PAIN WITH BILATERAL SCIATICA: Primary | ICD-10-CM

## 2025-07-31 DIAGNOSIS — G89.29 CHRONIC BILATERAL LOW BACK PAIN WITH BILATERAL SCIATICA: Primary | ICD-10-CM

## 2025-07-31 DIAGNOSIS — M54.41 CHRONIC BILATERAL LOW BACK PAIN WITH BILATERAL SCIATICA: Primary | ICD-10-CM

## 2025-07-31 RX ORDER — TRAMADOL HYDROCHLORIDE 50 MG/1
50 TABLET ORAL EVERY 6 HOURS PRN
Qty: 112 TABLET | Refills: 0 | Status: SHIPPED | OUTPATIENT
Start: 2025-07-31 | End: 2025-08-30

## 2025-07-31 NOTE — TELEPHONE ENCOUNTER
Date of last refill of this med was 6/2325, # of pills given 112 and # of refills given 0.  Their next appointment is 11/18/25, the last date patient was seen was 7/29/25.  Does patient have medication agreement on file? No  Has drug screen been done in last 12 months if needed? no

## 2025-08-19 ENCOUNTER — TRANSCRIBE ORDERS (OUTPATIENT)
Dept: ADMINISTRATIVE | Age: 60
End: 2025-08-19

## 2025-08-19 DIAGNOSIS — C68.8 MALIGNANT NEOPLASM OF OVERLAPPING SITES OF URINARY ORGANS (HCC): Primary | ICD-10-CM

## 2025-09-04 DIAGNOSIS — M54.42 CHRONIC BILATERAL LOW BACK PAIN WITH BILATERAL SCIATICA: ICD-10-CM

## 2025-09-04 DIAGNOSIS — G89.29 CHRONIC BILATERAL LOW BACK PAIN WITH BILATERAL SCIATICA: ICD-10-CM

## 2025-09-04 DIAGNOSIS — M54.41 CHRONIC BILATERAL LOW BACK PAIN WITH BILATERAL SCIATICA: ICD-10-CM

## 2025-09-04 RX ORDER — TRAMADOL HYDROCHLORIDE 50 MG/1
50 TABLET ORAL EVERY 6 HOURS PRN
Qty: 112 TABLET | Refills: 2 | Status: SHIPPED | OUTPATIENT
Start: 2025-09-04 | End: 2025-11-27

## (undated) DEVICE — BOWL MED L 32OZ PLAS W/ MOLD GRAD EZ OPN PEEL PCH

## (undated) DEVICE — MEDI-VAC NON-CONDUCTIVE SUCTION TUBING: Brand: CARDINAL HEALTH

## (undated) DEVICE — CANNULA NSL O2 AD 7 FT TBNG SFT TCH STD CONN N FLARED PRNG

## (undated) DEVICE — GUIDEWIRE ENDOSCP L150CM DIA0.035IN TIP 3CM PTFE NIT

## (undated) DEVICE — PACK,UNIVERSAL,SPLIT,II,AURORA: Brand: MEDLINE

## (undated) DEVICE — 3M™ TEGADERM™ TRANSPARENT FILM DRESSING FRAME STYLE, 1626W, 4 IN X 4-3/4 IN (10 CM X 12 CM), 50/CT 4CT/CASE: Brand: 3M™ TEGADERM™

## (undated) DEVICE — PAD,NON-ADHERENT,3X8,STERILE,LF,1/PK: Brand: MEDLINE

## (undated) DEVICE — CABLE ENDOSCP L10FT ACT DISP

## (undated) DEVICE — MAJOR SET UP PK

## (undated) DEVICE — CATHETER,URETHRAL,REDRUBBER,STRL,16FR: Brand: MEDLINE

## (undated) DEVICE — APPLICATOR MEDICATED 26 CC SOLUTION HI LT ORNG CHLORAPREP

## (undated) DEVICE — SUTURE VCRL + SZ 0 L27IN ABSRB VLT L26MM UR-6 5/8 CIR VCP603H

## (undated) DEVICE — SINGLE ACTION PUMPING SYSTEM

## (undated) DEVICE — PREP SOL PVP IODINE 4%  4 OZ/BTL

## (undated) DEVICE — GLOVE ORANGE PI 8 1/2   MSG9085

## (undated) DEVICE — TROCAR: Brand: KII OPTICAL ACCESS SYSTEM

## (undated) DEVICE — CATHETER URETH 16FR 5CC BLLN SIL ELASTMR F 2 W

## (undated) DEVICE — SYRINGE, LUER LOCK, 10ML: Brand: MEDLINE

## (undated) DEVICE — RESERVOIR,SUCTION,100CC,SILICONE: Brand: MEDLINE

## (undated) DEVICE — TROCAR: Brand: KII SLEEVE

## (undated) DEVICE — ELECTRODE PT RET AD L9FT HI MOIST COND ADH HYDRGEL CORDED

## (undated) DEVICE — CATHETER IV 20GA L1.25IN PNK FEP SFTY STR HUB RADPQ DISP

## (undated) DEVICE — OPEN-END FLEXI-TIP URETERAL CATHETER: Brand: FLEXI-TIP

## (undated) DEVICE — COVER BOOT THK2MIL UNIV POLYETH IMPERMEABLE FLD RESIST DISP

## (undated) DEVICE — SYSTEM SMK EVAC LAP TBNG FILTER HSNG BENT STYL PNK SEE CLR

## (undated) DEVICE — SUTURE VCRL SZ 4-0 L18IN ABSRB UD L19MM PS-2 3/8 CIR PRIM J496H

## (undated) DEVICE — CLIP INT L POLYMER LOK LIG HEM O LOK

## (undated) DEVICE — SUTURE ETHLN SZ 4-0 L18IN NONABSORBABLE BLK L19MM PS-2 3/8 1667H

## (undated) DEVICE — CIRCUIT ANES L72IN 3L BACT AND VIR FLTR EL CONN SGL LIMB

## (undated) DEVICE — GLOVE ORANGE PI 7   MSG9070

## (undated) DEVICE — INVIEW CLEAR LEGGINGS: Brand: CONVERTORS

## (undated) DEVICE — Z CONVERTED USE 2271043 CONTAINER SPEC COLL 4OZ SCR ON LID PEEL PCH

## (undated) DEVICE — CANNULA NSL 13FT TUBE AD ETCO2 DIV SAMP M

## (undated) DEVICE — SUTURE PERMAHAND SZ 2-0 L30IN NONABSORBABLE BLK SILK W/O A305H

## (undated) DEVICE — BASIC CYSTO I-LF: Brand: MEDLINE INDUSTRIES, INC.

## (undated) DEVICE — SUTURE VCRL SZ 3-0 L18IN ABSRB UD L26MM SH 1/2 CIR J864D

## (undated) DEVICE — GOWN SIRUS NONREIN LG W/TWL: Brand: MEDLINE INDUSTRIES, INC.

## (undated) DEVICE — 3M™ TEGADERM™ TRANSPARENT FILM DRESSING FRAME STYLE, 1624W, 2-3/8 IN X 2-3/4 IN (6 CM X 7 CM), 100/CT 4CT/CASE: Brand: 3M™ TEGADERM™

## (undated) DEVICE — KIT DRP 4 ARM ACC DISP DA VINCI SI ENDOWRIST

## (undated) DEVICE — SYRINGE MED 10ML LUERLOCK TIP W/O SFTY DISP

## (undated) DEVICE — 3M™ STERI-STRIP™ COMPOUND BENZOIN TINCTURE 40 BAGS/CARTON 4 CARTONS/CASE C1544: Brand: 3M™ STERI-STRIP™

## (undated) DEVICE — Z INACTIVE USE 2855096 SPONGE GZ W4XL4IN 8 PLY 100% COT

## (undated) DEVICE — NEEDLE HYPO 25GA L1.5IN BLU POLYPR HUB S STL REG BVL STR

## (undated) DEVICE — COMFO-TEX ELASTIC BANDAGE LATEX FREE, 3INX5YD: Brand: COMFO-TEX™

## (undated) DEVICE — TUBING, SUCTION, 3/16" X 10', STRAIGHT: Brand: MEDLINE

## (undated) DEVICE — SEAL

## (undated) DEVICE — SUTURE VCRL + SZ 3-0 L18IN ABSRB UD SH 1/2 CIR TAPERCUT NDL VCP864D

## (undated) DEVICE — SET GRAV VENT NVENT CK VLV 3 NDL FREE PRT 10 GTT

## (undated) DEVICE — SOLUTION IV 1000ML LAC RINGERS PH 6.5 INJ USP VIAFLX PLAS

## (undated) DEVICE — ADHESIVE SKIN CLSR 0.7ML TOP DERMBND ADV

## (undated) DEVICE — SOLUTION IV IRRIG 500ML 0.9% SODIUM CHL 2F7123

## (undated) DEVICE — GOWN,SIRUS,NON REINFRCD,LARGE,SET IN SL: Brand: MEDLINE

## (undated) DEVICE — GLOVE SURG SZ 65 L12IN FNGR THK94MIL STD WHT LTX FREE

## (undated) DEVICE — Device

## (undated) DEVICE — CHLORAPREP 26ML ORANGE

## (undated) DEVICE — PMI DISPOSABLE PUNCTURE CLOSURE DEVICE / SUTURE GRASPER: Brand: PMI

## (undated) DEVICE — SUTURE VCRL + SZ 3-0 L27IN ABSRB UD L26MM SH 1/2 CIR VCP416H

## (undated) DEVICE — 35 ML SYRINGE REGULAR TIP: Brand: MONOJECT

## (undated) DEVICE — AGENT HEMSTAT 3GM PURIFIED PLNT STARCH PWD ABSRB ARISTA AH

## (undated) DEVICE — ELECTRODE LOOP 24FR R ANG MPLR CUT

## (undated) DEVICE — SOLUTION IV IRRIG WATER 1000ML POUR BRL 2F7114

## (undated) DEVICE — DBD-PACK,CYSTOSCOPY,PK VI,AURORA: Brand: MEDLINE

## (undated) DEVICE — SUTURE MCRYL + SZ 4-0 L18IN ABSRB UD L19MM PS-2 3/8 CIR MCP496G

## (undated) DEVICE — GLOVE ORANGE PI 7 1/2   MSG9075

## (undated) DEVICE — PADDING CAST W4INXL4YD NONSTERILE COT RAYON MICROPLEATED

## (undated) DEVICE — LARGE NEEDLE DRIVER: Brand: ENDOWRIST

## (undated) DEVICE — BASIC SINGLE BASIN 1-LF: Brand: MEDLINE INDUSTRIES, INC.

## (undated) DEVICE — SUTURE PDS II SZ 0 L27IN ABSRB VLT L26MM CT-2 1/2 CIR Z334H

## (undated) DEVICE — BLADELESS OBTURATOR: Brand: WECK VISTA

## (undated) DEVICE — BAG URIN STRL FOR URO CTCH SYS

## (undated) DEVICE — PROGRASP FORCEPS: Brand: ENDOWRIST

## (undated) DEVICE — SUTURE VCRL + SZ 0 L18IN ABSRB CLR VCP112G

## (undated) DEVICE — GOWN,AURORA,NONREINF,RAGLAN,XXL,STERILE: Brand: MEDLINE

## (undated) DEVICE — GOWN SIRUS NONREIN XL W/TWL: Brand: MEDLINE INDUSTRIES, INC.

## (undated) DEVICE — 3M™ STERI-STRIP™ REINFORCED ADHESIVE SKIN CLOSURES, R1540, 1/8 IN X 3 IN (3 MM X 75 MM), 5 STRIPS/ENVELOPE: Brand: 3M™ STERI-STRIP™

## (undated) DEVICE — BAG DRAINAGE 2000ML UROLOGY ANTI REFLUX CHAMBER SAMPLE PORT

## (undated) DEVICE — SPONGE: LAP 4X18 W XR 200/CS: Brand: MEDICAL ACTION INDUSTRIES

## (undated) DEVICE — SET ADMIN PRIMING 7ML L30IN 7.35LB 20 GTT 2ND RLER CLMP

## (undated) DEVICE — TISSUE RETRIEVAL SYSTEM: Brand: INZII RETRIEVAL SYSTEM

## (undated) DEVICE — GLOVE,SURG,SENSICARE,ALOE,LF,PF,7: Brand: MEDLINE

## (undated) DEVICE — Y-TYPE TUR/BLADDER IRRIGATION SET, REGULATING CLAMP

## (undated) DEVICE — GLOVE,SURG,TRIUMPH MICRO,LTX,PF,7.5: Brand: MEDLINE

## (undated) DEVICE — STANDARD HYPODERMIC NEEDLE,POLYPROPYLENE HUB: Brand: MONOJECT

## (undated) DEVICE — SOLUTION IRRIG 3000ML 1.5% GL USP UROMATIC CONT

## (undated) DEVICE — CORD ES L12FT BPLR FRCP

## (undated) DEVICE — CYSTO/BLADDER IRRIGATION SET, REGULATING CLAMP

## (undated) DEVICE — SYRINGE 60ML IRRIG PISTON TOMEY

## (undated) DEVICE — SUTURE PERMA-HAND SZ 2-0 L30IN NONABSORBABLE BLK L26MM SH K833H

## (undated) DEVICE — DRAIN CHN 19FR L0.25IN DIA6.3MM SIL RND HUBLESS FULL FLUT

## (undated) DEVICE — SYRINGE 30CC LUER LOCK: Brand: CARDINAL HEALTH

## (undated) DEVICE — GAUZE,SPONGE,4"X4",8PLY,STRL,LF,10/TRAY: Brand: MEDLINE

## (undated) DEVICE — INTENDED FOR TISSUE SEPARATION, AND OTHER PROCEDURES THAT REQUIRE A SHARP SURGICAL BLADE TO PUNCTURE OR CUT.: Brand: BARD-PARKER ® STAINLESS STEEL BLADES

## (undated) DEVICE — GLOVE SURG SZ 75 L12IN FNGR THK94MIL STD WHT LTX FREE

## (undated) DEVICE — INSUFFLATION NEEDLE TO ESTABLISH PNEUMOPERITONEUM.: Brand: INSUFFLATION NEEDLE

## (undated) DEVICE — PUMP SUC IRR TBNG L10FT W/ HNDPC ASSEMB STRYKEFLOW 2

## (undated) DEVICE — PENCIL ES CRD L10FT HND SWCHING ROCK SWCH W/ EDGE COAT BLDE

## (undated) DEVICE — SHEATH URO 11 13FRX38CM UROPS

## (undated) DEVICE — ZIMMER® STERILE DISPOSABLE TOURNIQUET CUFF WITH PLC, DUAL PORT, SINGLE BLADDER, 18 IN. (46 CM)

## (undated) DEVICE — SCISSORS SURG DIA8MM MPLR CRV ENDOWRIST

## (undated) DEVICE — CATHETER URETH 22FR BLLN 30CC 3 W F SPEC INF CTRL BARDX

## (undated) DEVICE — REDUCER: Brand: ENDOWRIST

## (undated) DEVICE — 3M™ IOBAN™ 2 ANTIMICROBIAL INCISE DRAPE 6650EZ: Brand: IOBAN™ 2

## (undated) DEVICE — GAUZE,SPONGE,2"X2",8PLY,STERILE,LF,2'S: Brand: MEDLINE

## (undated) DEVICE — GUIDEWIRE URO L145CM DIA0.035IN TIP L3.5CM PTFE FLX AMPLATZ

## (undated) DEVICE — CANNULA SEAL

## (undated) DEVICE — DRAPE C ARM UNIV W41XL74IN CLR PLAS XR VELC CLSR POLY STRP

## (undated) DEVICE — APPLICATOR PREP 26ML 0.7% IOD POVACRYLEX 74% ISO ALC ST

## (undated) DEVICE — Z DUP USE 2522782 SOLUTION IRRIG 1000ML STRL H2O PLAS CONTAINER UROMATIC

## (undated) DEVICE — STONE RETRIEVAL BASKET: Brand: SEGURA HEMISPHERE

## (undated) DEVICE — MATERIAL PD W2XL4YD ST COT CAST SPLNT NONADHESIVE SPEC

## (undated) DEVICE — ABDOMINAL PAD: Brand: DERMACEA